# Patient Record
Sex: MALE | Race: WHITE | NOT HISPANIC OR LATINO | Employment: UNEMPLOYED | URBAN - METROPOLITAN AREA
[De-identification: names, ages, dates, MRNs, and addresses within clinical notes are randomized per-mention and may not be internally consistent; named-entity substitution may affect disease eponyms.]

---

## 2024-03-04 ENCOUNTER — APPOINTMENT (EMERGENCY)
Dept: RADIOLOGY | Facility: HOSPITAL | Age: 59
DRG: 136 | End: 2024-03-04
Payer: COMMERCIAL

## 2024-03-04 ENCOUNTER — APPOINTMENT (EMERGENCY)
Dept: CT IMAGING | Facility: HOSPITAL | Age: 59
DRG: 136 | End: 2024-03-04
Payer: COMMERCIAL

## 2024-03-04 ENCOUNTER — HOSPITAL ENCOUNTER (INPATIENT)
Facility: HOSPITAL | Age: 59
LOS: 2 days | Discharge: HOME | DRG: 136 | End: 2024-03-06
Attending: EMERGENCY MEDICINE | Admitting: INTERNAL MEDICINE
Payer: COMMERCIAL

## 2024-03-04 ENCOUNTER — APPOINTMENT (EMERGENCY)
Dept: MRI IMAGING | Facility: HOSPITAL | Age: 59
DRG: 136 | End: 2024-03-04
Payer: COMMERCIAL

## 2024-03-04 DIAGNOSIS — G93.89 BRAIN MASS: Primary | ICD-10-CM

## 2024-03-04 DIAGNOSIS — R91.8 LUNG MASS: ICD-10-CM

## 2024-03-04 PROBLEM — Z85.47 HX OF TESTICULAR CANCER: Status: ACTIVE | Noted: 2024-03-04

## 2024-03-04 PROBLEM — R79.89 ELEVATED TROPONIN: Status: ACTIVE | Noted: 2024-03-04

## 2024-03-04 PROBLEM — M54.9 BACK PAIN: Status: ACTIVE | Noted: 2024-03-04

## 2024-03-04 PROBLEM — J44.9 COPD (CHRONIC OBSTRUCTIVE PULMONARY DISEASE) (HCC): Status: ACTIVE | Noted: 2024-03-04

## 2024-03-04 LAB
2HR DELTA HS TROPONIN: -3 NG/L
4HR DELTA HS TROPONIN: -8 NG/L
ANION GAP SERPL CALCULATED.3IONS-SCNC: 10 MMOL/L
APAP SERPL-MCNC: <2 UG/ML (ref 10–20)
APTT PPP: 38 SECONDS (ref 23–37)
BASE EXCESS BLDA CALC-SCNC: -1 MMOL/L (ref -2–3)
BASOPHILS # BLD AUTO: 0.04 THOUSANDS/ÂΜL (ref 0–0.1)
BASOPHILS NFR BLD AUTO: 1 % (ref 0–1)
BUN SERPL-MCNC: 21 MG/DL (ref 5–25)
CA-I BLD-SCNC: 1.2 MMOL/L (ref 1.12–1.32)
CALCIUM SERPL-MCNC: 9.7 MG/DL (ref 8.4–10.2)
CARDIAC TROPONIN I PNL SERPL HS: 138 NG/L
CARDIAC TROPONIN I PNL SERPL HS: 143 NG/L
CARDIAC TROPONIN I PNL SERPL HS: 146 NG/L
CHLORIDE SERPL-SCNC: 105 MMOL/L (ref 96–108)
CK SERPL-CCNC: 114 U/L (ref 39–308)
CO2 SERPL-SCNC: 22 MMOL/L (ref 21–32)
CREAT SERPL-MCNC: 1.03 MG/DL (ref 0.6–1.3)
D DIMER PPP FEU-MCNC: 0.63 UG/ML FEU
EOSINOPHIL # BLD AUTO: 0.07 THOUSAND/ÂΜL (ref 0–0.61)
EOSINOPHIL NFR BLD AUTO: 1 % (ref 0–6)
ERYTHROCYTE [DISTWIDTH] IN BLOOD BY AUTOMATED COUNT: 12.5 % (ref 11.6–15.1)
ETHANOL SERPL-MCNC: <10 MG/DL
GFR SERPL CREATININE-BSD FRML MDRD: 79 ML/MIN/1.73SQ M
GLUCOSE SERPL-MCNC: 112 MG/DL (ref 65–140)
GLUCOSE SERPL-MCNC: 116 MG/DL (ref 65–140)
HCO3 BLDA-SCNC: 24 MMOL/L (ref 24–30)
HCT VFR BLD AUTO: 40.3 % (ref 36.5–49.3)
HCT VFR BLD CALC: 40 % (ref 36.5–49.3)
HGB BLD-MCNC: 13.2 G/DL (ref 12–17)
HGB BLDA-MCNC: 13.6 G/DL (ref 12–17)
IMM GRANULOCYTES # BLD AUTO: 0.03 THOUSAND/UL (ref 0–0.2)
IMM GRANULOCYTES NFR BLD AUTO: 0 % (ref 0–2)
INR PPP: 1.09 (ref 0.84–1.19)
LYMPHOCYTES # BLD AUTO: 1.54 THOUSANDS/ÂΜL (ref 0.6–4.47)
LYMPHOCYTES NFR BLD AUTO: 19 % (ref 14–44)
MCH RBC QN AUTO: 30.3 PG (ref 26.8–34.3)
MCHC RBC AUTO-ENTMCNC: 32.8 G/DL (ref 31.4–37.4)
MCV RBC AUTO: 92 FL (ref 82–98)
MONOCYTES # BLD AUTO: 0.6 THOUSAND/ÂΜL (ref 0.17–1.22)
MONOCYTES NFR BLD AUTO: 7 % (ref 4–12)
NEUTROPHILS # BLD AUTO: 6.04 THOUSANDS/ÂΜL (ref 1.85–7.62)
NEUTS SEG NFR BLD AUTO: 72 % (ref 43–75)
NRBC BLD AUTO-RTO: 0 /100 WBCS
PCO2 BLD: 25 MMOL/L (ref 21–32)
PCO2 BLD: 40.5 MM HG (ref 42–50)
PH BLD: 7.38 [PH] (ref 7.3–7.4)
PLATELET # BLD AUTO: 296 THOUSANDS/UL (ref 149–390)
PMV BLD AUTO: 10.2 FL (ref 8.9–12.7)
PO2 BLD: 32 MM HG (ref 35–45)
POTASSIUM BLD-SCNC: 3.8 MMOL/L (ref 3.5–5.3)
POTASSIUM SERPL-SCNC: 3.7 MMOL/L (ref 3.5–5.3)
PROTHROMBIN TIME: 14.6 SECONDS (ref 11.6–14.5)
RBC # BLD AUTO: 4.36 MILLION/UL (ref 3.88–5.62)
SALICYLATES SERPL-MCNC: <5 MG/DL (ref 3–20)
SAO2 % BLD FROM PO2: 60 % (ref 60–85)
SODIUM BLD-SCNC: 140 MMOL/L (ref 136–145)
SODIUM SERPL-SCNC: 137 MMOL/L (ref 135–147)
SPECIMEN SOURCE: ABNORMAL
WBC # BLD AUTO: 8.32 THOUSAND/UL (ref 4.31–10.16)

## 2024-03-04 PROCEDURE — 71275 CT ANGIOGRAPHY CHEST: CPT

## 2024-03-04 PROCEDURE — 36415 COLL VENOUS BLD VENIPUNCTURE: CPT | Performed by: EMERGENCY MEDICINE

## 2024-03-04 PROCEDURE — A9585 GADOBUTROL INJECTION: HCPCS | Performed by: EMERGENCY MEDICINE

## 2024-03-04 PROCEDURE — 84132 ASSAY OF SERUM POTASSIUM: CPT

## 2024-03-04 PROCEDURE — 74177 CT ABD & PELVIS W/CONTRAST: CPT

## 2024-03-04 PROCEDURE — 82077 ASSAY SPEC XCP UR&BREATH IA: CPT | Performed by: EMERGENCY MEDICINE

## 2024-03-04 PROCEDURE — 85014 HEMATOCRIT: CPT

## 2024-03-04 PROCEDURE — 80179 DRUG ASSAY SALICYLATE: CPT | Performed by: EMERGENCY MEDICINE

## 2024-03-04 PROCEDURE — 71045 X-RAY EXAM CHEST 1 VIEW: CPT

## 2024-03-04 PROCEDURE — 85610 PROTHROMBIN TIME: CPT | Performed by: PHYSICIAN ASSISTANT

## 2024-03-04 PROCEDURE — 82803 BLOOD GASES ANY COMBINATION: CPT

## 2024-03-04 PROCEDURE — 80143 DRUG ASSAY ACETAMINOPHEN: CPT | Performed by: EMERGENCY MEDICINE

## 2024-03-04 PROCEDURE — 99223 1ST HOSP IP/OBS HIGH 75: CPT | Performed by: INTERNAL MEDICINE

## 2024-03-04 PROCEDURE — 85379 FIBRIN DEGRADATION QUANT: CPT | Performed by: EMERGENCY MEDICINE

## 2024-03-04 PROCEDURE — 96376 TX/PRO/DX INJ SAME DRUG ADON: CPT

## 2024-03-04 PROCEDURE — 82550 ASSAY OF CK (CPK): CPT | Performed by: EMERGENCY MEDICINE

## 2024-03-04 PROCEDURE — 96375 TX/PRO/DX INJ NEW DRUG ADDON: CPT

## 2024-03-04 PROCEDURE — 84295 ASSAY OF SERUM SODIUM: CPT

## 2024-03-04 PROCEDURE — 96374 THER/PROPH/DIAG INJ IV PUSH: CPT

## 2024-03-04 PROCEDURE — 84484 ASSAY OF TROPONIN QUANT: CPT | Performed by: EMERGENCY MEDICINE

## 2024-03-04 PROCEDURE — 70450 CT HEAD/BRAIN W/O DYE: CPT

## 2024-03-04 PROCEDURE — 99291 CRITICAL CARE FIRST HOUR: CPT | Performed by: EMERGENCY MEDICINE

## 2024-03-04 PROCEDURE — 80048 BASIC METABOLIC PNL TOTAL CA: CPT | Performed by: EMERGENCY MEDICINE

## 2024-03-04 PROCEDURE — 85730 THROMBOPLASTIN TIME PARTIAL: CPT | Performed by: PHYSICIAN ASSISTANT

## 2024-03-04 PROCEDURE — 70553 MRI BRAIN STEM W/O & W/DYE: CPT

## 2024-03-04 PROCEDURE — 93005 ELECTROCARDIOGRAM TRACING: CPT

## 2024-03-04 PROCEDURE — 99285 EMERGENCY DEPT VISIT HI MDM: CPT

## 2024-03-04 PROCEDURE — 82947 ASSAY GLUCOSE BLOOD QUANT: CPT

## 2024-03-04 PROCEDURE — 82330 ASSAY OF CALCIUM: CPT

## 2024-03-04 PROCEDURE — 85025 COMPLETE CBC W/AUTO DIFF WBC: CPT | Performed by: EMERGENCY MEDICINE

## 2024-03-04 RX ORDER — MULTIVITAMIN
1 TABLET ORAL DAILY
COMMUNITY

## 2024-03-04 RX ORDER — CHLORHEXIDINE GLUCONATE ORAL RINSE 1.2 MG/ML
15 SOLUTION DENTAL EVERY 12 HOURS SCHEDULED
Status: DISCONTINUED | OUTPATIENT
Start: 2024-03-04 | End: 2024-03-06 | Stop reason: HOSPADM

## 2024-03-04 RX ORDER — GADOBUTROL 604.72 MG/ML
8.5 INJECTION INTRAVENOUS
Status: COMPLETED | OUTPATIENT
Start: 2024-03-04 | End: 2024-03-04

## 2024-03-04 RX ORDER — LEVETIRACETAM 500 MG/5ML
1000 INJECTION, SOLUTION, CONCENTRATE INTRAVENOUS ONCE
Status: COMPLETED | OUTPATIENT
Start: 2024-03-04 | End: 2024-03-04

## 2024-03-04 RX ORDER — LEVETIRACETAM 500 MG/5ML
500 INJECTION, SOLUTION, CONCENTRATE INTRAVENOUS EVERY 12 HOURS SCHEDULED
Status: DISCONTINUED | OUTPATIENT
Start: 2024-03-04 | End: 2024-03-06 | Stop reason: HOSPADM

## 2024-03-04 RX ORDER — DEXAMETHASONE SODIUM PHOSPHATE 4 MG/ML
4 INJECTION, SOLUTION INTRA-ARTICULAR; INTRALESIONAL; INTRAMUSCULAR; INTRAVENOUS; SOFT TISSUE EVERY 6 HOURS SCHEDULED
Status: DISCONTINUED | OUTPATIENT
Start: 2024-03-04 | End: 2024-03-06 | Stop reason: HOSPADM

## 2024-03-04 RX ORDER — DEXAMETHASONE SODIUM PHOSPHATE 10 MG/ML
10 INJECTION, SOLUTION INTRAMUSCULAR; INTRAVENOUS ONCE
Status: COMPLETED | OUTPATIENT
Start: 2024-03-04 | End: 2024-03-04

## 2024-03-04 RX ORDER — CHOLECALCIFEROL (VITAMIN D3) 50 MCG
2000 TABLET ORAL DAILY
COMMUNITY

## 2024-03-04 RX ADMIN — GADOBUTROL 8.5 ML: 604.72 INJECTION INTRAVENOUS at 14:32

## 2024-03-04 RX ADMIN — IOHEXOL 100 ML: 350 INJECTION, SOLUTION INTRAVENOUS at 11:36

## 2024-03-04 RX ADMIN — LEVETIRACETAM 500 MG: 500 INJECTION, SOLUTION INTRAVENOUS at 15:02

## 2024-03-04 RX ADMIN — DEXAMETHASONE SODIUM PHOSPHATE 10 MG: 10 INJECTION, SOLUTION INTRAMUSCULAR; INTRAVENOUS at 11:10

## 2024-03-04 RX ADMIN — LEVETIRACETAM 1000 MG: 500 INJECTION, SOLUTION INTRAVENOUS at 11:10

## 2024-03-04 RX ADMIN — CHLORHEXIDINE GLUCONATE 15 ML: 1.2 SOLUTION ORAL at 21:06

## 2024-03-04 RX ADMIN — DEXAMETHASONE SODIUM PHOSPHATE 4 MG: 4 INJECTION, SOLUTION INTRA-ARTICULAR; INTRALESIONAL; INTRAMUSCULAR; INTRAVENOUS; SOFT TISSUE at 17:07

## 2024-03-04 RX ADMIN — LEVETIRACETAM 500 MG: 500 INJECTION, SOLUTION INTRAVENOUS at 21:06

## 2024-03-04 NOTE — TREATMENT PLAN
Reviewed MRI brain with Dr. Goldberg.  This demonstrates at least 7 brain lesions with surrounding vasogenic edema.  Spoke with critical care team here at [place] , recommend transfer to [facility] for radiation.  Will make my team aware of transfer.  Call with any further questions or concerns.

## 2024-03-04 NOTE — CONSULTS
"e-Consult (IPC)     Consults     Contacted by Williams Oviedo MD.    Nino Pierresusie 58 y.o. male MRN: [id number]  Unit/Bed#: ED 07 Encounter: [id number]    Reason for Consult    Per provider report, patient presents with Confusion. Available past medical history,social history, surgical history, medication list, drug allergies and review of systems were reviewed.    /75 (BP Location: Right arm)   Pulse 65   Temp 97.5 °F (36.4 °C) (Temporal)   Resp 20   Ht 5' 10\" (1.778 m)   Wt 86.5 kg (190 lb 11.2 oz)   SpO2 92%   BMI 27.36 kg/m²      Clinical exam per provider report, confusion, intermittent re-directable.    Imaging personally reviewed. CT head, CTA Chest, MRI brain    Assessment and Recommendations    1. Imaging reviewed by Neurosurg, multiple brain lesion, with one right cerebellum lesion with small hemorrhage: recs [facility] for radiation   2. Consult Rad onc  3. dexamethasone  4. Keppra since one of the lesion and edema int e left temporal lobe  No need for SLB at this time. Transfer to [facility] for radiation evaluation.   Please call with any questions or concerns.     All questions answered. Provider is in agreement with the course of action. 31 + minutes, >50% of the total time devoted to medical consultative verbal/EMR discussion between providers. Written report will be generated in the EMR.   "
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
## 2024-03-04 NOTE — ASSESSMENT & PLAN NOTE
Intermittent confusion, headache, and aphasia worse with exertion since January  3/4/23 MRI brain: Multiple rim enhancing supra and infratentorial lesions some of which demonstrate internal hemorrhage, may represent cystic metastatic disease given findings within the chest. There is no associated increased ASL signal or definite increased cerebral blood flow and primary differential although less likely given nodular peripheral enhancement and lack of central restricted diffusion includes infection (including toxoplasmosis in the setting of immunocompromised state). 3 mm rightward midline shift, stable compared to most recent head CT.  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

## 2024-03-04 NOTE — H&P
[facility]  H&P  Name: Nino Silva 58 y.o. male I MRN: [id number]  Unit/Bed#: -01 SDU I Date of Admission: 3/4/2024   Date of Service: 3/4/2024 I Hospital Day: 0      Assessment/Plan   * Brain mass  Assessment & Plan  Intermittent confusion, headache, and aphasia worse with exertion since January  3/4/23 MRI brain: Multiple rim enhancing supra and infratentorial lesions some of which demonstrate internal hemorrhage, may represent cystic metastatic disease given findings within the chest. There is no associated increased ASL signal or definite increased cerebral blood flow and primary differential although less likely given nodular peripheral enhancement and lack of central restricted diffusion includes infection (including toxoplasmosis in the setting of immunocompromised state). 3 mm rightward midline shift, stable compared to most recent head CT.  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 Prominent right perihilar pulmonary mass resulting in cut off of a segmental right middle lobe pulmonary artery branch with remaining pulmonary arteries appearing unremarkable without filling defect to indicate PE. Additional separate right upper lobe mass versus bilobed component raising suspicion for pulmonary metastatic disease. Mediastinal and hilar adenopathy also suspicious for metastatic disease. Right greater than left iliac chain and periaortic beata disease also concerning for metastases. 2. Chronic pancreatitis. 3. COPD and associated pulmonary fibrotic changes noted as above.  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 male with past medical history significant for left-sided testicular cancer status post resection and chemotherapy in the early 90s and tobacco abuse who presented to the ED with complaints of lightheadedness and disorientation over the past 2 months.  Patient states that he developed what he thought was bronchitis in January with episodes of intermittent hemoptysis.  The symptoms resolved and he thought he was getting better so he did not seek treatment.  Those symptoms returned again in February and he was given a Z-Sav by his PCP for a presumed bronchitis.  He noted that he was having headaches and confusion when he would cough but again the symptoms resolved on their own.  Since then he has had intermittent episodes of \"disorientation\" worse with exertion.  His wife states that at times he has difficulty finding his words.  He denies any changes in vision, numbness, tingling, difficulties with ambulation.  He also admits to only being able to lay on his left side due to shortness of breath when laying on his right side.  In the ED, his CT head showed multiple cortical brain lesions with vasogenic edema concerning for metastatic disease.  This was discussed with neurocritical care at [place] and neurosurgery who recommended MRI brain and CTA chest abdomen pelvis which showed what is likely a primary lung malignancy with metastasis to the brain.  Due to vasogenic edema is recommended the patient be started on Decadron, Keppra for seizure prophylaxis, and have a consult " placed to radiation oncology for radiation to the brain.  He will be admitted to critical care service for further management.    History obtained from chart review and the patient.  Review of Systems   Constitutional:  Negative for chills, fatigue and fever.   Respiratory:  Positive for shortness of breath.    Cardiovascular:  Negative for chest pain.   Gastrointestinal:  Negative for abdominal distention, constipation, diarrhea and nausea.   Neurological:  Positive for speech difficulty and headaches. Negative for tremors, seizures, weakness, light-headedness and numbness.   Psychiatric/Behavioral:  Positive for confusion.    All other systems reviewed and are negative.    Disposition: Stepdown Level 1  Historical Information   No past medical history on file. Past Surgical History:  No date: HERNIA REPAIR   Current Outpatient Medications   Medication Instructions    Multiple Vitamin (Multi Vitamin Daily) TABS 1 tablet, Oral, Daily    Vitamin D 2,000 Units, Oral, Daily    No Known Allergies   Social History     Tobacco Use    Smoking status: Never    Smokeless tobacco: Never   Substance Use Topics    Alcohol use: Not Currently    Drug use: Not Currently    History reviewed. No pertinent family history.       Objective                            Vitals I/O      Most Recent Min/Max in 24hrs   Temp 98.8 °F (37.1 °C) Temp  Min: 97.5 °F (36.4 °C)  Max: 98.8 °F (37.1 °C)   Pulse 78 Pulse  Min: 62  Max: 80   Resp (!) 25 Resp  Min: 18  Max: 25   /82 BP  Min: 115/69  Max: 161/86   O2 Sat 95 % SpO2  Min: 92 %  Max: 95 %    No intake or output data in the 24 hours ending 03/04/24 1843    Diet NPO    Invasive Monitoring           Physical Exam   Physical Exam  Vitals reviewed.   Eyes:      Extraocular Movements: Extraocular movements intact.      Pupils: Pupils are equal, round, and reactive to light.   Skin:     General: Skin is warm and dry.   HENT:      Head: Normocephalic and atraumatic.      Mouth/Throat:       Mouth: Mucous membranes are moist.   Cardiovascular:      Rate and Rhythm: Normal rate and regular rhythm.      Heart sounds: No murmur heard.     No friction rub. No gallop.   Musculoskeletal:      Right lower leg: No edema.      Left lower leg: No edema.   Abdominal: General: There is no distension.      Palpations: Abdomen is soft.      Tenderness: There is no abdominal tenderness.   Constitutional:       General: He is not in acute distress.     Appearance: He is well-developed and well-nourished. He is not ill-appearing or toxic-appearing.   Pulmonary:      Effort: Pulmonary effort is normal.      Breath sounds: Wheezing present. No rhonchi or rales.   Neurological:      General: No focal deficit present.      Mental Status: He is alert and oriented to person, place and time. Mental status is at baseline.      Motor: Strength full and intact in all extremities.            Diagnostic Studies      EKG: NSR  Imaging:  I have personally reviewed pertinent reports.       Medications:  Scheduled PRN   chlorhexidine, 15 mL, Q12H JESUS  dexamethasone, 4 mg, Q6H JESUS  levETIRAcetam, 500 mg, Q12H JESUS          Continuous          Labs:    CBC    Recent Labs     03/04/24  1008 03/04/24  1014   WBC 8.32  --    HGB 13.2 13.6   HCT 40.3 40     --      BMP    Recent Labs     03/04/24  1008 03/04/24  1014   SODIUM 137  --    K 3.7  --      --    CO2 22 25   AGAP 10  --    BUN 21  --    CREATININE 1.03  --    CALCIUM 9.7  --        Coags    No recent results     Additional Electrolytes  Recent Labs     03/04/24  1014   CAIONIZED 1.20          Blood Gas    No recent results  No recent results LFTs  No recent results    Infectious  No recent results  Glucose  Recent Labs     03/04/24  1008   GLUC 112             Anticipated Length of Stay is > 2 midnights  Jamila Adames PA-C

## 2024-03-04 NOTE — PLAN OF CARE
Problem: PAIN - ADULT  Goal: Verbalizes/displays adequate comfort level or baseline comfort level  Description: Interventions:  - Encourage patient to monitor pain and request assistance  - Assess pain using appropriate pain scale  - Administer analgesics based on type and severity of pain and evaluate response  - Implement non-pharmacological measures as appropriate and evaluate response  - Consider cultural and social influences on pain and pain management  - Notify physician/advanced practitioner if interventions unsuccessful or patient reports new pain  Outcome: Progressing     Problem: INFECTION - ADULT  Goal: Absence or prevention of progression during hospitalization  Description: INTERVENTIONS:  - Assess and monitor for signs and symptoms of infection  - Monitor lab/diagnostic results  - Monitor all insertion sites, i.e. indwelling lines, tubes, and drains  - Monitor endotracheal if appropriate and nasal secretions for changes in amount and color  - [place] appropriate cooling/warming therapies per order  - Administer medications as ordered  - Instruct and encourage patient and family to use good hand hygiene technique  - Identify and instruct in appropriate isolation precautions for identified infection/condition  Outcome: Progressing  Goal: Absence of fever/infection during neutropenic period  Description: INTERVENTIONS:  - Monitor WBC    Outcome: Progressing     Problem: SAFETY ADULT  Goal: Patient will remain free of falls  Description: INTERVENTIONS:  - Educate patient/family on patient safety including physical limitations  - Instruct patient to call for assistance with activity   - Consult OT/PT to assist with strengthening/mobility   - Keep Call bell within reach  - Keep bed low and locked with side rails adjusted as appropriate  - Keep care items and personal belongings within reach  - Initiate and maintain comfort rounds  - Make Fall Risk Sign visible to staff  - Offer Toileting every 2 Hours,  in advance of need  - Initiate/Maintain bed alarm  - Obtain necessary fall risk management equipment:  Problem: Knowledge Deficit  Goal: Patient/family/caregiver demonstrates understanding of disease process, treatment plan, medications, and discharge instructions  Description: Complete learning assessment and assess knowledge base.  Interventions:  - Provide teaching at level of understanding  - Provide teaching via preferred learning methods  Outcome: Progressing   - Apply yellow socks and bracelet for high fall risk patients  - Consider moving patient to room near nurses station  Outcome: Progressing  Goal: Maintain or return to baseline ADL function  Description: INTERVENTIONS:  -  Assess patient's ability to carry out ADLs; assess patient's baseline for ADL function and identify physical deficits which impact ability to perform ADLs (bathing, care of mouth/teeth, toileting, grooming, dressing, etc.)  - Assess/evaluate cause of self-care deficits   - Assess range of motion  - Assess patient's mobility; develop plan if impaired  - Assess patient's need for assistive devices and provide as appropriate  - Encourage maximum independence but intervene and supervise when necessary  - Involve family in performance of ADLs  - Assess for home care needs following discharge   - Consider OT consult to assist with ADL evaluation and planning for discharge  - Provide patient education as appropriate  Outcome: Progressing  Goal: Maintains/Returns to pre admission functional level  Description: INTERVENTIONS:  - Perform AM-PAC 6 Click Basic Mobility/ Daily Activity assessment daily.  - Set and communicate daily mobility goal to care team and patient/family/caregiver.   - Collaborate with rehabilitation services on mobility goals if consulted    - Out of bed for toileting  - Record patient progress and toleration of activity level   Outcome: Progressing

## 2024-03-04 NOTE — ED NOTES
Pt's belongings at bedside.      Bonita Garcia RN  03/04/24 1222       Bonita Garcia RN  03/04/24 1237

## 2024-03-04 NOTE — TELEMEDICINE
"e-Consult (IPC)         Contacted by Williams O[place]o via TT at 1049    Nino Silva 58 y.o. male MRN: [id number]  Unit/Bed#: ED 07 Encounter: [id number]    Reason for Consult    Per provider report, patient presented with a few days/weeks of worsening confusion.  History of testicular cancer 30 years ago.    Available past medical history,social history, surgical history, medication list, drug allergies and review of systems were reviewed.    /86 (BP Location: Left arm)   Pulse 71   Temp 97.5 °F (36.4 °C) (Temporal)   Resp 18   Ht 5' 10\" (1.778 m)   Wt 86.5 kg (190 lb 11.2 oz)   SpO2 92%   BMI 27.36 kg/m²      Clinical exam per provider report, neuro intact    Imaging personally reviewed.  CT head demonstrates multiple cortical brain masses with associated vasogenic edema with midline shift.    Assessment and Recommendations    Continue frequent neurological checks.   STAT CT head with any neurological decline including drop GCS of 2pts within 1 hr.  Recommend MRI brain with and without contrast  Recommend CT chest abdomen and pelvis to rule out other possible underlying lesions  Recommend Decadron 10 mg one-time followed by 4 mg every 6 hours  Recommend Keppra  Hold all antiplatelet and anticoagulation medications.   Medical management and pain control per primary team  Recommend further discussion with neuro ICU in regards to transferring to [place]  Full consult to follow once transferred, contact neurosurgery with any further questions or concerns.    All questions answered. Provider is in agreement with the course of action. 11-20 minutes, >50% of the total time devoted to medical consultative verbal/EMR discussion between providers. Written report will be generated in the EMR.   "

## 2024-03-04 NOTE — ASSESSMENT & PLAN NOTE
3/4 CT CAP: 1. Prominent right perihilar pulmonary mass resulting in cut off of a segmental right middle lobe pulmonary artery branch with remaining pulmonary arteries appearing unremarkable without filling defect to indicate PE. Additional separate right upper lobe mass versus bilobed component raising suspicion for pulmonary metastatic disease. Mediastinal and hilar adenopathy also suspicious for metastatic disease. Right greater than left iliac chain and periaortic beata disease also concerning for metastases. 2. Chronic pancreatitis. 3. COPD and associated pulmonary fibrotic changes noted as above.  Hx of tobacco abuse, no longer smoking  Plan for IR biopsy  Currently on room air

## 2024-03-05 ENCOUNTER — APPOINTMENT (INPATIENT)
Dept: RADIOLOGY | Facility: HOSPITAL | Age: 59
DRG: 136 | End: 2024-03-05
Payer: COMMERCIAL

## 2024-03-05 ENCOUNTER — APPOINTMENT (INPATIENT)
Dept: CT IMAGING | Facility: HOSPITAL | Age: 59
DRG: 136 | End: 2024-03-05
Attending: RADIOLOGY
Payer: COMMERCIAL

## 2024-03-05 ENCOUNTER — TELEPHONE (OUTPATIENT)
Dept: HEMATOLOGY ONCOLOGY | Facility: CLINIC | Age: 59
End: 2024-03-05

## 2024-03-05 PROCEDURE — 77263 THER RADIOLOGY TX PLNG CPLX: CPT | Performed by: RADIOLOGY

## 2024-03-05 PROCEDURE — 71045 X-RAY EXAM CHEST 1 VIEW: CPT

## 2024-03-05 PROCEDURE — 77334 RADIATION TREATMENT AID(S): CPT | Performed by: RADIOLOGY

## 2024-03-05 PROCEDURE — 99232 SBSQ HOSP IP/OBS MODERATE 35: CPT | Performed by: INTERNAL MEDICINE

## 2024-03-05 PROCEDURE — 0BBC3ZX EXCISION OF RIGHT UPPER LUNG LOBE, PERCUTANEOUS APPROACH, DIAGNOSTIC: ICD-10-PCS | Performed by: RADIOLOGY

## 2024-03-05 PROCEDURE — 99255 IP/OBS CONSLTJ NEW/EST HI 80: CPT | Performed by: RADIOLOGY

## 2024-03-05 PROCEDURE — 77290 THER RAD SIMULAJ FIELD CPLX: CPT | Performed by: RADIOLOGY

## 2024-03-05 PROCEDURE — 88360 TUMOR IMMUNOHISTOCHEM/MANUAL: CPT | Performed by: PATHOLOGY

## 2024-03-05 PROCEDURE — 88305 TISSUE EXAM BY PATHOLOGIST: CPT | Performed by: PATHOLOGY

## 2024-03-05 PROCEDURE — 88333 PATH CONSLTJ SURG CYTO XM 1: CPT | Performed by: PATHOLOGY

## 2024-03-05 PROCEDURE — 88342 IMHCHEM/IMCYTCHM 1ST ANTB: CPT | Performed by: PATHOLOGY

## 2024-03-05 PROCEDURE — 88341 IMHCHEM/IMCYTCHM EA ADD ANTB: CPT | Performed by: PATHOLOGY

## 2024-03-05 RX ORDER — LIDOCAINE HYDROCHLORIDE 10 MG/ML
INJECTION, SOLUTION EPIDURAL; INFILTRATION; INTRACAUDAL; PERINEURAL AS NEEDED
Status: COMPLETED | OUTPATIENT
Start: 2024-03-05 | End: 2024-03-05

## 2024-03-05 RX ORDER — MIDAZOLAM HYDROCHLORIDE 2 MG/2ML
INJECTION, SOLUTION INTRAMUSCULAR; INTRAVENOUS AS NEEDED
Status: COMPLETED | OUTPATIENT
Start: 2024-03-05 | End: 2024-03-05

## 2024-03-05 RX ORDER — FENTANYL CITRATE 50 UG/ML
INJECTION, SOLUTION INTRAMUSCULAR; INTRAVENOUS AS NEEDED
Status: COMPLETED | OUTPATIENT
Start: 2024-03-05 | End: 2024-03-05

## 2024-03-05 RX ADMIN — CHLORHEXIDINE GLUCONATE 15 ML: 1.2 SOLUTION ORAL at 21:46

## 2024-03-05 RX ADMIN — DEXAMETHASONE SODIUM PHOSPHATE 4 MG: 4 INJECTION, SOLUTION INTRA-ARTICULAR; INTRALESIONAL; INTRAMUSCULAR; INTRAVENOUS; SOFT TISSUE at 12:54

## 2024-03-05 RX ADMIN — MIDAZOLAM 2 MG: 1 INJECTION INTRAMUSCULAR; INTRAVENOUS at 11:57

## 2024-03-05 RX ADMIN — FENTANYL CITRATE 100 MCG: 50 INJECTION, SOLUTION INTRAMUSCULAR; INTRAVENOUS at 11:57

## 2024-03-05 RX ADMIN — LIDOCAINE HYDROCHLORIDE 5 ML: 10 INJECTION, SOLUTION EPIDURAL; INFILTRATION; INTRACAUDAL; PERINEURAL at 11:57

## 2024-03-05 RX ADMIN — LEVETIRACETAM 500 MG: 500 INJECTION, SOLUTION INTRAVENOUS at 21:46

## 2024-03-05 RX ADMIN — DEXAMETHASONE SODIUM PHOSPHATE 4 MG: 4 INJECTION, SOLUTION INTRA-ARTICULAR; INTRALESIONAL; INTRAMUSCULAR; INTRAVENOUS; SOFT TISSUE at 00:00

## 2024-03-05 RX ADMIN — DEXAMETHASONE SODIUM PHOSPHATE 4 MG: 4 INJECTION, SOLUTION INTRA-ARTICULAR; INTRALESIONAL; INTRAMUSCULAR; INTRAVENOUS; SOFT TISSUE at 17:46

## 2024-03-05 RX ADMIN — LEVETIRACETAM 500 MG: 500 INJECTION, SOLUTION INTRAVENOUS at 08:55

## 2024-03-05 RX ADMIN — DEXAMETHASONE SODIUM PHOSPHATE 4 MG: 4 INJECTION, SOLUTION INTRA-ARTICULAR; INTRALESIONAL; INTRAMUSCULAR; INTRAVENOUS; SOFT TISSUE at 05:08

## 2024-03-05 RX ADMIN — CHLORHEXIDINE GLUCONATE 15 ML: 1.2 SOLUTION ORAL at 08:55

## 2024-03-05 NOTE — PROGRESS NOTES
Pastoral Care Progress Note    3/5/2024  Patient: Nino Silva : 1965  Admission Date & Time: 3/4/2024 0950  MRN: [id number] [facility]: [id number]       24 1200   Clinical Encounter Type   Visited With Patient;Family   Routine Visit Introduction   Referral From Verbal;Nurse   Referral To    Anglican Encounters   Anglican Needs Prayer        Intern visited with pt and family based on nurse referral.  Intern introduced themself to the pt and asked how he was doing. Pt appeared in positive spirits and stated that he was doing well. Pt identified his demetra and his family as sources of strength and support as he is awaiting his test results.  Intern also provided a supportive presence to the family. Prayer was provided before leaving and the pt was informed on how to contact chaplains if needed. Spiritual care remains available.             Chaplaincy Interventions Utilized:   Exploration: Explored spiritual needs & resources and Facilitated story telling    Collaboration: Facilitated respect for spiritual/cultural practice during hospitalization    Relationship Building: Cultivated a relationship of care and support, Listened empathically, Hospitality, and Provided silent and supportive presence    Ritual: Provided prayer    Chaplaincy Outcomes Achieved:  Expressed peace, Identified meaningful connections, Identified priorities, and Spiritual resources utilized    Spiritual Coping Strategies Utilized:   Spiritual practices and Spiritual comfort

## 2024-03-05 NOTE — TELEPHONE ENCOUNTER
"Soft Intake Form   Patient Details   Nino Silva     1965     Reason For Appointment   Who is Calling? Mary Malloyinley   If not patient, Name?  NA   DID YOU CONFIRM INSURANCE WITH PATIENT? MARLENE BENJAMIN Pending, Routed to finance   Who is the Referring Doctor? MARCOS Dong   What is the diagnosis? Lung mass, Brain mass   Has this diagnosis been confirmed by a biopsy/surgery?    If yes, what is the date it was done? Lung Bx taken on 3/5   Biopsy done at [facility]?  If not, where was it done? Yes   Was imaging done, and was it done at [facility]?  If not, where was it done? Yes-[facility]   Have you been seen by another Oncologist?  If so, who and where (name of facility, city and state) No   For 2nd Opinions Only: Are you currently undergoing treatment, or are you scheduled to start treatment?  If yes, name of facility, city and state  Patient will be starting WBRT on 3/6 per Rad Onc note.   For \"History Of\" only: Have you completed treatment?  No   Have you had Genetic Testing done in the past?  If so, advise to bring test results to their visit No   Record Gathering Information   Did you advise to have records faxed to [phone]?  NA   Did you advise to have disks sent to the proper address with imaging? (\"History of\" Patients)  5 years of imaging for breast patients-Mammos, US etc NA   Scheduling Information   Did you send new patient paperwork?  Email or mail? NA   What is the best call back number?   (If the [facility] is calling, please use their number) NA   Miscellaneous Information      The patient is scheduled for his HFU appointment with Dr. Morrell on 3/20 at 0900 in the [facility].      "

## 2024-03-05 NOTE — DISCHARGE INSTRUCTIONS
Needle Biopsy of the Lung    WHAT YOU NEED TO KNOW:  A needle biopsy of the lung is a procedure to remove cells or tissue from your lung. You may have a fine needle aspiration biopsy (FNAB), or a core needle biopsy (CNB). A FNAB is used to remove cells through a thin needle. CNB uses a thicker needle to remove lung tissue. The samples are collected and tested for inflammation, infection, or cancer.     DISCHARGE INSTRUCTIONS:   Resume your normal diet. Small sips of flat soda will help with nausea. Limit your activity for 24 hours.    Wound Care:      - Remove band aid in 24 hours.    Contact Interventional Radiology at [phone] ([place] PATIENTS: Contact Interventional Radiology at [phone]) ([place] PATIENTS: Contact Interventional Radiology at [phone]) if any of the following occur:    - You have a fever greater than 101*    - You cough up large amounts of bright red blood     - You have chest pain with breathing    - You have shortness of breath    -You have persistent nausea and vomiting    - You have pus, redness or swelling around your biopsy site    - You have questions or concerns about your condition or care            Needle Biopsy of the Lung    WHAT YOU NEED TO KNOW:  A needle biopsy of the lung is a procedure to remove cells or tissue from your lung. You may have a fine needle aspiration biopsy (FNAB), or a core needle biopsy (CNB). A FNAB is used to remove cells through a thin needle. CNB uses a thicker needle to remove lung tissue. The samples are collected and tested for inflammation, infection, or cancer.     DISCHARGE INSTRUCTIONS:   Resume your normal diet. Small sips of flat soda will help with nausea. Limit your activity for 24 hours.    Wound Care:      - Remove band aid in 24 hours.    Contact Interventional Radiology at [phone] ([place] PATIENTS: Contact Interventional Radiology at [phone]) ([place] PATIENTS: Contact Interventional Radiology at [phone]) if  any of the following occur:    - You have a fever greater than 101*    - You cough up large amounts of bright red blood     - You have chest pain with breathing    - You have shortness of breath    -You have persistent nausea and vomiting    - You have pus, redness or swelling around your biopsy site    - You have questions or concerns about your condition or care

## 2024-03-05 NOTE — CONSULTS
e-Consult (IPC)  - Interventional Radiology  Nino Silva 58 y.o. male MRN: [id number]  Unit/Bed#: -01 SDU Encounter: [id number]          Interventional Radiology has been consulted to evaluate Nino Silva    We were consulted by Dr Navarrete concerning this patient with metastatic cancer.    Inpatient Consult to IR  Consult performed by: Duke Flowers MD  Consult ordered by: Jamila Adames PA-C        03/05/24    Assessment/Recommendation:   58 yr old male with metastatic lesions in brain and right lung mass .Request for right lung mass biopsy. CT from 03/04/24 shows the right upper anterior lung mass accessible for CT guided biopsy.  This will be coordinated with CT.    5-10 minutes, >50% of the total time devoted to medical consultative verbal/EMR discussion between providers. Written report will be generated in the EMR.     Thank you for allowing Interventional Radiology to participate in the care of Nino Silva. Please don't hesitate to call or TigerText us with any questions.     Duke Flowers MD

## 2024-03-05 NOTE — QUICK NOTE
Radiation Oncology Simulation Note     A CT simulation was done today for treatment planning purposes for whole brain radiation. Patient will return for first radiation treatment 3/6 @ 12:30.    KG
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
## 2024-03-05 NOTE — PLAN OF CARE
Problem: PAIN - ADULT  Goal: Verbalizes/displays adequate comfort level or baseline comfort level  Description: Interventions:  - Encourage patient to monitor pain and request assistance  - Assess pain using appropriate pain scale  - Administer analgesics based on type and severity of pain and evaluate response  - Implement non-pharmacological measures as appropriate and evaluate response  - Consider cultural and social influences on pain and pain management  - Notify physician/advanced practitioner if interventions unsuccessful or patient reports new pain  Outcome: Progressing     Problem: INFECTION - ADULT  Goal: Absence or prevention of progression during hospitalization  Description: INTERVENTIONS:  - Assess and monitor for signs and symptoms of infection  - Monitor lab/diagnostic results  - Monitor all insertion sites, i.e. indwelling lines, tubes, and drains  - Monitor endotracheal if appropriate and nasal secretions for changes in amount and color  - [place] appropriate cooling/warming therapies per order  - Administer medications as ordered  - Instruct and encourage patient and family to use good hand hygiene technique  - Identify and instruct in appropriate isolation precautions for identified infection/condition  Outcome: Progressing  Goal: Absence of fever/infection during neutropenic period  Description: INTERVENTIONS:  - Monitor WBC    Outcome: Progressing     Problem: SAFETY ADULT  Goal: Patient will remain free of falls  Description: INTERVENTIONS:  - Educate patient/family on patient safety including physical limitations  - Instruct patient to call for assistance with activity   - Consult OT/PT to assist with strengthening/mobility   - Keep Call bell within reach  - Keep bed low and locked with side rails adjusted as appropriate  - Keep care items and personal belongings within reach  - Initiate and maintain comfort rounds  - Make Fall Risk Sign visible to staff  - Offer Toileting every 2 Hours,  in advance of need  - Obtain necessary fall risk management equipment  - Apply yellow socks and bracelet for high fall risk patients  - Consider moving patient to room near nurses station  Outcome: Progressing  Goal: Maintain or return to baseline ADL function  Description: INTERVENTIONS:  -  Assess patient's ability to carry out ADLs; assess patient's baseline for ADL function and identify physical deficits which impact ability to perform ADLs (bathing, care of mouth/teeth, toileting, grooming, dressing, etc.)  - Assess/evaluate cause of self-care deficits   - Assess range of motion  - Assess patient's mobility; develop plan if impaired  - Assess patient's need for assistive devices and provide as appropriate  - Encourage maximum independence but intervene and supervise when necessary  - Involve family in performance of ADLs  - Assess for home care needs following discharge   - Consider OT consult to assist with ADL evaluation and planning for discharge  - Provide patient education as appropriate  Outcome: Progressing  Goal: Maintains/Returns to pre admission functional level  Description: INTERVENTIONS:  - Perform AM-PAC 6 Click Basic Mobility/ Daily Activity assessment daily.  - Set and communicate daily mobility goal to care team and patient/family/caregiver.   - Collaborate with rehabilitation services on mobility goals if consulted  - Perform Range of Motion 4 times a day.  - Reposition patient every 2 hours.  - Dangle patient 3 times a day  - Stand patient 3 times a day  - Ambulate patient 3 times a day  - Out of bed to chair 3 times a day   - Out of bed for meals 3 times a day  - Out of bed for toileting  - Record patient progress and toleration of activity level   Outcome: Progressing     Problem: DISCHARGE PLANNING  Goal: Discharge to home or other facility with appropriate resources  Description: INTERVENTIONS:  - Identify barriers to discharge w/patient and caregiver  - Arrange for needed discharge  resources and transportation as appropriate  - Identify discharge learning needs (meds, wound care, etc.)  - Arrange for interpretive services to assist at discharge as needed  - Refer to Case Management Department for coordinating discharge planning if the patient needs post-hospital services based on physician/advanced practitioner order or complex needs related to functional status, cognitive ability, or social support system  Outcome: Progressing     Problem: Knowledge Deficit  Goal: Patient/family/caregiver demonstrates understanding of disease process, treatment plan, medications, and discharge instructions  Description: Complete learning assessment and assess knowledge base.  Interventions:  - Provide teaching at level of understanding  - Provide teaching via preferred learning methods  Outcome: Progressing     Problem: NEUROSENSORY - ADULT  Goal: Achieves stable or improved neurological status  Description: INTERVENTIONS  - Monitor and report changes in neurological status  - Monitor vital signs such as temperature, blood pressure, glucose, and any other labs ordered   - Initiate measures to prevent increased intracranial pressure  - Monitor for seizure activity and implement precautions if appropriate      Outcome: Progressing  Goal: Remains free of injury related to seizures activity  Description: INTERVENTIONS  - Maintain airway, patient safety  and administer oxygen as ordered  - Monitor patient for seizure activity, document and report duration and description of seizure to physician/advanced practitioner  - If seizure occurs,  ensure patient safety during seizure  - Reorient patient post seizure  - Seizure pads on all 4 side rails  - Instruct patient/family to notify RN of any seizure activity including if an aura is experienced  - Instruct patient/family to call for assistance with activity based on nursing assessment  - Administer anti-seizure medications if ordered    Outcome: Progressing  Goal:  Achieves maximal functionality and self care  Description: INTERVENTIONS  - Monitor swallowing and airway patency with patient fatigue and changes in neurological status  - Encourage and assist patient to increase activity and self care.   - Encourage visually impaired, hearing impaired and aphasic patients to use assistive/communication devices  Outcome: Progressing
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
## 2024-03-05 NOTE — PROGRESS NOTES
Patient post right apical lung mass biopsy with trace right apical pneumothorax slightly improved from time of biopsy. Patient stable and comfortable with 2 lts O2 via Nasal cannula.  Discussed with patient and family and Dr Navarrete.  Plan for f/u CXR in AM tomorrow.

## 2024-03-05 NOTE — BRIEF OP NOTE (RAD/CATH)
INTERVENTIONAL RADIOLOGY PROCEDURE NOTE    Date: 3/5/2024    Procedure: Right lung mass biopsy  Procedure Summary       Date:  Room / Location:     Anesthesia Start:  Anesthesia Stop:     Procedure:  Diagnosis:     Scheduled Providers:  Responsible Provider:     Anesthesia Type: Not recorded ASA Status: Not recorded            Preoperative diagnosis:   1. Brain mass    2. Lung mass         Postoperative diagnosis: Same.    Surgeon: Duke Flowers MD     Assistant: None. No qualified resident was available.    Blood loss: None    Specimens: Right lung mass      Findings: CT guided 18 G core biopsy of right upper anterior lung mass x 4.    Complications: None immediate.    Anesthesia: conscious sedation

## 2024-03-05 NOTE — CASE MANAGEMENT
Case Management Assessment & Discharge Planning Note    Patient name Nino Silva  Location  SDU/-01 S* MRN [id number]  : 1965 Date 3/5/2024       Current Admission Date: 3/4/2024  Current Admission Diagnosis:Brain mass   Patient Active Problem List    Diagnosis Date Noted    Back pain 2024    Brain mass 2024    Lung mass 2024    Elevated troponin 2024    COPD (chronic obstructive pulmonary disease) (HCC) 2024    Hx of testicular cancer 2024      LOS (days): 1  Geometric Mean LOS (GMLOS) (days):   Days to GMLOS:     OBJECTIVE:    Risk of Unplanned Readmission Score: 7.36         Current admission status: Inpatient       Preferred Pharmacy:   [id number] [place] - [address]  [address] [place] [address]  Phone: [phone] Fax: [phone]    Primary Care Provider: No primary care provider on file.    Primary Insurance: [place] [employer] PENDING  Secondary Insurance:     ASSESSMENT:  Active Health Care Proxies    There are no active Health Care Proxies on file.       Advance Directives  Does patient have a Health Care POA?: No  Was patient offered paperwork?: Yes (Declined)  Does patient currently have a Health Care decision maker?: No  Does patient have Advance Directives?: No  Was patient offered paperwork?: Yes (Declined)  Primary Contact: Charlotte Vides: wife: [phone]         Readmission Root Cause  30 Day Readmission: No    Patient Information  Admitted from:: Home  Mental Status: Alert  During Assessment patient was accompanied by: Not accompanied during assessment  Assessment information provided by:: Patient  Primary Caregiver: Self  Support Systems: Self, Spouse/significant other, Children  County of Residence: [place]  What city do you live in?: [place]  Type of Current Residence: Apartment  Floor Level: 1  Upon entering residence, is there a bedroom on the main floor (no further steps)?: Yes  Upon  entering residence, is there a bathroom on the main floor (no further steps)?: Yes  Living Arrangements: Lives w/ Spouse/significant other, Lives w/ Daughter  Is patient a ?: No    Activities of Daily Living Prior to Admission  Functional Status: Independent  Completes ADLs independently?: Yes  Ambulates independently?: Yes  Does patient use assisted devices?: No  Does patient currently own DME?: No  Does patient have a history of Outpatient Therapy (PT/OT)?: No  Does the patient have a history of Short-Term Rehab?: No  Does patient have a history of HHC?: No  Does patient currently have HHC?: No         Patient Information Continued  Income Source: Unknown  Does patient have prescription coverage?: No  Does patient receive dialysis treatments?: No  Does patient have a history of substance abuse?: No  Does patient have a history of Mental Health Diagnosis?: No         Means of Transportation  Means of Transport to Appts:: Drives Self      Social Determinants of Health (SDOH)      Flowsheet Row Most Recent Value   Housing Stability    In the last 12 months, was there a time when you were not able to pay the mortgage or rent on time? N   In the last 12 months, how many places have you lived? 1   In the last 12 months, was there a time when you did not have a steady place to sleep or slept in a shelter (including now)? N   Transportation Needs    In the past 12 months, has lack of transportation kept you from medical appointments or from getting medications? no   In the past 12 months, has lack of transportation kept you from meetings, work, or from getting things needed for daily living? No   Food Insecurity    Within the past 12 months, you worried that your food would run out before you got the money to buy more. Never true   Within the past 12 months, the food you bought just didn't last and you didn't have money to get more. Never true   Utilities    In the past 12 months has the electric, gas, oil, or  water company threatened to shut off services in your home? No            DISCHARGE DETAILS:    Additional Comments: Met with Pt. Pt presents AA&Ox3. Discussed role of case management. Pt lives with wife and dtr in 1st floor apartment. Independent PTA. NO DME. Denies hx of VNA/SNF/MH/D&A. Pt reports he uses [facility] in [place]. Pt reports he does not have health care insurance and met with PATHS rep to fill out paperwork. Pt reports he does not have PCP and agreeable for the [facility]'s general information for assistance in finding PCP-added to Pt's follow up providers. Pt reports plan to return home and his wife will transport home. CM to follow.

## 2024-03-05 NOTE — PROGRESS NOTES
[facility]  Progress Note  Name: Nino Silva I  MRN: [id number]  Unit/Bed#: -01 SDU I Date of Admission: 3/4/2024   Date of Service: 3/5/2024 I Hospital Day: 1    Assessment/Plan   * Brain mass  Assessment & Plan  Intermittent confusion, headache, and aphasia worse with exertion since January  3/4/23 MRI brain: Multiple rim enhancing supra and infratentorial lesions some of which demonstrate internal hemorrhage, may represent cystic metastatic disease given findings within the chest. There is no associated increased ASL signal or definite increased cerebral blood flow and primary differential although less likely given nodular peripheral enhancement and lack of central restricted diffusion includes infection (including toxoplasmosis in the setting of immunocompromised state). 3 mm rightward midline shift, stable compared to most recent head CT.  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 Prominent right perihilar pulmonary mass resulting in cut off of a segmental right middle lobe pulmonary artery branch with remaining pulmonary arteries appearing unremarkable without filling defect to indicate PE. Additional separate right upper lobe mass versus bilobed component raising suspicion for pulmonary metastatic disease. Mediastinal and hilar adenopathy also suspicious for metastatic disease. Right greater than left iliac chain and periaortic beata disease also concerning for metastases. 2. Chronic pancreatitis. 3. COPD and associated pulmonary fibrotic changes noted as above.  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 Yes  Need for changes to pain regimen? Yes   B: Both SAT/SAT  N/A   C: Choice of Sedation RASS Goal: 0 Alert and Calm  Need for changes to sedation or analgesia regimen? NA   D: Delirium CAM-ICU: Negative   E: Early Mobility  Plan for early mobility? Yes   F: Family Engagement Plan for family engagement today? Yes         Prophylaxis:  VTE Contraindicated secondary to: bleeding brain mets    Stress Ulcer  not ordered         Significant 24hr Events     No issues overnight     ( If decrease in neuro status ( GCS drop by 2 in 1 hour ) emergent CT HEAD)     Subjective   Review of Systems   Constitutional:  Positive for fatigue.   HENT: Negative.     Eyes: Negative.    Respiratory: Negative.     Cardiovascular: Negative.    Gastrointestinal: Negative.    Endocrine: Negative.    Musculoskeletal: Negative.    Allergic/Immunologic: Negative.    Neurological:  Positive for speech difficulty and light-headedness.   Hematological: Negative.    Psychiatric/Behavioral: Negative.     All other systems reviewed and are negative.     Objective            No complains                     Vitals I/O      Most Recent Min/Max in 24hrs   Temp 99 °F (37.2 °C) Temp  Min: 97.5 °F (36.4 °C)  Max: 99 °F (37.2 °C)   Pulse (!) 53 Pulse  Min: 53  Max: 80   Resp 20 Resp  Min: 18  Max: 25   /71 BP  Min: 105/70  Max: 161/86   O2 Sat 94 % SpO2  Min: 92 %  Max: 95 %      Intake/Output Summary (Last 24 hours) at 3/5/2024 4835  Last data filed at  3/5/2024 0301  Gross per 24 hour   Intake 240 ml   Output 400 ml   Net -160 ml       Diet NPO    Invasive Monitoring           Physical Exam   Physical Exam  Vitals and nursing note reviewed.   Eyes:      Extraocular Movements: Extraocular movements intact.      Pupils: Pupils are equal, round, and reactive to light.   Skin:     General: Skin is warm, dry and not mottled extremities.      Capillary Refill: Capillary refill takes less than 2 seconds.      Coloration: Skin is not pale.   HENT:      Head: Normocephalic and atraumatic.   Cardiovascular:      Rate and Rhythm: Normal rate and regular rhythm.      Pulses: Normal pulses.      Heart sounds: Normal heart sounds.   Musculoskeletal:         General: Normal range of motion.      Cervical back: Full passive range of motion without pain, normal range of motion and neck supple.   Abdominal: General: Bowel sounds are normal.      Palpations: Abdomen is soft.   Constitutional:       General: He is awake.      Appearance: He is well-developed and well-nourished.   Pulmonary:      Effort: Pulmonary effort is normal.      Breath sounds: Normal breath sounds. No wheezing or rhonchi.   Psychiatric:         Behavior: Behavior is cooperative.   Neurological:      General: No focal deficit present.      Mental Status: He is alert, easily aroused and oriented to person, place and time. Mental status is at baseline.      Sensory: Sensation is intact.      Motor: gross motor function is at baseline for patient. Strength full and intact in all extremities.            Diagnostic Studies      EKG: SB  Imaging:  I have personally reviewed pertinent reports.       Medications:  Scheduled PRN   chlorhexidine, 15 mL, Q12H JESUS  dexamethasone, 4 mg, Q6H JESUS  levETIRAcetam, 500 mg, Q12H JESUS          Continuous          Labs:    CBC    Recent Labs     03/04/24  1008 03/04/24  1014   WBC 8.32  --    HGB 13.2 13.6   HCT 40.3 40     --      BMP    Recent Labs     03/04/24  1008  03/04/24  1014   SODIUM 137  --    K 3.7  --      --    CO2 22 25   AGAP 10  --    BUN 21  --    CREATININE 1.03  --    CALCIUM 9.7  --        Coags    Recent Labs     03/04/24  1707   INR 1.09   PTT 38*        Additional Electrolytes  Recent Labs     03/04/24  1014   CAIONIZED 1.20          Blood Gas    No recent results  No recent results LFTs  No recent results    Infectious  No recent results  Glucose  Recent Labs     03/04/24  1008   GLUC 112               Reynaldo Paniagua PA-C

## 2024-03-05 NOTE — SEDATION DOCUMENTATION
Right lung bx completed. Band aid to site. Hemodynamically stable for transfer back to room. Report and care given to primary RN.

## 2024-03-06 ENCOUNTER — APPOINTMENT (INPATIENT)
Dept: RADIOLOGY | Facility: HOSPITAL | Age: 59
DRG: 136 | End: 2024-03-06
Payer: COMMERCIAL

## 2024-03-06 ENCOUNTER — PATIENT OUTREACH (OUTPATIENT)
Dept: HEMATOLOGY ONCOLOGY | Facility: CLINIC | Age: 59
End: 2024-03-06

## 2024-03-06 VITALS
WEIGHT: 186.07 LBS | RESPIRATION RATE: 20 BRPM | DIASTOLIC BLOOD PRESSURE: 79 MMHG | HEART RATE: 64 BPM | BODY MASS INDEX: 26.05 KG/M2 | SYSTOLIC BLOOD PRESSURE: 127 MMHG | HEIGHT: 71 IN | OXYGEN SATURATION: 95 % | TEMPERATURE: 97.7 F

## 2024-03-06 LAB — GLUCOSE SERPL-MCNC: 111 MG/DL (ref 65–140)

## 2024-03-06 PROCEDURE — 77280 THER RAD SIMULAJ FIELD SMPL: CPT | Performed by: RADIOLOGY

## 2024-03-06 PROCEDURE — 77307 TELETHX ISODOSE PLAN CPLX: CPT | Performed by: RADIOLOGY

## 2024-03-06 PROCEDURE — 71045 X-RAY EXAM CHEST 1 VIEW: CPT

## 2024-03-06 PROCEDURE — 77427 RADIATION TX MANAGEMENT X5: CPT | Performed by: RADIOLOGY

## 2024-03-06 PROCEDURE — 77331 SPECIAL RADIATION DOSIMETRY: CPT | Performed by: RADIOLOGY

## 2024-03-06 PROCEDURE — 99238 HOSP IP/OBS DSCHRG MGMT 30/<: CPT | Performed by: INTERNAL MEDICINE

## 2024-03-06 PROCEDURE — 77334 RADIATION TREATMENT AID(S): CPT | Performed by: RADIOLOGY

## 2024-03-06 PROCEDURE — 77412 RADIATION TX DELIVERY LVL 3: CPT | Performed by: RADIOLOGY

## 2024-03-06 PROCEDURE — 82948 REAGENT STRIP/BLOOD GLUCOSE: CPT

## 2024-03-06 RX ORDER — ONDANSETRON 4 MG/1
4 TABLET, FILM COATED ORAL EVERY 8 HOURS PRN
Qty: 20 TABLET | Refills: 0 | Status: SHIPPED | OUTPATIENT
Start: 2024-03-06 | End: 2024-03-20

## 2024-03-06 RX ORDER — DEXAMETHASONE 4 MG/1
4 TABLET ORAL EVERY 8 HOURS
Qty: 60 TABLET | Refills: 0 | OUTPATIENT
Start: 2024-03-06

## 2024-03-06 RX ORDER — DEXAMETHASONE 4 MG/1
4 TABLET ORAL EVERY 8 HOURS
Qty: 60 TABLET | Refills: 0 | Status: SHIPPED | OUTPATIENT
Start: 2024-03-06

## 2024-03-06 RX ORDER — LEVETIRACETAM 500 MG/1
500 TABLET ORAL EVERY 12 HOURS SCHEDULED
Qty: 10 TABLET | Refills: 0 | Status: SHIPPED | OUTPATIENT
Start: 2024-03-06 | End: 2024-03-11

## 2024-03-06 RX ORDER — OMEPRAZOLE 40 MG/1
40 CAPSULE, DELAYED RELEASE ORAL DAILY
Qty: 40 CAPSULE | Refills: 0 | Status: SHIPPED | OUTPATIENT
Start: 2024-03-06

## 2024-03-06 RX ADMIN — LEVETIRACETAM 500 MG: 500 INJECTION, SOLUTION INTRAVENOUS at 08:55

## 2024-03-06 RX ADMIN — DEXAMETHASONE SODIUM PHOSPHATE 4 MG: 4 INJECTION, SOLUTION INTRA-ARTICULAR; INTRALESIONAL; INTRAMUSCULAR; INTRAVENOUS; SOFT TISSUE at 00:22

## 2024-03-06 RX ADMIN — DEXAMETHASONE SODIUM PHOSPHATE 4 MG: 4 INJECTION, SOLUTION INTRA-ARTICULAR; INTRALESIONAL; INTRAMUSCULAR; INTRAVENOUS; SOFT TISSUE at 11:34

## 2024-03-06 RX ADMIN — DEXAMETHASONE SODIUM PHOSPHATE 4 MG: 4 INJECTION, SOLUTION INTRA-ARTICULAR; INTRALESIONAL; INTRAMUSCULAR; INTRAVENOUS; SOFT TISSUE at 05:55

## 2024-03-06 RX ADMIN — CHLORHEXIDINE GLUCONATE 15 ML: 1.2 SOLUTION ORAL at 08:50

## 2024-03-06 NOTE — PROGRESS NOTES
24 1400   Clinical Encounter Type   Visited With Patient and family together   Routine Visit Follow-up   Referral From Nurse   [facility] Encounters   [facility] Needs Prayer      Pastoral Care Progress Note    3/6/2024  Patient: Nino Silva : 1965  Admission Date & Time: 3/4/2024 0950  MRN: [id number] CSN: [id number]           met with family and patient together, offering prayers and support for treatments. Patient was grateful and appeared optimistic and shared his gratitude. Family supportive. Has home Qu[employer] support as well. Spiritual care remains available.

## 2024-03-06 NOTE — PROGRESS NOTES
Intake received.  Chart reviewed.  Patient is currently inpatient.  Patient has a history of left testicular cancer in the early 90s with resection and chemotherapy.  He initially presented to [facility] ED on 3//2024 with a chief complaint of lightheadedness and disorientation over the past 2 months.

## 2024-03-06 NOTE — ASSESSMENT & PLAN NOTE
3/4 CT CAP: 1. Prominent right perihilar pulmonary mass resulting in cut off of a segmental right middle lobe pulmonary artery branch with remaining pulmonary arteries appearing unremarkable without filling defect to indicate PE. Additional separate right upper lobe mass versus bilobed component raising suspicion for pulmonary metastatic disease. Mediastinal and hilar adenopathy also suspicious for metastatic disease. Right greater than left iliac chain and periaortic beata disease also concerning for metastases. 2. Chronic pancreatitis. 3. COPD and associated pulmonary fibrotic changes noted as above.  Hx of tobacco abuse, no longer smoking  Patient is status post IR biopsy, pathology pending  Status postbiopsy chest x-ray shows minimal pneumothorax repeat chest x-ray improving   Currently on room air  No pneumothorax seen on XR 3/6

## 2024-03-06 NOTE — DISCHARGE INSTR - AVS FIRST PAGE
You were admitted for brain lesions that are likely secondary to metastatic disease from primary lung cancer. Here in the hospital you underwent a lung biopsy by IR to evaluate the tumor in your lung and to identify the kind of cancer you have so that a treatment plan may be tailored to you. You were also started on your first brain radiation today in the hopes to shrink the tumors in your brain and lesson any further neurological symptoms you may experience.     After discharge you will need to continue to follow with radiation oncology for your radiation appointments. Your next radiation appointment is 3/7/2024 at 9:15 am. You will have a total of 10 days of treatment. You will also need to follow up with medical oncology once your tissue biopsy is resulted to start you on systemic chemotherapy scheduled for 3/20/2024.     I am continuing several medication you were started on in the hospital, now in oral form. First is dexamethasone, a steroid to help reduce the swelling in your brain caused by the tumors. This medication will be taken by mouth every 8 hours. This medication may cause weight gain, and you may feel hungry on this medication. Continued use of steroids can cause gastroesophageal reflux, because of this I am prescribing you a proton pump inhibitor call Omeprazole to limit build up of acid in the stomach and to prevent you from developing a gastric ulcer. The second medication is Keppra. This is an anti seizure medication. Sometimes patients with brain tumors experience seizures. You have not. We will have you finish a total of 7 days of Keppra. If you ever have a seizure in the future you will need to come back to the ER for evaluation. The last medication I prescribed for you is Zofran. This is a medication you can take as needed for nausea and vomiting. You may experience these symptoms while receiving radiation.

## 2024-03-06 NOTE — QUICK NOTE
Called [facility] [address] to confirm 4 prescriptions are ready for pickup per patient's wife request. [facility] confirmed [facility], patient's wife made aware.

## 2024-03-06 NOTE — ASSESSMENT & PLAN NOTE
3/4 CT CAP: 1. Prominent right perihilar pulmonary mass resulting in cut off of a segmental right middle lobe pulmonary artery branch with remaining pulmonary arteries appearing unremarkable without filling defect to indicate PE. Additional separate right upper lobe mass versus bilobed component raising suspicion for pulmonary metastatic disease. Mediastinal and hilar adenopathy also suspicious for metastatic disease. Right greater than left iliac chain and periaortic beata disease also concerning for metastases. 2. Chronic pancreatitis. 3. COPD and associated pulmonary fibrotic changes noted as above.  Hx of tobacco abuse, no longer smoking  Patient is status post IR biopsy, pathology pending  Status postbiopsy chest x-ray shows minimal pneumothorax repeat chest x-ray improving   Currently on room air

## 2024-03-06 NOTE — ASSESSMENT & PLAN NOTE
Intermittent confusion, headache, and aphasia worse with exertion since January  3/4/23 MRI brain: Multiple rim enhancing supra and infratentorial lesions some of which demonstrate internal hemorrhage, may represent cystic metastatic disease given findings within the chest. There is no associated increased ASL signal or definite increased cerebral blood flow and primary differential although less likely given nodular peripheral enhancement and lack of central restricted diffusion includes infection (including toxoplasmosis in the setting of immunocompromised state). 3 mm rightward midline shift, stable compared to most recent head CT.  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  Radiation oncology was consulted and recommended brain radiation, IR to obtain biopsy of lung tumor for cancer staging, consult medical oncology for treatment plan for systemic chemotherapy, all anticoagulation held  Patient had first total brain radiation on 3/6 with radiation oncology. Will need to continue to follow up with radiation oncology for continued radiation treatments  Patient discharged on Decadron 4 mg p.o. 3 times daily, Keppra 500 mg p.o. twice daily for a total of 5 more days, omeprazole 40 mg p.o. daily to prevent gastroesophageal reflux and PUD, Zofran 4 mg p.o. every 4 as needed for nausea vomiting  Patient to follow up with medical oncology 3/20 to discuss chemotherapy treatment plan

## 2024-03-06 NOTE — PLAN OF CARE
Problem: PAIN - ADULT  Goal: Verbalizes/displays adequate comfort level or baseline comfort level  Description: Interventions:  - Encourage patient to monitor pain and request assistance  - Assess pain using appropriate pain scale  - Administer analgesics based on type and severity of pain and evaluate response  - Implement non-pharmacological measures as appropriate and evaluate response  - Consider cultural and social influences on pain and pain management  - Notify physician/advanced practitioner if interventions unsuccessful or patient reports new pain  Outcome: Progressing     Problem: INFECTION - ADULT  Goal: Absence or prevention of progression during hospitalization  Description: INTERVENTIONS:  - Assess and monitor for signs and symptoms of infection  - Monitor lab/diagnostic results  - Monitor all insertion sites, i.e. indwelling lines, tubes, and drains  - Monitor endotracheal if appropriate and nasal secretions for changes in amount and color  - [place] appropriate cooling/warming therapies per order  - Administer medications as ordered  - Instruct and encourage patient and family to use good hand hygiene technique  - Identify and instruct in appropriate isolation precautions for identified infection/condition  Outcome: Progressing  Goal: Absence of fever/infection during neutropenic period  Description: INTERVENTIONS:  - Monitor WBC    Outcome: Progressing     Problem: SAFETY ADULT  Goal: Patient will remain free of falls  Description: INTERVENTIONS:  - Educate patient/family on patient safety including physical limitations  - Instruct patient to call for assistance with activity   - Consult OT/PT to assist with strengthening/mobility   - Keep Call bell within reach  - Keep bed low and locked with side rails adjusted as appropriate  - Keep care items and personal belongings within reach  - Initiate and maintain comfort rounds  - Make Fall Risk Sign visible to staff  - Offer Toileting every 2 Hours,  in advance of need  - Initiate/Maintain bed alarm  - Obtain necessary fall risk management equipment:   - Apply yellow socks and bracelet for high fall risk patients  - Consider moving patient to room near nurses station  Outcome: Progressing  Goal: Maintain or return to baseline ADL function  Description: INTERVENTIONS:  -  Assess patient's ability to carry out ADLs; assess patient's baseline for ADL function and identify physical deficits which impact ability to perform ADLs (bathing, care of mouth/teeth, toileting, grooming, dressing, etc.)  - Assess/evaluate cause of self-care deficits   - Assess range of motion  - Assess patient's mobility; develop plan if impaired  - Assess patient's need for assistive devices and provide as appropriate  - Encourage maximum independence but intervene and supervise when necessary  - Involve family in performance of ADLs  - Assess for home care needs following discharge   - Consider OT consult to assist with ADL evaluation and planning for discharge  - Provide patient education as appropriate  Outcome: Progressing  Goal: Maintains/Returns to pre admission functional level  Description: INTERVENTIONS:  - Perform AM-PAC 6 Click Basic Mobility/ Daily Activity assessment daily.  - Set and communicate daily mobility goal to care team and patient/family/caregiver.   - Collaborate with rehabilitation services on mobility goals if consulted  - Perform Range of Motion 3 times a day.  - Reposition patient every 2 hours.  - Dangle patient 3 times a day  - Stand patient 3 times a day  - Ambulate patient 3 times a day  - Out of bed to chair 3 times a day   - Out of bed for meals 3 times a day  - Out of bed for toileting  - Record patient progress and toleration of activity level   Outcome: Progressing     Problem: DISCHARGE PLANNING  Goal: Discharge to home or other facility with appropriate resources  Description: INTERVENTIONS:  - Identify barriers to discharge w/patient and caregiver  -  Arrange for needed discharge resources and transportation as appropriate  - Identify discharge learning needs (meds, wound care, etc.)  - Arrange for interpretive services to assist at discharge as needed  - Refer to Case Management Department for coordinating discharge planning if the patient needs post-hospital services based on physician/advanced practitioner order or complex needs related to functional status, cognitive ability, or social support system  Outcome: Progressing     Problem: Knowledge Deficit  Goal: Patient/family/caregiver demonstrates understanding of disease process, treatment plan, medications, and discharge instructions  Description: Complete learning assessment and assess knowledge base.  Interventions:  - Provide teaching at level of understanding  - Provide teaching via preferred learning methods  Outcome: Progressing     Problem: NEUROSENSORY - ADULT  Goal: Achieves stable or improved neurological status  Description: INTERVENTIONS  - Monitor and report changes in neurological status  - Monitor vital signs such as temperature, blood pressure, glucose, and any other labs ordered   - Initiate measures to prevent increased intracranial pressure  - Monitor for seizure activity and implement precautions if appropriate      Outcome: Progressing  Goal: Remains free of injury related to seizures activity  Description: INTERVENTIONS  - Maintain airway, patient safety  and administer oxygen as ordered  - Monitor patient for seizure activity, document and report duration and description of seizure to physician/advanced practitioner  - If seizure occurs,  ensure patient safety during seizure  - Reorient patient post seizure  - Seizure pads on all 4 side rails  - Instruct patient/family to notify RN of any seizure activity including if an aura is experienced  - Instruct patient/family to call for assistance with activity based on nursing assessment  - Administer anti-seizure medications if  ordered    Outcome: Progressing  Goal: Achieves maximal functionality and self care  Description: INTERVENTIONS  - Monitor swallowing and airway patency with patient fatigue and changes in neurological status  - Encourage and assist patient to increase activity and self care.   - Encourage visually impaired, hearing impaired and aphasic patients to use assistive/communication devices  Outcome: Progressing

## 2024-03-06 NOTE — ASSESSMENT & PLAN NOTE
Intermittent confusion, headache, and aphasia worse with exertion since January  3/4/23 MRI brain: Multiple rim enhancing supra and infratentorial lesions some of which demonstrate internal hemorrhage, may represent cystic metastatic disease given findings within the chest. There is no associated increased ASL signal or definite increased cerebral blood flow and primary differential although less likely given nodular peripheral enhancement and lack of central restricted diffusion includes infection (including toxoplasmosis in the setting of immunocompromised state). 3 mm rightward midline shift, stable compared to most recent head CT.  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

## 2024-03-06 NOTE — QUICK NOTE
Radiation Oncology Treatment Note     A treatment dose of 300 cGy was administered today to the involved tumor site(s) Whole brain using 1-10 MV photons.  Present total dose at this time is 300 cGy.  Approximately 9 more treatment before completion of treatments or critical review.    KG

## 2024-03-06 NOTE — DISCHARGE SUMMARY
Former[facility]  Discharge- Nino Silva 1965, 58 y.o. male MRN: [id number]  Unit/Bed#: -01 SDU Encounter: [id number]  Primary Care Provider: No primary care provider on file.   Date and time admitted to hospital: 3/4/2024  9:50 AM    * Brain mass  Assessment & Plan  Intermittent confusion, headache, and aphasia worse with exertion since January  3/4/23 MRI brain: Multiple rim enhancing supra and infratentorial lesions some of which demonstrate internal hemorrhage, may represent cystic metastatic disease given findings within the chest. There is no associated increased ASL signal or definite increased cerebral blood flow and primary differential although less likely given nodular peripheral enhancement and lack of central restricted diffusion includes infection (including toxoplasmosis in the setting of immunocompromised state). 3 mm rightward midline shift, stable compared to most recent head CT.  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  Radiation oncology was consulted and recommended brain radiation, IR to obtain biopsy of lung tumor for cancer staging, consult medical oncology for treatment plan for systemic chemotherapy, all anticoagulation held  Patient had first total brain radiation on 3/6 with radiation oncology. Will need to continue to follow up with radiation oncology for continued radiation treatments  Patient discharged on Decadron 4 mg p.o. 3 times daily, Keppra 500 mg p.o. twice daily for a total of 5 more days, omeprazole 40 mg p.o. daily to prevent gastroesophageal reflux and PUD, Zofran 4 mg p.o. every 4 as needed for nausea vomiting  Patient to follow up with medical oncology 3/20 to discuss chemotherapy treatment plan    Lung mass  Assessment & Plan  3/4 CT CAP: 1. Prominent right perihilar pulmonary mass  resulting in cut off of a segmental right middle lobe pulmonary artery branch with remaining pulmonary arteries appearing unremarkable without filling defect to indicate PE. Additional separate right upper lobe mass versus bilobed component raising suspicion for pulmonary metastatic disease. Mediastinal and hilar adenopathy also suspicious for metastatic disease. Right greater than left iliac chain and periaortic beata disease also concerning for metastases. 2. Chronic pancreatitis. 3. COPD and associated pulmonary fibrotic changes noted as above.  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    COPD (chronic obstructive pulmonary disease) (HCC)  Assessment & Plan  No formal diagnosis of COPD  Noted on CTCAP  Not in AECOPD      Back pain  Assessment & Plan  No longer complains of back pain               Medical Problems       Resolved Problems  Date Reviewed: 3/6/2024   None         Admission Date:   Admission Orders (From admission, onward)       Ordered        03/04/24 1715  INPATIENT ADMISSION  Once                            Admitting Diagnosis: Lung mass [R91.8]  Altered mental status [R41.82]  Brain mass [G93.89]    HPI: Nino Silva is a 58 year old male with a past medical history significant for left-sided testicular cancer status post resection and chemotherapy in the early 90s and tobacco abuse initially presented to [facility] ED on 3//2024 with a chief complaint of lightheadedness and disorientation over the past 2 months.  Patient stated that he developed  what he thought was bronchitis in January 2024 with episodes of intermittent hemoptysis.  The symptoms resolved and he thought he was getting better so he did not seek treatment.  Episodes of hemoptysis returned again in February and he was given a Z-Sav by his PCP for presumed bronchitis.  Patient noted that he was having frequent headaches and periods of confusion when he would cough but again the symptoms resolved on their own.  Since that time patient has had intermittent episodes of disorientation that was worse with exertion.  Patient's wife at also reported that the patient had had difficulty finding words recently.  Patient denied changes in vision, numbness, tingling, difficulties with ambulation.  Any ED his CT of his head showed multiple cortical brain lesions with vasogenic edema concerning for metastatic disease.  The patient's case was discussed with neurocritical care and neurosurgery at [facility] who recommended follow-up MRI of the brain as well as CTA chest abdomen pelvis which showed that he had a primary lung malignancy with metastasis to the brain.  Due to vasogenic edema seen on CT the patient was started on IV Decadron, and was started on Keppra for seizure prophylaxis.  Radiation oncology was consulted and recommended full brain radiation.  Medical oncology agreed with radiation and requested biopsy of the lung tumor for staging purposes and to formulate a chemotherapy plan for the patient.  Patient underwent IR biopsy of the right lung tumor on 3/5/2024.  Postbiopsy x-ray was noted to have a small pneumothorax that resolved on its own without symptoms.  Patient underwent first full brain radiation on 3/6/2024 without complication.  Patient to be discharged from home as he is now medically stable to follow-up with radiation oncology for continued radiation as well as medical oncology once his biopsy results are back to be started on chemotherapy regimen.    Procedures Performed:   Orders Placed  This Encounter   Procedures    Critical Care       Summary of Hospital Course: Admit 3/4/2024. CTH showed multiple cortical brain lesions with associated vasogenic edema worrisome for metastatic disease.  MRI confirmed multiple rim-enhancing supra and infratentorial lesions some of which demonstrate internal hemorrhage, may represent cystic metastatic disease given findings within the chest.  CT chest abdomen pelvis showed prominent right perihilar pulmonary mass resulting in cut off of segmental right middle lobe pulmonary artery branch with remaining pulmonary arteries.  Unremarkable without filling defect to indicate PE.  Additional separate right upper lobe mass versus bilobed component raising suspicion for pulmonary metastatic disease.  Mediastinal and hilar adenopathy also space for metastatic disease.  Consultation with neurocritical care and neurosurgery.  Patient to admitted to ICU for frequent neurochecks, started on Decadron, Keppra, consultation to radiation oncology/heme-onc.  3/5/2024 underwent right long biopsy status post procedure small pneumothorax was seen on chest x-ray. Patient did not require intervention, pneumothorax resolved on its own.  Patient underwent full brain radiation on 3/6/2024, without further complication.  Patient was discharged to home with follow-up with radiation oncology and hematology oncology    Significant Findings, Care, Treatment and Services Provided:   3/4/2024 CTH: ultiple cortical brain lesions with associated vasogenic edema most worrisome for metastatic disease, largest left parietal lobe measuring up to 2.7 x 4.3 cm. Slight rightward midline shift measuring up to 3 mm.   3/4/2024 CTA CAP: Prominent right perihilar pulmonary mass resulting in cut off of a segmental right middle lobe pulmonary artery branch with remaining pulmonary arteries appearing unremarkable without filling defect to indicate PE. Additional separate right upper lobe   mass versus bilobed  component raising suspicion for pulmonary metastatic disease. Mediastinal and hilar adenopathy also suspicious for metastatic disease. Right greater than left iliac chain and periaortic beata disease also concerning for metastases. Chronic pancreatitis. COPD and associated pulmonary fibrotic changes  3/4/2024 MRI Brain: Multiple rim enhancing supra and infratentorial lesions some of which demonstrate internal hemorrhage, may represent cystic metastatic disease given findings within the chest. There is no associated increased ASL signal or definite increased cerebral   blood flow and primary differential although less likely given nodular peripheral enhancement and lack of central restricted diffusion includes infection (including toxoplasmosis in the setting of immunocompromised state). 3 mm rightward midline shift, stable compared to most recent head CT  3/5/2024 IR right lung biopsy   3/6/2024 Full brain radiation     Complications: N/A    Condition at Discharge: good         Discharge instructions/Information to patient and family:   See after visit summary for information provided to patient and family.      Provisions for Follow-Up Care:  See after visit summary for information related to follow-up care and any pertinent home health orders.      PCP: No primary care provider on file.    Disposition: Home    Planned Readmission: No    Discharge Statement   I spent 20 minutes discharging the patient. This time was spent on the day of discharge. I had direct contact with the patient on the day of discharge. Additional documentation is required if more than 30 minutes were spent on discharge.     Discharge Medications:  See after visit summary for reconciled discharge medications provided to patient and family.

## 2024-03-06 NOTE — PROGRESS NOTES
[facility]  Progress Note  Name: Nino Silva I  MRN: [id number]  Unit/Bed#: -01 SDU I Date of Admission: 3/4/2024   Date of Service: 3/6/2024 I Hospital Day: 2    Assessment/Plan   * Brain mass  Assessment & Plan  Intermittent confusion, headache, and aphasia worse with exertion since January  3/4/23 MRI brain: Multiple rim enhancing supra and infratentorial lesions some of which demonstrate internal hemorrhage, may represent cystic metastatic disease given findings within the chest. There is no associated increased ASL signal or definite increased cerebral blood flow and primary differential although less likely given nodular peripheral enhancement and lack of central restricted diffusion includes infection (including toxoplasmosis in the setting of immunocompromised state). 3 mm rightward midline shift, stable compared to most recent head CT.  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 Prominent right perihilar pulmonary mass resulting in cut off of a segmental right middle lobe pulmonary artery branch with remaining pulmonary arteries appearing unremarkable without filling defect to indicate PE. Additional separate right upper lobe mass versus bilobed component raising suspicion for pulmonary metastatic disease. Mediastinal and hilar adenopathy also suspicious for metastatic disease. Right greater than left iliac chain and periaortic beata disease also concerning for metastases. 2. Chronic pancreatitis. 3. COPD and  associated pulmonary fibrotic changes noted as above.  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 NA  Need for changes to pain regimen? NA   B: Both SAT/SAT  N/A   C: Choice of Sedation RASS Goal: 0 Alert and Calm or N/A patient not on sedation  Need for changes to sedation or analgesia regimen? NA   D: Delirium CAM-ICU: Negative   E: Early Mobility  Plan for early mobility? Yes   F: Family Engagement Plan for family engagement today? Yes         Prophylaxis:  VTE Contraindicated secondary to: Bleeding in the brain   Stress Ulcer  not ordered         Significant 24hr Events     Had IR biopsy lung mass  We will get whole brain radiation therapy  During the night when sleeping goes as low as 35 with his heart rate it is sinus and as soon as he wakes up it is back above 40-55     Subjective   Review of Systems   Neurological:  Positive for speech difficulty and weakness.   All other systems reviewed and are negative.     Objective                No complains                 Vitals I/O      Most Recent Min/Max in 24hrs   Temp 97.9 °F (36.6 °C) Temp  Min: 97.5 °F (36.4 °C)  Max: 98 °F (36.7 °C)   Pulse 71 Pulse  Min: 52  Max: 71   Resp 22 Resp  Min: 13  Max: 26   /69 BP  Min: 97/65  Max: 121/76   O2 Sat 93 % SpO2  Min: 93 %  Max: 99 %      Intake/Output  Summary (Last 24 hours) at 3/6/2024 0545  Last data filed at 3/6/2024 0000  Gross per 24 hour   Intake 960 ml   Output 300 ml   Net 660 ml       Diet Regular; Regular House    Invasive Monitoring           Physical Exam   Physical Exam  Vitals and nursing note reviewed.   Eyes:      Extraocular Movements: Extraocular movements intact.      Pupils: Pupils are equal, round, and reactive to light.   Skin:     General: Skin is warm, dry and not mottled extremities.      Capillary Refill: Capillary refill takes less than 2 seconds.      Coloration: Skin is not pale.   HENT:      Head: Normocephalic and atraumatic.   Cardiovascular:      Rate and Rhythm: Normal rate and regular rhythm.      Pulses: Normal pulses.      Heart sounds: Normal heart sounds.   Musculoskeletal:         General: Normal range of motion.      Cervical back: Full passive range of motion without pain, normal range of motion and neck supple.   Abdominal: General: Bowel sounds are normal.      Palpations: Abdomen is soft.   Constitutional:       General: He is awake.      Appearance: He is well-developed and well-nourished.   Pulmonary:      Effort: Pulmonary effort is normal.      Breath sounds: Normal breath sounds. No wheezing or rhonchi.   Psychiatric:         Behavior: Behavior is cooperative.   Neurological:      General: No focal deficit present.      Mental Status: He is alert, easily aroused and oriented to person, place and time. Mental status is at baseline.      Sensory: Sensation is intact.      Motor: gross motor function is at baseline for patient. Strength full and intact in all extremities.            Diagnostic Studies      EKG: SB  Imaging:  I have personally reviewed pertinent reports.       Medications:  Scheduled PRN   chlorhexidine, 15 mL, Q12H JESUS  dexamethasone, 4 mg, Q6H JESUS  levETIRAcetam, 500 mg, Q12H JESUS          Continuous          Labs:    CBC    Recent Labs     03/04/24  1008 03/04/24  1014   WBC 8.32  --    HGB 13.2  13.6   HCT 40.3 40     --      BMP    Recent Labs     03/04/24  1008 03/04/24  1014   SODIUM 137  --    K 3.7  --      --    CO2 22 25   AGAP 10  --    BUN 21  --    CREATININE 1.03  --    CALCIUM 9.7  --        Coags    Recent Labs     03/04/24  1707   INR 1.09   PTT 38*        Additional Electrolytes  Recent Labs     03/04/24  1014   CAIONIZED 1.20          Blood Gas    No recent results  No recent results LFTs  No recent results    Infectious  No recent results  Glucose  Recent Labs     03/04/24  1008   GLUC 112               Reynaldo Paniagua PA-C

## 2024-03-07 ENCOUNTER — TELEPHONE (OUTPATIENT)
Dept: HEMATOLOGY ONCOLOGY | Facility: CLINIC | Age: 59
End: 2024-03-07

## 2024-03-07 ENCOUNTER — RADIATION ONCOLOGY ON TREATMENT (OUTPATIENT)
Facility: HOSPITAL | Age: 59
End: 2024-03-07

## 2024-03-07 ENCOUNTER — APPOINTMENT (OUTPATIENT)
Facility: HOSPITAL | Age: 59
End: 2024-03-07

## 2024-03-07 ENCOUNTER — TELEPHONE (OUTPATIENT)
Facility: HOSPITAL | Age: 59
End: 2024-03-07

## 2024-03-07 DIAGNOSIS — R91.8 LUNG MASS: Primary | ICD-10-CM

## 2024-03-07 PROCEDURE — 77412 RADIATION TX DELIVERY LVL 3: CPT | Performed by: RADIOLOGY

## 2024-03-07 NOTE — PROGRESS NOTES
Nino Silva    1. Lung mass  NM PET CT skull base to mid thigh    CANCELED: NM PET CT skull base to mid thigh        Cancer Staging   No matching staging information was found for the patient.    Oncology History    No history exists.       Interval History:***    GYN History:***    Patient Active Problem List   Diagnosis    Back pain    Brain mass    Lung mass    Elevated troponin    COPD (chronic obstructive pulmonary disease) (HCC)    Hx of testicular cancer     No past medical history on file.  Past Surgical History:   Procedure Laterality Date    HERNIA REPAIR      IR BIOPSY LUNG  3/5/2024     No family history on file.  Social History     Socioeconomic History    Marital status: /Civil Union     Spouse name: Not on file    Number of children: Not on file    Years of education: Not on file    Highest education level: Not on file   Occupational History    Not on file   Tobacco Use    Smoking status: Never    Smokeless tobacco: Never   Substance and Sexual Activity    Alcohol use: Not Currently    Drug use: Not Currently    Sexual activity: Not on file   Other Topics Concern    Not on file   Social History Narrative    Not on file     Social Determinants of Health     Financial Resource Strain: Not on file   Food Insecurity: No Food Insecurity (3/5/2024)    Hunger Vital Sign     Worried About Running Out of Food in the Last Year: Never true     Ran Out of Food in the Last Year: Never true   Transportation Needs: No Transportation Needs (3/5/2024)    PRAPARE - Transportation     Lack of Transportation (Medical): No     Lack of Transportation (Non-Medical): No   Physical Activity: Not on file   Stress: Not on file   Social Connections: Not on file   Intimate Partner Violence: Not on file   Housing Stability: Low Risk  (3/5/2024)    Housing Stability Vital Sign     Unable to Pay for Housing in the Last Year: No     Number of Places Lived in the Last Year: 1     Unstable Housing in the Last Year: No        Current Outpatient Medications:     Cholecalciferol (Vitamin D) 50 MCG (2000 UT) tablet, Take 2,000 Units by mouth daily, Disp: , Rfl:     dexamethasone (DECADRON) 4 mg tablet, Take 1 tablet (4 mg total) by mouth every 8 (eight) hours, Disp: 60 tablet, Rfl: 0    levETIRAcetam (Keppra) 500 mg tablet, Take 1 tablet (500 mg total) by mouth every 12 (twelve) hours for 10 doses, Disp: 10 tablet, Rfl: 0    Multiple Vitamin (Multi Vitamin Daily) TABS, Take 1 tablet by mouth daily, Disp: , Rfl:     omeprazole (PriLOSEC) 40 MG capsule, Take 1 capsule (40 mg total) by mouth daily, Disp: 40 capsule, Rfl: 0    ondansetron (ZOFRAN) 4 mg tablet, Take 1 tablet (4 mg total) by mouth every 8 (eight) hours as needed for nausea or vomiting, Disp: 20 tablet, Rfl: 0  No current facility-administered medications for this visit.  No Known Allergies    Review of Systems:  Review of Systems    There were no vitals filed for this visit.    Imaging:XR chest portable    Result Date: 3/6/2024  Narrative: XR CHEST PORTABLE INDICATION: follow up right PTX. COMPARISON: 3/5/2024 FINDINGS: Masslike density right midlung field similar to prior. Elsewhere, pulmonary opacities are also unchanged from recent prior. The right pneumothorax is no longer seen. No significant effusions are appreciated. Stable cardiomediastinal silhouette.     Impression: The right pneumothorax appears to have resolved. No significant change in the appearance of the lungs. Right lung mass redemonstrated Workstation performed: [id number]     IR biopsy lung    Result Date: 3/5/2024  Narrative: CT-scan guided core biopsy of a right apical lung lesion History: Right hilar and upper lung mass with metastatic lesions in the brain. Biopsy requested. Radiation dose: 1668.63 mGy Concious sedation time: 26 MINUTES Technique: This examination, like all CT scans performed in the [facility], was performed utilizing techniques to minimize radiation dose exposure,  including the use of iterative reconstruction and automated exposure control. The patient was brought to the CT scanner and placed supine on the table. After axial images were obtained through the region of interest an area of the skin was then marked, prepped, and draped in usual sterile fashion. Lidocaine was administered to the  skin and a small skin incision was made. A 17 gauge cannula was advanced up to the lesion. Through the cannula, an18-gauge core biopsy was performed. 3 additional passes were made. The specimen was found to be adequate for evaluation. The patient tolerated the procedure well . Small pneumothorax seen..     Impression: Impression: Successful percutaneous core biopsy of right anterior apical lung lesion. A full pathology report will follow. Small postprocedural pneumothorax. Patient stable. Workstation performed: [id number]     CXR 1 view PA portable in 2 hours    Result Date: 3/5/2024  Narrative: XR CHEST PORTABLE INDICATION: post right lung mass biopsy. COMPARISON: Compared to earlier study of 12:53 p.m. FINDINGS: Right hilar mass again seen. Bilateral peripheral interstitial prominence unchanged. Improving trace right apical pneumothorax. Normal cardiomediastinal silhouette. Bones are unremarkable for age. Normal upper abdomen.     Impression: Improved but residual trace right apical pneumothorax. Workstation performed: [id number]     CXR 1 view PA portable stat    Result Date: 3/5/2024  Narrative: XR CHEST PORTABLE INDICATION: Post rt lung mass biopsy. COMPARISON: Compared with 3/4/2024 FINDINGS: Mild prominent vascular interstitial markings. Right hilar mass. Small right apical pneumothorax. Normal cardiomediastinal silhouette. Bones are unremarkable for age. Normal upper abdomen.     Impression: Small right apical pneumothorax. Workstation performed: [id number]     MRI brain w wo contrast    Result Date: 3/4/2024  Narrative: MRI BRAIN WITH AND WITHOUT CONTRAST INDICATION: brain  fog. COMPARISON: Same day CT head TECHNIQUE: Multiplanar, multisequence imaging of the brain was performed before and after gadolinium administration., And CTA chest abdomen pelvis IV Contrast:  8.5 mL of Gadobutrol injection (SINGLE-DOSE) IMAGE QUALITY:   Diagnostic. FINDINGS: BRAIN PARENCHYMA: There are multiple centrally necrotic, rim-enhancing lesions demonstrating peripheral nodularity within the supra and infratentorial parenchyma. These lesions demonstrate peripheral restricted diffusion and surrounding vasogenic edema. There is no definite associated increased signal on ASL or cerebral blood flow. The lesions are listed below: - 1.3 x 1.5 x 1.4 cm right frontal lobe (12:102, 10:16) - 1.2 x 0.9 x 0.9 cm right thalamic lesion (12:86, 10:14). -4.6 x 3 x 4.6 cm left predominantly parietal lobe lesion (12:90, 10:9). This lesion is abutting exerts mass effect and partially effaces the posterior horn of the left lateral ventricle. -3.6 x 2.5 x 2.8 cm left temporal lobe lesion (12:62, 10:7), abutting and exerting mass effect on the temporal horn of the left lateral ventricle. -1.2 x 1.5 x 1 cm right cerebellar hemisphere lesion abutting the right tentorium (12:42, 10:17). -1 x 0.9 x 0.9 cm right paramedian occipital lobe lesion (12:70, 10:14). -Additional foci of enhancement within the right frontal lobe measuring 2 mm (12:119) and 0.7 cm within the right middle cerebellar peduncle (12:41), consistent with metastatic disease. As mentioned above there is vasogenic edema surrounding the metastatic lesions most prominently involving the left parietal and temporal lesions resulting in 3 mm rightward midline shift. Partial effacement of the right ambient cistern. There is hemorrhage associated with the right middle cerebellar peduncle and right cerebellar hemisphere lesions. No diffusion weighted signal normality to suggest acute infarct. Small scattered hyperintensities on T2/FLAIR imaging are noted in the  periventricular and subcortical white matter demonstrating an appearance that is statistically most likely to represent mild microangiopathic change. VENTRICLES: Partial effacement of the left lateral ventricle as detailed above. Otherwise the ventricles are normal for age. SELLA AND PITUITARY GLAND:  Normal. ORBITS:  Normal. PARANASAL SINUSES: Mild mucosal thickening of the paranasal sinuses. VASCULATURE:  Evaluation of the major intracranial vasculature demonstrates appropriate flow voids. CALVARIUM AND SKULL BASE:  Normal. EXTRACRANIAL SOFT TISSUES:  Normal.     Impression: -Multiple rim enhancing supra and infratentorial lesions some of which demonstrate internal hemorrhage, may represent cystic metastatic disease given findings within the chest. There is no associated increased ASL signal or definite increased cerebral blood flow and primary differential although less likely given nodular peripheral enhancement and lack of central restricted diffusion includes infection (including toxoplasmosis in the setting of immunocompromised state). Clinical correlation advised. -3 mm rightward midline shift, stable compared to most recent head CT. I personally communicated the findings via telephone with Williams Oviedo at 3:25 p.m. on 3/4/2024, Workstation performed: [id number]     XR follow up    Result Date: 3/4/2024  Narrative: XR FOLLOW UP (NO CHARGE) INDICATION: MRI screen. 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 COMPARISON: CT head without contrast earlier the same day. FINDINGS: No radiopaque foreign bodies. No evidence of a fracture or orbital emphysema. There is a lucent lesion on the right involving the temporal or parietal bone.. Clear paranasal sinuses.     Impression: No radiopaque foreign bodies. Lucency in the right temporoparietal region could represent a metastatic focus given the CT findings. Other benign lesion such as hemangioma are also possible. Further metastatic evaluation possibly including bone scan  can be considered. The study was marked in EPIC for immediate notification. I have personally reviewed this study including all images.  / JOANNE Resident: AYALA PARKINSON I, the attending radiologist, have reviewed the images and agree with the final report above. Workstation performed: [id number]     PE Study with CT abdomen & pelvis with contrast    Result Date: 3/4/2024  Narrative: CT PULMONARY ANGIOGRAM OF THE CHEST AND CT ABDOMEN AND PELVIS WITH INTRAVENOUS CONTRAST INDICATION: brain lesions, primary CA? elevated dimer.. COMPARISON: None. TECHNIQUE: CT examination of the chest, abdomen and pelvis was performed. Thin section CT angiographic technique was used in the chest in order to evaluate for pulmonary embolus and coronal 3D MIP postprocessing was performed on the acquisition scanner. Multiplanar 2D reformatted images were created from the source data. This examination, like all CT scans [facility], was performed utilizing techniques to minimize radiation dose exposure, including the use of iterative reconstruction and automated exposure control. Radiation dose length product (DLP) for this visit: 980.91 mGy-cm IV Contrast: 100 mL of iohexol (OMNIPAQUE) Enteric Contrast: Not administered. FINDINGS: CHEST PULMONARY ARTERIAL TREE: No pulmonary embolus is seen. LUNGS: COPD with scattered cystic changes. There is fibrosis at the lung bases. Prominent right upper lobe perihilar pulmonary mass identified measuring up to 4.3 x 5.8 cm concerning for primary carcinoma. There is considerable mass effect on adjacent pulmonary arteries and bronchi. There is partial cut off of the right middle lobe lobe pulmonary artery branch best seen on series 2 image 83. Additional right upper lobe nodular mass versus bilobed configuration measuring up to 3.8 x 3.0 cm. PLEURA: Unremarkable. HEART/AORTA: Heart is unremarkable for patient's age. No thoracic aortic aneurysm. MEDIASTINUM AND DAVID:  Prevascular node concerning for metastases measuring 1.5 x 2.0 cm on 2/84. Additional mediastinal, hilar nodes identified. CHEST WALL AND LOWER NECK: Unremarkable. ABDOMEN LIVER/BILIARY TREE: Enlarged fatty liver noted. GALLBLADDER: No calcified gallstones. No pericholecystic inflammatory change. SPLEEN: Unremarkable. PANCREAS: Prominent fatty atrophic change noted head and uncinate process region. Scattered calcifications suggestive of chronic pancreatitis. ADRENAL GLANDS: Unremarkable. KIDNEYS/URETERS: Left upper renal pole cortical cyst. Kidneys are otherwise unremarkable. STOMACH AND BOWEL: Unremarkable. APPENDIX: Normal appendix. ABDOMINOPELVIC CAVITY: Para-aortic retroperitoneal (largest on 3/84 measuring 3.0 x 2.2 cm) and iliac chain lymph nodes identified (largest on the right 3/135 measuring 3.0 x 2.6 cm). VESSELS: Unremarkable for patient's age. PELVIS REPRODUCTIVE ORGANS: Enlarged prostate.. URINARY BLADDER: Decompressed but otherwise unremarkable. ABDOMINAL WALL/INGUINAL REGIONS: Unremarkable. BONES: No acute fracture or suspicious osseous lesion.     Impression: 1. Prominent right perihilar pulmonary mass resulting in cut off of a segmental right middle lobe pulmonary artery branch with remaining pulmonary arteries appearing unremarkable without filling defect to indicate PE. Additional separate right upper lobe  mass versus bilobed component raising suspicion for pulmonary metastatic disease. Mediastinal and hilar adenopathy also suspicious for metastatic disease. Right greater than left iliac chain and periaortic beata disease also concerning for metastases. 2. Chronic pancreatitis. 3. COPD and associated pulmonary fibrotic changes noted as above. I personally discussed this study with ARIS FREY on 3/4/2024 12:27 PM. Workstation performed: [id number]     X-ray chest 1 view portable    Result Date: 3/4/2024  Narrative: XR CHEST PORTABLE INDICATION: chest pain. COMPARISON: None FINDINGS: Right  perihilar confluent mass and right basal infiltrate. CT correlation recommended . No pneumothorax or pleural effusion. Normal cardiomediastinal silhouette. Bones are unremarkable for age. Normal upper abdomen.     Impression: Right perihilar confluent mass and right basal infiltrate Workstation performed: [id number]     CT head wo contrast    Result Date: 3/4/2024  Narrative: CT BRAIN - WITHOUT CONTRAST INDICATION:   ams. COMPARISON:  None. TECHNIQUE:  CT examination of the brain was performed.  Multiplanar 2D reformatted images were created from the source data. Radiation dose length product (DLP) for this visit:  857 mGy-cm .  This examination, like all CT scans performed in the [facility], was performed utilizing techniques to minimize radiation dose exposure, including the use of iterative reconstruction and automated exposure control. IMAGE QUALITY:  Diagnostic. FINDINGS: PARENCHYMA: Multiple somewhat cystic-appearing cortical lesions with rim hyperattenuation and associated vasogenic edema, largest left parietal lobe measuring up to 2.7 x 4.3 cm. Right corona radiata measuring up to 1.3 cm. Left temporal lobe measuring up to 2.4 x 3.4 cm. Findings are worrisome for metastatic disease. There is mild rightward shift of approximately 3 mm. VENTRICLES AND EXTRA-AXIAL SPACES:  Normal for the patient's age. VISUALIZED ORBITS: Normal visualized orbits. PARANASAL SINUSES: Normal visualized paranasal sinuses. CALVARIUM AND EXTRACRANIAL SOFT TISSUES:  Normal.     Impression: Multiple cortical brain lesions with associated vasogenic edema most worrisome for metastatic disease, largest left parietal lobe measuring up to 2.7 x 4.3 cm. Slight rightward midline shift measuring up to 3 mm. Contrast-enhanced MR brain recommended promptly. I personally discussed this study with ARIS FREY on 3/4/2024 10:45 AM. Workstation performed: [id number]       Teaching:***

## 2024-03-07 NOTE — TELEPHONE ENCOUNTER
I phoned the patient and left a VM message indicating that I was calling from [facility] to review the details if his upcoming appointment. I provided the appointment information (3/20, Dr. Morrell, [facility] office, 0900), the office address and location, and the [facility] number as the office contact.

## 2024-03-08 ENCOUNTER — APPOINTMENT (OUTPATIENT)
Facility: HOSPITAL | Age: 59
End: 2024-03-08

## 2024-03-08 DIAGNOSIS — R91.8 LUNG MASS: Primary | ICD-10-CM

## 2024-03-08 PROCEDURE — 88305 TISSUE EXAM BY PATHOLOGIST: CPT | Performed by: PATHOLOGY

## 2024-03-08 PROCEDURE — 77412 RADIATION TX DELIVERY LVL 3: CPT | Performed by: RADIOLOGY

## 2024-03-08 PROCEDURE — 88341 IMHCHEM/IMCYTCHM EA ADD ANTB: CPT | Performed by: PATHOLOGY

## 2024-03-08 PROCEDURE — 88360 TUMOR IMMUNOHISTOCHEM/MANUAL: CPT | Performed by: PATHOLOGY

## 2024-03-08 PROCEDURE — 88333 PATH CONSLTJ SURG CYTO XM 1: CPT | Performed by: PATHOLOGY

## 2024-03-08 PROCEDURE — 88342 IMHCHEM/IMCYTCHM 1ST ANTB: CPT | Performed by: PATHOLOGY

## 2024-03-10 LAB
ATRIAL RATE: 73 BPM
P AXIS: 41 DEGREES
PR INTERVAL: 154 MS
QRS AXIS: 32 DEGREES
QRSD INTERVAL: 94 MS
QT INTERVAL: 412 MS
QTC INTERVAL: 453 MS
T WAVE AXIS: -12 DEGREES
VENTRICULAR RATE: 73 BPM

## 2024-03-10 PROCEDURE — 93010 ELECTROCARDIOGRAM REPORT: CPT | Performed by: INTERNAL MEDICINE

## 2024-03-11 ENCOUNTER — APPOINTMENT (OUTPATIENT)
Facility: HOSPITAL | Age: 59
End: 2024-03-11

## 2024-03-11 PROCEDURE — 77412 RADIATION TX DELIVERY LVL 3: CPT | Performed by: RADIOLOGY

## 2024-03-11 NOTE — QUICK NOTE
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

## 2024-03-12 ENCOUNTER — APPOINTMENT (OUTPATIENT)
Facility: HOSPITAL | Age: 59
End: 2024-03-12
Attending: RADIOLOGY

## 2024-03-12 ENCOUNTER — TELEPHONE (OUTPATIENT)
Dept: HEMATOLOGY ONCOLOGY | Facility: CLINIC | Age: 59
End: 2024-03-12

## 2024-03-12 PROCEDURE — 77412 RADIATION TX DELIVERY LVL 3: CPT | Performed by: RADIOLOGY

## 2024-03-12 PROCEDURE — 77336 RADIATION PHYSICS CONSULT: CPT | Performed by: RADIOLOGY

## 2024-03-12 NOTE — TELEPHONE ENCOUNTER
Received message from [employer] regarding patient's insurance, patient currently does not have active insurance, patient is waiting to see if he is able to receive medical assistance. Patient will inform the office once he is approved. Caris testing will be delayed until insurance is available. Patient verbalized understanding.

## 2024-03-13 ENCOUNTER — APPOINTMENT (OUTPATIENT)
Facility: HOSPITAL | Age: 59
End: 2024-03-13

## 2024-03-13 PROCEDURE — 77417 THER RADIOLOGY PORT IMAGE(S): CPT | Performed by: RADIOLOGY

## 2024-03-13 PROCEDURE — 77412 RADIATION TX DELIVERY LVL 3: CPT | Performed by: RADIOLOGY

## 2024-03-13 PROCEDURE — 77427 RADIATION TX MANAGEMENT X5: CPT | Performed by: RADIOLOGY

## 2024-03-14 ENCOUNTER — APPOINTMENT (OUTPATIENT)
Facility: HOSPITAL | Age: 59
End: 2024-03-14

## 2024-03-14 ENCOUNTER — APPOINTMENT (OUTPATIENT)
Facility: HOSPITAL | Age: 59
End: 2024-03-14
Attending: RADIOLOGY

## 2024-03-14 DIAGNOSIS — G93.89 BRAIN MASS: Primary | ICD-10-CM

## 2024-03-14 PROCEDURE — 77412 RADIATION TX DELIVERY LVL 3: CPT | Performed by: RADIOLOGY

## 2024-03-14 RX ORDER — DEXAMETHASONE 1 MG
1 TABLET ORAL SEE ADMIN INSTRUCTIONS
Qty: 10 TABLET | Refills: 0 | Status: SHIPPED | OUTPATIENT
Start: 2024-03-14

## 2024-03-14 RX ORDER — DEXAMETHASONE 2 MG/1
2 TABLET ORAL SEE ADMIN INSTRUCTIONS
Qty: 35 TABLET | Refills: 0 | Status: SHIPPED | OUTPATIENT
Start: 2024-03-14

## 2024-03-14 RX ORDER — DEXAMETHASONE 4 MG/1
4 TABLET ORAL SEE ADMIN INSTRUCTIONS
Qty: 26 TABLET | Refills: 0 | Status: SHIPPED | OUTPATIENT
Start: 2024-03-14

## 2024-03-15 ENCOUNTER — HOSPITAL ENCOUNTER (OUTPATIENT)
Dept: NUCLEAR MEDICINE | Facility: HOSPITAL | Age: 59
End: 2024-03-15
Payer: COMMERCIAL

## 2024-03-15 ENCOUNTER — APPOINTMENT (OUTPATIENT)
Facility: HOSPITAL | Age: 59
End: 2024-03-15

## 2024-03-15 DIAGNOSIS — R91.8 LUNG MASS: ICD-10-CM

## 2024-03-15 LAB — GLUCOSE SERPL-MCNC: 101 MG/DL (ref 65–140)

## 2024-03-15 PROCEDURE — A9552 F18 FDG: HCPCS

## 2024-03-15 PROCEDURE — 78815 PET IMAGE W/CT SKULL-THIGH: CPT

## 2024-03-15 PROCEDURE — 77412 RADIATION TX DELIVERY LVL 3: CPT | Performed by: RADIOLOGY

## 2024-03-15 PROCEDURE — 82948 REAGENT STRIP/BLOOD GLUCOSE: CPT

## 2024-03-18 ENCOUNTER — APPOINTMENT (OUTPATIENT)
Facility: HOSPITAL | Age: 59
End: 2024-03-18

## 2024-03-18 PROCEDURE — 77412 RADIATION TX DELIVERY LVL 3: CPT | Performed by: RADIOLOGY

## 2024-03-19 ENCOUNTER — APPOINTMENT (OUTPATIENT)
Facility: HOSPITAL | Age: 59
End: 2024-03-19
Attending: RADIOLOGY

## 2024-03-19 PROCEDURE — 77336 RADIATION PHYSICS CONSULT: CPT | Performed by: RADIOLOGY

## 2024-03-19 PROCEDURE — 77412 RADIATION TX DELIVERY LVL 3: CPT | Performed by: RADIOLOGY

## 2024-03-20 ENCOUNTER — TELEPHONE (OUTPATIENT)
Age: 59
End: 2024-03-20

## 2024-03-20 ENCOUNTER — OFFICE VISIT (OUTPATIENT)
Age: 59
End: 2024-03-20

## 2024-03-20 VITALS
DIASTOLIC BLOOD PRESSURE: 69 MMHG | SYSTOLIC BLOOD PRESSURE: 112 MMHG | WEIGHT: 186 LBS | HEIGHT: 71 IN | BODY MASS INDEX: 26.04 KG/M2 | OXYGEN SATURATION: 95 % | TEMPERATURE: 97.6 F | HEART RATE: 63 BPM | RESPIRATION RATE: 18 BRPM

## 2024-03-20 DIAGNOSIS — R11.2 CHEMOTHERAPY INDUCED NAUSEA AND VOMITING: ICD-10-CM

## 2024-03-20 DIAGNOSIS — C34.90 SMALL CELL LUNG CANCER (HCC): Primary | ICD-10-CM

## 2024-03-20 DIAGNOSIS — T45.1X5A CHEMOTHERAPY INDUCED NAUSEA AND VOMITING: ICD-10-CM

## 2024-03-20 PROCEDURE — 99205 OFFICE O/P NEW HI 60 MIN: CPT | Performed by: INTERNAL MEDICINE

## 2024-03-20 RX ORDER — ONDANSETRON HYDROCHLORIDE 8 MG/1
8 TABLET, FILM COATED ORAL EVERY 8 HOURS PRN
Qty: 30 TABLET | Refills: 1 | Status: SHIPPED | OUTPATIENT
Start: 2024-03-20

## 2024-03-20 RX ORDER — PROCHLORPERAZINE MALEATE 10 MG
10 TABLET ORAL EVERY 6 HOURS PRN
Qty: 30 TABLET | Refills: 1 | Status: SHIPPED | OUTPATIENT
Start: 2024-03-20

## 2024-03-20 RX ORDER — SODIUM CHLORIDE 9 MG/ML
20 INJECTION, SOLUTION INTRAVENOUS ONCE
OUTPATIENT
Start: 2024-04-08

## 2024-03-20 RX ORDER — SODIUM CHLORIDE 9 MG/ML
20 INJECTION, SOLUTION INTRAVENOUS ONCE
OUTPATIENT
Start: 2024-04-09

## 2024-03-20 RX ORDER — SODIUM CHLORIDE 9 MG/ML
20 INJECTION, SOLUTION INTRAVENOUS ONCE
OUTPATIENT
Start: 2024-04-10

## 2024-03-20 NOTE — TELEPHONE ENCOUNTER
What would be a preferred day of the week that would work best for your infusion appointment? M, T, W preferred but will make whatever work  Do you prefer mornings or afternoons for your appointments? 830am or later  Are there any days or dates that do not work for your schedule, including any upcoming vacations? N/A  We are going to try our best to schedule you at the infusion center closest to your home.  In the event that we are unable to what would be your next preferred infusion site or sites? [facility]  Do you have transportation to take you to all of your appointments? Yes  Would you like the infusion center to draw labs from your port? (disregard if patient doesn't have a port or need labs for infusion appointment) N/A

## 2024-03-20 NOTE — TELEPHONE ENCOUNTER
Dr Morrell requested a follow up appt between cycle 1 and cycle 2.  The follow up has been scheduled for Weds 4/10 @ 8:20 AM.  Please adjust this appt as needed per Dr Morrell schedule.

## 2024-03-20 NOTE — PROGRESS NOTES
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 to face counseling time, coordination of care and documentation.    Cortney Morrell MD PhD

## 2024-03-20 NOTE — PATIENT INSTRUCTIONS
Chemo/IO therapy to start 5 April 2024    Labs prior    Follow up in clinic one week after start chemotherapy

## 2024-03-21 ENCOUNTER — PATIENT OUTREACH (OUTPATIENT)
Dept: HEMATOLOGY ONCOLOGY | Facility: CLINIC | Age: 59
End: 2024-03-21

## 2024-03-21 DIAGNOSIS — C34.90 SMALL CELL LUNG CANCER (HCC): Primary | ICD-10-CM

## 2024-03-21 NOTE — PROGRESS NOTES
I reached out and spoke with Nino now that consults have been completed with the oncology teams to review for any barriers to care and offer supportive services as needed. I reviewed and updated the members assigned to the care team in [facility].   He knows the members of the care team as well as how and when to contact them with any needs.   He verbalizes managing the schedules well. He is thankful to have his wife is is very supportive in this area and accompanies him to his office visits.   We discussed the role Palliative Care could play in supporting him through treatment. Nino would to establish care with them. I placed referral and scheduled a consult for them and provided all appnt info.   Patients states that they are eating and drinking as per usual with no unintentional weight loss.   Nino states that his [employer] family have been overflowing his home with good nutritious foods as well.   Patient does not smoke.   Patient states He is well supported by family and friends.  Community support groups discussed including the Cancer Support Community of the [facility]. Patient declined information at this time.   Patient feels they have adequate insurance coverage and deny any financial concerns at this time.     I reviewed the following potential barriers to determine plan for on going outreach.  No-Multimodality treatment- WBRT is now complete    No-Presence of greater than 2 cancer related symptoms on presentation    No-Transportation barriers    No-Lack of social support    No-High anxiety    No-Difficultly comprehending treatment plan    No-History of non-compliance    No-Greater than 2 co-morbidities    No-Concurrent/additional care at Tertiary center ([facility], [facility])  No-Language barrier  No-Uninsured/ expressed financial concerns              No-Expresses concerns regarding memory/ forgetfulness   No-Active tobacco use  No-Primary caregiver to a family member/friend  Yes-Stage 4 Diagnosis  This  patient will require follow up outreach based on low complexity score of 0-1 qualifiers or barriers listed above. Plan to outreach in 4-6 weeks.   Discussed the plan for follow-up with the patient.   I have provided my direct contact information to the patient and welcome them to contact me if their needs as discussed above change. They were appreciative for the call.

## 2024-03-21 NOTE — TELEPHONE ENCOUNTER
Spoke with patient, confirmed dates, times, and location of first cycle and follow up. He will get the rest at infusion.

## 2024-03-25 ENCOUNTER — HOSPITAL ENCOUNTER (OUTPATIENT)
Dept: INFUSION CENTER | Facility: HOSPITAL | Age: 59
End: 2024-03-25
Attending: INTERNAL MEDICINE

## 2024-03-25 ENCOUNTER — PATIENT OUTREACH (OUTPATIENT)
Dept: HEMATOLOGY ONCOLOGY | Facility: CLINIC | Age: 59
End: 2024-03-25

## 2024-03-25 NOTE — PROGRESS NOTES
Oncology Finance Advocacy Intake and Intervention  Oncology Finance Counselor/Advocate placed call to patient. This writer informed patient that this writer is here to assist patient with billing questions, financial assistance, payment/payment plans, quotes, copayment assistance, insurance optimization, and insurance navigation.    This writer conducted a thorough benefit review of copayment, deductible, and out of pocket cost. This information is documented below and has been reviewed with patient.     Copayment: Self-Pay  Deductible: Self-Pay  Out of Pocket Cost: Self-Pay  Insurance optimization (Limited benefit vs self-pay): Self-Pay  Patient assistance status: MA Pending  Free Drug Applications: Imfinzi & Emend  Oral Chemo Application: NA  BIN#:  PCN#:  GRP#:  Copay:$  Interventions:    Outreached PT to discuss financial assistance opportunities. PT did not answer. Voicemail was left detailing the reason for the call, this writer's contact information, and a high-level overview of options.     If any question arise patient can contact this writer at below information. This information was given to patient at time of contact.      Elvi Romero MPH  Phone:[phone]  Email: [email or web address]

## 2024-03-27 ENCOUNTER — DOCUMENTATION (OUTPATIENT)
Dept: HEMATOLOGY ONCOLOGY | Facility: CLINIC | Age: 59
End: 2024-03-27

## 2024-03-27 NOTE — PROGRESS NOTES
Oncology Finance Advocacy Intake and Intervention    Oncology Finance Counselor/Advocate placed call to patient. This writer informed patient that this writer is here to assist patient with billing questions, financial assistance, payment/payment plans, quotes, copayment assistance, insurance optimization, and insurance navigation.      This writer conducted a thorough benefit review of copayment, deductible, and out of pocket cost. This information is documented below and has been reviewed with patient.     Copayment: $ SELF PAY, UNINSURED  Deductible: $ / $ Remaining   Out Of Pocket Cost: $ / $ Remaining  Insurance Optimization (Limited Benefit Vs Self-Pay): SELF PAY, UNINSURED  Patient Assistance Status: Naz Care, Emend, Imfinzi, Able Pay  Free Drug Applications: Naz Care, Emend, Imfinzi, Able Pay  Oral Chemo Application:   BIN Number:   PCN Number:   GRP Number:   Copay Amount: $  Interventions:   Spoke to PT and spouse regarding household finances, pending naz care application, emend, imfinzi, able pay, and the 2 remaining RX that do not have free drug programs and implications. All PT & spouse questions were answered about process & timeline. They were agreeable to an electronic process. Application sent to PT and HCP for signatures.   Information above was review thoroughly with patient and patient was advise of possible assistance programs/interventions. If any question arise patient can contact this writer at below information. This information was given to patient at time of contact.      Elvi Romero MPH  Phone: [phone]  Email: [email or web address]

## 2024-03-27 NOTE — PROGRESS NOTES
Applications were sent to HCP and PT.  The following email was sent to the PT accompanying the applications per his request after the phone call:     Sina Oneill and Leslie,     Thank you for giving me a call back today, it was great (virtually) meeting you both.     I apologize for the delayed email…. I had put out the inquiries to the [facility] financial counselor team regarding the status of your naz care application, as well as an inquiry to your clinical team about Toposar and Carboplatin (the 2 infusion medications that do not have funding) and I was waiting to e-mail you in hopes to have answers for you by the end of today but I do not. I will follow-up with you when I do though! However, in the meantime, I did not want to leave you without the information we spoke about.   Here is the link to Able Pay: [email or web address]. Friendly reminder that only services that are performed AFTER Able Pay approves you can be billed to Able Pay (EX: you have an appointment on 4/1 and 4/7 and are approved for Able Pay on 4/5, only the 4/7 appointment will be eligible for Able Pay). I just like to be transparent about that!   Once you get approved (if you choose to take this route- no pressure), you can either call me/ send me a picture of your card and I can do it OR you can call the billing office at: [phone] to have them add it as your payment method.   Your naz care application is still showing as pending. If you would like to call the department directly for an update, the [facility] financial counselor department's number is: [phone] OR you can ask me and I should be able to get you an answer as well.   Documents: I will need your 1040. At the end of the 1040, I will need Leslie to sign (I think the line is called head of household) the first line, then Nino, I will need you to sign the second line. You both will need to put the date and employment position Nino, you should put  unemployed).   Applications:   Emend [employer]:   Please complete the household salary and members section on page 2, sign and date on page 2, sign and date on page 3.  Imf[employer], [employer]:  Please sign and date on page 1 (2 qty times).  Please send both applications (Emend page 2 and 3, Imfinzi page 1) and your 1040 (all pages of 1040, no schedules needed; est. 2-3 pages) back to me at this e-mail address!   Again, I just gave soooo much information. Please feel free to call/ e-mail with questions or concerns. You will need this application to be processed before you can receive your treatment, FYI so please get this back to me at your earliest convenience. In the meantime, I will work on getting answers about the other 2 medications, the naz care application, and having your doctor sign the Emend and Imfinzi application, as well!   Have a good evening!    Warm Regards,     Elvi Romero, Mph   Oncology     [address]   [place] [address]  [phone]  [phone]

## 2024-04-04 ENCOUNTER — TELEPHONE (OUTPATIENT)
Dept: HEMATOLOGY ONCOLOGY | Facility: CLINIC | Age: 59
End: 2024-04-04

## 2024-04-04 NOTE — TELEPHONE ENCOUNTER
Patient had a question about what day would be the best to have the blood work done prior to his infusions. Patient ok with having blood draws from his arm and not the port. Patient made aware to have his blood work done tomorrow.     Patient also asking about his PRN nausea medications. Patient encouraged to pick this up from the pharmacy to have on hand. Clarified with patient that nausea/vomiting symptoms typically start the night of the infusion into the day after the infusion (assuming he even has it). Patient can take the PRN medications preemptively to help minimize symptoms if he finds he becomes nauseous after his first treatment. Patient verbally understood.

## 2024-04-04 NOTE — TELEPHONE ENCOUNTER
"Received from voice mail:    \"Good afternoon, Nurse Tabby, this is Le Hilton, I was in to see a couple of weeks ago and I'm supposed to start my chemo treatments on Monday. I just had a couple questions for you. If you give me a call back at your convenience, phone number is [phone]. Thank you so much.\"    "

## 2024-04-05 ENCOUNTER — APPOINTMENT (OUTPATIENT)
Dept: LAB | Facility: HOSPITAL | Age: 59
End: 2024-04-05

## 2024-04-05 DIAGNOSIS — C34.90 SMALL CELL LUNG CANCER (HCC): ICD-10-CM

## 2024-04-05 LAB
ALBUMIN SERPL BCP-MCNC: 3.8 G/DL (ref 3.5–5)
ALP SERPL-CCNC: 36 U/L (ref 34–104)
ALT SERPL W P-5'-P-CCNC: 26 U/L (ref 7–52)
ANION GAP SERPL CALCULATED.3IONS-SCNC: 6 MMOL/L (ref 4–13)
AST SERPL W P-5'-P-CCNC: 26 U/L (ref 13–39)
BASOPHILS # BLD AUTO: 0.03 THOUSANDS/ÂΜL (ref 0–0.1)
BASOPHILS NFR BLD AUTO: 0 % (ref 0–1)
BILIRUB SERPL-MCNC: 0.47 MG/DL (ref 0.2–1)
BUN SERPL-MCNC: 23 MG/DL (ref 5–25)
CALCIUM SERPL-MCNC: 9.2 MG/DL (ref 8.4–10.2)
CHLORIDE SERPL-SCNC: 103 MMOL/L (ref 96–108)
CO2 SERPL-SCNC: 29 MMOL/L (ref 21–32)
CREAT SERPL-MCNC: 0.7 MG/DL (ref 0.6–1.3)
CRP SERPL QL: <1 MG/L
EOSINOPHIL # BLD AUTO: 0.15 THOUSAND/ÂΜL (ref 0–0.61)
EOSINOPHIL NFR BLD AUTO: 2 % (ref 0–6)
ERYTHROCYTE [DISTWIDTH] IN BLOOD BY AUTOMATED COUNT: 13.9 % (ref 11.6–15.1)
ERYTHROCYTE [SEDIMENTATION RATE] IN BLOOD: 14 MM/HOUR (ref 0–19)
GFR SERPL CREATININE-BSD FRML MDRD: 103 ML/MIN/1.73SQ M
GLUCOSE SERPL-MCNC: 74 MG/DL (ref 65–140)
HCT VFR BLD AUTO: 42.5 % (ref 36.5–49.3)
HGB BLD-MCNC: 13.8 G/DL (ref 12–17)
IMM GRANULOCYTES # BLD AUTO: 0.07 THOUSAND/UL (ref 0–0.2)
IMM GRANULOCYTES NFR BLD AUTO: 1 % (ref 0–2)
LDH SERPL-CCNC: 348 U/L (ref 140–271)
LIPASE SERPL-CCNC: 37 U/L (ref 11–82)
LYMPHOCYTES # BLD AUTO: 1.72 THOUSANDS/ÂΜL (ref 0.6–4.47)
LYMPHOCYTES NFR BLD AUTO: 21 % (ref 14–44)
MAGNESIUM SERPL-MCNC: 2.2 MG/DL (ref 1.9–2.7)
MCH RBC QN AUTO: 31.2 PG (ref 26.8–34.3)
MCHC RBC AUTO-ENTMCNC: 32.5 G/DL (ref 31.4–37.4)
MCV RBC AUTO: 96 FL (ref 82–98)
MONOCYTES # BLD AUTO: 0.73 THOUSAND/ÂΜL (ref 0.17–1.22)
MONOCYTES NFR BLD AUTO: 9 % (ref 4–12)
NEUTROPHILS # BLD AUTO: 5.57 THOUSANDS/ÂΜL (ref 1.85–7.62)
NEUTS SEG NFR BLD AUTO: 67 % (ref 43–75)
NRBC BLD AUTO-RTO: 0 /100 WBCS
PLATELET # BLD AUTO: 277 THOUSANDS/UL (ref 149–390)
PMV BLD AUTO: 9.7 FL (ref 8.9–12.7)
POTASSIUM SERPL-SCNC: 5.3 MMOL/L (ref 3.5–5.3)
PROT SERPL-MCNC: 6.8 G/DL (ref 6.4–8.4)
RBC # BLD AUTO: 4.42 MILLION/UL (ref 3.88–5.62)
SODIUM SERPL-SCNC: 138 MMOL/L (ref 135–147)
T3FREE SERPL-MCNC: 4.23 PG/ML (ref 2.5–3.9)
TSH SERPL DL<=0.05 MIU/L-ACNC: 0.85 UIU/ML (ref 0.45–4.5)
WBC # BLD AUTO: 8.27 THOUSAND/UL (ref 4.31–10.16)

## 2024-04-05 PROCEDURE — 85652 RBC SED RATE AUTOMATED: CPT

## 2024-04-05 PROCEDURE — 86140 C-REACTIVE PROTEIN: CPT

## 2024-04-05 PROCEDURE — 84481 FREE ASSAY (FT-3): CPT

## 2024-04-05 PROCEDURE — 85025 COMPLETE CBC W/AUTO DIFF WBC: CPT

## 2024-04-05 PROCEDURE — 83690 ASSAY OF LIPASE: CPT

## 2024-04-05 PROCEDURE — 82024 ASSAY OF ACTH: CPT

## 2024-04-05 PROCEDURE — 84443 ASSAY THYROID STIM HORMONE: CPT

## 2024-04-05 PROCEDURE — 83735 ASSAY OF MAGNESIUM: CPT

## 2024-04-05 PROCEDURE — 83615 LACTATE (LD) (LDH) ENZYME: CPT

## 2024-04-05 PROCEDURE — 80053 COMPREHEN METABOLIC PANEL: CPT

## 2024-04-05 PROCEDURE — 36415 COLL VENOUS BLD VENIPUNCTURE: CPT

## 2024-04-06 LAB — ACTH PLAS-MCNC: 13.1 PG/ML (ref 7.2–63.3)

## 2024-04-08 ENCOUNTER — HOSPITAL ENCOUNTER (OUTPATIENT)
Dept: INFUSION CENTER | Facility: HOSPITAL | Age: 59
Discharge: HOME | End: 2024-04-08
Attending: INTERNAL MEDICINE
Payer: COMMERCIAL

## 2024-04-08 VITALS
WEIGHT: 191.14 LBS | DIASTOLIC BLOOD PRESSURE: 81 MMHG | RESPIRATION RATE: 18 BRPM | HEIGHT: 71 IN | TEMPERATURE: 96.6 F | OXYGEN SATURATION: 97 % | BODY MASS INDEX: 26.76 KG/M2 | HEART RATE: 77 BPM | SYSTOLIC BLOOD PRESSURE: 137 MMHG

## 2024-04-08 DIAGNOSIS — C34.90 SMALL CELL LUNG CANCER (HCC): Primary | ICD-10-CM

## 2024-04-08 RX ORDER — SODIUM CHLORIDE 9 MG/ML
20 INJECTION, SOLUTION INTRAVENOUS ONCE
Status: COMPLETED | OUTPATIENT
Start: 2024-04-08 | End: 2024-04-08

## 2024-04-08 RX ADMIN — SODIUM CHLORIDE 20 ML/HR: 0.9 INJECTION, SOLUTION INTRAVENOUS at 10:36

## 2024-04-08 RX ADMIN — ETOPOSIDE 164 MG: 20 INJECTION INTRAVENOUS at 13:24

## 2024-04-08 RX ADMIN — DEXAMETHASONE SODIUM PHOSPHATE: 100 INJECTION INTRAMUSCULAR; INTRAVENOUS at 10:39

## 2024-04-08 RX ADMIN — FOSAPREPITANT DIMEGLUMINE 150 MG: 150 INJECTION, POWDER, LYOPHILIZED, FOR SOLUTION INTRAVENOUS at 11:03

## 2024-04-08 RX ADMIN — CARBOPLATIN 750 MG: 10 INJECTION, SOLUTION INTRAVENOUS at 12:47

## 2024-04-08 RX ADMIN — DURVALUMAB 1500 MG: 500 INJECTION, SOLUTION INTRAVENOUS at 11:40

## 2024-04-08 NOTE — PROGRESS NOTES
Pt tolerated chemotherapy infusion without any adverse reactions.  Iv removed. Pt ambulated out of unit with a steady gait. Declined AVS     Aware of next appt 04/09/2024 @ 0100 pm

## 2024-04-09 ENCOUNTER — HOSPITAL ENCOUNTER (OUTPATIENT)
Dept: INFUSION CENTER | Facility: HOSPITAL | Age: 59
Discharge: HOME | End: 2024-04-09
Attending: INTERNAL MEDICINE
Payer: COMMERCIAL

## 2024-04-09 ENCOUNTER — DOCUMENTATION (OUTPATIENT)
Dept: HEMATOLOGY ONCOLOGY | Facility: CLINIC | Age: 59
End: 2024-04-09

## 2024-04-09 VITALS
TEMPERATURE: 98.7 F | DIASTOLIC BLOOD PRESSURE: 76 MMHG | RESPIRATION RATE: 16 BRPM | BODY MASS INDEX: 26.76 KG/M2 | HEIGHT: 71 IN | WEIGHT: 191.14 LBS | SYSTOLIC BLOOD PRESSURE: 129 MMHG | HEART RATE: 53 BPM | OXYGEN SATURATION: 100 %

## 2024-04-09 DIAGNOSIS — C34.90 SMALL CELL LUNG CANCER (HCC): Primary | ICD-10-CM

## 2024-04-09 PROCEDURE — 96367 TX/PROPH/DG ADDL SEQ IV INF: CPT

## 2024-04-09 PROCEDURE — 96413 CHEMO IV INFUSION 1 HR: CPT

## 2024-04-09 RX ORDER — SODIUM CHLORIDE 9 MG/ML
20 INJECTION, SOLUTION INTRAVENOUS ONCE
Status: COMPLETED | OUTPATIENT
Start: 2024-04-09 | End: 2024-04-09

## 2024-04-09 RX ADMIN — ETOPOSIDE 164 MG: 20 INJECTION INTRAVENOUS at 14:13

## 2024-04-09 RX ADMIN — DEXAMETHASONE SODIUM PHOSPHATE: 100 INJECTION INTRAMUSCULAR; INTRAVENOUS at 13:41

## 2024-04-09 RX ADMIN — SODIUM CHLORIDE 20 ML/HR: 9 INJECTION, SOLUTION INTRAVENOUS at 13:41

## 2024-04-09 NOTE — PROGRESS NOTES
Nino Silva  tolerated treatment well with no complications.      Nino Silva is aware of future appt on 4/10 at 1200.     AVS printed and given to Nino Silva:  No (Declined by Nino Silva)     Iv site maintained and with alcohol capo and nina wrap per order ok to leave IV in.

## 2024-04-09 NOTE — PROGRESS NOTES
Emend shipment to deliver on 4/23/24 to [address] [place] [address] [place] [address]  Infusion ATTN Pharmacy.   Call [phone] to refill.   PT eligible until 4/9/25.   Refill Emend every 21 days.    Elvi Romero, MPH  Phone:[phone]  Email: [email or web address]
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
## 2024-04-09 NOTE — PROGRESS NOTES
Emend approved 4/9/24-/9/25. Faxed enrollment to pharmacy at [id number].        Elvi Romero MPH  Phone:[phone]  Email: [email or web address]

## 2024-04-10 ENCOUNTER — HOSPITAL ENCOUNTER (OUTPATIENT)
Dept: INFUSION CENTER | Facility: HOSPITAL | Age: 59
Discharge: HOME | End: 2024-04-10
Attending: INTERNAL MEDICINE
Payer: COMMERCIAL

## 2024-04-10 ENCOUNTER — TELEPHONE (OUTPATIENT)
Dept: INTERNAL MEDICINE CLINIC | Facility: CLINIC | Age: 59
End: 2024-04-10

## 2024-04-10 ENCOUNTER — TELEPHONE (OUTPATIENT)
Age: 59
End: 2024-04-10

## 2024-04-10 ENCOUNTER — DOCUMENTATION (OUTPATIENT)
Dept: HEMATOLOGY ONCOLOGY | Facility: CLINIC | Age: 59
End: 2024-04-10

## 2024-04-10 ENCOUNTER — OFFICE VISIT (OUTPATIENT)
Age: 59
End: 2024-04-10

## 2024-04-10 VITALS
BODY MASS INDEX: 26.76 KG/M2 | RESPIRATION RATE: 16 BRPM | OXYGEN SATURATION: 97 % | TEMPERATURE: 97 F | WEIGHT: 191.14 LBS | SYSTOLIC BLOOD PRESSURE: 117 MMHG | DIASTOLIC BLOOD PRESSURE: 70 MMHG | HEART RATE: 58 BPM | HEIGHT: 71 IN

## 2024-04-10 VITALS
HEIGHT: 71 IN | OXYGEN SATURATION: 90 % | SYSTOLIC BLOOD PRESSURE: 120 MMHG | DIASTOLIC BLOOD PRESSURE: 76 MMHG | RESPIRATION RATE: 17 BRPM | HEART RATE: 78 BPM | BODY MASS INDEX: 27.16 KG/M2 | WEIGHT: 194 LBS | TEMPERATURE: 97.6 F

## 2024-04-10 DIAGNOSIS — T45.1X5A CHEMOTHERAPY INDUCED NAUSEA AND VOMITING: ICD-10-CM

## 2024-04-10 DIAGNOSIS — C34.90 SMALL CELL LUNG CANCER (HCC): ICD-10-CM

## 2024-04-10 DIAGNOSIS — C34.90 SMALL CELL LUNG CANCER (HCC): Primary | ICD-10-CM

## 2024-04-10 DIAGNOSIS — R11.2 CHEMOTHERAPY INDUCED NAUSEA AND VOMITING: ICD-10-CM

## 2024-04-10 PROCEDURE — 96367 TX/PROPH/DG ADDL SEQ IV INF: CPT

## 2024-04-10 PROCEDURE — 96413 CHEMO IV INFUSION 1 HR: CPT

## 2024-04-10 PROCEDURE — 99215 OFFICE O/P EST HI 40 MIN: CPT | Performed by: INTERNAL MEDICINE

## 2024-04-10 RX ORDER — SODIUM CHLORIDE 9 MG/ML
20 INJECTION, SOLUTION INTRAVENOUS ONCE
Status: COMPLETED | OUTPATIENT
Start: 2024-04-10 | End: 2024-04-10

## 2024-04-10 RX ORDER — PROCHLORPERAZINE MALEATE 10 MG
10 TABLET ORAL EVERY 6 HOURS PRN
Qty: 30 TABLET | Refills: 1 | Status: SHIPPED | OUTPATIENT
Start: 2024-04-10

## 2024-04-10 RX ADMIN — ETOPOSIDE 164 MG: 20 INJECTION INTRAVENOUS at 12:30

## 2024-04-10 RX ADMIN — SODIUM CHLORIDE 20 ML/HR: 9 INJECTION, SOLUTION INTRAVENOUS at 11:59

## 2024-04-10 RX ADMIN — DEXAMETHASONE SODIUM PHOSPHATE: 100 INJECTION INTRAMUSCULAR; INTRAVENOUS at 11:59

## 2024-04-10 NOTE — PROGRESS NOTES
Patient chemo infusion completed without complication. Patient given AVS at this time and was discharged in stable condition to home via ambulation.  Patient verified next appointment.

## 2024-04-10 NOTE — TELEPHONE ENCOUNTER
Patient has a STAT referral, patient's oncologist called and lvm asking to schedule an appt for him. Called patient and lvm explaining someone will be calling him to schedule an appt

## 2024-04-10 NOTE — PROGRESS NOTES
Called [employer] at [phone] and spoke with representative Adelaida.    [id number] Approved 4/10/24-4/10/25    E-Scribe: BUSHRA Ceja     [employer] Information #[id number]    Elvi Romero MPH  Phone:[phone]  Email: [email or web address]

## 2024-04-11 ENCOUNTER — TELEPHONE (OUTPATIENT)
Age: 59
End: 2024-04-11

## 2024-04-11 DIAGNOSIS — C34.90 SMALL CELL LUNG CANCER (HCC): Primary | ICD-10-CM

## 2024-04-11 RX ORDER — DURVALUMAB 500 MG/10ML
1500 INJECTION, SOLUTION INTRAVENOUS
Qty: 30 ML | Refills: 3 | Status: SHIPPED | OUTPATIENT
Start: 2024-04-11

## 2024-04-11 NOTE — TELEPHONE ENCOUNTER
Patient's wife stopped in for FMLA paperwork left for her by Tabby and brought in first page Tabby was missing.  Copied page, gave wife her completed form and than faxed after confirming with wife and Tabby to do so.

## 2024-04-15 ENCOUNTER — DOCUMENTATION (OUTPATIENT)
Dept: HEMATOLOGY ONCOLOGY | Facility: CLINIC | Age: 59
End: 2024-04-15

## 2024-04-15 ENCOUNTER — TELEPHONE (OUTPATIENT)
Dept: HEMATOLOGY ONCOLOGY | Facility: CLINIC | Age: 59
End: 2024-04-15

## 2024-04-15 NOTE — PROGRESS NOTES
PT spoke to Steph NATHAN [facility] regarding naz care application. When this writer began working the case, naz care was pending. This writer forwarded PT 1040 to [facility] team, as 1040 were needed for free drug applications. This writer secured free drug applications. This writer reached out to Steph to inform her that the PT is seeking clarity.   The original email sent to the PT can be found below:    Sina Oneill and Leslie,      Thank you for giving me a call back today, it was great (virtually) meeting you both.      I apologize for the delayed email…. I had put out the inquiries to the hospital financial counselor team regarding the status of your naz care application, as well as an inquiry to your clinical team about Toposar and Carboplatin (the 2 infusion medications that do not have funding) and I was waiting to e-mail you in hopes to have answers for you by the end of today but I do not. I will follow-up with you when I do though! However, in the meantime, I did not want to leave you without the information we spoke about.   Here is the link to [employer]: [email or web address]. Friendly reminder that only services that are performed AFTER Able Pay approves you can be billed to [employer] (EX: you have an appointment on 4/1 and 4/7 and are approved for [employer]ble Pay on 4/5, only the 4/7 appointment will be eligible for [employer]). I just like to be transparent about that!   Once you get approved (if you choose to take this route- no pressure), you can either call me/ send me a picture of your card and I can do it OR you can call the billing office at: [phone] to have them add it as your payment method.   Your naz care application is still showing as pending. If you would like to call the department directly for an update, the [facility] financial counselor department's number is: [phone] OR you can ask me and I should be able to get you an answer as well.   Documents: I will need your 1040.  At the end of the 1040, I will need Leslie to sign (I think the line is called head of household) the first line, then Nino, I will need you to sign the second line. You both will need to put the date and employment position Nino, you should put unemployed).   Applications:   [employer]:   Please complete the household salary and members section on page 2, sign and date on page 2, sign and date on page 3.  Imfinzi, [employer]:  Please sign and date on page 1 (2 qty times).  Please send both applications (Emend page 2 and 3, Imfinzi page 1) and your 1040 (all pages of 1040, no schedules needed; est. 2-3 pages) back to me at this e-mail address!   Again, I just gave soooo much information. Please feel free to call/ e-mail with questions or concerns. You will need this application to be processed before you can receive your treatment, FYI so please get this back to me at your earliest convenience. In the meantime, I will work on getting answers about the other 2 medications, the naz care application, and having your doctor sign the Emend and Imfinzi application, as well!   Have a good evening!    PT sent back applications and 1040.     PT, on 4/15/24, left  for this writer saying the following:  Good morning, Ananya. This is Nino. Samson, you've been talking to my wife and I, Brittany, and about our financial assistance and stuff. I got a call from Steph, from [facility], also from the financial assistance, what is it? Coordinator [facility]. And she said she has none of my paperwork. The wife said you have all the paperwork, so I was wondering if you could for forward it over to them or e-mail them or else call me and let me know what you may need. My phone number is [phone]. Steph was aware who you are, so I'm not sure if you're aware of who she is. But again, she's a  for [facility]. She said she needs some of the information I guess that you have. If you have any  questions, give me a call back. If not, just let me know what else I could do. OK. Thank you so much and have a nice day. Bye now.    PT also has MARLENE BENJAMIN now.... [facility] team should support further requests related to naz care.

## 2024-04-15 NOTE — TELEPHONE ENCOUNTER
"Received from voice mail:    \"Good morning, Nurse Magda, she's really Tabby. This is Yobani Oneill calling Raul [birth year]. Just had a couple questions, no emergency. I was just wondering if you could give me a call back at your convenience. Thank you so much and I look forward to talking to you.\"    "

## 2024-04-15 NOTE — TELEPHONE ENCOUNTER
Patient calling to report that wheezing symptoms he was experiencing at his last office visit have since resolved. Patient inquiring if Dr. Morrell still would like him to see ENT for it. Dr. Morrell cancelled ENT referral. Patient made aware.     Patient also with question regarding how long chemotherapy is present in body fluids like urine, following infusions. Patient made aware that chemotherapy is secreted out of the body for the first 48 hours after infusion. Patient's last infusion was on 4/10/24. Reassurance given.

## 2024-04-16 ENCOUNTER — DOCUMENTATION (OUTPATIENT)
Dept: HEMATOLOGY ONCOLOGY | Facility: CLINIC | Age: 59
End: 2024-04-16

## 2024-04-16 NOTE — PROGRESS NOTES
Imfinzi: [id number]   [place] [employer] [place] # [phone]  Approved 4/10/24-4/10/25   4 Refills   Pharm[facility]: [facility] [place]    6-8 Business Day to Ship Out  Scheduled to deliver on 4/26/24    Emend:  [facility]  [phone]  Refill Emend every 21 days.  4/9/24-4/9/25   3 Refills after this fill  Call 7 days before DOS   Scheduled to deliver on 4/23/24    Elvi Romero MPH  Phone:[phone]  Email: [email or web address]

## 2024-04-17 ENCOUNTER — APPOINTMENT (OUTPATIENT)
Dept: LAB | Facility: HOSPITAL | Age: 59
End: 2024-04-17

## 2024-04-17 DIAGNOSIS — C34.90 SMALL CELL LUNG CANCER (HCC): ICD-10-CM

## 2024-04-17 LAB
ALBUMIN SERPL BCP-MCNC: 3.9 G/DL (ref 3.5–5)
ALP SERPL-CCNC: 42 U/L (ref 34–104)
ALT SERPL W P-5'-P-CCNC: 26 U/L (ref 7–52)
ANION GAP SERPL CALCULATED.3IONS-SCNC: 7 MMOL/L (ref 4–13)
AST SERPL W P-5'-P-CCNC: 22 U/L (ref 13–39)
BASOPHILS # BLD AUTO: 0.04 THOUSANDS/ÂΜL (ref 0–0.1)
BASOPHILS NFR BLD AUTO: 1 % (ref 0–1)
BILIRUB SERPL-MCNC: 0.59 MG/DL (ref 0.2–1)
BUN SERPL-MCNC: 18 MG/DL (ref 5–25)
CALCIUM SERPL-MCNC: 9 MG/DL (ref 8.4–10.2)
CHLORIDE SERPL-SCNC: 104 MMOL/L (ref 96–108)
CO2 SERPL-SCNC: 27 MMOL/L (ref 21–32)
CREAT SERPL-MCNC: 0.82 MG/DL (ref 0.6–1.3)
EOSINOPHIL # BLD AUTO: 0.05 THOUSAND/ÂΜL (ref 0–0.61)
EOSINOPHIL NFR BLD AUTO: 1 % (ref 0–6)
ERYTHROCYTE [DISTWIDTH] IN BLOOD BY AUTOMATED COUNT: 13.2 % (ref 11.6–15.1)
GFR SERPL CREATININE-BSD FRML MDRD: 96 ML/MIN/1.73SQ M
GLUCOSE SERPL-MCNC: 97 MG/DL (ref 65–140)
HCT VFR BLD AUTO: 39 % (ref 36.5–49.3)
HGB BLD-MCNC: 12.9 G/DL (ref 12–17)
IMM GRANULOCYTES # BLD AUTO: 0.04 THOUSAND/UL (ref 0–0.2)
IMM GRANULOCYTES NFR BLD AUTO: 1 % (ref 0–2)
LYMPHOCYTES # BLD AUTO: 0.9 THOUSANDS/ÂΜL (ref 0.6–4.47)
LYMPHOCYTES NFR BLD AUTO: 23 % (ref 14–44)
MAGNESIUM SERPL-MCNC: 2 MG/DL (ref 1.9–2.7)
MCH RBC QN AUTO: 31 PG (ref 26.8–34.3)
MCHC RBC AUTO-ENTMCNC: 33.1 G/DL (ref 31.4–37.4)
MCV RBC AUTO: 94 FL (ref 82–98)
MONOCYTES # BLD AUTO: 0.15 THOUSAND/ÂΜL (ref 0.17–1.22)
MONOCYTES NFR BLD AUTO: 4 % (ref 4–12)
NEUTROPHILS # BLD AUTO: 2.76 THOUSANDS/ÂΜL (ref 1.85–7.62)
NEUTS SEG NFR BLD AUTO: 70 % (ref 43–75)
NRBC BLD AUTO-RTO: 0 /100 WBCS
PLATELET # BLD AUTO: 204 THOUSANDS/UL (ref 149–390)
PMV BLD AUTO: 9.9 FL (ref 8.9–12.7)
POTASSIUM SERPL-SCNC: 4.3 MMOL/L (ref 3.5–5.3)
PROT SERPL-MCNC: 7.1 G/DL (ref 6.4–8.4)
RBC # BLD AUTO: 4.16 MILLION/UL (ref 3.88–5.62)
RETICS # AUTO: ABNORMAL 10*3/UL (ref 14356–105094)
RETICS # CALC: 0.22 % (ref 0.37–1.87)
SODIUM SERPL-SCNC: 138 MMOL/L (ref 135–147)
WBC # BLD AUTO: 3.94 THOUSAND/UL (ref 4.31–10.16)

## 2024-04-17 PROCEDURE — 36415 COLL VENOUS BLD VENIPUNCTURE: CPT

## 2024-04-17 PROCEDURE — 85025 COMPLETE CBC W/AUTO DIFF WBC: CPT

## 2024-04-17 PROCEDURE — 85045 AUTOMATED RETICULOCYTE COUNT: CPT

## 2024-04-17 PROCEDURE — 83735 ASSAY OF MAGNESIUM: CPT

## 2024-04-17 PROCEDURE — 80053 COMPREHEN METABOLIC PANEL: CPT

## 2024-04-18 ENCOUNTER — RADIATION ONCOLOGY FOLLOW-UP (OUTPATIENT)
Facility: HOSPITAL | Age: 59
End: 2024-04-18
Attending: RADIOLOGY

## 2024-04-18 VITALS
OXYGEN SATURATION: 96 % | WEIGHT: 191.8 LBS | RESPIRATION RATE: 16 BRPM | HEART RATE: 71 BPM | SYSTOLIC BLOOD PRESSURE: 113 MMHG | TEMPERATURE: 97.6 F | BODY MASS INDEX: 26.75 KG/M2 | DIASTOLIC BLOOD PRESSURE: 72 MMHG

## 2024-04-18 DIAGNOSIS — C34.90 SMALL CELL LUNG CANCER (HCC): Primary | ICD-10-CM

## 2024-04-18 PROCEDURE — 99024 POSTOP FOLLOW-UP VISIT: CPT | Performed by: RADIOLOGY

## 2024-04-18 PROCEDURE — 99211 OFF/OP EST MAY X REQ PHY/QHP: CPT | Performed by: RADIOLOGY
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
## 2024-04-18 NOTE — PROGRESS NOTES
Nino Silva 1965 is a 58 y.o. male With metastatic lung cancer. He completed whole brain radiation 3/19/24. He returns today for his first follow-up.    3/15/24 PET scan-   1. 3.6 x 5.4 cm hypermetabolic right upper lobe mass and adjacent 2.6 cm hypermetabolic satellite nodule, compatible with malignant involvement.  2. Hypermetabolic bilateral hilar, mediastinal, right lower cervical, retroperitoneal, and bilateral iliac chain lymphadenopathy, compatible with malignant beata involvement.  3. Multiple brain metastases better appreciated on recent MRI.    3/20/24 Dr Morrell- start Carboplatin/Etoposide AUC 5 and 100mg/m2 as well as and Durvalumab 1500 mg flat/fixed dose every 3 weeks x 4 with maintenance after every 4 weeks depending on response. Wait until at least April 5 due to steroid taper. Treat with 4 cycles then repeat PET and MRI brain    4/10/24 Dr. Morrell  Completed C1D3 today with Carbo/Etoposide/Durvalumab  Tolerated chemotherapy well without issues with N/V; is constipated   Has noted some  wheezing, no overt stridor left neck and is audible on exam today; has right sided cervical nodes per PET but on left  ENT referral  Follow up 3 weeks    Upcoming appointments  ENT referral now cancelled per MD and patient. No longer needed  5/3/24 Dr. Morrell      Follow up visit     Oncology History   Small cell lung cancer (HCC)   3/5/2024 Initial Diagnosis    Small cell lung cancer (HCC)     3/5/2024 Biopsy    Final Diagnosis  A. Lung, Right Upper Lobe, biopsy:  - Poorly differentiated carcinoma with neuroendocrine features, favor large cell neuroendocrine carcinoma.  - See note.     3/6/2024 - 3/19/2024 Radiation      Plan ID Energy Fractions Dose per Fraction (cGy) Dose Correction (cGy) Total Dose Delivered (cGy) Elapsed Days   Whole Brain 6X 10 / 10 300 0 3,000 13        4/8/2024 -  Chemotherapy    alteplase (CATHFLO), 2 mg, Intracatheter, Every 1 Minute as needed, 1 of 10 cycles  fosaprepitant  "(EMEND) IVPB, 150 mg, Intravenous, Once, 1 of 4 cycles  Administration: 150 mg (4/8/2024)  etoposide (TOPOSAR), 80 mg/m2 = 164 mg, Intravenous, Once, 1 of 4 cycles  Dose modification: 100 mg/m2 (original dose 80 mg/m2, Cycle 1, Reason: Anticipated Tolerance), 80 mg/m2 (original dose 80 mg/m2, Cycle 1, Reason: Anticipated Tolerance), 100 mg/m2 (original dose 80 mg/m2, Cycle 2, Reason: Anticipated Tolerance)  Administration: 164 mg (4/8/2024), 164 mg (4/9/2024), 164 mg (4/10/2024)  CARBOplatin (PARAPLATIN) IVPB (GOG AUC DOSING), 750 mg (574.7 % of original dose 130.5 mg), Intravenous, Once, 1 of 4 cycles  Dose modification: 130.5 mg (original dose 130.5 mg, Cycle 1, Reason: Anticipated Tolerance),   (original dose 130.5 mg, Cycle 1, Reason: Other (Must fill in a comment))  Administration: 750 mg (4/8/2024)  durvalumab (IMFINZI) IVPB, 1,500 mg, Intravenous, Once, 1 of 10 cycles  Administration: 1,500 mg (4/8/2024)         Review of Systems:  Review of Systems   Constitutional:  Positive for fatigue (comes and goes).        Food not a flavorful as it was   HENT: Negative.     Eyes: Negative.    Respiratory:  Positive for cough (thick yellowish).    Cardiovascular:  Positive for chest pain (reports on april 5th on right chest and went away).   Gastrointestinal: Negative.    Endocrine: Positive for cold intolerance and heat intolerance.        Currently on chemo. Feet feel cold   Genitourinary: Negative.    Musculoskeletal: Negative.    Skin: Negative.    Allergic/Immunologic: Negative.    Neurological:  Positive for numbness (hands and feet) and headaches (off and on short duration. 1/10). Negative for dizziness, seizures and speech difficulty.   Hematological: Negative.    Psychiatric/Behavioral:  Positive for confusion (mild and feels fuzzy sometimes. sometimes difficulty word finding).         Mental \"clarity\" goes up and down per patient       Clinical Trial: no    Health Maintenance   Topic Date Due    Hepatitis C " Screening  Never done    COVID-19 Vaccine (1) Never done    Pneumococcal Vaccine: Pediatrics (0 to 5 Years) and At-Risk Patients (6 to 64 Years) (1 of 2 - PCV) Never done    HIV Screening  Never done    BMI: Followup Plan  Never done    Annual Physical  Never done    Zoster Vaccine (1 of 2) Never done    DTaP,Tdap,and Td Vaccines (1 - Tdap) Never done    Colorectal Cancer Screening  Never done    Influenza Vaccine (1) Never done    BMI: Adult  04/10/2025    Depression Screening  04/18/2025    HIB Vaccine  Aged Out    IPV Vaccine  Aged Out    Hepatitis A Vaccine  Aged Out    Meningococcal ACWY Vaccine  Aged Out    HPV Vaccine  Aged Out     Patient Active Problem List   Diagnosis    Back pain    Brain mass    Lung mass    Elevated troponin    COPD (chronic obstructive pulmonary disease) (HCC)    Hx of testicular cancer    Small cell lung cancer (HCC)     No past medical history on file.  Past Surgical History:   Procedure Laterality Date    HERNIA REPAIR      IR BIOPSY LUNG  3/5/2024     No family history on file.  Social History     Socioeconomic History    Marital status: /Civil Union     Spouse name: Not on file    Number of children: Not on file    Years of education: Not on file    Highest education level: Not on file   Occupational History    Not on file   Tobacco Use    Smoking status: Never    Smokeless tobacco: Never   Substance and Sexual Activity    Alcohol use: Not Currently    Drug use: Not Currently    Sexual activity: Not on file   Other Topics Concern    Not on file   Social History Narrative    Not on file     Social Determinants of Health     Financial Resource Strain: Not on file   Food Insecurity: No Food Insecurity (3/5/2024)    Hunger Vital Sign     Worried About Running Out of Food in the Last Year: Never true     Ran Out of Food in the Last Year: Never true   Transportation Needs: No Transportation Needs (3/5/2024)    PRAPARE - Transportation     Lack of Transportation (Medical): No      Lack of Transportation (Non-Medical): No   Physical Activity: Not on file   Stress: Not on file   Social Connections: Not on file   Intimate Partner Violence: Not on file   Housing Stability: Low Risk  (3/5/2024)    Housing Stability Vital Sign     Unable to Pay for Housing in the Last Year: No     Number of Places Lived in the Last Year: 1     Unstable Housing in the Last Year: No       Current Outpatient Medications:     Cholecalciferol (Vitamin D) 50 MCG (2000 UT) tablet, Take 2,000 Units by mouth daily, Disp: , Rfl:     Multiple Vitamin (Multi Vitamin Daily) TABS, Take 1 tablet by mouth daily, Disp: , Rfl:     dexamethasone (DECADRON) 1 mg tablet, Take 1 tablet (1 mg total) by mouth see administration instructions Follow taper calendar (Patient not taking: Reported on 4/18/2024), Disp: 10 tablet, Rfl: 0    dexamethasone (DECADRON) 2 mg tablet, Take 1 tablet (2 mg total) by mouth see administration instructions Follow taper calendar (Patient not taking: Reported on 4/18/2024), Disp: 35 tablet, Rfl: 0    dexamethasone (DECADRON) 4 mg tablet, Take 1 tablet (4 mg total) by mouth every 8 (eight) hours (Patient not taking: Reported on 4/18/2024), Disp: 60 tablet, Rfl: 0    dexamethasone (DECADRON) 4 mg tablet, Take 1 tablet (4 mg total) by mouth see administration instructions Follow taper calendar (Patient not taking: Reported on 4/18/2024), Disp: 26 tablet, Rfl: 0    Durvalumab (Imfinzi) 500 MG/10ML SOLN, Inject 30 mL (1,500 mg total) into a catheter in a vein every 21 days To be given at infusion center, Disp: 30 mL, Rfl: 3    levETIRAcetam (Keppra) 500 mg tablet, Take 1 tablet (500 mg total) by mouth every 12 (twelve) hours for 10 doses, Disp: 10 tablet, Rfl: 0    omeprazole (PriLOSEC) 40 MG capsule, Take 1 capsule (40 mg total) by mouth daily (Patient not taking: Reported on 3/20/2024), Disp: 40 capsule, Rfl: 0    ondansetron (ZOFRAN) 8 mg tablet, Take 1 tablet (8 mg total) by mouth every 8 (eight) hours as  needed for nausea or vomiting (Patient not taking: Reported on 4/18/2024), Disp: 30 tablet, Rfl: 1    prochlorperazine (COMPAZINE) 10 mg tablet, Take 1 tablet (10 mg total) by mouth every 6 (six) hours as needed for nausea or vomiting (Patient not taking: Reported on 4/18/2024), Disp: 30 tablet, Rfl: 1  No Known Allergies  Vitals:    04/18/24 0828   BP: 113/72   BP Location: Left arm   Patient Position: Sitting   Cuff Size: Standard   Pulse: 71   Resp: 16   Temp: 97.6 °F (36.4 °C)   TempSrc: Temporal   SpO2: 96%   Weight: 87 kg (191 lb 12.8 oz)      Pain Score: 0-No pain

## 2024-04-22 ENCOUNTER — TELEPHONE (OUTPATIENT)
Dept: INTERNAL MEDICINE CLINIC | Facility: CLINIC | Age: 59
End: 2024-04-22

## 2024-04-22 NOTE — TELEPHONE ENCOUNTER
I received a [facility] referral for patient with DX code of small cell lung cancer. I called and spoke with patient to offer appt. He said he was hoping to get in closer to [facility] as it is hard for him to get to [place]. He said some of the symptoms are gone so he would rather wait and see if he can get in closer to that area. He stated he will reach out to his PCP to see if they can help coordinate that appt

## 2024-04-24 RX ORDER — PALONOSETRON 0.05 MG/ML
0.25 INJECTION, SOLUTION INTRAVENOUS ONCE
Status: CANCELLED | OUTPATIENT
Start: 2024-04-30

## 2024-04-24 RX ORDER — PALONOSETRON 0.05 MG/ML
0.25 INJECTION, SOLUTION INTRAVENOUS ONCE
Status: CANCELLED | OUTPATIENT
Start: 2024-05-01

## 2024-04-24 RX ORDER — PALONOSETRON 0.05 MG/ML
0.25 INJECTION, SOLUTION INTRAVENOUS ONCE
Status: CANCELLED | OUTPATIENT
Start: 2024-04-29

## 2024-04-24 RX ORDER — SODIUM CHLORIDE 9 MG/ML
20 INJECTION, SOLUTION INTRAVENOUS ONCE
Status: CANCELLED | OUTPATIENT
Start: 2024-04-29

## 2024-04-24 RX ORDER — SODIUM CHLORIDE 9 MG/ML
20 INJECTION, SOLUTION INTRAVENOUS ONCE
Status: CANCELLED | OUTPATIENT
Start: 2024-05-01

## 2024-04-24 RX ORDER — SODIUM CHLORIDE 9 MG/ML
20 INJECTION, SOLUTION INTRAVENOUS ONCE
Status: CANCELLED | OUTPATIENT
Start: 2024-04-30

## 2024-04-26 ENCOUNTER — APPOINTMENT (OUTPATIENT)
Dept: LAB | Facility: HOSPITAL | Age: 59
End: 2024-04-26
Payer: COMMERCIAL

## 2024-04-26 ENCOUNTER — TELEPHONE (OUTPATIENT)
Dept: HEMATOLOGY ONCOLOGY | Facility: CLINIC | Age: 59
End: 2024-04-26

## 2024-04-26 DIAGNOSIS — C34.90 SMALL CELL LUNG CANCER (HCC): ICD-10-CM

## 2024-04-26 LAB
ALBUMIN SERPL BCP-MCNC: 4.1 G/DL (ref 3.5–5)
ALP SERPL-CCNC: 46 U/L (ref 34–104)
ALT SERPL W P-5'-P-CCNC: 18 U/L (ref 7–52)
ANION GAP SERPL CALCULATED.3IONS-SCNC: 7 MMOL/L (ref 4–13)
AST SERPL W P-5'-P-CCNC: 21 U/L (ref 13–39)
BASOPHILS # BLD AUTO: 0.05 THOUSANDS/ÂΜL (ref 0–0.1)
BASOPHILS NFR BLD AUTO: 2 % (ref 0–1)
BILIRUB SERPL-MCNC: 0.55 MG/DL (ref 0.2–1)
BUN SERPL-MCNC: 12 MG/DL (ref 5–25)
CALCIUM SERPL-MCNC: 9 MG/DL (ref 8.4–10.2)
CHLORIDE SERPL-SCNC: 100 MMOL/L (ref 96–108)
CO2 SERPL-SCNC: 28 MMOL/L (ref 21–32)
CREAT SERPL-MCNC: 0.77 MG/DL (ref 0.6–1.3)
CRP SERPL QL: 1.4 MG/L
EOSINOPHIL # BLD AUTO: 0.04 THOUSAND/ÂΜL (ref 0–0.61)
EOSINOPHIL NFR BLD AUTO: 1 % (ref 0–6)
ERYTHROCYTE [DISTWIDTH] IN BLOOD BY AUTOMATED COUNT: 13.6 % (ref 11.6–15.1)
ERYTHROCYTE [SEDIMENTATION RATE] IN BLOOD: 24 MM/HOUR (ref 0–19)
GFR SERPL CREATININE-BSD FRML MDRD: 99 ML/MIN/1.73SQ M
GLUCOSE P FAST SERPL-MCNC: 101 MG/DL (ref 65–99)
HCT VFR BLD AUTO: 38.5 % (ref 36.5–49.3)
HGB BLD-MCNC: 12.9 G/DL (ref 12–17)
IMM GRANULOCYTES # BLD AUTO: 0.03 THOUSAND/UL (ref 0–0.2)
IMM GRANULOCYTES NFR BLD AUTO: 1 % (ref 0–2)
LDH SERPL-CCNC: 242 U/L (ref 140–271)
LIPASE SERPL-CCNC: 19 U/L (ref 11–82)
LYMPHOCYTES # BLD AUTO: 0.98 THOUSANDS/ÂΜL (ref 0.6–4.47)
LYMPHOCYTES NFR BLD AUTO: 32 % (ref 14–44)
MAGNESIUM SERPL-MCNC: 2.1 MG/DL (ref 1.9–2.7)
MCH RBC QN AUTO: 31.3 PG (ref 26.8–34.3)
MCHC RBC AUTO-ENTMCNC: 33.5 G/DL (ref 31.4–37.4)
MCV RBC AUTO: 93 FL (ref 82–98)
MONOCYTES # BLD AUTO: 0.72 THOUSAND/ÂΜL (ref 0.17–1.22)
MONOCYTES NFR BLD AUTO: 24 % (ref 4–12)
NEUTROPHILS # BLD AUTO: 1.23 THOUSANDS/ÂΜL (ref 1.85–7.62)
NEUTS SEG NFR BLD AUTO: 40 % (ref 43–75)
NRBC BLD AUTO-RTO: 0 /100 WBCS
PLATELET # BLD AUTO: 285 THOUSANDS/UL (ref 149–390)
PMV BLD AUTO: 9.3 FL (ref 8.9–12.7)
POTASSIUM SERPL-SCNC: 4.4 MMOL/L (ref 3.5–5.3)
PROT SERPL-MCNC: 7.4 G/DL (ref 6.4–8.4)
RBC # BLD AUTO: 4.12 MILLION/UL (ref 3.88–5.62)
SODIUM SERPL-SCNC: 135 MMOL/L (ref 135–147)
T3FREE SERPL-MCNC: 3.4 PG/ML (ref 2.5–3.9)
TSH SERPL DL<=0.05 MIU/L-ACNC: 0.56 UIU/ML (ref 0.45–4.5)
WBC # BLD AUTO: 3.05 THOUSAND/UL (ref 4.31–10.16)

## 2024-04-26 PROCEDURE — 83615 LACTATE (LD) (LDH) ENZYME: CPT

## 2024-04-26 PROCEDURE — 83690 ASSAY OF LIPASE: CPT

## 2024-04-26 PROCEDURE — 85652 RBC SED RATE AUTOMATED: CPT

## 2024-04-26 PROCEDURE — 84481 FREE ASSAY (FT-3): CPT

## 2024-04-26 PROCEDURE — 36415 COLL VENOUS BLD VENIPUNCTURE: CPT

## 2024-04-26 PROCEDURE — 83735 ASSAY OF MAGNESIUM: CPT

## 2024-04-26 PROCEDURE — 80053 COMPREHEN METABOLIC PANEL: CPT

## 2024-04-26 PROCEDURE — 84443 ASSAY THYROID STIM HORMONE: CPT

## 2024-04-26 PROCEDURE — 86140 C-REACTIVE PROTEIN: CPT

## 2024-04-26 PROCEDURE — 85025 COMPLETE CBC W/AUTO DIFF WBC: CPT

## 2024-04-26 NOTE — TELEPHONE ENCOUNTER
Patient Call    Who are you speaking with? Patient    If it is not the patient, are they listed on an active communication consent form? N/A   What is the reason for this call? Patient is requesting a call back to discuss questions   Does this require a call back? Yes   If a call back is required, please list best call back number [phone]   If a call back is required, advise that a message will be forwarded to their care team and someone will return their call as soon as possible.   Did you relay this information to the patient? Yes

## 2024-04-26 NOTE — TELEPHONE ENCOUNTER
Returned call to patient, he wanted to review all of the labs he had drawn today.  ACTH was missed, he will have drawn Monday prior to chemo.

## 2024-04-27 ENCOUNTER — APPOINTMENT (OUTPATIENT)
Dept: LAB | Facility: HOSPITAL | Age: 59
End: 2024-04-27
Payer: COMMERCIAL

## 2024-04-27 DIAGNOSIS — C34.90 SMALL CELL LUNG CANCER (HCC): ICD-10-CM

## 2024-04-27 DIAGNOSIS — C34.90 MALIGNANT NEOPLASM OF BRONCHUS AND LUNG (HCC): ICD-10-CM

## 2024-04-27 PROCEDURE — 82024 ASSAY OF ACTH: CPT

## 2024-04-27 PROCEDURE — 36415 COLL VENOUS BLD VENIPUNCTURE: CPT
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
## 2024-04-29 ENCOUNTER — HOSPITAL ENCOUNTER (OUTPATIENT)
Dept: INFUSION CENTER | Facility: HOSPITAL | Age: 59
Discharge: HOME | End: 2024-04-29
Attending: INTERNAL MEDICINE
Payer: COMMERCIAL

## 2024-04-29 VITALS
HEART RATE: 81 BPM | OXYGEN SATURATION: 94 % | WEIGHT: 192.9 LBS | SYSTOLIC BLOOD PRESSURE: 124 MMHG | DIASTOLIC BLOOD PRESSURE: 74 MMHG | TEMPERATURE: 97.4 F | BODY MASS INDEX: 27.01 KG/M2 | RESPIRATION RATE: 16 BRPM | HEIGHT: 71 IN

## 2024-04-29 DIAGNOSIS — C34.90 SMALL CELL LUNG CANCER (HCC): Primary | ICD-10-CM

## 2024-04-29 PROCEDURE — 96367 TX/PROPH/DG ADDL SEQ IV INF: CPT

## 2024-04-29 PROCEDURE — 96375 TX/PRO/DX INJ NEW DRUG ADDON: CPT

## 2024-04-29 PROCEDURE — 96417 CHEMO IV INFUS EACH ADDL SEQ: CPT

## 2024-04-29 PROCEDURE — 96413 CHEMO IV INFUSION 1 HR: CPT

## 2024-04-29 RX ORDER — SODIUM CHLORIDE 9 MG/ML
20 INJECTION, SOLUTION INTRAVENOUS ONCE
Status: COMPLETED | OUTPATIENT
Start: 2024-04-29 | End: 2024-04-29

## 2024-04-29 RX ORDER — PALONOSETRON 0.05 MG/ML
0.25 INJECTION, SOLUTION INTRAVENOUS ONCE
Status: COMPLETED | OUTPATIENT
Start: 2024-04-29 | End: 2024-04-29

## 2024-04-29 RX ADMIN — DURVALUMAB 1500 MG: 500 INJECTION, SOLUTION INTRAVENOUS at 10:22

## 2024-04-29 RX ADMIN — CARBOPLATIN 701.5 MG: 10 INJECTION, SOLUTION INTRAVENOUS at 11:24

## 2024-04-29 RX ADMIN — ETOPOSIDE 205 MG: 20 INJECTION, SOLUTION INTRAVENOUS at 12:02

## 2024-04-29 RX ADMIN — SODIUM CHLORIDE 150 MG: 0.9 INJECTION, SOLUTION INTRAVENOUS at 09:38

## 2024-04-29 RX ADMIN — SODIUM CHLORIDE 20 ML/HR: 0.9 INJECTION, SOLUTION INTRAVENOUS at 09:00

## 2024-04-29 RX ADMIN — DEXAMETHASONE SODIUM PHOSPHATE 10 MG: 100 INJECTION INTRAMUSCULAR; INTRAVENOUS at 09:00

## 2024-04-29 RX ADMIN — PALONOSETRON HYDROCHLORIDE 0.25 MG: 0.25 INJECTION INTRAVENOUS at 09:00

## 2024-04-29 NOTE — PROGRESS NOTES
Nino Silva  tolerated treatment well with no complications.      Nino Silva is aware of future appt on 04/30/2024 at 08:30 AM.     AVS printed and given to Nino Silva:  Yes

## 2024-04-30 ENCOUNTER — HOSPITAL ENCOUNTER (OUTPATIENT)
Dept: INFUSION CENTER | Facility: HOSPITAL | Age: 59
Discharge: HOME | End: 2024-04-30
Attending: INTERNAL MEDICINE
Payer: COMMERCIAL

## 2024-04-30 ENCOUNTER — HOSPITAL ENCOUNTER (OUTPATIENT)
Dept: INFUSION CENTER | Facility: HOSPITAL | Age: 59
Discharge: HOME | End: 2024-04-30
Attending: INTERNAL MEDICINE

## 2024-04-30 VITALS
SYSTOLIC BLOOD PRESSURE: 120 MMHG | DIASTOLIC BLOOD PRESSURE: 80 MMHG | RESPIRATION RATE: 16 BRPM | HEART RATE: 53 BPM | TEMPERATURE: 97.7 F | WEIGHT: 192.9 LBS | HEIGHT: 71 IN | BODY MASS INDEX: 27.01 KG/M2 | OXYGEN SATURATION: 96 %

## 2024-04-30 DIAGNOSIS — C34.90 SMALL CELL LUNG CANCER (HCC): Primary | ICD-10-CM

## 2024-04-30 LAB — ACTH PLAS-MCNC: 61.2 PG/ML (ref 7.2–63.3)

## 2024-04-30 PROCEDURE — 96413 CHEMO IV INFUSION 1 HR: CPT

## 2024-04-30 PROCEDURE — 96367 TX/PROPH/DG ADDL SEQ IV INF: CPT

## 2024-04-30 RX ORDER — SODIUM CHLORIDE 9 MG/ML
20 INJECTION, SOLUTION INTRAVENOUS ONCE
Status: COMPLETED | OUTPATIENT
Start: 2024-04-30 | End: 2024-04-30

## 2024-04-30 RX ADMIN — DEXAMETHASONE SODIUM PHOSPHATE 10 MG: 100 INJECTION INTRAMUSCULAR; INTRAVENOUS at 08:51

## 2024-04-30 RX ADMIN — SODIUM CHLORIDE 20 ML/HR: 0.9 INJECTION, SOLUTION INTRAVENOUS at 08:51

## 2024-04-30 RX ADMIN — ETOPOSIDE 205 MG: 20 INJECTION, SOLUTION INTRAVENOUS at 09:31

## 2024-04-30 NOTE — PROGRESS NOTES
Nino Ricardo  tolerated chemotherapy well with no complications. Aware of future appt on 5/1/24 at 830. AVS declined. Patient left clinic ambulatory.

## 2024-04-30 NOTE — PLAN OF CARE
Problem: Knowledge Deficit  Goal: Patient/family/caregiver demonstrates understanding of disease process, treatment plan, medications, and discharge instructions  Description: Complete learning assessment and assess knowledge base.  Interventions:  - Provide teaching at level of understanding  - Provide teaching via preferred learning methods  4/30/2024 0834 by Toshia Noguera RN  Outcome: Progressing  4/30/2024 0834 by Toshia Noguera RN  Outcome: Progressing

## 2024-05-01 ENCOUNTER — HOSPITAL ENCOUNTER (OUTPATIENT)
Dept: INFUSION CENTER | Facility: HOSPITAL | Age: 59
Discharge: HOME | End: 2024-05-01
Attending: INTERNAL MEDICINE
Payer: COMMERCIAL

## 2024-05-01 ENCOUNTER — HOSPITAL ENCOUNTER (OUTPATIENT)
Dept: INFUSION CENTER | Facility: HOSPITAL | Age: 59
End: 2024-05-01
Attending: INTERNAL MEDICINE

## 2024-05-01 VITALS
HEART RATE: 54 BPM | HEIGHT: 71 IN | TEMPERATURE: 96.6 F | DIASTOLIC BLOOD PRESSURE: 74 MMHG | OXYGEN SATURATION: 95 % | SYSTOLIC BLOOD PRESSURE: 126 MMHG | BODY MASS INDEX: 27.01 KG/M2 | WEIGHT: 192.9 LBS | RESPIRATION RATE: 16 BRPM

## 2024-05-01 DIAGNOSIS — C34.90 SMALL CELL LUNG CANCER (HCC): Primary | ICD-10-CM

## 2024-05-01 PROCEDURE — 96367 TX/PROPH/DG ADDL SEQ IV INF: CPT

## 2024-05-01 PROCEDURE — 96413 CHEMO IV INFUSION 1 HR: CPT

## 2024-05-01 RX ADMIN — ETOPOSIDE 205 MG: 20 INJECTION, SOLUTION INTRAVENOUS at 09:27

## 2024-05-01 RX ADMIN — DEXAMETHASONE SODIUM PHOSPHATE 10 MG: 100 INJECTION INTRAMUSCULAR; INTRAVENOUS at 08:48

## 2024-05-01 NOTE — PROGRESS NOTES
Nino Silva  tolerated treatment well with no complications.      Nino Silva is aware of future appt on 05/20/2024 at 08:30 AM.     AVS printed and given to Nino Silva:  Yes

## 2024-05-03 ENCOUNTER — OFFICE VISIT (OUTPATIENT)
Age: 59
End: 2024-05-03

## 2024-05-03 VITALS
SYSTOLIC BLOOD PRESSURE: 125 MMHG | OXYGEN SATURATION: 91 % | HEIGHT: 70 IN | RESPIRATION RATE: 18 BRPM | WEIGHT: 191.8 LBS | BODY MASS INDEX: 27.46 KG/M2 | TEMPERATURE: 98 F | DIASTOLIC BLOOD PRESSURE: 74 MMHG | HEART RATE: 71 BPM

## 2024-05-03 DIAGNOSIS — C34.90 SMALL CELL LUNG CANCER (HCC): Primary | ICD-10-CM

## 2024-05-03 PROCEDURE — 99214 OFFICE O/P EST MOD 30 MIN: CPT | Performed by: INTERNAL MEDICINE

## 2024-05-06 ENCOUNTER — PATIENT OUTREACH (OUTPATIENT)
Dept: HEMATOLOGY ONCOLOGY | Facility: CLINIC | Age: 59
End: 2024-05-06

## 2024-05-06 DIAGNOSIS — C34.90 SMALL CELL LUNG CANCER (HCC): ICD-10-CM

## 2024-05-06 RX ORDER — DURVALUMAB 500 MG/10ML
1500 INJECTION, SOLUTION INTRAVENOUS
Qty: 30 ML | Refills: 0 | Status: SHIPPED | OUTPATIENT
Start: 2024-05-06

## 2024-05-06 NOTE — PROGRESS NOTES
I reached out and spoke with Nino to follow up with them and to review for any changes in barriers to care and offer supportive services as needed.    Barriers noted previously; none.     Current barriers and interventions provided: none    Nino shared that he is well supported by his family and friends. His sister and wife have been taking him to his appnts and they are managing his schedules well. He has been working with a  and has their contact info to follow up as needed. We previously discussed and scheduled a consult with Palliative Care but he explained that they cancelled that appnt as he did not want to accrue another bill if not needed. He is currently managing symptoms and side effects of treatment though Hem/Onc to his satisfaction. I encouraged him to maintain open discussions with his providers to make sure that he is as comfortable as possible through treatment. He confirmed that he has the contact information for his providers and knows how and when to call them.    Discussed the plan for follow-up with the patient. I will follow up with them in 4-6 weeks.   I have provided my direct contact information to the patient and welcome them to contact me if their needs as discussed above change. They were appreciative for the call.

## 2024-05-09 ENCOUNTER — TELEPHONE (OUTPATIENT)
Dept: HEMATOLOGY ONCOLOGY | Facility: CLINIC | Age: 59
End: 2024-05-09

## 2024-05-09 ENCOUNTER — TELEPHONE (OUTPATIENT)
Dept: SURGICAL ONCOLOGY | Facility: CLINIC | Age: 59
End: 2024-05-09

## 2024-05-09 NOTE — TELEPHONE ENCOUNTER
Patient Call    Who are you speaking with? Patient    If it is not the patient, are they listed on an active communication consent form? N/A   What is the reason for this call? The patient is requesting to speak to Tabby regarding his anti-nausea medication and some other questions as well.    Does this require a call back? Yes   If a call back is required, please list best call back number [phone]   If a call back is required, advise that a message will be forwarded to their care team and someone will return their call as soon as possible.   Did you relay this information to the patient? Yes

## 2024-05-09 NOTE — TELEPHONE ENCOUNTER
E mail sent regarding Emend Upfront Delivery:    Good afternoon,    I placed a call to [facility] today for the Upfront Shipment of the patient's Emend for the next planned treatment 05/20/2024.     I spoke with Vanessa ARMSTRONG [phone] and she confirmed that the Upfront Shipment from [facility] would go to:     [facility]    [address], [place], [address]    [facility]        Ship date: 05/09/24 for Delivery: 05/10/24      Future Upfront Shipment calls to the pharmacy should take place a week prior to treatment.    E mail sent regarding Imfinzi Delivery:    Good afternoon,    I placed a call to [facility] today for the Upfront Shipment of the patient's Imfinzi for the next planned treatment 05/20/2024.     I spoke with Joanne MARQUEZ [phone] and she confirmed that the Upfront Shipment from [facility] would go to:     [facility]    [address] [place], [address]    [facility]     Will be processed 05/11/242 for expected     Ship date: 05/13/24 for Delivery: 05/14/24.    Regards

## 2024-05-09 NOTE — TELEPHONE ENCOUNTER
Patient has been experiencing nausea and loss of appetite since cycle 2 of chemotherapy. Patient reports that he is able to hydrate well. Patient has been utilizing PRN Zofran and Compazine for the nausea and now has constipation as a result. Patient not getting much relief with the Senna alone and has had to use OTC laxatives. Recommended patient take Colace and Senna BID, along with Miralex daily. If he needs immediate relief, suggested patient try a half bottle of Magnesium Citrate- but advised to be near a bathroom if taking. Encouraged the patient to call the office if he finds his fluid intake becomes poorer, so we can get him scheduled for IV hydration and anti-emetics. Patient verbally understood.

## 2024-05-15 RX ORDER — PALONOSETRON 0.05 MG/ML
0.25 INJECTION, SOLUTION INTRAVENOUS ONCE
Status: CANCELLED | OUTPATIENT
Start: 2024-05-20

## 2024-05-15 RX ORDER — SODIUM CHLORIDE 9 MG/ML
20 INJECTION, SOLUTION INTRAVENOUS ONCE
Status: CANCELLED | OUTPATIENT
Start: 2024-05-20

## 2024-05-15 RX ORDER — SODIUM CHLORIDE 9 MG/ML
20 INJECTION, SOLUTION INTRAVENOUS ONCE
Status: CANCELLED | OUTPATIENT
Start: 2024-05-21

## 2024-05-15 RX ORDER — SODIUM CHLORIDE 9 MG/ML
20 INJECTION, SOLUTION INTRAVENOUS ONCE
Status: CANCELLED | OUTPATIENT
Start: 2024-05-22

## 2024-05-17 ENCOUNTER — APPOINTMENT (OUTPATIENT)
Dept: LAB | Facility: HOSPITAL | Age: 59
End: 2024-05-17
Payer: COMMERCIAL

## 2024-05-17 DIAGNOSIS — C34.90 SMALL CELL LUNG CANCER (HCC): ICD-10-CM

## 2024-05-17 LAB
ALBUMIN SERPL BCP-MCNC: 4.1 G/DL (ref 3.5–5)
ALP SERPL-CCNC: 39 U/L (ref 34–104)
ALT SERPL W P-5'-P-CCNC: 18 U/L (ref 7–52)
ANION GAP SERPL CALCULATED.3IONS-SCNC: 10 MMOL/L (ref 4–13)
AST SERPL W P-5'-P-CCNC: 19 U/L (ref 13–39)
BASOPHILS # BLD AUTO: 0.02 THOUSANDS/ÂΜL (ref 0–0.1)
BASOPHILS NFR BLD AUTO: 1 % (ref 0–1)
BILIRUB SERPL-MCNC: 0.56 MG/DL (ref 0.2–1)
BUN SERPL-MCNC: 9 MG/DL (ref 5–25)
CALCIUM SERPL-MCNC: 9.1 MG/DL (ref 8.4–10.2)
CHLORIDE SERPL-SCNC: 101 MMOL/L (ref 96–108)
CO2 SERPL-SCNC: 26 MMOL/L (ref 21–32)
CREAT SERPL-MCNC: 0.82 MG/DL (ref 0.6–1.3)
CRP SERPL QL: <1 MG/L
EOSINOPHIL # BLD AUTO: 0.06 THOUSAND/ÂΜL (ref 0–0.61)
EOSINOPHIL NFR BLD AUTO: 2 % (ref 0–6)
ERYTHROCYTE [DISTWIDTH] IN BLOOD BY AUTOMATED COUNT: 14.9 % (ref 11.6–15.1)
ERYTHROCYTE [SEDIMENTATION RATE] IN BLOOD: 38 MM/HOUR (ref 0–19)
GFR SERPL CREATININE-BSD FRML MDRD: 96 ML/MIN/1.73SQ M
GLUCOSE SERPL-MCNC: 105 MG/DL (ref 65–140)
HCT VFR BLD AUTO: 32.3 % (ref 36.5–49.3)
HGB BLD-MCNC: 11 G/DL (ref 12–17)
IMM GRANULOCYTES # BLD AUTO: 0.01 THOUSAND/UL (ref 0–0.2)
IMM GRANULOCYTES NFR BLD AUTO: 0 % (ref 0–2)
LDH SERPL-CCNC: 182 U/L (ref 140–271)
LIPASE SERPL-CCNC: 12 U/L (ref 11–82)
LYMPHOCYTES # BLD AUTO: 0.91 THOUSANDS/ÂΜL (ref 0.6–4.47)
LYMPHOCYTES NFR BLD AUTO: 34 % (ref 14–44)
MAGNESIUM SERPL-MCNC: 2.1 MG/DL (ref 1.9–2.7)
MCH RBC QN AUTO: 31.9 PG (ref 26.8–34.3)
MCHC RBC AUTO-ENTMCNC: 34.1 G/DL (ref 31.4–37.4)
MCV RBC AUTO: 94 FL (ref 82–98)
MONOCYTES # BLD AUTO: 0.69 THOUSAND/ÂΜL (ref 0.17–1.22)
MONOCYTES NFR BLD AUTO: 26 % (ref 4–12)
NEUTROPHILS # BLD AUTO: 1 THOUSANDS/ÂΜL (ref 1.85–7.62)
NEUTS SEG NFR BLD AUTO: 37 % (ref 43–75)
NRBC BLD AUTO-RTO: 0 /100 WBCS
PLATELET # BLD AUTO: 259 THOUSANDS/UL (ref 149–390)
PMV BLD AUTO: 9.1 FL (ref 8.9–12.7)
POTASSIUM SERPL-SCNC: 4.3 MMOL/L (ref 3.5–5.3)
PROT SERPL-MCNC: 7.2 G/DL (ref 6.4–8.4)
RBC # BLD AUTO: 3.45 MILLION/UL (ref 3.88–5.62)
SODIUM SERPL-SCNC: 137 MMOL/L (ref 135–147)
TSH SERPL DL<=0.05 MIU/L-ACNC: 0.53 UIU/ML (ref 0.45–4.5)
WBC # BLD AUTO: 2.69 THOUSAND/UL (ref 4.31–10.16)

## 2024-05-17 PROCEDURE — 82024 ASSAY OF ACTH: CPT

## 2024-05-17 PROCEDURE — 36415 COLL VENOUS BLD VENIPUNCTURE: CPT

## 2024-05-17 PROCEDURE — 84443 ASSAY THYROID STIM HORMONE: CPT

## 2024-05-17 PROCEDURE — 85652 RBC SED RATE AUTOMATED: CPT

## 2024-05-17 PROCEDURE — 85025 COMPLETE CBC W/AUTO DIFF WBC: CPT

## 2024-05-17 PROCEDURE — 83690 ASSAY OF LIPASE: CPT

## 2024-05-17 PROCEDURE — 86140 C-REACTIVE PROTEIN: CPT

## 2024-05-17 PROCEDURE — 80053 COMPREHEN METABOLIC PANEL: CPT

## 2024-05-17 PROCEDURE — 83735 ASSAY OF MAGNESIUM: CPT

## 2024-05-17 PROCEDURE — 83615 LACTATE (LD) (LDH) ENZYME: CPT

## 2024-05-18 LAB — ACTH PLAS-MCNC: 36.6 PG/ML (ref 7.2–63.3)

## 2024-05-20 ENCOUNTER — TELEPHONE (OUTPATIENT)
Dept: HEMATOLOGY ONCOLOGY | Facility: CLINIC | Age: 59
End: 2024-05-20

## 2024-05-20 ENCOUNTER — HOSPITAL ENCOUNTER (OUTPATIENT)
Dept: INFUSION CENTER | Facility: HOSPITAL | Age: 59
Discharge: HOME | End: 2024-05-20
Attending: INTERNAL MEDICINE
Payer: COMMERCIAL

## 2024-05-20 VITALS
BODY MASS INDEX: 26.76 KG/M2 | SYSTOLIC BLOOD PRESSURE: 104 MMHG | HEART RATE: 75 BPM | WEIGHT: 186.95 LBS | OXYGEN SATURATION: 96 % | RESPIRATION RATE: 16 BRPM | TEMPERATURE: 97.8 F | HEIGHT: 70 IN | DIASTOLIC BLOOD PRESSURE: 67 MMHG

## 2024-05-20 DIAGNOSIS — C34.90 SMALL CELL LUNG CANCER (HCC): Primary | ICD-10-CM

## 2024-05-20 LAB
BASOPHILS # BLD AUTO: 0.04 THOUSANDS/ÂΜL (ref 0–0.1)
BASOPHILS NFR BLD AUTO: 1 % (ref 0–1)
EOSINOPHIL # BLD AUTO: 0.03 THOUSAND/ÂΜL (ref 0–0.61)
EOSINOPHIL NFR BLD AUTO: 1 % (ref 0–6)
ERYTHROCYTE [DISTWIDTH] IN BLOOD BY AUTOMATED COUNT: 15 % (ref 11.6–15.1)
HCT VFR BLD AUTO: 31.9 % (ref 36.5–49.3)
HGB BLD-MCNC: 11 G/DL (ref 12–17)
IMM GRANULOCYTES # BLD AUTO: 0.04 THOUSAND/UL (ref 0–0.2)
IMM GRANULOCYTES NFR BLD AUTO: 1 % (ref 0–2)
LYMPHOCYTES # BLD AUTO: 1.23 THOUSANDS/ÂΜL (ref 0.6–4.47)
LYMPHOCYTES NFR BLD AUTO: 29 % (ref 14–44)
MCH RBC QN AUTO: 32 PG (ref 26.8–34.3)
MCHC RBC AUTO-ENTMCNC: 34.5 G/DL (ref 31.4–37.4)
MCV RBC AUTO: 93 FL (ref 82–98)
MONOCYTES # BLD AUTO: 0.97 THOUSAND/ÂΜL (ref 0.17–1.22)
MONOCYTES NFR BLD AUTO: 23 % (ref 4–12)
NEUTROPHILS # BLD AUTO: 1.95 THOUSANDS/ÂΜL (ref 1.85–7.62)
NEUTS SEG NFR BLD AUTO: 45 % (ref 43–75)
NRBC BLD AUTO-RTO: 0 /100 WBCS
PLATELET # BLD AUTO: 325 THOUSANDS/UL (ref 149–390)
PMV BLD AUTO: 8.7 FL (ref 8.9–12.7)
RBC # BLD AUTO: 3.44 MILLION/UL (ref 3.88–5.62)
WBC # BLD AUTO: 4.26 THOUSAND/UL (ref 4.31–10.16)

## 2024-05-20 PROCEDURE — 85025 COMPLETE CBC W/AUTO DIFF WBC: CPT | Performed by: INTERNAL MEDICINE

## 2024-05-20 RX ORDER — SODIUM CHLORIDE 9 MG/ML
20 INJECTION, SOLUTION INTRAVENOUS ONCE
Status: COMPLETED | OUTPATIENT
Start: 2024-05-20 | End: 2024-05-20

## 2024-05-20 RX ORDER — PALONOSETRON 0.05 MG/ML
0.25 INJECTION, SOLUTION INTRAVENOUS ONCE
Status: COMPLETED | OUTPATIENT
Start: 2024-05-20 | End: 2024-05-20

## 2024-05-20 RX ADMIN — DEXAMETHASONE SODIUM PHOSPHATE 10 MG: 10 INJECTION, SOLUTION INTRAMUSCULAR; INTRAVENOUS at 09:09

## 2024-05-20 RX ADMIN — DURVALUMAB 1500 MG: 500 INJECTION, SOLUTION INTRAVENOUS at 10:27

## 2024-05-20 RX ADMIN — SODIUM CHLORIDE 20 ML/HR: 0.9 INJECTION, SOLUTION INTRAVENOUS at 09:09

## 2024-05-20 RX ADMIN — PALONOSETRON HYDROCHLORIDE 0.25 MG: 0.25 INJECTION INTRAVENOUS at 09:11

## 2024-05-20 RX ADMIN — ETOPOSIDE 200 MG: 20 INJECTION, SOLUTION INTRAVENOUS at 12:06

## 2024-05-20 RX ADMIN — SODIUM CHLORIDE 150 MG: 0.9 INJECTION, SOLUTION INTRAVENOUS at 09:48

## 2024-05-20 RX ADMIN — CARBOPLATIN 664 MG: 10 INJECTION, SOLUTION INTRAVENOUS at 11:30

## 2024-05-20 NOTE — PROGRESS NOTES
Patient chemo infusion completed without complication. Patient declined AVS at this time and was discharged in stable condition to home via ambulation.

## 2024-05-21 ENCOUNTER — HOSPITAL ENCOUNTER (OUTPATIENT)
Dept: INFUSION CENTER | Facility: HOSPITAL | Age: 59
Discharge: HOME | End: 2024-05-21
Attending: INTERNAL MEDICINE
Payer: COMMERCIAL

## 2024-05-21 VITALS
DIASTOLIC BLOOD PRESSURE: 58 MMHG | OXYGEN SATURATION: 97 % | BODY MASS INDEX: 26.76 KG/M2 | HEIGHT: 70 IN | TEMPERATURE: 97 F | RESPIRATION RATE: 16 BRPM | SYSTOLIC BLOOD PRESSURE: 95 MMHG | HEART RATE: 61 BPM | WEIGHT: 186.95 LBS

## 2024-05-21 DIAGNOSIS — C34.90 SMALL CELL LUNG CANCER (HCC): Primary | ICD-10-CM

## 2024-05-21 PROCEDURE — 96413 CHEMO IV INFUSION 1 HR: CPT

## 2024-05-21 PROCEDURE — 96367 TX/PROPH/DG ADDL SEQ IV INF: CPT

## 2024-05-21 RX ORDER — SODIUM CHLORIDE 9 MG/ML
20 INJECTION, SOLUTION INTRAVENOUS ONCE
Status: COMPLETED | OUTPATIENT
Start: 2024-05-21 | End: 2024-05-21

## 2024-05-21 RX ADMIN — ETOPOSIDE 200 MG: 20 INJECTION, SOLUTION INTRAVENOUS at 09:55

## 2024-05-21 RX ADMIN — DEXAMETHASONE SODIUM PHOSPHATE 10 MG: 10 INJECTION, SOLUTION INTRAMUSCULAR; INTRAVENOUS at 09:02

## 2024-05-21 RX ADMIN — SODIUM CHLORIDE 20 ML/HR: 0.9 INJECTION, SOLUTION INTRAVENOUS at 09:00

## 2024-05-21 NOTE — PROGRESS NOTES
Nino Silva  tolerated treatment well with no complications.      Nino Silva is aware of future appt on 5/22/24 at 0830.     AVS printed and given to Nino Silva:  No (Declined by Nino Silva)

## 2024-05-22 ENCOUNTER — TELEPHONE (OUTPATIENT)
Age: 59
End: 2024-05-22

## 2024-05-22 ENCOUNTER — TELEPHONE (OUTPATIENT)
Dept: HEMATOLOGY ONCOLOGY | Facility: CLINIC | Age: 59
End: 2024-05-22

## 2024-05-22 ENCOUNTER — HOSPITAL ENCOUNTER (OUTPATIENT)
Dept: INFUSION CENTER | Facility: HOSPITAL | Age: 59
Discharge: HOME | End: 2024-05-22
Attending: INTERNAL MEDICINE
Payer: COMMERCIAL

## 2024-05-22 VITALS
HEIGHT: 70 IN | BODY MASS INDEX: 26.76 KG/M2 | HEART RATE: 52 BPM | RESPIRATION RATE: 16 BRPM | SYSTOLIC BLOOD PRESSURE: 113 MMHG | WEIGHT: 186.95 LBS | TEMPERATURE: 98.2 F | DIASTOLIC BLOOD PRESSURE: 76 MMHG | OXYGEN SATURATION: 97 %

## 2024-05-22 DIAGNOSIS — C34.90 SMALL CELL LUNG CANCER (HCC): Primary | ICD-10-CM

## 2024-05-22 PROCEDURE — 96413 CHEMO IV INFUSION 1 HR: CPT

## 2024-05-22 PROCEDURE — 96367 TX/PROPH/DG ADDL SEQ IV INF: CPT

## 2024-05-22 RX ORDER — SODIUM CHLORIDE 9 MG/ML
20 INJECTION, SOLUTION INTRAVENOUS ONCE
Status: COMPLETED | OUTPATIENT
Start: 2024-05-22 | End: 2024-05-22

## 2024-05-22 RX ADMIN — ETOPOSIDE 200 MG: 20 INJECTION, SOLUTION INTRAVENOUS at 09:35

## 2024-05-22 RX ADMIN — DEXAMETHASONE SODIUM PHOSPHATE 10 MG: 10 INJECTION, SOLUTION INTRAMUSCULAR; INTRAVENOUS at 08:34

## 2024-05-22 RX ADMIN — SODIUM CHLORIDE 20 ML/HR: 0.9 INJECTION, SOLUTION INTRAVENOUS at 08:34

## 2024-05-22 NOTE — TELEPHONE ENCOUNTER
Patient Call    Who are you speaking with? Patient    If it is not the patient, are they listed on an active communication consent form? N/A   What is the reason for this call? He wanted to schedule last infusion   Does this require a call back? Yes   If a call back is required, please list best call back number [phone]    If a call back is required, advise that a message will be forwarded to their care team and someone will return their call as soon as possible.   Did you relay this information to the patient? Yes

## 2024-05-22 NOTE — TELEPHONE ENCOUNTER
Patient's spouse stopped in office to change 6/5 appt which patient can not make.  Rescheduled to 6/7.

## 2024-05-28 DIAGNOSIS — C34.90 SMALL CELL LUNG CANCER (HCC): ICD-10-CM

## 2024-05-28 RX ORDER — DURVALUMAB 500 MG/10ML
1500 INJECTION, SOLUTION INTRAVENOUS
Qty: 30 ML | Refills: 0 | Status: SHIPPED | OUTPATIENT
Start: 2024-05-28

## 2024-06-04 ENCOUNTER — TELEPHONE (OUTPATIENT)
Dept: SURGICAL ONCOLOGY | Facility: CLINIC | Age: 59
End: 2024-06-04

## 2024-06-04 RX ORDER — PALONOSETRON 0.05 MG/ML
0.25 INJECTION, SOLUTION INTRAVENOUS ONCE
OUTPATIENT
Start: 2024-06-10

## 2024-06-04 RX ORDER — SODIUM CHLORIDE 9 MG/ML
20 INJECTION, SOLUTION INTRAVENOUS ONCE
OUTPATIENT
Start: 2024-06-10

## 2024-06-04 RX ORDER — SODIUM CHLORIDE 9 MG/ML
20 INJECTION, SOLUTION INTRAVENOUS ONCE
OUTPATIENT
Start: 2024-06-12

## 2024-06-04 RX ORDER — SODIUM CHLORIDE 9 MG/ML
20 INJECTION, SOLUTION INTRAVENOUS ONCE
OUTPATIENT
Start: 2024-06-11

## 2024-06-04 NOTE — TELEPHONE ENCOUNTER
E mail sent regarding Emend Upfront Delivery:     Good morning,     I placed a call to [employer]  today for the Upfront Shipment of the patient's Emend for the next planned treatment 06/10/2024.     I spoke with Mandeep TORRES [phone] and she confirmed that the Upfront Shipment from [employer] would go to:     [facility]     [address]  [place] [address]     Attention Infusion Pharmacy        Ship date: 06/04/24 for Delivery: 06/05/24      Future Upfront Shipment calls to the pharmacy should take place a week prior to treatment.      Nick Sanchez  Oncology

## 2024-06-06 ENCOUNTER — DOCUMENTATION (OUTPATIENT)
Dept: HEMATOLOGY ONCOLOGY | Facility: CLINIC | Age: 59
End: 2024-06-06
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
## 2024-06-06 NOTE — PROGRESS NOTES
Imfinzi: [id number]   [place] [employer] [place] # [phone]  Approved 4/10/24-4/10/25   3 Refills   Pharmacy: [facility] [place], [place]    6-8 Business Day to Ship Out  Scheduled to deliver on 6/14/24.  Spoke with Frances, she called me to schedule Nino's RX.   This writer Nick know, as this is not the correct contact.     Elvi Romero, MPH  Phone: [phone]  Email: [email or web address]

## 2024-06-07 ENCOUNTER — APPOINTMENT (OUTPATIENT)
Dept: LAB | Facility: HOSPITAL | Age: 59
End: 2024-06-07
Payer: COMMERCIAL

## 2024-06-07 ENCOUNTER — OFFICE VISIT (OUTPATIENT)
Age: 59
End: 2024-06-07
Payer: COMMERCIAL

## 2024-06-07 VITALS
OXYGEN SATURATION: 95 % | BODY MASS INDEX: 26.17 KG/M2 | WEIGHT: 182.8 LBS | TEMPERATURE: 97.9 F | HEART RATE: 72 BPM | SYSTOLIC BLOOD PRESSURE: 102 MMHG | HEIGHT: 70 IN | RESPIRATION RATE: 16 BRPM | DIASTOLIC BLOOD PRESSURE: 69 MMHG

## 2024-06-07 DIAGNOSIS — C34.90 SMALL CELL LUNG CANCER (HCC): ICD-10-CM

## 2024-06-07 DIAGNOSIS — C34.90 SMALL CELL LUNG CANCER (HCC): Primary | ICD-10-CM

## 2024-06-07 LAB
ALBUMIN SERPL BCP-MCNC: 4.4 G/DL (ref 3.5–5)
ALP SERPL-CCNC: 37 U/L (ref 34–104)
ALT SERPL W P-5'-P-CCNC: 14 U/L (ref 7–52)
ANION GAP SERPL CALCULATED.3IONS-SCNC: 8 MMOL/L (ref 4–13)
AST SERPL W P-5'-P-CCNC: 17 U/L (ref 13–39)
BASOPHILS # BLD AUTO: 0.01 THOUSANDS/ÂΜL (ref 0–0.1)
BASOPHILS NFR BLD AUTO: 0 % (ref 0–1)
BILIRUB SERPL-MCNC: 0.62 MG/DL (ref 0.2–1)
BUN SERPL-MCNC: 9 MG/DL (ref 5–25)
CALCIUM SERPL-MCNC: 9.2 MG/DL (ref 8.4–10.2)
CHLORIDE SERPL-SCNC: 105 MMOL/L (ref 96–108)
CO2 SERPL-SCNC: 27 MMOL/L (ref 21–32)
CREAT SERPL-MCNC: 0.8 MG/DL (ref 0.6–1.3)
CRP SERPL QL: 1 MG/L
EOSINOPHIL # BLD AUTO: 0.06 THOUSAND/ÂΜL (ref 0–0.61)
EOSINOPHIL NFR BLD AUTO: 3 % (ref 0–6)
ERYTHROCYTE [DISTWIDTH] IN BLOOD BY AUTOMATED COUNT: 16.3 % (ref 11.6–15.1)
ERYTHROCYTE [SEDIMENTATION RATE] IN BLOOD: 37 MM/HOUR (ref 0–19)
GFR SERPL CREATININE-BSD FRML MDRD: 97 ML/MIN/1.73SQ M
GLUCOSE SERPL-MCNC: 128 MG/DL (ref 65–140)
HCT VFR BLD AUTO: 30.5 % (ref 36.5–49.3)
HGB BLD-MCNC: 10.2 G/DL (ref 12–17)
IMM GRANULOCYTES # BLD AUTO: 0.01 THOUSAND/UL (ref 0–0.2)
IMM GRANULOCYTES NFR BLD AUTO: 0 % (ref 0–2)
LDH SERPL-CCNC: 172 U/L (ref 140–271)
LIPASE SERPL-CCNC: 14 U/L (ref 11–82)
LYMPHOCYTES # BLD AUTO: 0.91 THOUSANDS/ÂΜL (ref 0.6–4.47)
LYMPHOCYTES NFR BLD AUTO: 40 % (ref 14–44)
MAGNESIUM SERPL-MCNC: 2.1 MG/DL (ref 1.9–2.7)
MCH RBC QN AUTO: 32.8 PG (ref 26.8–34.3)
MCHC RBC AUTO-ENTMCNC: 33.4 G/DL (ref 31.4–37.4)
MCV RBC AUTO: 98 FL (ref 82–98)
MONOCYTES # BLD AUTO: 0.61 THOUSAND/ÂΜL (ref 0.17–1.22)
MONOCYTES NFR BLD AUTO: 27 % (ref 4–12)
NEUTROPHILS # BLD AUTO: 0.67 THOUSANDS/ÂΜL (ref 1.85–7.62)
NEUTS SEG NFR BLD AUTO: 30 % (ref 43–75)
NRBC BLD AUTO-RTO: 0 /100 WBCS
PLATELET # BLD AUTO: 224 THOUSANDS/UL (ref 149–390)
PMV BLD AUTO: 9.5 FL (ref 8.9–12.7)
POTASSIUM SERPL-SCNC: 4 MMOL/L (ref 3.5–5.3)
PROT SERPL-MCNC: 7.4 G/DL (ref 6.4–8.4)
RBC # BLD AUTO: 3.11 MILLION/UL (ref 3.88–5.62)
SODIUM SERPL-SCNC: 140 MMOL/L (ref 135–147)
T3FREE SERPL-MCNC: 3.32 PG/ML (ref 2.5–3.9)
T4 FREE SERPL-MCNC: 0.86 NG/DL (ref 0.61–1.12)
TSH SERPL DL<=0.05 MIU/L-ACNC: 0.42 UIU/ML (ref 0.45–4.5)
WBC # BLD AUTO: 2.27 THOUSAND/UL (ref 4.31–10.16)

## 2024-06-07 PROCEDURE — 84481 FREE ASSAY (FT-3): CPT

## 2024-06-07 PROCEDURE — 84439 ASSAY OF FREE THYROXINE: CPT

## 2024-06-07 PROCEDURE — 86140 C-REACTIVE PROTEIN: CPT | Performed by: INTERNAL MEDICINE

## 2024-06-07 PROCEDURE — 99215 OFFICE O/P EST HI 40 MIN: CPT | Performed by: INTERNAL MEDICINE

## 2024-06-07 PROCEDURE — 83615 LACTATE (LD) (LDH) ENZYME: CPT | Performed by: INTERNAL MEDICINE

## 2024-06-07 PROCEDURE — 83735 ASSAY OF MAGNESIUM: CPT | Performed by: INTERNAL MEDICINE

## 2024-06-07 PROCEDURE — 84443 ASSAY THYROID STIM HORMONE: CPT

## 2024-06-07 PROCEDURE — 85652 RBC SED RATE AUTOMATED: CPT | Performed by: INTERNAL MEDICINE

## 2024-06-07 PROCEDURE — 85025 COMPLETE CBC W/AUTO DIFF WBC: CPT

## 2024-06-07 PROCEDURE — 80053 COMPREHEN METABOLIC PANEL: CPT

## 2024-06-07 PROCEDURE — 83690 ASSAY OF LIPASE: CPT | Performed by: INTERNAL MEDICINE

## 2024-06-07 PROCEDURE — 36415 COLL VENOUS BLD VENIPUNCTURE: CPT

## 2024-06-07 NOTE — PATIENT INSTRUCTIONS
PET scan and MRI brain around 28th June    Call with any issues    Labs prior to next visit in about 3-4 weeks    Follow up one month

## 2024-06-10 ENCOUNTER — HOSPITAL ENCOUNTER (OUTPATIENT)
Dept: INFUSION CENTER | Facility: HOSPITAL | Age: 59
Discharge: HOME | End: 2024-06-10
Attending: INTERNAL MEDICINE
Payer: COMMERCIAL

## 2024-06-10 ENCOUNTER — TELEPHONE (OUTPATIENT)
Dept: HEMATOLOGY ONCOLOGY | Facility: CLINIC | Age: 59
End: 2024-06-10

## 2024-06-10 DIAGNOSIS — C34.90 SMALL CELL LUNG CANCER (HCC): ICD-10-CM

## 2024-06-10 NOTE — TELEPHONE ENCOUNTER
Title: Patient with a 6/7 ANC of 0.67. Patient's treatment for 6/ is to be held for 1 week. Patient notified of hold and change to treatment schedule.     Date patient scheduled: 6/10-6/12    Original medication ordered:      Carboplatin/Etoposide/Imfinzi    Timeout done with FRAN Hernandez at [facility] on 6/10/24 at 0800.

## 2024-06-11 ENCOUNTER — HOSPITAL ENCOUNTER (OUTPATIENT)
Dept: INFUSION CENTER | Facility: HOSPITAL | Age: 59
End: 2024-06-11
Attending: INTERNAL MEDICINE

## 2024-06-12 ENCOUNTER — HOSPITAL ENCOUNTER (OUTPATIENT)
Dept: INFUSION CENTER | Facility: HOSPITAL | Age: 59
End: 2024-06-12
Attending: INTERNAL MEDICINE

## 2024-06-13 ENCOUNTER — PATIENT OUTREACH (OUTPATIENT)
Dept: HEMATOLOGY ONCOLOGY | Facility: CLINIC | Age: 59
End: 2024-06-13

## 2024-06-13 ENCOUNTER — APPOINTMENT (OUTPATIENT)
Dept: LAB | Facility: HOSPITAL | Age: 59
End: 2024-06-13
Payer: COMMERCIAL

## 2024-06-13 DIAGNOSIS — C34.90 SMALL CELL LUNG CANCER (HCC): ICD-10-CM

## 2024-06-13 DIAGNOSIS — Z29.89 IMMUNOTHERAPY: Primary | ICD-10-CM

## 2024-06-13 DIAGNOSIS — Z29.89 IMMUNOTHERAPY: ICD-10-CM

## 2024-06-13 LAB
ALBUMIN SERPL BCP-MCNC: 4.4 G/DL (ref 3.5–5)
ALP SERPL-CCNC: 43 U/L (ref 34–104)
ALT SERPL W P-5'-P-CCNC: 13 U/L (ref 7–52)
ANION GAP SERPL CALCULATED.3IONS-SCNC: 8 MMOL/L (ref 4–13)
AST SERPL W P-5'-P-CCNC: 18 U/L (ref 13–39)
BASOPHILS # BLD AUTO: 0.03 THOUSANDS/ÂΜL (ref 0–0.1)
BASOPHILS NFR BLD AUTO: 1 % (ref 0–1)
BILIRUB SERPL-MCNC: 0.6 MG/DL (ref 0.2–1)
BUN SERPL-MCNC: 13 MG/DL (ref 5–25)
CALCIUM SERPL-MCNC: 9.5 MG/DL (ref 8.4–10.2)
CHLORIDE SERPL-SCNC: 104 MMOL/L (ref 96–108)
CO2 SERPL-SCNC: 27 MMOL/L (ref 21–32)
CREAT SERPL-MCNC: 0.87 MG/DL (ref 0.6–1.3)
CRP SERPL QL: 4 MG/L
EOSINOPHIL # BLD AUTO: 0.07 THOUSAND/ÂΜL (ref 0–0.61)
EOSINOPHIL NFR BLD AUTO: 1 % (ref 0–6)
ERYTHROCYTE [DISTWIDTH] IN BLOOD BY AUTOMATED COUNT: 16.2 % (ref 11.6–15.1)
ERYTHROCYTE [SEDIMENTATION RATE] IN BLOOD: 34 MM/HOUR (ref 0–19)
GFR SERPL CREATININE-BSD FRML MDRD: 94 ML/MIN/1.73SQ M
GLUCOSE SERPL-MCNC: 107 MG/DL (ref 65–140)
HCT VFR BLD AUTO: 31.3 % (ref 36.5–49.3)
HGB BLD-MCNC: 10.2 G/DL (ref 12–17)
IMM GRANULOCYTES # BLD AUTO: 0.05 THOUSAND/UL (ref 0–0.2)
IMM GRANULOCYTES NFR BLD AUTO: 1 % (ref 0–2)
LDH SERPL-CCNC: 174 U/L (ref 140–271)
LIPASE SERPL-CCNC: 12 U/L (ref 11–82)
LYMPHOCYTES # BLD AUTO: 1.35 THOUSANDS/ÂΜL (ref 0.6–4.47)
LYMPHOCYTES NFR BLD AUTO: 27 % (ref 14–44)
MAGNESIUM SERPL-MCNC: 2.2 MG/DL (ref 1.9–2.7)
MCH RBC QN AUTO: 32.2 PG (ref 26.8–34.3)
MCHC RBC AUTO-ENTMCNC: 32.6 G/DL (ref 31.4–37.4)
MCV RBC AUTO: 99 FL (ref 82–98)
MONOCYTES # BLD AUTO: 0.93 THOUSAND/ÂΜL (ref 0.17–1.22)
MONOCYTES NFR BLD AUTO: 19 % (ref 4–12)
NEUTROPHILS # BLD AUTO: 2.52 THOUSANDS/ÂΜL (ref 1.85–7.62)
NEUTS SEG NFR BLD AUTO: 51 % (ref 43–75)
NRBC BLD AUTO-RTO: 0 /100 WBCS
PLATELET # BLD AUTO: 346 THOUSANDS/UL (ref 149–390)
PMV BLD AUTO: 9.2 FL (ref 8.9–12.7)
POTASSIUM SERPL-SCNC: 4.5 MMOL/L (ref 3.5–5.3)
PROT SERPL-MCNC: 7.6 G/DL (ref 6.4–8.4)
RBC # BLD AUTO: 3.17 MILLION/UL (ref 3.88–5.62)
SODIUM SERPL-SCNC: 139 MMOL/L (ref 135–147)
T3FREE SERPL-MCNC: 3.68 PG/ML (ref 2.5–3.9)
TSH SERPL DL<=0.05 MIU/L-ACNC: 0.74 UIU/ML (ref 0.45–4.5)
WBC # BLD AUTO: 4.95 THOUSAND/UL (ref 4.31–10.16)

## 2024-06-13 PROCEDURE — 83735 ASSAY OF MAGNESIUM: CPT

## 2024-06-13 PROCEDURE — 85025 COMPLETE CBC W/AUTO DIFF WBC: CPT

## 2024-06-13 PROCEDURE — 84481 FREE ASSAY (FT-3): CPT

## 2024-06-13 PROCEDURE — 80053 COMPREHEN METABOLIC PANEL: CPT

## 2024-06-13 PROCEDURE — 84443 ASSAY THYROID STIM HORMONE: CPT

## 2024-06-13 PROCEDURE — 83615 LACTATE (LD) (LDH) ENZYME: CPT

## 2024-06-13 PROCEDURE — 82024 ASSAY OF ACTH: CPT

## 2024-06-13 PROCEDURE — 86140 C-REACTIVE PROTEIN: CPT

## 2024-06-13 PROCEDURE — 85652 RBC SED RATE AUTOMATED: CPT

## 2024-06-13 PROCEDURE — 36415 COLL VENOUS BLD VENIPUNCTURE: CPT

## 2024-06-13 PROCEDURE — 83690 ASSAY OF LIPASE: CPT

## 2024-06-13 NOTE — PROGRESS NOTES
I reached out to Nino now that he has been on Tx to review for any barriers to care and offer any supportive services that may be needed. Previous outreach there were no barriers identified. I left  with reason for my call including my direct phone number [phone].

## 2024-06-14 LAB — ACTH PLAS-MCNC: 28.4 PG/ML (ref 7.2–63.3)

## 2024-06-17 ENCOUNTER — HOSPITAL ENCOUNTER (OUTPATIENT)
Dept: INFUSION CENTER | Facility: HOSPITAL | Age: 59
Discharge: HOME | End: 2024-06-17
Attending: INTERNAL MEDICINE
Payer: COMMERCIAL

## 2024-06-17 VITALS
OXYGEN SATURATION: 95 % | BODY MASS INDEX: 25.91 KG/M2 | DIASTOLIC BLOOD PRESSURE: 56 MMHG | HEART RATE: 80 BPM | SYSTOLIC BLOOD PRESSURE: 101 MMHG | HEIGHT: 70 IN | TEMPERATURE: 97.8 F | WEIGHT: 181 LBS | RESPIRATION RATE: 16 BRPM

## 2024-06-17 DIAGNOSIS — C34.90 SMALL CELL LUNG CANCER (HCC): Primary | ICD-10-CM

## 2024-06-17 RX ORDER — SODIUM CHLORIDE 9 MG/ML
20 INJECTION, SOLUTION INTRAVENOUS ONCE
Status: COMPLETED | OUTPATIENT
Start: 2024-06-17 | End: 2024-06-17

## 2024-06-17 RX ORDER — PALONOSETRON 0.05 MG/ML
0.25 INJECTION, SOLUTION INTRAVENOUS ONCE
Status: COMPLETED | OUTPATIENT
Start: 2024-06-17 | End: 2024-06-17

## 2024-06-17 RX ADMIN — DURVALUMAB 1500 MG: 500 INJECTION, SOLUTION INTRAVENOUS at 11:45

## 2024-06-17 RX ADMIN — ETOPOSIDE 200 MG: 20 INJECTION, SOLUTION INTRAVENOUS at 13:26

## 2024-06-17 RX ADMIN — SODIUM CHLORIDE 20 ML/HR: 0.9 INJECTION, SOLUTION INTRAVENOUS at 10:09

## 2024-06-17 RX ADMIN — DEXAMETHASONE SODIUM PHOSPHATE 10 MG: 10 INJECTION, SOLUTION INTRAMUSCULAR; INTRAVENOUS at 10:09

## 2024-06-17 RX ADMIN — PALONOSETRON HYDROCHLORIDE 0.25 MG: 0.25 INJECTION INTRAVENOUS at 10:32

## 2024-06-17 RX ADMIN — SODIUM CHLORIDE 150 MG: 0.9 INJECTION, SOLUTION INTRAVENOUS at 10:54

## 2024-06-17 RX ADMIN — CARBOPLATIN 633 MG: 10 INJECTION, SOLUTION INTRAVENOUS at 12:52

## 2024-06-17 NOTE — PROGRESS NOTES
Nino Silva  tolerated treatment well with no complications.      Nino Silva is aware of future appt on 6/18 at 1300.     AVS printed and given to Nino Silva:    No (Declined by Nino Silva)

## 2024-06-18 ENCOUNTER — HOSPITAL ENCOUNTER (OUTPATIENT)
Dept: INFUSION CENTER | Facility: HOSPITAL | Age: 59
Discharge: HOME | End: 2024-06-18
Attending: INTERNAL MEDICINE
Payer: COMMERCIAL

## 2024-06-18 VITALS
DIASTOLIC BLOOD PRESSURE: 72 MMHG | HEIGHT: 70 IN | TEMPERATURE: 97.4 F | HEART RATE: 72 BPM | BODY MASS INDEX: 25.91 KG/M2 | WEIGHT: 181 LBS | RESPIRATION RATE: 16 BRPM | SYSTOLIC BLOOD PRESSURE: 108 MMHG | OXYGEN SATURATION: 96 %

## 2024-06-18 DIAGNOSIS — C34.90 SMALL CELL LUNG CANCER (HCC): Primary | ICD-10-CM

## 2024-06-18 RX ORDER — SODIUM CHLORIDE 9 MG/ML
20 INJECTION, SOLUTION INTRAVENOUS ONCE
Status: COMPLETED | OUTPATIENT
Start: 2024-06-18 | End: 2024-06-18

## 2024-06-18 RX ADMIN — ETOPOSIDE 200 MG: 20 INJECTION, SOLUTION INTRAVENOUS at 14:22

## 2024-06-18 RX ADMIN — SODIUM CHLORIDE 20 ML/HR: 0.9 INJECTION, SOLUTION INTRAVENOUS at 13:22

## 2024-06-18 RX ADMIN — DEXAMETHASONE SODIUM PHOSPHATE 10 MG: 10 INJECTION, SOLUTION INTRAMUSCULAR; INTRAVENOUS at 13:25

## 2024-06-18 NOTE — PROGRESS NOTES
..Nino Silva  tolerated treatment well with no complications.      Nino Silva is aware of future appt on 6/19 at 1:00.     AVS printed and given to Nino Silva:  No (Declined by Nino Silva)

## 2024-06-19 ENCOUNTER — HOSPITAL ENCOUNTER (OUTPATIENT)
Dept: INFUSION CENTER | Facility: HOSPITAL | Age: 59
Discharge: HOME | End: 2024-06-19
Attending: INTERNAL MEDICINE
Payer: COMMERCIAL

## 2024-06-19 VITALS
HEART RATE: 62 BPM | BODY MASS INDEX: 25.91 KG/M2 | TEMPERATURE: 96.8 F | WEIGHT: 181 LBS | SYSTOLIC BLOOD PRESSURE: 96 MMHG | OXYGEN SATURATION: 96 % | RESPIRATION RATE: 18 BRPM | HEIGHT: 70 IN | DIASTOLIC BLOOD PRESSURE: 57 MMHG

## 2024-06-19 DIAGNOSIS — C34.90 SMALL CELL LUNG CANCER (HCC): Primary | ICD-10-CM

## 2024-06-19 RX ORDER — SODIUM CHLORIDE 9 MG/ML
20 INJECTION, SOLUTION INTRAVENOUS ONCE
Status: COMPLETED | OUTPATIENT
Start: 2024-06-19 | End: 2024-06-19

## 2024-06-19 RX ADMIN — DEXAMETHASONE SODIUM PHOSPHATE 10 MG: 10 INJECTION, SOLUTION INTRAMUSCULAR; INTRAVENOUS at 13:01

## 2024-06-19 RX ADMIN — SODIUM CHLORIDE 20 ML/HR: 0.9 INJECTION, SOLUTION INTRAVENOUS at 13:01

## 2024-06-19 RX ADMIN — ETOPOSIDE 200 MG: 20 INJECTION, SOLUTION INTRAVENOUS at 13:44

## 2024-06-19 NOTE — PROGRESS NOTES
Pt tolerated treatment with no adv reactions; pt aware of next appt 7/8 at 1400; left unit ambulatory with steady gait.

## 2024-06-27 DIAGNOSIS — C34.90 SMALL CELL LUNG CANCER (HCC): Primary | ICD-10-CM

## 2024-06-29 ENCOUNTER — HOSPITAL ENCOUNTER (OUTPATIENT)
Dept: MRI IMAGING | Facility: HOSPITAL | Age: 59
Discharge: HOME | End: 2024-06-29
Attending: INTERNAL MEDICINE
Payer: COMMERCIAL

## 2024-06-29 DIAGNOSIS — C34.90 SMALL CELL LUNG CANCER (HCC): ICD-10-CM

## 2024-06-29 PROCEDURE — 70553 MRI BRAIN STEM W/O & W/DYE: CPT

## 2024-06-29 PROCEDURE — A9585 GADOBUTROL INJECTION: HCPCS | Performed by: INTERNAL MEDICINE

## 2024-06-29 RX ORDER — GADOBUTROL 604.72 MG/ML
8 INJECTION INTRAVENOUS
Status: COMPLETED | OUTPATIENT
Start: 2024-06-29 | End: 2024-06-29

## 2024-06-29 RX ADMIN — GADOBUTROL 8 ML: 604.72 INJECTION INTRAVENOUS at 14:13

## 2024-07-01 ENCOUNTER — OFFICE VISIT (OUTPATIENT)
Dept: FAMILY MEDICINE CLINIC | Facility: HOSPITAL | Age: 59
End: 2024-07-01
Payer: COMMERCIAL

## 2024-07-01 VITALS
DIASTOLIC BLOOD PRESSURE: 60 MMHG | HEIGHT: 70 IN | BODY MASS INDEX: 25.91 KG/M2 | OXYGEN SATURATION: 96 % | HEART RATE: 69 BPM | WEIGHT: 181 LBS | SYSTOLIC BLOOD PRESSURE: 102 MMHG

## 2024-07-01 DIAGNOSIS — J44.9 CHRONIC OBSTRUCTIVE PULMONARY DISEASE, UNSPECIFIED COPD TYPE (HCC): ICD-10-CM

## 2024-07-01 DIAGNOSIS — C34.90 SMALL CELL LUNG CANCER (HCC): Primary | ICD-10-CM

## 2024-07-01 PROBLEM — R79.89 ELEVATED TROPONIN: Status: RESOLVED | Noted: 2024-03-04 | Resolved: 2024-07-01

## 2024-07-01 PROBLEM — M54.9 BACK PAIN: Status: RESOLVED | Noted: 2024-03-04 | Resolved: 2024-07-01

## 2024-07-01 PROBLEM — R91.8 LUNG MASS: Status: RESOLVED | Noted: 2024-03-04 | Resolved: 2024-07-01

## 2024-07-01 PROCEDURE — 99204 OFFICE O/P NEW MOD 45 MIN: CPT | Performed by: INTERNAL MEDICINE

## 2024-07-01 NOTE — PROGRESS NOTES
Assessment/Plan:    Small cell lung cancer (HCC)  Patient presents with his wife to establish care at this practice.    He has small cell lung cancer with brain metastases.  He is undergoing chemotherapy.    He complains of nausea after chemotherapy and when he takes Zofran he develops constipation.    He also complains of shortness of breath with exertion: Walking 100 yards or walking up a flight of stairs causes dyspnea which is relieved in 1 to 2 minutes after resting.  He has a pulse oximeter at home and his oxygen saturation dropped to as low as 72% according to him    His CBC showed a hemoglobin of 10.2 on June 13, thyroid function, liver function test were normal    His lungs are clear to auscultation today    I ordered a 6-minute walk test to see if patient qualifies for home oxygen    He will follow-up with hematology    COPD (chronic obstructive pulmonary disease) (HCC)  Patient has a remote history of smoking as a child.  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  CT of the chest was done in March when he was diagnosed with lung cancer.  The chest CAT scan also revealed COPD.  He does have shortness of breath with exertion but denies cough, wheezing, hemoptysis  His lungs are completely clear to auscultation today    I will check patient's PFTs to assess the degree of COPD seen on CT of the chest and to see if he needs inhalers     Diagnoses and all orders for this visit:    Small cell lung cancer (HCC)  -     Pulse Oximetry with exercise; Future    Chronic obstructive pulmonary disease, unspecified COPD type (HCC)  -     Complete PFT with post Bronchodilator and Six Minute walk; Future  -     Pulse Oximetry with exercise; Future          Subjective:      Patient ID: Nino Silva is a 58 y.o. male.      HPI    Patient presents for follow-up of chronic conditions as detailed in the assessment and plan.      The following portions of the patient's history were reviewed and updated as  "appropriate: current medications, past family history, past medical history, past social history, past surgical history and problem list.    Review of Systems      Objective:    /60   Pulse 69   Ht 5' 10\" (1.778 m)   Wt 82.1 kg (181 lb)   SpO2 96%   BMI 25.97 kg/m²      Physical Exam  Constitutional:       General: He is not in acute distress.     Appearance: He is not toxic-appearing.   HENT:      Head: Normocephalic.   Cardiovascular:      Rate and Rhythm: Normal rate and regular rhythm.      Heart sounds: No murmur heard.     No gallop.   Pulmonary:      Effort: No respiratory distress.      Breath sounds: No wheezing or rales.   Abdominal:      General: Bowel sounds are normal. There is no distension.      Palpations: Abdomen is soft.      Tenderness: There is no abdominal tenderness.   Musculoskeletal:      Cervical back: Neck supple.   Lymphadenopathy:      Cervical: No cervical adenopathy.   Skin:     General: Skin is warm and dry.      Comments: He had no lower extremity edema   Neurological:      Mental Status: He is alert and oriented to person, place, and time.      Cranial Nerves: No cranial nerve deficit.      Motor: No weakness.      Gait: Gait normal.   Psychiatric:         Mood and Affect: Mood normal.         Thought Content: Thought content normal.           Depression Screening and Follow-up Plan: Patient was screened for depression during today's encounter. They screened negative with a PHQ-2 score of 0.        Obed Marinelli MD  "

## 2024-07-01 NOTE — ASSESSMENT & PLAN NOTE
Patient presents with his wife to establish care at this practice.    He has small cell lung cancer with brain metastases.  He is undergoing chemotherapy.    He complains of nausea after chemotherapy and when he takes Zofran he develops constipation.    He also complains of shortness of breath with exertion: Walking 100 yards or walking up a flight of stairs causes dyspnea which is relieved in 1 to 2 minutes after resting.  He has a pulse oximeter at home and his oxygen saturation dropped to as low as 72% according to him    His CBC showed a hemoglobin of 10.2 on June 13, thyroid function, liver function test were normal    His lungs are clear to auscultation today    I ordered a 6-minute walk test to see if patient qualifies for home oxygen    He will follow-up with hematology

## 2024-07-01 NOTE — ASSESSMENT & PLAN NOTE
Patient has a remote history of smoking as a child.  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  CT of the chest was done in March when he was diagnosed with lung cancer.  The chest CAT scan also revealed COPD.  He does have shortness of breath with exertion but denies cough, wheezing, hemoptysis  His lungs are completely clear to auscultation today    I will check patient's PFTs to assess the degree of COPD seen on CT of the chest and to see if he needs inhalers

## 2024-07-02 ENCOUNTER — HOSPITAL ENCOUNTER (OUTPATIENT)
Dept: NUCLEAR MEDICINE | Facility: HOSPITAL | Age: 59
Discharge: HOME | End: 2024-07-02
Attending: INTERNAL MEDICINE
Payer: COMMERCIAL

## 2024-07-02 DIAGNOSIS — C34.90 SMALL CELL LUNG CANCER (HCC): ICD-10-CM

## 2024-07-02 LAB — GLUCOSE SERPL-MCNC: 102 MG/DL (ref 65–140)

## 2024-07-02 PROCEDURE — 78815 PET IMAGE W/CT SKULL-THIGH: CPT

## 2024-07-02 PROCEDURE — A9552 F18 FDG: HCPCS

## 2024-07-02 PROCEDURE — 82948 REAGENT STRIP/BLOOD GLUCOSE: CPT

## 2024-07-03 DIAGNOSIS — C34.90 SMALL CELL LUNG CANCER (HCC): ICD-10-CM

## 2024-07-03 RX ORDER — DURVALUMAB 500 MG/10ML
1500 INJECTION, SOLUTION INTRAVENOUS
Qty: 30 ML | Refills: 0 | Status: SHIPPED | OUTPATIENT
Start: 2024-07-03

## 2024-07-04 RX ORDER — SODIUM CHLORIDE 9 MG/ML
20 INJECTION, SOLUTION INTRAVENOUS ONCE
OUTPATIENT
Start: 2024-07-10

## 2024-07-05 ENCOUNTER — APPOINTMENT (OUTPATIENT)
Dept: LAB | Facility: HOSPITAL | Age: 59
End: 2024-07-05
Payer: COMMERCIAL

## 2024-07-05 ENCOUNTER — TELEPHONE (OUTPATIENT)
Age: 59
End: 2024-07-05

## 2024-07-05 DIAGNOSIS — C34.90 SMALL CELL LUNG CANCER (HCC): ICD-10-CM

## 2024-07-05 LAB
ALBUMIN SERPL BCG-MCNC: 4.2 G/DL (ref 3.5–5)
ALP SERPL-CCNC: 38 U/L (ref 34–104)
ALT SERPL W P-5'-P-CCNC: 22 U/L (ref 7–52)
ANION GAP SERPL CALCULATED.3IONS-SCNC: 9 MMOL/L (ref 4–13)
AST SERPL W P-5'-P-CCNC: 23 U/L (ref 13–39)
BASOPHILS # BLD MANUAL: 0 THOUSAND/UL (ref 0–0.1)
BASOPHILS NFR MAR MANUAL: 0 % (ref 0–1)
BILIRUB SERPL-MCNC: 0.57 MG/DL (ref 0.2–1)
BUN SERPL-MCNC: 12 MG/DL (ref 5–25)
CALCIUM SERPL-MCNC: 9.3 MG/DL (ref 8.4–10.2)
CEA SERPL-MCNC: 14.2 NG/ML (ref 0–3)
CHLORIDE SERPL-SCNC: 105 MMOL/L (ref 96–108)
CO2 SERPL-SCNC: 26 MMOL/L (ref 21–32)
CREAT SERPL-MCNC: 0.86 MG/DL (ref 0.6–1.3)
CRP SERPL QL: <1 MG/L
EOSINOPHIL # BLD MANUAL: 0.09 THOUSAND/UL (ref 0–0.4)
EOSINOPHIL NFR BLD MANUAL: 4 % (ref 0–6)
ERYTHROCYTE [DISTWIDTH] IN BLOOD BY AUTOMATED COUNT: 15.8 % (ref 11.6–15.1)
ERYTHROCYTE [SEDIMENTATION RATE] IN BLOOD: 30 MM/HOUR (ref 0–19)
GFR SERPL CREATININE-BSD FRML MDRD: 94 ML/MIN/1.73SQ M
GLUCOSE P FAST SERPL-MCNC: 102 MG/DL (ref 65–99)
HCT VFR BLD AUTO: 28.7 % (ref 36.5–49.3)
HGB BLD-MCNC: 9.6 G/DL (ref 12–17)
LDH SERPL-CCNC: 168 U/L (ref 140–271)
LIPASE SERPL-CCNC: 15 U/L (ref 11–82)
LYMPHOCYTES # BLD AUTO: 1.44 THOUSAND/UL (ref 0.6–4.47)
LYMPHOCYTES # BLD AUTO: 65 % (ref 14–44)
MAGNESIUM SERPL-MCNC: 2 MG/DL (ref 1.9–2.7)
MCH RBC QN AUTO: 33.8 PG (ref 26.8–34.3)
MCHC RBC AUTO-ENTMCNC: 33.4 G/DL (ref 31.4–37.4)
MCV RBC AUTO: 101 FL (ref 82–98)
MONOCYTES # BLD AUTO: 0.27 THOUSAND/UL (ref 0–1.22)
MONOCYTES NFR BLD: 12 % (ref 4–12)
NEUTROPHILS # BLD MANUAL: 0.42 THOUSAND/UL (ref 1.85–7.62)
NEUTS BAND NFR BLD MANUAL: 1 % (ref 0–8)
NEUTS SEG NFR BLD AUTO: 18 % (ref 43–75)
PLATELET # BLD AUTO: 194 THOUSANDS/UL (ref 149–390)
PLATELET BLD QL SMEAR: ADEQUATE
PMV BLD AUTO: 9 FL (ref 8.9–12.7)
POTASSIUM SERPL-SCNC: 4 MMOL/L (ref 3.5–5.3)
PROT SERPL-MCNC: 7.2 G/DL (ref 6.4–8.4)
RBC # BLD AUTO: 2.84 MILLION/UL (ref 3.88–5.62)
RBC MORPH BLD: NORMAL
SODIUM SERPL-SCNC: 140 MMOL/L (ref 135–147)
T3FREE SERPL-MCNC: 3.22 PG/ML (ref 2.5–3.9)
TSH SERPL DL<=0.05 MIU/L-ACNC: 0.88 UIU/ML (ref 0.45–4.5)
WBC # BLD AUTO: 2.21 THOUSAND/UL (ref 4.31–10.16)

## 2024-07-05 PROCEDURE — 85027 COMPLETE CBC AUTOMATED: CPT

## 2024-07-05 PROCEDURE — 85652 RBC SED RATE AUTOMATED: CPT

## 2024-07-05 PROCEDURE — 85007 BL SMEAR W/DIFF WBC COUNT: CPT

## 2024-07-05 PROCEDURE — 84443 ASSAY THYROID STIM HORMONE: CPT

## 2024-07-05 PROCEDURE — 82378 CARCINOEMBRYONIC ANTIGEN: CPT

## 2024-07-05 PROCEDURE — 84481 FREE ASSAY (FT-3): CPT

## 2024-07-05 PROCEDURE — 83615 LACTATE (LD) (LDH) ENZYME: CPT

## 2024-07-05 PROCEDURE — 80053 COMPREHEN METABOLIC PANEL: CPT

## 2024-07-05 PROCEDURE — 83735 ASSAY OF MAGNESIUM: CPT

## 2024-07-05 PROCEDURE — 82024 ASSAY OF ACTH: CPT

## 2024-07-05 PROCEDURE — 83690 ASSAY OF LIPASE: CPT

## 2024-07-05 PROCEDURE — 86140 C-REACTIVE PROTEIN: CPT

## 2024-07-05 PROCEDURE — 36415 COLL VENOUS BLD VENIPUNCTURE: CPT

## 2024-07-05 NOTE — TELEPHONE ENCOUNTER
Infusion center unable to accept deliveries over the weekend and the earliest the Imfinzi can be shipped is Monday. [employer] representative unable to give time that Imfinzi will be arriving at the infusion center on Tuesday, 7/9. Patient's infusion was rescheduled for Wednesday, 7/10 at 9:30 am after his appt with Dr. Morrell on 7/10 at 8:20 am. Left message for patient on home phone with date and time on this infusion change.

## 2024-07-05 NOTE — TELEPHONE ENCOUNTER
Called [employer] to discuss status of Imfinzi shipment. Was told that if case was escalated before 4 pm today, they should be able to have the Imfinzi shipped for Monday. Per respreasentive, case was being expedited.

## 2024-07-06 LAB — ACTH PLAS-MCNC: 64.6 PG/ML (ref 7.2–63.3)

## 2024-07-08 ENCOUNTER — HOSPITAL ENCOUNTER (OUTPATIENT)
Dept: INFUSION CENTER | Facility: HOSPITAL | Age: 59
Discharge: HOME | End: 2024-07-08
Attending: INTERNAL MEDICINE

## 2024-07-10 ENCOUNTER — HOSPITAL ENCOUNTER (OUTPATIENT)
Dept: INFUSION CENTER | Facility: HOSPITAL | Age: 59
Discharge: HOME | End: 2024-07-10
Attending: INTERNAL MEDICINE
Payer: COMMERCIAL

## 2024-07-10 ENCOUNTER — OFFICE VISIT (OUTPATIENT)
Age: 59
End: 2024-07-10
Payer: COMMERCIAL

## 2024-07-10 VITALS
BODY MASS INDEX: 25.34 KG/M2 | OXYGEN SATURATION: 94 % | WEIGHT: 177 LBS | HEIGHT: 70 IN | SYSTOLIC BLOOD PRESSURE: 92 MMHG | RESPIRATION RATE: 18 BRPM | HEART RATE: 74 BPM | TEMPERATURE: 97.6 F | DIASTOLIC BLOOD PRESSURE: 64 MMHG

## 2024-07-10 VITALS
OXYGEN SATURATION: 96 % | HEART RATE: 61 BPM | HEIGHT: 70 IN | BODY MASS INDEX: 25.34 KG/M2 | DIASTOLIC BLOOD PRESSURE: 79 MMHG | SYSTOLIC BLOOD PRESSURE: 113 MMHG | TEMPERATURE: 97.1 F | WEIGHT: 177 LBS

## 2024-07-10 DIAGNOSIS — Z29.89 IMMUNOTHERAPY: Primary | ICD-10-CM

## 2024-07-10 DIAGNOSIS — C34.90 SMALL CELL LUNG CANCER (HCC): ICD-10-CM

## 2024-07-10 DIAGNOSIS — C34.90 SMALL CELL LUNG CANCER (HCC): Primary | ICD-10-CM

## 2024-07-10 PROCEDURE — 99215 OFFICE O/P EST HI 40 MIN: CPT | Performed by: INTERNAL MEDICINE

## 2024-07-10 RX ORDER — SODIUM CHLORIDE 9 MG/ML
20 INJECTION, SOLUTION INTRAVENOUS ONCE
Status: COMPLETED | OUTPATIENT
Start: 2024-07-10 | End: 2024-07-10

## 2024-07-10 RX ADMIN — DURVALUMAB 1500 MG: 500 INJECTION, SOLUTION INTRAVENOUS at 09:50

## 2024-07-10 RX ADMIN — SODIUM CHLORIDE 20 ML/HR: 9 INJECTION, SOLUTION INTRAVENOUS at 09:50

## 2024-07-10 NOTE — PROGRESS NOTES
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 lesion.     No mass effect or midline shift. No acute intracranial hemorrhage. No acute infarction. No abnormal hyperperfusion.     Small scattered hyperintensities on T2/FLAIR imaging are noted in the periventricular and subcortical white matter demonstrating an appearance that is statistically most likely to represent mild microangiopathic change.     VENTRICLES:  Normal for the patient's age.     SELLA AND PITUITARY GLAND:  Normal.     ORBITS:  Normal.     PARANASAL SINUSES:  Normal.     VASCULATURE:  Evaluation of the major intracranial vasculature demonstrates appropriate flow voids.     CALVARIUM AND SKULL BASE: Small bilateral mastoid effusions.     EXTRACRANIAL SOFT TISSUES:  Normal.     IMPRESSION:     Overall positive treatment response since 3/4/2024.  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  - Resolved enhancing metastatic lesions in right thalamus, right medial occipital lobe, and right middle cerebellar peduncle.  - No acute intracranial abnormality.    I spent 40 minutes on chart review, face to face  counseling time, coordination of care and documentation.    Cortney Morrell MD PhD

## 2024-07-10 NOTE — PROGRESS NOTES
Nino Silva  tolerated treatment well with no complications.      Nino Silva is aware of future appt on 8/5 at 1030.     AVS printed and given to Nino Silva:  Yes

## 2024-07-19 ENCOUNTER — PATIENT OUTREACH (OUTPATIENT)
Dept: HEMATOLOGY ONCOLOGY | Facility: CLINIC | Age: 59
End: 2024-07-19

## 2024-07-19 NOTE — PROGRESS NOTES
I reached out to Nino to review for any barriers to care and offer any supportive services that may be needed. I left  with reason for my call including my direct phone number [phone].

## 2024-07-22 ENCOUNTER — TELEPHONE (OUTPATIENT)
Age: 59
End: 2024-07-22

## 2024-07-22 NOTE — TELEPHONE ENCOUNTER
Pt called in regards to the six minute walk test Dr. Marinelli ordered; he stated when he went to actually schedule the test, the nurse didn't understand what Dr. Marinelli was ordering and changed the order. Pt would like Dr. Marinelli to look at pts chart and make sure he was scheduled for the right test. Checked pt's upcoming appts and he's scheduled for 8/12 at [facility] and was told the test would take over two hours; didn't see any other orders in his chart. Please advise pt with this information.

## 2024-07-23 ENCOUNTER — OFFICE VISIT (OUTPATIENT)
Facility: HOSPITAL | Age: 59
End: 2024-07-23
Attending: RADIOLOGY
Payer: COMMERCIAL

## 2024-07-23 VITALS
HEIGHT: 70 IN | WEIGHT: 179.2 LBS | SYSTOLIC BLOOD PRESSURE: 108 MMHG | RESPIRATION RATE: 18 BRPM | HEART RATE: 74 BPM | OXYGEN SATURATION: 97 % | BODY MASS INDEX: 25.65 KG/M2 | DIASTOLIC BLOOD PRESSURE: 71 MMHG | TEMPERATURE: 96.8 F

## 2024-07-23 DIAGNOSIS — C79.31 LUNG CANCER METASTATIC TO BRAIN (HCC): ICD-10-CM

## 2024-07-23 DIAGNOSIS — C34.90 SMALL CELL LUNG CANCER (HCC): Primary | ICD-10-CM

## 2024-07-23 DIAGNOSIS — C34.90 LUNG CANCER METASTATIC TO BRAIN (HCC): ICD-10-CM

## 2024-07-23 PROCEDURE — 99213 OFFICE O/P EST LOW 20 MIN: CPT | Performed by: RADIOLOGY

## 2024-07-23 PROCEDURE — 99211 OFF/OP EST MAY X REQ PHY/QHP: CPT | Performed by: RADIOLOGY

## 2024-07-23 RX ORDER — MULTIVIT WITH MINERALS/LUTEIN
500 TABLET ORAL DAILY
COMMUNITY
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
## 2024-07-23 NOTE — PROGRESS NOTES
"Nino Silva 1965 is a 58 y.o. male with metastatic lung cancer. He completed whole brain radiation 3/19/24. He was last seen 24. He returns today for follow-up.    5/3/24 Dr. Morrell- Doing well. Wheezing/stridor resolved. Increased nausea and constipation, discussed laxatives, antiemetics. Continue with treatment. F/u 3 weeks    24 Dr. Morrell- Doing well. Increased toxicities. PET MRI brain later in  after cycle 4. , hold treatment    6/10/24 cycle 4 of chemo      24 MRI brain-  Overall positive treatment response since 3/4/2024.  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  - Resolved enhancing metastatic lesions in right thalamus, right medial occipital lobe, and right middle cerebellar peduncle.  - No acute intracranial abnormality.      24 PET scan-  1. Significant improvement of previously seen adenopathy in the right neck, abdomen and pelvis, as above further delineated  2. There is also significantly improved disease in the left infrahilar region and AP window  3. Within the right chest, persistent viable tumor as above described. Some regions demonstrate a greater SUV value, while others are unchanged in size. Some regions have shown partial improvement  4. No development of osseous metastasis      7/10/24 Dr. Morrell- Doing well post chemotherapy but remains very fatigued with chemotherapy \"fog\". Maintenance Durvalumab every 4 weeks to start today      Upcomin24 infusion  24 pulmonary f/u  24 Dr. Morrell      Follow up visit     Oncology History   Small cell lung cancer (HCC)   3/5/2024 Initial Diagnosis    Small cell lung cancer (HCC)     3/5/2024 Biopsy    Final Diagnosis  A. Lung, Right Upper Lobe, biopsy:  - Poorly " differentiated carcinoma with neuroendocrine features, favor large cell neuroendocrine carcinoma.  - See note.     3/5/2024 -  Cancer Staged    Staging form: Lung, AJCC 8th Edition  - Clinical stage from 3/5/2024: Stage IV (cT2, cN3, cM1) - Signed by Lizbeth López MD on 4/18/2024  Histopathologic type: Small cell carcinoma, NOS  Stage prefix: Initial diagnosis  Histologic grade (G): G3  Histologic grading system: 4 grade system       3/6/2024 - 3/19/2024 Radiation      Plan ID Energy Fractions Dose per Fraction (cGy) Dose Correction (cGy) Total Dose Delivered (cGy) Elapsed Days   Whole Brain 6X 10 / 10 300 0 3,000 13        4/8/2024 -  Chemotherapy    alteplase (CATHFLO), 2 mg, Intracatheter, Every 1 Minute as needed, 5 of 10 cycles  palonosetron (ALOXI), 0.25 mg, Intravenous, Once, 3 of 3 cycles  Administration: 0.25 mg (4/29/2024), 0.25 mg (5/20/2024), 0.25 mg (6/17/2024)  fosaprepitant (EMEND) IVPB, 150 mg, Intravenous, Once, 4 of 4 cycles  Administration: 150 mg (4/8/2024), 150 mg (4/29/2024), 150 mg (5/20/2024), 150 mg (6/17/2024)  etoposide (TOPOSAR), 80 mg/m2 = 164 mg, Intravenous, Once, 4 of 4 cycles  Dose modification: 100 mg/m2 (original dose 80 mg/m2, Cycle 1, Reason: Anticipated Tolerance), 80 mg/m2 (original dose 80 mg/m2, Cycle 1, Reason: Anticipated Tolerance), 100 mg/m2 (original dose 80 mg/m2, Cycle 2, Reason: Anticipated Tolerance)  Administration: 164 mg (4/8/2024), 164 mg (4/9/2024), 164 mg (4/10/2024), 205 mg (4/29/2024), 205 mg (4/30/2024), 205 mg (5/1/2024), 200 mg (5/20/2024), 200 mg (5/21/2024), 200 mg (5/22/2024), 200 mg (6/17/2024), 200 mg (6/18/2024), 200 mg (6/19/2024)  CARBOplatin (PARAPLATIN) IVPB ([facility] AUC DOSING), 750 mg (574.7 % of original dose 130.5 mg), Intravenous, Once, 4 of 4 cycles  Dose modification: 130.5 mg (original dose 130.5 mg, Cycle 1, Reason: Anticipated Tolerance),   (original dose 130.5 mg, Cycle 1, Reason: Other (Must fill in a comment))  Administration: 750 mg  (4/8/2024), 701.5 mg (4/29/2024), 664 mg (5/20/2024), 633 mg (6/17/2024)  durvalumab (IMFINZI) IVPB, 1,500 mg, Intravenous, Once, 5 of 10 cycles  Administration: 1,500 mg (4/8/2024), 1,500 mg (4/29/2024), 1,500 mg (5/20/2024), 1,500 mg (6/17/2024), 1,500 mg (7/10/2024)         Review of Systems:  Review of Systems   Constitutional:  Positive for fatigue (varies).        Reports one brief episode of low grade fever last week   HENT: Negative.          Reports throat feels dry at night   Eyes: Negative.    Respiratory:  Positive for cough (mild, occasional) and shortness of breath (improving).    Cardiovascular: Negative.    Gastrointestinal:  Positive for constipation.   Endocrine: Negative.    Genitourinary: Negative.    Musculoskeletal: Negative.    Skin: Negative.    Allergic/Immunologic: Negative.    Neurological:  Positive for weakness (hands, unchanged) and numbness (neuropathy feet). Negative for dizziness and headaches.   Hematological: Negative.    Psychiatric/Behavioral: Negative.          Reports some memory issues. Reports he felt sharp when he was taking the steroids after radiation. States it waxes and wanes       Clinical Trial: no      Health Maintenance   Topic Date Due    Hepatitis C Screening  Never done    COVID-19 Vaccine (1) Never done    Pneumococcal Vaccine: Pediatrics (0 to 5 Years) and At-Risk Patients (6 to 64 Years) (1 of 2 - PCV) Never done    HIV Screening  Never done    BMI: Followup Plan  Never done    Annual Physical  Never done    Zoster Vaccine (1 of 2) Never done    DTaP,Tdap,and Td Vaccines (1 - Tdap) Never done    Colorectal Cancer Screening  Never done    Influenza Vaccine (1) 09/01/2024    RSV Vaccine Age 60+ Years (1 - 1-dose 60+ series) 10/02/2024    BMI: Adult  07/10/2025    Depression Screening  07/23/2025    RSV Vaccine age 0-20 Months  Aged Out    HIB Vaccine  Aged Out    IPV Vaccine  Aged Out    Hepatitis A Vaccine  Aged Out    Meningococcal ACWY Vaccine  Aged Out    HPV  Vaccine  Aged Out     Patient Active Problem List   Diagnosis    Brain mass    COPD (chronic obstructive pulmonary disease) (HCC)    Hx of testicular cancer    Small cell lung cancer (HCC)     No past medical history on file.  Past Surgical History:   Procedure Laterality Date    HERNIA REPAIR      IR BIOPSY LUNG  03/05/2024    ORCHIECTOMY Left      Family History   Problem Relation Age of Onset    Breast cancer additional onset Mother     No Known Problems Father     Stroke Brother      Social History     Socioeconomic History    Marital status: /Civil Union     Spouse name: Not on file    Number of children: Not on file    Years of education: Not on file    Highest education level: Not on file   Occupational History    Not on file   Tobacco Use    Smoking status: Never    Smokeless tobacco: Never   Vaping Use    Vaping status: Never Used   Substance and Sexual Activity    Alcohol use: Not Currently    Drug use: Not Currently    Sexual activity: Not on file   Other Topics Concern    Not on file   Social History Narrative    Not on file     Social Determinants of Health     Financial Resource Strain: Not on file   Food Insecurity: No Food Insecurity (3/5/2024)    Hunger Vital Sign     Worried About Running Out of Food in the Last Year: Never true     Ran Out of Food in the Last Year: Never true   Transportation Needs: No Transportation Needs (3/5/2024)    PRAPARE - Transportation     Lack of Transportation (Medical): No     Lack of Transportation (Non-Medical): No   Physical Activity: Not on file   Stress: Not on file   Social Connections: Not on file   Intimate Partner Violence: Not on file   Housing Stability: Low Risk  (3/5/2024)    Housing Stability Vital Sign     Unable to Pay for Housing in the Last Year: No     Number of Times Moved in the Last Year: 1     Homeless in the Last Year: No       Current Outpatient Medications:     ascorbic acid (VITAMIN C) 250 mg tablet, Take 500 mg by mouth daily, Disp: ,  "Rfl:     Durvalumab (Imfinzi) 500 MG/10ML SOLN, Inject 30 mL (1,500 mg total) into a catheter in a vein every 21 days To be given at infusion center, Disp: 30 mL, Rfl: 0    Multiple Vitamin (Multi Vitamin Daily) TABS, Take 1 tablet by mouth daily, Disp: , Rfl:     Cholecalciferol (Vitamin D) 50 MCG (2000 UT) tablet, Take 2,000 Units by mouth daily (Patient not taking: Reported on 7/23/2024), Disp: , Rfl:     dexamethasone (DECADRON) 1 mg tablet, Take 1 tablet (1 mg total) by mouth see administration instructions Follow taper calendar (Patient not taking: Reported on 4/18/2024), Disp: 10 tablet, Rfl: 0    dexamethasone (DECADRON) 2 mg tablet, Take 1 tablet (2 mg total) by mouth see administration instructions Follow taper calendar (Patient not taking: Reported on 4/18/2024), Disp: 35 tablet, Rfl: 0    dexamethasone (DECADRON) 4 mg tablet, Take 1 tablet (4 mg total) by mouth every 8 (eight) hours (Patient not taking: Reported on 4/18/2024), Disp: 60 tablet, Rfl: 0    dexamethasone (DECADRON) 4 mg tablet, Take 1 tablet (4 mg total) by mouth see administration instructions Follow taper calendar (Patient not taking: Reported on 4/18/2024), Disp: 26 tablet, Rfl: 0    omeprazole (PriLOSEC) 40 MG capsule, Take 1 capsule (40 mg total) by mouth daily (Patient not taking: Reported on 3/20/2024), Disp: 40 capsule, Rfl: 0    ondansetron (ZOFRAN) 8 mg tablet, Take 1 tablet (8 mg total) by mouth every 8 (eight) hours as needed for nausea or vomiting (Patient not taking: Reported on 6/7/2024), Disp: 30 tablet, Rfl: 1    prochlorperazine (COMPAZINE) 10 mg tablet, Take 1 tablet (10 mg total) by mouth every 6 (six) hours as needed for nausea or vomiting (Patient not taking: Reported on 4/18/2024), Disp: 30 tablet, Rfl: 1  No Known Allergies  Vitals:    07/23/24 0932   BP: 108/71   Pulse: 74   Resp: 18   Temp: (!) 96.8 °F (36 °C)   SpO2: 97%   Weight: 81.3 kg (179 lb 3.2 oz)   Height: 5' 10\" (1.778 m)      Pain Score: 0-No pain  "

## 2024-07-29 RX ORDER — SODIUM CHLORIDE 9 MG/ML
20 INJECTION, SOLUTION INTRAVENOUS ONCE
OUTPATIENT
Start: 2024-08-13

## 2024-07-31 ENCOUNTER — OFFICE VISIT (OUTPATIENT)
Facility: HOSPITAL | Age: 59
End: 2024-07-31
Attending: RADIOLOGY
Payer: COMMERCIAL

## 2024-07-31 VITALS
SYSTOLIC BLOOD PRESSURE: 118 MMHG | BODY MASS INDEX: 25.71 KG/M2 | RESPIRATION RATE: 16 BRPM | WEIGHT: 179.2 LBS | DIASTOLIC BLOOD PRESSURE: 72 MMHG | OXYGEN SATURATION: 95 % | HEART RATE: 77 BPM | TEMPERATURE: 96.6 F

## 2024-07-31 DIAGNOSIS — C34.90 SMALL CELL LUNG CANCER (HCC): Primary | ICD-10-CM

## 2024-07-31 PROCEDURE — 99214 OFFICE O/P EST MOD 30 MIN: CPT | Performed by: RADIOLOGY

## 2024-07-31 NOTE — PROGRESS NOTES
Nino Silva 1965 is a 58 y.o. male With metastatic lung cancer. He completed whole brain radiation 3/19/24. He completed systemic chemotherapy on 24. Remains on immunotherapy with Durvalumab every 4 weeks. He was last seen in Radiation Oncology on 24.He returns today for complex follow up.      24 MRI brain-  Overall positive treatment response since 3/4/2024.  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  - Resolved enhancing metastatic lesions in right thalamus, right medial occipital lobe, and right middle cerebellar peduncle.  - No acute intracranial abnormality.       24 PET scan-  1. Significant improvement of previously seen adenopathy in the right neck, abdomen and pelvis, as above further delineated  2. There is also significantly improved disease in the left infrahilar region and AP window  3. Within the right chest, persistent viable tumor as above described. Some regions demonstrate a greater SUV value, while others are unchanged in size. Some regions have shown partial improvement  4. No development of osseous metastasis          Upcomin24 Infusion  24 Pulmonary  10/21/24 MRI brain w wo contrast    Follow up visit     Oncology History   Small cell lung cancer (HCC)   3/5/2024 Initial Diagnosis    Small cell lung cancer (HCC)     3/5/2024 Biopsy    Final Diagnosis  A. Lung, Right Upper Lobe, biopsy:  - Poorly differentiated carcinoma with neuroendocrine features, favor large cell neuroendocrine carcinoma.  - See note.     3/5/2024 -  Cancer Staged    Staging form: Lung, AJCC 8th Edition  - Clinical stage from 3/5/2024: Stage IV (cT2, cN3, cM1) - Signed by Lizbeth López MD on 2024  Histopathologic type: Small cell carcinoma,  NOS  Stage prefix: Initial diagnosis  Histologic grade (G): G3  Histologic grading system: 4 grade system       3/6/2024 - 3/19/2024 Radiation      Plan ID Energy Fractions Dose per Fraction (cGy) Dose Correction (cGy) Total Dose Delivered (cGy) Elapsed Days   Whole Brain 6X 10 / 10 300 0 3,000 13        4/8/2024 -  Chemotherapy    alteplase (CATHFLO), 2 mg, Intracatheter, Every 1 Minute as needed, 5 of 10 cycles  palonosetron (ALOXI), 0.25 mg, Intravenous, Once, 3 of 3 cycles  Administration: 0.25 mg (4/29/2024), 0.25 mg (5/20/2024), 0.25 mg (6/17/2024)  fosaprepitant (EMEND) IVPB, 150 mg, Intravenous, Once, 4 of 4 cycles  Administration: 150 mg (4/8/2024), 150 mg (4/29/2024), 150 mg (5/20/2024), 150 mg (6/17/2024)  etoposide (TOPOSAR), 80 mg/m2 = 164 mg, Intravenous, Once, 4 of 4 cycles  Dose modification: 100 mg/m2 (original dose 80 mg/m2, Cycle 1, Reason: Anticipated Tolerance), 80 mg/m2 (original dose 80 mg/m2, Cycle 1, Reason: Anticipated Tolerance), 100 mg/m2 (original dose 80 mg/m2, Cycle 2, Reason: Anticipated Tolerance)  Administration: 164 mg (4/8/2024), 164 mg (4/9/2024), 164 mg (4/10/2024), 205 mg (4/29/2024), 205 mg (4/30/2024), 205 mg (5/1/2024), 200 mg (5/20/2024), 200 mg (5/21/2024), 200 mg (5/22/2024), 200 mg (6/17/2024), 200 mg (6/18/2024), 200 mg (6/19/2024)  CARBOplatin (PARAPLATIN) IVPB ([facility] AUC DOSING), 750 mg (574.7 % of original dose 130.5 mg), Intravenous, Once, 4 of 4 cycles  Dose modification: 130.5 mg (original dose 130.5 mg, Cycle 1, Reason: Anticipated Tolerance),   (original dose 130.5 mg, Cycle 1, Reason: Other (Must fill in a comment))  Administration: 750 mg (4/8/2024), 701.5 mg (4/29/2024), 664 mg (5/20/2024), 633 mg (6/17/2024)  durvalumab (IMFINZI) IVPB, 1,500 mg, Intravenous, Once, 5 of 10 cycles  Administration: 1,500 mg (4/8/2024), 1,500 mg (4/29/2024), 1,500 mg (5/20/2024), 1,500 mg (6/17/2024), 1,500 mg (7/10/2024)         Review of Systems:  Review of Systems    Constitutional:  Positive for fatigue (varies).   HENT: Negative.          Reports throat feels dry at night   Eyes: Negative.    Respiratory:  Positive for cough (mostly dry, intermittently productive in am-thick/yellowish) and shortness of breath (with exertion. Reports improving).    Cardiovascular: Negative.    Gastrointestinal:  Positive for constipation and nausea. Negative for vomiting.   Endocrine: Positive for heat intolerance (hands sensitive to heat).   Genitourinary: Negative.    Musculoskeletal: Negative.    Skin: Negative.    Allergic/Immunologic: Negative.    Neurological:  Positive for weakness (hands, unchanged) and numbness (neuropathy feet). Negative for dizziness, seizures and headaches.   Hematological: Negative.    Psychiatric/Behavioral:  Positive for confusion (mild- reports short term memory issues).         Reports some memory issues. Reports he felt sharp when he was taking the steroids after radiation. States it waxes and wanes         Health Maintenance   Topic Date Due    Hepatitis C Screening  Never done    COVID-19 Vaccine (1) Never done    Pneumococcal Vaccine: Pediatrics (0 to 5 Years) and At-Risk Patients (6 to 64 Years) (1 of 2 - PCV) Never done    HIV Screening  Never done    BMI: Followup Plan  Never done    Annual Physical  Never done    Zoster Vaccine (1 of 2) Never done    DTaP,Tdap,and Td Vaccines (1 - Tdap) Never done    Colorectal Cancer Screening  Never done    Influenza Vaccine (1) 09/01/2024    RSV Vaccine Age 60+ Years (1 - 1-dose 60+ series) 10/02/2024    Depression Screening  07/23/2025    BMI: Adult  07/23/2025    RSV Vaccine age 0-20 Months  Aged Out    HIB Vaccine  Aged Out    IPV Vaccine  Aged Out    Hepatitis A Vaccine  Aged Out    Meningococcal ACWY Vaccine  Aged Out    HPV Vaccine  Aged Out     Patient Active Problem List   Diagnosis    Brain mass    COPD (chronic obstructive pulmonary disease) (HCC)    Hx of testicular cancer    Small cell lung cancer (HCC)     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 No pertinent past medical history.  Past Surgical History:   Procedure Laterality Date    HERNIA REPAIR      IR BIOPSY LUNG  03/05/2024    ORCHIECTOMY Left      Family History   Problem Relation Age of Onset    Breast cancer additional onset Mother     No Known Problems Father     Stroke Brother      Social History     Socioeconomic History    Marital status: /Civil Union     Spouse name: Not on file    Number of children: Not on file    Years of education: Not on file    Highest education level: Not on file   Occupational History    Not on file   Tobacco Use    Smoking status: Never    Smokeless tobacco: Never   Vaping Use    Vaping status: Never Used   Substance and Sexual Activity    Alcohol use: Not Currently    Drug use: Not Currently    Sexual activity: Not on file   Other Topics Concern    Not on file   Social History Narrative    Not on file     Social Determinants of Health     Financial Resource Strain: Not on file   Food Insecurity: No Food Insecurity (3/5/2024)    Hunger Vital Sign     Worried About Running Out of Food in the Last Year: Never true     Ran Out of Food in the Last Year: Never true   Transportation Needs: No Transportation Needs (3/5/2024)    PRAPARE - Transportation     Lack of Transportation (Medical): No     Lack of Transportation (Non-Medical): No   Physical Activity: Not on file   Stress: Not on file   Social Connections: Not on file   Intimate Partner Violence: Not on file   Housing Stability: Low Risk  (3/5/2024)    Housing Stability Vital Sign     Unable to Pay for Housing in the Last Year: No     Number of Times Moved in the Last Year: 1     Homeless in the Last Year: No       Current Outpatient Medications:     ascorbic acid (VITAMIN C) 250 mg tablet, Take 500 mg by mouth daily, Disp: , Rfl:     Durvalumab (Imfinzi) 500 MG/10ML SOLN, Inject 30 mL (1,500 mg total) into a catheter in a vein every 21 days To be  given at infusion center, Disp: 30 mL, Rfl: 0    Multiple Vitamin (Multi Vitamin Daily) TABS, Take 1 tablet by mouth daily, Disp: , Rfl:     Cholecalciferol (Vitamin D) 50 MCG (2000 UT) tablet, Take 2,000 Units by mouth daily (Patient not taking: Reported on 7/23/2024), Disp: , Rfl:     dexamethasone (DECADRON) 1 mg tablet, Take 1 tablet (1 mg total) by mouth see administration instructions Follow taper calendar (Patient not taking: Reported on 4/18/2024), Disp: 10 tablet, Rfl: 0    dexamethasone (DECADRON) 2 mg tablet, Take 1 tablet (2 mg total) by mouth see administration instructions Follow taper calendar (Patient not taking: Reported on 4/18/2024), Disp: 35 tablet, Rfl: 0    dexamethasone (DECADRON) 4 mg tablet, Take 1 tablet (4 mg total) by mouth every 8 (eight) hours (Patient not taking: Reported on 4/18/2024), Disp: 60 tablet, Rfl: 0    dexamethasone (DECADRON) 4 mg tablet, Take 1 tablet (4 mg total) by mouth see administration instructions Follow taper calendar (Patient not taking: Reported on 4/18/2024), Disp: 26 tablet, Rfl: 0    omeprazole (PriLOSEC) 40 MG capsule, Take 1 capsule (40 mg total) by mouth daily (Patient not taking: Reported on 3/20/2024), Disp: 40 capsule, Rfl: 0    ondansetron (ZOFRAN) 8 mg tablet, Take 1 tablet (8 mg total) by mouth every 8 (eight) hours as needed for nausea or vomiting (Patient not taking: Reported on 6/7/2024), Disp: 30 tablet, Rfl: 1    prochlorperazine (COMPAZINE) 10 mg tablet, Take 1 tablet (10 mg total) by mouth every 6 (six) hours as needed for nausea or vomiting (Patient not taking: Reported on 4/18/2024), Disp: 30 tablet, Rfl: 1  No Known Allergies  Vitals:    07/31/24 0758   BP: 118/72   BP Location: Right arm   Patient Position: Sitting   Pulse: 77   Resp: 16   Temp: (!) 96.6 °F (35.9 °C)   SpO2: 95%   Weight: 81.3 kg (179 lb 3.2 oz)      Pain Score: 0-No pain

## 2024-07-31 NOTE — PROGRESS NOTES
Consultation - Radiation Oncology      MRN:[id number] : 1965  Encounter: [id number]  Patient Information: Nino Silva      CHIEF COMPLAINT  Chief Complaint   Patient presents with    Follow-up     Cancer Staging   Small cell lung cancer (HCC)  Staging form: Lung, AJCC 8th Edition  - Clinical stage from 3/5/2024: Stage IV (cT2, cN3, cM1) - Signed by Lizbeth López MD on 2024  Histopathologic type: Small cell carcinoma, NOS  Stage prefix: Initial diagnosis  Histologic grade (G): G3  Histologic grading system: 4 grade system           History of Present Illness   Nino Silva is a 58 y.o. 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  He received whole brain radiation, treatment completion on 3/19/2024.   He then received systemic therapy and presents for consideration of additional radiotherapy and ongoing disease surveillance.     To briefly review, he presented in 3/2024 with altered mental status.    CT head without contrast on 3/4/2024 demonstrated multiple cortical brain lesions with associated vasogenic edema most worrisome for metastatic disease.  The largest was a left parietal lobe lesion measuring 2.7 x 4.3 cm.    CTA chest/abdomen and pelvis on 3/4/2024 demonstrated a prominent right upper lobe perihilar pulmonary mass identified measuring up to 4.3 x 5.8 cm concerning for primary carcinoma.  There was considerable mass effect on adjacent pulmonary arteries and bronchi.  There was partial cut off of the right middle lobe pulmonary artery branch and additional right upper lobe nodular mass versus bilobed configuration measuring 3.8 x 3 cm.  There was a prevascular node measuring 1.5 x 2 cm and additional mediastinal and hilar adenopathy.  There was para-aortic retroperitoneal and iliac chain lymphadneopathy.     MRI brain on 3/4/2024 demonstrated multiple centrally  necrotic, rim-enhancing  lesions demonstrating peripheral nodularity within the supra and infratentorial parenchyma.      IR biopsy of the lung was performed on 3/5/2024 and poorly differentiated carcinoma with neuroendocrine features, favoring large cell neuroendocrine carcinoma.      PET/CT on 3/15/2024 demonstrated a 3.6 x 5.4 cm hypermetabolic right upper lobe mass and adjacent 2.6 cm hypermetabolic satellite nodule, compatible with malignant involvement.  There was hypermetabolic bilateral hilar, mediastinal, right lower cervical, retroperitoneal and bilateral iliac chain lymphadenopathy, compatible with multiple malignant involvement.      He completed whole brain radiation on 3/19/2024.    He began carboplatin, etoposide and durvalumab on 4/8/2024.    MRI brain on 6/29/24 demonstrated decreased size of enhancing hemorrhagic metastatic lesions in the left parietal, right frontal periventricular, left temporal and right cerebellar lobes.  The largest in the left parietal lobe had mild perilesional vasogenic edema, improved.  There was resolution of enhancing metastatic lesions in the right thalamus, right medial occipital lobe and right middle cerebellar peduncle.      PET/CT on 7/2/2024 demonstrated no residual FDG avidity in right cervical lymph nodes.  The previously 3.6 x 5.4 cm right upper lobe perihilar mass now measured 2.5 x 2.7 cm with SUV 12.  The anterior right upper lobe mass previously measuring 2.6 x 2 cm now was unchanged in size with SUV 19, previously 11.  A previously seen right hilar lymph node measuring 2.4 x 1.9 cm with SUV 9.8 now measuring 1.6 x 1.6 cm with SUV 7.  There was no residual activity in a previously seen left infrahilar lymph node.  There was previously a conglomerate of right lower paratracheal lymph nodes measuring 1.6 x 1.9 cm with SUV 8.7, now 1.5 x 1.2 cm with SUV 5.  There was a previous right upper paratracheal lymph node measuring 1.9 cm now 1.9 x 1.5 cm with SUV 14.   An AP window lymph node measured 2.2 x 1.8 cm previously, now measured 1.4 x 0.8 cm.  Of note, there was marked interval improvement in previously demonstrated retroperitoneal lymphadenopathy and the majority of the lymph nodes were no longer FDG avid.  An example is a left para-aortic lymph node which was previously 2.7 x 4.1 cm, now 0.9 x 0.7 cm and prior right external iliac lymph node measuring 2.6 x 3.1 cm, now 1.2 x 0.7 cm.      He continues durvalumab.      Upon interview, he endorses the above history.  He has improving nausea and fatigue while continuing maintenance durvalumab alone.  He denies worsening shortness of cough, chest pain or fevers/chills.  He had initial improvement in mental clarity after administration of steroids and WBRT.  However, now he has slowly noticed some decline in short term memory and processing speed (calculating tip at restaurants, etc.).  He is without additional acute concerns.    Historical Information   Oncology History   Small cell lung cancer (HCC)   3/5/2024 Initial Diagnosis    Small cell lung cancer (HCC)     3/5/2024 Biopsy    Final Diagnosis  A. Lung, Right Upper Lobe, biopsy:  - Poorly differentiated carcinoma with neuroendocrine features, favor large cell neuroendocrine carcinoma.  - See note.     3/5/2024 -  Cancer Staged    Staging form: Lung, AJCC 8th Edition  - Clinical stage from 3/5/2024: Stage IV (cT2, cN3, cM1) - Signed by Lizbeth López MD on 4/18/2024  Histopathologic type: Small cell carcinoma, NOS  Stage prefix: Initial diagnosis  Histologic grade (G): G3  Histologic grading system: 4 grade system       3/6/2024 - 3/19/2024 Radiation      Plan ID Energy Fractions Dose per Fraction (cGy) Dose Correction (cGy) Total Dose Delivered (cGy) Elapsed Days   Whole Brain 6X 10 / 10 300 0 3,000 13        4/8/2024 -  Chemotherapy    alteplase (CATHFLO), 2 mg, Intracatheter, Every 1 Minute as needed, 5 of 10 cycles  palonosetron (ALOXI), 0.25 mg, Intravenous,  Once, 3 of 3 cycles  Administration: 0.25 mg (4/29/2024), 0.25 mg (5/20/2024), 0.25 mg (6/17/2024)  fosaprepitant (EMEND) IVPB, 150 mg, Intravenous, Once, 4 of 4 cycles  Administration: 150 mg (4/8/2024), 150 mg (4/29/2024), 150 mg (5/20/2024), 150 mg (6/17/2024)  etoposide (TOPOSAR), 80 mg/m2 = 164 mg, Intravenous, Once, 4 of 4 cycles  Dose modification: 100 mg/m2 (original dose 80 mg/m2, Cycle 1, Reason: Anticipated Tolerance), 80 mg/m2 (original dose 80 mg/m2, Cycle 1, Reason: Anticipated Tolerance), 100 mg/m2 (original dose 80 mg/m2, Cycle 2, Reason: Anticipated Tolerance)  Administration: 164 mg (4/8/2024), 164 mg (4/9/2024), 164 mg (4/10/2024), 205 mg (4/29/2024), 205 mg (4/30/2024), 205 mg (5/1/2024), 200 mg (5/20/2024), 200 mg (5/21/2024), 200 mg (5/22/2024), 200 mg (6/17/2024), 200 mg (6/18/2024), 200 mg (6/19/2024)  CARBOplatin (PARAPLATIN) IVPB (GOG AUC DOSING), 750 mg (574.7 % of original dose 130.5 mg), Intravenous, Once, 4 of 4 cycles  Dose modification: 130.5 mg (original dose 130.5 mg, Cycle 1, Reason: Anticipated Tolerance),   (original dose 130.5 mg, Cycle 1, Reason: Other (Must fill in a comment))  Administration: 750 mg (4/8/2024), 701.5 mg (4/29/2024), 664 mg (5/20/2024), 633 mg (6/17/2024)  durvalumab (IMFINZI) IVPB, 1,500 mg, Intravenous, Once, 5 of 10 cycles  Administration: 1,500 mg (4/8/2024), 1,500 mg (4/29/2024), 1,500 mg (5/20/2024), 1,500 mg (6/17/2024), 1,500 mg (7/10/2024)           History reviewed. No pertinent past medical history.  Past Surgical History:   Procedure Laterality Date    HERNIA REPAIR      IR BIOPSY LUNG  03/05/2024    ORCHIECTOMY Left        Family History   Problem Relation Age of Onset    Breast cancer additional onset Mother     No Known Problems Father     Stroke Brother        Social History   Social History     Substance and Sexual Activity   Alcohol Use Not Currently     Social History     Substance and Sexual Activity   Drug Use Not Currently     Social  History     Tobacco Use   Smoking Status Never   Smokeless Tobacco Never         Meds/Allergies     Current Outpatient Medications:     ascorbic acid (VITAMIN C) 250 mg tablet, Take 500 mg by mouth daily, Disp: , Rfl:     Durvalumab (Imfinzi) 500 MG/10ML SOLN, Inject 30 mL (1,500 mg total) into a catheter in a vein every 21 days To be given at infusion center, Disp: 30 mL, Rfl: 0    Multiple Vitamin (Multi Vitamin Daily) TABS, Take 1 tablet by mouth daily, Disp: , Rfl:     Cholecalciferol (Vitamin D) 50 MCG (2000 UT) tablet, Take 2,000 Units by mouth daily (Patient not taking: Reported on 7/23/2024), Disp: , Rfl:     dexamethasone (DECADRON) 1 mg tablet, Take 1 tablet (1 mg total) by mouth see administration instructions Follow taper calendar (Patient not taking: Reported on 4/18/2024), Disp: 10 tablet, Rfl: 0    dexamethasone (DECADRON) 2 mg tablet, Take 1 tablet (2 mg total) by mouth see administration instructions Follow taper calendar (Patient not taking: Reported on 4/18/2024), Disp: 35 tablet, Rfl: 0    dexamethasone (DECADRON) 4 mg tablet, Take 1 tablet (4 mg total) by mouth every 8 (eight) hours (Patient not taking: Reported on 4/18/2024), Disp: 60 tablet, Rfl: 0    dexamethasone (DECADRON) 4 mg tablet, Take 1 tablet (4 mg total) by mouth see administration instructions Follow taper calendar (Patient not taking: Reported on 4/18/2024), Disp: 26 tablet, Rfl: 0    omeprazole (PriLOSEC) 40 MG capsule, Take 1 capsule (40 mg total) by mouth daily (Patient not taking: Reported on 3/20/2024), Disp: 40 capsule, Rfl: 0    ondansetron (ZOFRAN) 8 mg tablet, Take 1 tablet (8 mg total) by mouth every 8 (eight) hours as needed for nausea or vomiting (Patient not taking: Reported on 6/7/2024), Disp: 30 tablet, Rfl: 1    prochlorperazine (COMPAZINE) 10 mg tablet, Take 1 tablet (10 mg total) by mouth every 6 (six) hours as needed for nausea or vomiting (Patient not taking: Reported on 4/18/2024), Disp: 30 tablet, Rfl: 1  No  Known Allergies      Review of Systems  Constitutional:  Positive for fatigue (varies).   HENT: Negative.          Reports throat feels dry at night   Eyes: Negative.    Respiratory:  Positive for cough (mostly dry, intermittently productive in am-thick/yellowish) and shortness of breath (with exertion. Reports improving).    Cardiovascular: Negative.    Gastrointestinal:  Positive for constipation and nausea. Negative for vomiting.   Endocrine: Positive for heat intolerance (hands sensitive to heat).   Genitourinary: Negative.    Musculoskeletal: Negative.    Skin: Negative.    Allergic/Immunologic: Negative.    Neurological:  Positive for weakness (hands, unchanged) and numbness (neuropathy feet). Negative for dizziness, seizures and headaches.   Hematological: Negative.    Psychiatric/Behavioral:  Positive for confusion (mild- reports short term memory issues).         Reports some memory issues. Reports he felt sharp when he was taking the steroids after radiation. States it waxes and wanes     OBJECTIVE:   /72 (BP Location: Right arm, Patient Position: Sitting)   Pulse 77   Temp (!) 96.6 °F (35.9 °C)   Resp 16   Wt 81.3 kg (179 lb 3.2 oz)   SpO2 95%   BMI 25.71 kg/m²   Pain Assessment:  0  Performance Status: Karnofsky: 90 - Able to carry on normal activity; minor signs or symptoms of disease     Physical Exam  HENT:      Head: Normocephalic and atraumatic.   Eyes:      General: No scleral icterus.     Extraocular Movements: Extraocular movements intact.   Cardiovascular:      Rate and Rhythm: Normal rate.   Pulmonary:      Effort: Pulmonary effort is normal.   Abdominal:      General: Abdomen is flat. There is no distension.   Musculoskeletal:         General: Normal range of motion.      Cervical back: Normal range of motion.   Skin:     General: Skin is warm and dry.   Neurological:      General: No focal deficit present.      Mental Status: He is alert.   Psychiatric:         Mood and Affect:  Mood normal.          RESULTS  Lab Results    Chemistry        Component Value Date/Time    K 4.0 07/05/2024 0723     07/05/2024 0723    CO2 26 07/05/2024 0723    CO2 25 03/04/2024 1014    BUN 12 07/05/2024 0723    CREATININE 0.86 07/05/2024 0723        Component Value Date/Time    CALCIUM 9.3 07/05/2024 0723    ALKPHOS 38 07/05/2024 0723    AST 23 07/05/2024 0723    ALT 22 07/05/2024 0723            Lab Results   Component Value Date    WBC 2.21 (L) 07/05/2024    HGB 9.6 (L) 07/05/2024    HCT 28.7 (L) 07/05/2024     (H) 07/05/2024     07/05/2024         Imaging Studies  NM PET CT skull base to mid thigh    Result Date: 7/2/2024  Narrative: PET/CT SCAN INDICATION: C34.90: Malignant neoplasm of unspecified part of unspecified bronchus or lung History of small cell lung carcinoma with known metastatic disease involving the brain. Restaging study following therapy. MODIFIER: PS COMPARISON: Prior PET/CT 3/15/2024 CELL TYPE: Poorly differentiated carcinoma with neuroendocrine features favoring large cell neuroendocrine carcinoma, right upper lobe TECHNIQUE:   8.6 mCi F-18-FDG administered IV. Multiplanar attenuation corrected and non attenuation corrected PET images are available for interpretation, and contiguous, low dose, axial CT sections were obtained from the skull base through the femurs. Intravenous contrast material was not utilized. This examination, like all CT scans performed in the [facility], was performed utilizing techniques to minimize radiation dose exposure, including the use of iterative reconstruction and automated exposure control. Fasting serum glucose: 102 mg/dl FINDINGS: VISUALIZED BRAIN: Known history of brain metastases as per prior MRI studies. Limited CT demonstrates no hydrocephalus, midline shift or acute hemorrhage HEAD/NECK: No residual FDG avidity within previously seen right cervical lymph nodes. CT images: No other significant change CHEST: -The  right upper lobe perihilar mass earlier measured 3.6 x 5.4 cm with SUV max 13, currently approximately 2.5 x 2.7 cm, SUV max 12.0 - Anterior right upper lobe mass on image 4/101 earlier measured 2.6 x 2.0 cm with SUV max 11, currently unchanged in size, SUV max now 19 - Previously seen right hilar lymph node measuring 2.4 x 1.9 cm with SUV max 9.8, now approximately 1.6 x 1.6 cm based on size of activity and with SUV max 7.0 - No residual activity within the previously seen left infrahilar lymph node - Prior study demonstrated a conglomerate of right lower paratracheal lymph nodes measuring 1.6 x 1.9 cm with SUV max of 8.7 now approximately 1.5 x 1.2 cm, SUV max 5.0. There is dystrophic calcification within the treated node. - Prior study demonstrated right upper paratracheal adenopathy measuring 1.6 x 1.9 cm with SUV max 9.0, currently approximately 1.9 x 1.5 cm, SUV max 14. - Prior study demonstrated a left-sided AP window lymph node earlier 2.2 x 1.8 cm with SUV max of 10.0. Now measuring 1.4 x 0.8 cm, SUV max 2.3. CT images: Coronary artery calcification. No pericardial effusion. Subpleural interstitial fibrotic change and bullous change at the right lung base as on the previous study. ABDOMEN: Prior examination demonstrated multiple FDG avid retroperitoneal lymph nodes. Current study demonstrates a marked interval improvement. Majority of the lymph nodes are no longer FDG avid and have markedly diminished in size. An example is the left para-aortic lymph node which earlier measured 2.7 x 4.1 cm with SUV max 13, now 0.9 x 0.7 cm, SUV max 1.8. Previous right external iliac lymph node had measured 2.6 x 3.1 cm with SUV max of 9.1 now 1.2 x 0.7 cm, SUV max 3.2. CT images: Several punctate pancreatic calcifications as on prior study. Probable left upper pole renal cyst, unchanged. PELVIS: Please see above for description of improved adenopathy CT images: Prostatomegaly OSSEOUS STRUCTURES: No FDG avid lesions are  seen. CT images: No interval change     Impression: 1. Significant improvement of previously seen adenopathy in the right neck, abdomen and pelvis, as above further delineated 2. There is also significantly improved disease in the left infrahilar region and AP window 3. Within the right chest, persistent viable tumor as above described. Some regions demonstrate a greater SUV value, while others are unchanged in size. Some regions have shown partial improvement 4. No development of osseous metastasis Workstation performed: [id number]         Pathology: See above.  Confirmed with pathologist today that this is large cell neuroendocrine carcinoma         ASSESSMENT  No diagnosis found.  Cancer Staging   Small cell lung cancer (HCC)  Staging form: Lung, AJCC 8th Edition  - Clinical stage from 3/5/2024: Stage IV (cT2, cN3, cM1) - Signed by Lizbeth López MD on 4/18/2024  Histopathologic type: Small cell carcinoma, NOS  Stage prefix: Initial diagnosis  Histologic grade (G): G3  Histologic grading system: 4 grade system        PLAN/DISCUSSION  No orders of the defined types were placed in this encounter.         Nino Silva is a 58 y.o. 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  He received whole brain radiation, treatment completion on 3/19/2024.   He then received systemic therapy and presents for consideration of additional radiotherapy and ongoing disease surveillance.     I reviewed the diagnosis of metastatic non-small cell lung cancer presenting with multiple metastatic sites including intracranial metastatic disease now status post-WBRT.  I reviewed possible side effects of WBRT including neurocognitive changes.  He has surveillance brain MRI arranged in the near future.  At this time, he has exhibited a treatment response and there is no clear role for additional radiotherapy for  "management of brain metastases at this time.    I reviewed his post-chemotherapy PET/CT demonstrating a favorable disease response in extrathoracic sites.  He has small volume residual disease within the chest.  I discussed the evolving role of immunotherapy and agree with continued maintenance durvalumab. Given the non-small cell histology, there is no clear role for thoracic consolidation at this time.  Palliative treatment of symptomatic or painful sites of disease, both within the chest or at extrathoracic locations, can be considered at any time in the future.     All questions were answered to his satisfaction.  Going forward, he will continue surveillance imaging and maintenance durvalumab under the supervision of medical oncology.  He has repeat brain MRI scheduled for 8/14/24 and will follow up with radiation oncology after this is completed.    He was encouraged to call with any future questions / concerns.    Prince Funes MD  7/31/2024,8:56 AM      Portions of the record may have been created with voice recognition software.  Occasional wrong word or \"sound a like\" substitutions may have occurred due to the inherent limitations of voice recognition software.  Read the chart carefully and recognize, using context, where substitutions have occurred.          "

## 2024-08-02 ENCOUNTER — TELEPHONE (OUTPATIENT)
Age: 59
End: 2024-08-02

## 2024-08-02 ENCOUNTER — APPOINTMENT (OUTPATIENT)
Dept: LAB | Facility: HOSPITAL | Age: 59
End: 2024-08-02
Payer: COMMERCIAL

## 2024-08-02 DIAGNOSIS — C34.90 SMALL CELL LUNG CANCER (HCC): Primary | ICD-10-CM

## 2024-08-02 DIAGNOSIS — C34.90 SMALL CELL LUNG CANCER (HCC): ICD-10-CM

## 2024-08-02 LAB
ALBUMIN SERPL BCG-MCNC: 4.3 G/DL (ref 3.5–5)
ALP SERPL-CCNC: 52 U/L (ref 34–104)
ALT SERPL W P-5'-P-CCNC: 10 U/L (ref 7–52)
ANION GAP SERPL CALCULATED.3IONS-SCNC: 8 MMOL/L (ref 4–13)
AST SERPL W P-5'-P-CCNC: 16 U/L (ref 13–39)
BASOPHILS # BLD AUTO: 0.05 THOUSANDS/ÂΜL (ref 0–0.1)
BASOPHILS NFR BLD AUTO: 1 % (ref 0–1)
BILIRUB SERPL-MCNC: 0.53 MG/DL (ref 0.2–1)
BUN SERPL-MCNC: 13 MG/DL (ref 5–25)
CALCIUM SERPL-MCNC: 9.6 MG/DL (ref 8.4–10.2)
CHLORIDE SERPL-SCNC: 102 MMOL/L (ref 96–108)
CO2 SERPL-SCNC: 29 MMOL/L (ref 21–32)
CREAT SERPL-MCNC: 0.8 MG/DL (ref 0.6–1.3)
CRP SERPL QL: 5.5 MG/L
EOSINOPHIL # BLD AUTO: 0.25 THOUSAND/ÂΜL (ref 0–0.61)
EOSINOPHIL NFR BLD AUTO: 4 % (ref 0–6)
ERYTHROCYTE [DISTWIDTH] IN BLOOD BY AUTOMATED COUNT: 13.2 % (ref 11.6–15.1)
ERYTHROCYTE [SEDIMENTATION RATE] IN BLOOD: 27 MM/HOUR (ref 0–19)
GFR SERPL CREATININE-BSD FRML MDRD: 97 ML/MIN/1.73SQ M
GLUCOSE P FAST SERPL-MCNC: 100 MG/DL (ref 65–99)
HCT VFR BLD AUTO: 34.3 % (ref 36.5–49.3)
HGB BLD-MCNC: 11.4 G/DL (ref 12–17)
IMM GRANULOCYTES # BLD AUTO: 0.03 THOUSAND/UL (ref 0–0.2)
IMM GRANULOCYTES NFR BLD AUTO: 1 % (ref 0–2)
LDH SERPL-CCNC: 185 U/L (ref 140–271)
LIPASE SERPL-CCNC: 11 U/L (ref 11–82)
LYMPHOCYTES # BLD AUTO: 1.08 THOUSANDS/ÂΜL (ref 0.6–4.47)
LYMPHOCYTES NFR BLD AUTO: 17 % (ref 14–44)
MAGNESIUM SERPL-MCNC: 2 MG/DL (ref 1.9–2.7)
MCH RBC QN AUTO: 33.4 PG (ref 26.8–34.3)
MCHC RBC AUTO-ENTMCNC: 33.2 G/DL (ref 31.4–37.4)
MCV RBC AUTO: 101 FL (ref 82–98)
MONOCYTES # BLD AUTO: 0.76 THOUSAND/ÂΜL (ref 0.17–1.22)
MONOCYTES NFR BLD AUTO: 12 % (ref 4–12)
NEUTROPHILS # BLD AUTO: 4.21 THOUSANDS/ÂΜL (ref 1.85–7.62)
NEUTS SEG NFR BLD AUTO: 65 % (ref 43–75)
NRBC BLD AUTO-RTO: 0 /100 WBCS
PLATELET # BLD AUTO: 302 THOUSANDS/UL (ref 149–390)
PMV BLD AUTO: 9.9 FL (ref 8.9–12.7)
POTASSIUM SERPL-SCNC: 4.3 MMOL/L (ref 3.5–5.3)
PROT SERPL-MCNC: 7.8 G/DL (ref 6.4–8.4)
RBC # BLD AUTO: 3.41 MILLION/UL (ref 3.88–5.62)
SODIUM SERPL-SCNC: 139 MMOL/L (ref 135–147)
T3FREE SERPL-MCNC: 3.12 PG/ML (ref 2.5–3.9)
TSH SERPL DL<=0.05 MIU/L-ACNC: 0.57 UIU/ML (ref 0.45–4.5)
WBC # BLD AUTO: 6.38 THOUSAND/UL (ref 4.31–10.16)

## 2024-08-02 PROCEDURE — 36415 COLL VENOUS BLD VENIPUNCTURE: CPT

## 2024-08-02 PROCEDURE — 84481 FREE ASSAY (FT-3): CPT

## 2024-08-02 PROCEDURE — 86140 C-REACTIVE PROTEIN: CPT

## 2024-08-02 PROCEDURE — 84443 ASSAY THYROID STIM HORMONE: CPT

## 2024-08-02 PROCEDURE — 82024 ASSAY OF ACTH: CPT

## 2024-08-02 PROCEDURE — 85652 RBC SED RATE AUTOMATED: CPT

## 2024-08-02 PROCEDURE — 85025 COMPLETE CBC W/AUTO DIFF WBC: CPT

## 2024-08-02 PROCEDURE — 80053 COMPREHEN METABOLIC PANEL: CPT

## 2024-08-02 PROCEDURE — 83735 ASSAY OF MAGNESIUM: CPT

## 2024-08-02 PROCEDURE — 83615 LACTATE (LD) (LDH) ENZYME: CPT

## 2024-08-02 PROCEDURE — 83690 ASSAY OF LIPASE: CPT

## 2024-08-02 NOTE — TELEPHONE ENCOUNTER
Called [employer] to set up shipment of Imfinzi. They were able to set up shipment but this is going to take 6-8 days. Can we push his treatment out 1 week? Thanks!

## 2024-08-03 LAB — ACTH PLAS-MCNC: 38.3 PG/ML (ref 7.2–63.3)

## 2024-08-05 ENCOUNTER — HOSPITAL ENCOUNTER (OUTPATIENT)
Dept: INFUSION CENTER | Facility: HOSPITAL | Age: 59
Discharge: HOME | End: 2024-08-05
Attending: INTERNAL MEDICINE

## 2024-08-12 ENCOUNTER — HOSPITAL ENCOUNTER (OUTPATIENT)
Dept: PULMONOLOGY | Facility: HOSPITAL | Age: 59
Discharge: HOME | End: 2024-08-12
Attending: INTERNAL MEDICINE
Payer: COMMERCIAL

## 2024-08-12 DIAGNOSIS — J44.9 CHRONIC OBSTRUCTIVE PULMONARY DISEASE, UNSPECIFIED COPD TYPE (HCC): ICD-10-CM

## 2024-08-12 PROCEDURE — 94060 EVALUATION OF WHEEZING: CPT | Performed by: INTERNAL MEDICINE

## 2024-08-12 PROCEDURE — 94618 PULMONARY STRESS TESTING: CPT

## 2024-08-12 PROCEDURE — 94729 DIFFUSING CAPACITY: CPT

## 2024-08-12 PROCEDURE — 94060 EVALUATION OF WHEEZING: CPT

## 2024-08-12 PROCEDURE — 94726 PLETHYSMOGRAPHY LUNG VOLUMES: CPT

## 2024-08-12 PROCEDURE — 94726 PLETHYSMOGRAPHY LUNG VOLUMES: CPT | Performed by: INTERNAL MEDICINE

## 2024-08-12 PROCEDURE — 94729 DIFFUSING CAPACITY: CPT | Performed by: INTERNAL MEDICINE

## 2024-08-12 PROCEDURE — 94618 PULMONARY STRESS TESTING: CPT | Performed by: INTERNAL MEDICINE

## 2024-08-12 RX ORDER — ALBUTEROL SULFATE 0.83 MG/ML
2.5 SOLUTION RESPIRATORY (INHALATION) ONCE
Status: COMPLETED | OUTPATIENT
Start: 2024-08-12 | End: 2024-08-12

## 2024-08-12 RX ADMIN — ALBUTEROL SULFATE 2.5 MG: 2.5 SOLUTION RESPIRATORY (INHALATION) at 12:47

## 2024-08-13 ENCOUNTER — HOSPITAL ENCOUNTER (OUTPATIENT)
Dept: INFUSION CENTER | Facility: HOSPITAL | Age: 59
Discharge: HOME | End: 2024-08-13
Attending: INTERNAL MEDICINE
Payer: COMMERCIAL

## 2024-08-13 VITALS
BODY MASS INDEX: 25.28 KG/M2 | SYSTOLIC BLOOD PRESSURE: 113 MMHG | HEIGHT: 70 IN | HEART RATE: 67 BPM | RESPIRATION RATE: 16 BRPM | DIASTOLIC BLOOD PRESSURE: 70 MMHG | OXYGEN SATURATION: 94 % | TEMPERATURE: 96.5 F | WEIGHT: 176.59 LBS

## 2024-08-13 DIAGNOSIS — C34.90 SMALL CELL LUNG CANCER (HCC): Primary | ICD-10-CM

## 2024-08-13 PROCEDURE — 96413 CHEMO IV INFUSION 1 HR: CPT

## 2024-08-13 RX ORDER — SODIUM CHLORIDE 9 MG/ML
20 INJECTION, SOLUTION INTRAVENOUS ONCE
Status: COMPLETED | OUTPATIENT
Start: 2024-08-13 | End: 2024-08-13

## 2024-08-13 RX ADMIN — SODIUM CHLORIDE 20 ML/HR: 0.9 INJECTION, SOLUTION INTRAVENOUS at 08:52

## 2024-08-13 RX ADMIN — DURVALUMAB 1500 MG: 500 INJECTION, SOLUTION INTRAVENOUS at 08:55

## 2024-08-13 NOTE — PLAN OF CARE
Problem: Knowledge Deficit  Goal: Patient/family/caregiver demonstrates understanding of disease process, treatment plan, medications, and discharge instructions  Description: Complete learning assessment and assess knowledge base.  Interventions:  - Provide teaching at level of understanding  - Provide teaching via preferred learning methods  Outcome: Progressing     
Initial (On Arrival)

## 2024-08-13 NOTE — PROGRESS NOTES
Nino Silva  tolerated treatment well with no complications.      Nino Silva is aware of future appt on 9/9/24 at 0830.     AVS printed and given to Nino Silva:  No (Declined by Nino Silva)

## 2024-08-15 DIAGNOSIS — R06.09 DYSPNEA ON EXERTION: Primary | ICD-10-CM
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
## 2024-08-16 ENCOUNTER — OFFICE VISIT (OUTPATIENT)
Age: 59
End: 2024-08-16
Payer: COMMERCIAL

## 2024-08-16 VITALS
SYSTOLIC BLOOD PRESSURE: 103 MMHG | HEIGHT: 70 IN | BODY MASS INDEX: 25.62 KG/M2 | WEIGHT: 179 LBS | OXYGEN SATURATION: 95 % | DIASTOLIC BLOOD PRESSURE: 65 MMHG | HEART RATE: 71 BPM | RESPIRATION RATE: 16 BRPM | TEMPERATURE: 97.9 F

## 2024-08-16 DIAGNOSIS — C34.90 SMALL CELL LUNG CANCER (HCC): Primary | ICD-10-CM

## 2024-08-16 PROCEDURE — 99215 OFFICE O/P EST HI 40 MIN: CPT | Performed by: INTERNAL MEDICINE

## 2024-08-19 ENCOUNTER — PATIENT OUTREACH (OUTPATIENT)
Dept: HEMATOLOGY ONCOLOGY | Facility: CLINIC | Age: 59
End: 2024-08-19

## 2024-08-19 LAB

## 2024-08-19 NOTE — PROGRESS NOTES
I reached out and spoke with Nino to follow up and to review for any changes in barriers to care and offer supportive services as needed.    Barriers noted previously; none.     Current barriers and interventions provided: none    Nino shared that he is managing well. He still feels well supported by his Buddhism, family and friends. He would like the info for the Cancer Support Community so that he may view and possibly get involved. He is regaining his sense of taste but admits that he is still experiencing early satiety. Overall he feels that his nutritional intact is adequate. Nino states that he has started to feel better since completing chemo but still has cycles of the expected fatigue that does resolve. I reviewed palliative care with him again. He still feels that he he managing and declines to establish at this time. In regards to financial concerns Nino shared that his naz care will be expiring this October. He believes he has the number of the  he worked with but is unsure. I will send her info to him in [employer] message.     Bases on individual needs I will follow up with them in 4-6 weeks.   I have provided my direct contact information and welcome them to contact me if their needs as discussed above change. They were appreciative for the call.

## 2024-08-20 ENCOUNTER — DOCUMENTATION (OUTPATIENT)
Dept: HEMATOLOGY ONCOLOGY | Facility: CLINIC | Age: 59
End: 2024-08-20

## 2024-08-20 NOTE — PROGRESS NOTES
Spoke to PT regarding [facility] care re-application. PT was emailed application. PT has this writer's contact information for questions.      Elvi Romero MPH  Phone: [phone]  Email: [email or web address]

## 2024-08-23 DIAGNOSIS — J44.9 CHRONIC OBSTRUCTIVE PULMONARY DISEASE, UNSPECIFIED COPD TYPE (HCC): Primary | ICD-10-CM

## 2024-08-23 DIAGNOSIS — C79.31 LUNG CANCER METASTATIC TO BRAIN (HCC): ICD-10-CM

## 2024-08-23 DIAGNOSIS — C34.90 LUNG CANCER METASTATIC TO BRAIN (HCC): ICD-10-CM

## 2024-08-23 DIAGNOSIS — C34.90 SMALL CELL LUNG CANCER (HCC): ICD-10-CM

## 2024-08-27 DIAGNOSIS — C34.90 SMALL CELL LUNG CANCER (HCC): ICD-10-CM

## 2024-08-27 RX ORDER — DURVALUMAB 500 MG/10ML
1500 INJECTION, SOLUTION INTRAVENOUS
Qty: 30 ML | Refills: 11 | Status: SHIPPED | OUTPATIENT
Start: 2024-08-27 | End: 2024-08-29 | Stop reason: SDUPTHER

## 2024-08-27 NOTE — TELEPHONE ENCOUNTER
Called [facility] to schedule shipment of landon to [facility] center for his 9/9 treatment. They need a new script before proceeding. Script pended to be printed and faxed to [facility]. Once this is sent, will need to call to set up shipment.

## 2024-08-29 DIAGNOSIS — C34.90 SMALL CELL LUNG CANCER (HCC): ICD-10-CM

## 2024-08-29 RX ORDER — DURVALUMAB 500 MG/10ML
1500 INJECTION, SOLUTION INTRAVENOUS
Qty: 30 ML | Refills: 11 | Status: SHIPPED | OUTPATIENT
Start: 2024-08-29

## 2024-08-29 NOTE — TELEPHONE ENCOUNTER
Spoke to [facility] to confirm they received the faxed script for patient's imfinzi. They have not received it. They requested we send it electronically to [facility]. Script pended. Will need call tomorrow or Monday to set up shipment.

## 2024-09-03 ENCOUNTER — TELEPHONE (OUTPATIENT)
Age: 59
End: 2024-09-03

## 2024-09-03 NOTE — TELEPHONE ENCOUNTER
Called [facility] and scheduled shipment of Infinzi to  infusion center. Treatment scheduled for 9/9. Tracking information not available at this time.

## 2024-09-04 RX ORDER — SODIUM CHLORIDE 9 MG/ML
20 INJECTION, SOLUTION INTRAVENOUS ONCE
Status: CANCELLED | OUTPATIENT
Start: 2024-09-09

## 2024-09-06 ENCOUNTER — APPOINTMENT (OUTPATIENT)
Dept: LAB | Facility: HOSPITAL | Age: 59
End: 2024-09-06
Payer: COMMERCIAL

## 2024-09-06 DIAGNOSIS — C34.90 SMALL CELL LUNG CANCER (HCC): ICD-10-CM

## 2024-09-06 DIAGNOSIS — C34.90 MALIGNANT NEOPLASM OF BRONCHUS AND LUNG (HCC): ICD-10-CM

## 2024-09-06 LAB
ALBUMIN SERPL BCG-MCNC: 4.2 G/DL (ref 3.5–5)
ALP SERPL-CCNC: 49 U/L (ref 34–104)
ALT SERPL W P-5'-P-CCNC: 12 U/L (ref 7–52)
ANION GAP SERPL CALCULATED.3IONS-SCNC: 10 MMOL/L (ref 4–13)
AST SERPL W P-5'-P-CCNC: 18 U/L (ref 5–45)
BASOPHILS # BLD AUTO: 0.03 THOUSANDS/ÂΜL (ref 0–0.1)
BASOPHILS NFR BLD AUTO: 1 % (ref 0–1)
BILIRUB SERPL-MCNC: 0.55 MG/DL (ref 0.2–1)
BUN SERPL-MCNC: 15 MG/DL (ref 5–25)
CALCIUM SERPL-MCNC: 9.2 MG/DL (ref 8.4–10.2)
CHLORIDE SERPL-SCNC: 103 MMOL/L (ref 96–108)
CO2 SERPL-SCNC: 26 MMOL/L (ref 21–32)
CREAT SERPL-MCNC: 0.85 MG/DL (ref 0.6–1.3)
CRP SERPL QL: 1.1 MG/L
EOSINOPHIL # BLD AUTO: 0.17 THOUSAND/ÂΜL (ref 0–0.61)
EOSINOPHIL NFR BLD AUTO: 3 % (ref 0–6)
ERYTHROCYTE [DISTWIDTH] IN BLOOD BY AUTOMATED COUNT: 12.3 % (ref 11.6–15.1)
ERYTHROCYTE [SEDIMENTATION RATE] IN BLOOD: 29 MM/HOUR (ref 0–19)
GLUCOSE P FAST SERPL-MCNC: 96 MG/DL (ref 65–99)
HCT VFR BLD AUTO: 37 % (ref 36.5–46.1)
HGB BLD-MCNC: 12.5 G/DL (ref 12–15.4)
IMM GRANULOCYTES # BLD AUTO: 0.02 THOUSAND/UL (ref 0–0.2)
IMM GRANULOCYTES NFR BLD AUTO: 0 % (ref 0–2)
LDH SERPL-CCNC: 191 U/L (ref 140–271)
LIPASE SERPL-CCNC: 20 U/L (ref 11–82)
LYMPHOCYTES # BLD AUTO: 1.21 THOUSANDS/ÂΜL (ref 0.6–4.47)
LYMPHOCYTES NFR BLD AUTO: 22 % (ref 14–44)
MAGNESIUM SERPL-MCNC: 2.1 MG/DL (ref 1.9–2.7)
MCH RBC QN AUTO: 33.2 PG (ref 26.8–34.3)
MCHC RBC AUTO-ENTMCNC: 33.8 G/DL (ref 31.4–37.4)
MCV RBC AUTO: 98 FL (ref 82–98)
MONOCYTES # BLD AUTO: 0.57 THOUSAND/ÂΜL (ref 0.17–1.22)
MONOCYTES NFR BLD AUTO: 10 % (ref 4–12)
NEUTROPHILS # BLD AUTO: 3.59 THOUSANDS/ÂΜL (ref 1.85–7.62)
NEUTS SEG NFR BLD AUTO: 64 % (ref 43–75)
NRBC BLD AUTO-RTO: 0 /100 WBCS
PLATELET # BLD AUTO: 271 THOUSANDS/UL (ref 149–390)
PMV BLD AUTO: 10.2 FL (ref 8.9–12.7)
POTASSIUM SERPL-SCNC: 3.8 MMOL/L (ref 3.5–5.3)
PROT SERPL-MCNC: 7.5 G/DL (ref 6.4–8.4)
RBC # BLD AUTO: 3.77 MILLION/UL (ref 3.88–5.12)
SODIUM SERPL-SCNC: 139 MMOL/L (ref 135–147)
T3 SERPL-MCNC: 1.2 NG/ML
TSH SERPL DL<=0.05 MIU/L-ACNC: 0.49 UIU/ML (ref 0.45–4.5)
WBC # BLD AUTO: 5.59 THOUSAND/UL (ref 4.31–10.16)

## 2024-09-06 PROCEDURE — 80053 COMPREHEN METABOLIC PANEL: CPT

## 2024-09-06 PROCEDURE — 36415 COLL VENOUS BLD VENIPUNCTURE: CPT

## 2024-09-06 PROCEDURE — 83690 ASSAY OF LIPASE: CPT

## 2024-09-06 PROCEDURE — 83615 LACTATE (LD) (LDH) ENZYME: CPT

## 2024-09-06 PROCEDURE — 85025 COMPLETE CBC W/AUTO DIFF WBC: CPT

## 2024-09-06 PROCEDURE — 84443 ASSAY THYROID STIM HORMONE: CPT

## 2024-09-06 PROCEDURE — 82024 ASSAY OF ACTH: CPT

## 2024-09-06 PROCEDURE — 83735 ASSAY OF MAGNESIUM: CPT

## 2024-09-06 PROCEDURE — 84480 ASSAY TRIIODOTHYRONINE (T3): CPT

## 2024-09-06 PROCEDURE — 86140 C-REACTIVE PROTEIN: CPT

## 2024-09-06 PROCEDURE — 85652 RBC SED RATE AUTOMATED: CPT

## 2024-09-07 LAB — ACTH PLAS-MCNC: 43.5 PG/ML (ref 7.2–63.3)

## 2024-09-09 ENCOUNTER — HOSPITAL ENCOUNTER (OUTPATIENT)
Dept: INFUSION CENTER | Facility: HOSPITAL | Age: 59
Discharge: HOME | End: 2024-09-09
Attending: INTERNAL MEDICINE
Payer: COMMERCIAL

## 2024-09-09 VITALS
HEART RATE: 73 BPM | DIASTOLIC BLOOD PRESSURE: 59 MMHG | WEIGHT: 178.35 LBS | SYSTOLIC BLOOD PRESSURE: 105 MMHG | TEMPERATURE: 97.6 F | BODY MASS INDEX: 25.53 KG/M2 | HEIGHT: 70 IN | OXYGEN SATURATION: 95 %

## 2024-09-09 DIAGNOSIS — C34.90 SMALL CELL LUNG CANCER (HCC): Primary | ICD-10-CM

## 2024-09-09 PROCEDURE — 96413 CHEMO IV INFUSION 1 HR: CPT

## 2024-09-09 RX ORDER — SODIUM CHLORIDE 9 MG/ML
20 INJECTION, SOLUTION INTRAVENOUS ONCE
Status: COMPLETED | OUTPATIENT
Start: 2024-09-09 | End: 2024-09-09

## 2024-09-09 RX ADMIN — DURVALUMAB 1500 MG: 500 INJECTION, SOLUTION INTRAVENOUS at 09:16

## 2024-09-09 RX ADMIN — SODIUM CHLORIDE 20 ML/HR: 9 INJECTION, SOLUTION INTRAVENOUS at 09:15

## 2024-09-09 NOTE — PROGRESS NOTES
Pt tolerated chemotherapy infusion without any adverse reactions.  Iv removed. Pt ambulated out of unit with a steady gait. AVS printed and given to pt.     Aware of next appt 10/07/24 @ 0800 am
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
## 2024-09-18 ENCOUNTER — OFFICE VISIT (OUTPATIENT)
Age: 59
End: 2024-09-18
Payer: COMMERCIAL

## 2024-09-18 VITALS
TEMPERATURE: 98 F | HEIGHT: 70 IN | DIASTOLIC BLOOD PRESSURE: 64 MMHG | SYSTOLIC BLOOD PRESSURE: 118 MMHG | BODY MASS INDEX: 25.34 KG/M2 | HEART RATE: 74 BPM | OXYGEN SATURATION: 92 % | WEIGHT: 177 LBS | RESPIRATION RATE: 18 BRPM

## 2024-09-18 DIAGNOSIS — Z29.89 IMMUNOTHERAPY: Primary | ICD-10-CM

## 2024-09-18 PROCEDURE — 99215 OFFICE O/P EST HI 40 MIN: CPT | Performed by: INTERNAL MEDICINE

## 2024-09-18 RX ORDER — PREDNISONE 5 MG/1
5 TABLET ORAL DAILY
Qty: 20 TABLET | Refills: 1 | Status: SHIPPED | OUTPATIENT
Start: 2024-09-18

## 2024-09-27 ENCOUNTER — PATIENT OUTREACH (OUTPATIENT)
Dept: HEMATOLOGY ONCOLOGY | Facility: CLINIC | Age: 59
End: 2024-09-27

## 2024-09-27 ENCOUNTER — HOSPITAL ENCOUNTER (OUTPATIENT)
Dept: NON INVASIVE DIAGNOSTICS | Age: 59
Discharge: HOME | End: 2024-09-27
Attending: INTERNAL MEDICINE
Payer: COMMERCIAL

## 2024-09-27 ENCOUNTER — TELEPHONE (OUTPATIENT)
Age: 59
End: 2024-09-27

## 2024-09-27 VITALS
DIASTOLIC BLOOD PRESSURE: 64 MMHG | HEIGHT: 70 IN | BODY MASS INDEX: 25.34 KG/M2 | WEIGHT: 177 LBS | SYSTOLIC BLOOD PRESSURE: 118 MMHG | HEART RATE: 64 BPM

## 2024-09-27 DIAGNOSIS — R06.09 DYSPNEA ON EXERTION: ICD-10-CM

## 2024-09-27 PROCEDURE — 93306 TTE W/DOPPLER COMPLETE: CPT | Performed by: INTERNAL MEDICINE

## 2024-09-27 PROCEDURE — 93356 MYOCRD STRAIN IMG SPCKL TRCK: CPT | Performed by: INTERNAL MEDICINE

## 2024-09-27 PROCEDURE — 93356 MYOCRD STRAIN IMG SPCKL TRCK: CPT

## 2024-09-27 PROCEDURE — 93306 TTE W/DOPPLER COMPLETE: CPT

## 2024-09-27 NOTE — PROGRESS NOTES
I reached out and spoke with Nino to follow up and to review for any changes in barriers to care and offer supportive services as needed.    Barriers noted previously; none.     Current barriers and interventions provided: none    Nino shared that he is doing well. He is driving himself to all of his appnts. He is still managing the schedules well. He was actually in the car at the time of my call so we kept the conversation short and simple. He stated that he has not yet re-applied for his naz care. I sent information for his  via [facility] should he need any additional guidance he may follow up with her. I also included information on the Cancer Support Community. He is aware that I remain available should he need any additional support connections. No barriers identified today.     Bases on individual needs I will follow up with them in 6-8 weeks.   I have provided my direct contact information and welcome them to contact me if their needs as discussed above change. They were appreciative for the call.

## 2024-09-28 LAB
AORTIC ROOT: 3.4 CM
APICAL FOUR CHAMBER EJECTION FRACTION: 65 %
ASCENDING AORTA: 3.1 CM
BSA FOR ECHO PROCEDURE: 1.98 M2
E WAVE DECELERATION TIME: 403 MS
E/A RATIO: 0.68
FRACTIONAL SHORTENING: 31 (ref 28–44)
GLOBAL LONGITUIDAL STRAIN: -20 %
INTERVENTRICULAR SEPTUM IN DIASTOLE (PARASTERNAL SHORT AXIS VIEW): 1.2 CM
INTERVENTRICULAR SEPTUM: 1.2 CM (ref 0.6–1.1)
LAAS-AP2: 12.2 CM2
LAAS-AP4: 19.2 CM2
LEFT ATRIUM SIZE: 3.6 CM
LEFT ATRIUM VOLUME (MOD BIPLANE): 42 ML
LEFT ATRIUM VOLUME INDEX (MOD BIPLANE): 21.2 ML/M2
LEFT INTERNAL DIMENSION IN SYSTOLE: 3.4 CM (ref 2.1–4)
LEFT VENTRICLE DIASTOLIC VOLUME (MOD BIPLANE): 147 ML
LEFT VENTRICLE DIASTOLIC VOLUME INDEX (MOD BIPLANE): 74.2 ML/M2
LEFT VENTRICLE SYSTOLIC VOLUME (MOD BIPLANE): 49 ML
LEFT VENTRICLE SYSTOLIC VOLUME INDEX (MOD BIPLANE): 24.7 ML/M2
LEFT VENTRICULAR INTERNAL DIMENSION IN DIASTOLE: 4.9 CM (ref 3.5–6)
LEFT VENTRICULAR POSTERIOR WALL IN END DIASTOLE: 1.4 CM
LEFT VENTRICULAR STROKE VOLUME: 65 ML
LV EF: 67 %
LVSV (TEICH): 65 ML
MV E'TISSUE VEL-LAT: 12 CM/S
MV E'TISSUE VEL-SEP: 9 CM/S
MV PEAK A VEL: 0.71 M/S
MV PEAK E VEL: 48 CM/S
MV STENOSIS PRESSURE HALF TIME: 117 MS
MV VALVE AREA P 1/2 METHOD: 1.88
RIGHT ATRIUM AREA SYSTOLE A4C: 15 CM2
RIGHT VENTRICLE ID DIMENSION: 4.1 CM
SL CV LEFT ATRIUM LENGTH A2C: 4.3 CM
SL CV LV EF: 67
SL CV PED ECHO LEFT VENTRICLE DIASTOLIC VOLUME (MOD BIPLANE) 2D: 114 ML
SL CV PED ECHO LEFT VENTRICLE SYSTOLIC VOLUME (MOD BIPLANE) 2D: 49 ML
TR MAX PG: 20 MMHG
TR PEAK VELOCITY: 2.2 M/S
TRICUSPID ANNULAR PLANE SYSTOLIC EXCURSION: 2.3 CM
TRICUSPID VALVE PEAK REGURGITATION VELOCITY: 2.24 M/S

## 2024-09-30 RX ORDER — SODIUM CHLORIDE 9 MG/ML
20 INJECTION, SOLUTION INTRAVENOUS ONCE
Status: CANCELLED | OUTPATIENT
Start: 2024-10-07

## 2024-10-04 ENCOUNTER — APPOINTMENT (OUTPATIENT)
Dept: LAB | Facility: HOSPITAL | Age: 59
End: 2024-10-04
Payer: COMMERCIAL

## 2024-10-04 DIAGNOSIS — C34.90 SMALL CELL LUNG CANCER (HCC): ICD-10-CM

## 2024-10-04 LAB
ALBUMIN SERPL BCG-MCNC: 4.2 G/DL (ref 3.5–5)
ALP SERPL-CCNC: 52 U/L (ref 34–104)
ALT SERPL W P-5'-P-CCNC: 15 U/L (ref 7–52)
ANION GAP SERPL CALCULATED.3IONS-SCNC: 11 MMOL/L (ref 4–13)
AST SERPL W P-5'-P-CCNC: 18 U/L (ref 5–45)
BASOPHILS # BLD AUTO: 0.05 THOUSANDS/ΜL (ref 0–0.1)
BASOPHILS NFR BLD AUTO: 1 % (ref 0–1)
BILIRUB SERPL-MCNC: 0.52 MG/DL (ref 0.2–1)
BUN SERPL-MCNC: 17 MG/DL (ref 5–25)
CALCIUM SERPL-MCNC: 9.3 MG/DL (ref 8.4–10.2)
CHLORIDE SERPL-SCNC: 103 MMOL/L (ref 96–108)
CO2 SERPL-SCNC: 26 MMOL/L (ref 21–32)
CREAT SERPL-MCNC: 0.81 MG/DL (ref 0.6–1.3)
CRP SERPL QL: <1 MG/L
EOSINOPHIL # BLD AUTO: 0.2 THOUSAND/ΜL (ref 0–0.61)
EOSINOPHIL NFR BLD AUTO: 3 % (ref 0–6)
ERYTHROCYTE [DISTWIDTH] IN BLOOD BY AUTOMATED COUNT: 12.4 % (ref 11.6–15.1)
ERYTHROCYTE [SEDIMENTATION RATE] IN BLOOD: 37 MM/HOUR (ref 0–19)
GLUCOSE SERPL-MCNC: 101 MG/DL (ref 65–140)
HCT VFR BLD AUTO: 40.4 % (ref 36.5–46.1)
HGB BLD-MCNC: 13.5 G/DL (ref 12–15.4)
IMM GRANULOCYTES # BLD AUTO: 0.04 THOUSAND/UL (ref 0–0.2)
IMM GRANULOCYTES NFR BLD AUTO: 1 % (ref 0–2)
LDH SERPL-CCNC: 208 U/L (ref 140–271)
LIPASE SERPL-CCNC: 28 U/L (ref 11–82)
LYMPHOCYTES # BLD AUTO: 1.22 THOUSANDS/ΜL (ref 0.6–4.47)
LYMPHOCYTES NFR BLD AUTO: 18 % (ref 14–44)
MAGNESIUM SERPL-MCNC: 2 MG/DL (ref 1.9–2.7)
MCH RBC QN AUTO: 32.5 PG (ref 26.8–34.3)
MCHC RBC AUTO-ENTMCNC: 33.4 G/DL (ref 31.4–37.4)
MCV RBC AUTO: 97 FL (ref 82–98)
MONOCYTES # BLD AUTO: 0.7 THOUSAND/ΜL (ref 0.17–1.22)
MONOCYTES NFR BLD AUTO: 10 % (ref 4–12)
NEUTROPHILS # BLD AUTO: 4.49 THOUSANDS/ΜL (ref 1.85–7.62)
NEUTS SEG NFR BLD AUTO: 67 % (ref 43–75)
NRBC BLD AUTO-RTO: 0 /100 WBCS
PLATELET # BLD AUTO: 266 THOUSANDS/UL (ref 149–390)
PMV BLD AUTO: 10.2 FL (ref 8.9–12.7)
POTASSIUM SERPL-SCNC: 4.5 MMOL/L (ref 3.5–5.3)
PROT SERPL-MCNC: 7.3 G/DL (ref 6.4–8.4)
RBC # BLD AUTO: 4.16 MILLION/UL (ref 3.88–5.12)
SODIUM SERPL-SCNC: 140 MMOL/L (ref 135–147)
T3FREE SERPL-MCNC: 3.65 PG/ML (ref 2.5–3.9)
TSH SERPL DL<=0.05 MIU/L-ACNC: 1.04 UIU/ML (ref 0.45–4.5)
WBC # BLD AUTO: 6.7 THOUSAND/UL (ref 4.31–10.16)

## 2024-10-04 PROCEDURE — 84481 FREE ASSAY (FT-3): CPT

## 2024-10-04 PROCEDURE — 83690 ASSAY OF LIPASE: CPT

## 2024-10-04 PROCEDURE — 80053 COMPREHEN METABOLIC PANEL: CPT | Performed by: INTERNAL MEDICINE

## 2024-10-04 PROCEDURE — 83615 LACTATE (LD) (LDH) ENZYME: CPT

## 2024-10-04 PROCEDURE — 85025 COMPLETE CBC W/AUTO DIFF WBC: CPT | Performed by: INTERNAL MEDICINE

## 2024-10-04 PROCEDURE — 83735 ASSAY OF MAGNESIUM: CPT

## 2024-10-04 PROCEDURE — 36415 COLL VENOUS BLD VENIPUNCTURE: CPT

## 2024-10-04 PROCEDURE — 85652 RBC SED RATE AUTOMATED: CPT

## 2024-10-04 PROCEDURE — 84443 ASSAY THYROID STIM HORMONE: CPT | Performed by: INTERNAL MEDICINE

## 2024-10-04 PROCEDURE — 86140 C-REACTIVE PROTEIN: CPT

## 2024-10-07 ENCOUNTER — HOSPITAL ENCOUNTER (OUTPATIENT)
Dept: INFUSION CENTER | Facility: HOSPITAL | Age: 59
Discharge: HOME | End: 2024-10-07
Attending: INTERNAL MEDICINE
Payer: COMMERCIAL

## 2024-10-07 VITALS
TEMPERATURE: 97.3 F | OXYGEN SATURATION: 96 % | DIASTOLIC BLOOD PRESSURE: 69 MMHG | SYSTOLIC BLOOD PRESSURE: 114 MMHG | HEART RATE: 67 BPM

## 2024-10-07 DIAGNOSIS — C34.90 SMALL CELL LUNG CANCER (HCC): ICD-10-CM

## 2024-10-07 DIAGNOSIS — C34.00 SMALL CELL CARCINOMA OF HILUM OF LUNG, UNSPECIFIED LATERALITY (HCC): Primary | ICD-10-CM

## 2024-10-07 PROCEDURE — 96413 CHEMO IV INFUSION 1 HR: CPT

## 2024-10-07 RX ORDER — SODIUM CHLORIDE 9 MG/ML
20 INJECTION, SOLUTION INTRAVENOUS ONCE
Status: COMPLETED | OUTPATIENT
Start: 2024-10-07 | End: 2024-10-07

## 2024-10-07 RX ADMIN — DURVALUMAB 1500 MG: 500 INJECTION, SOLUTION INTRAVENOUS at 08:51

## 2024-10-07 RX ADMIN — SODIUM CHLORIDE 20 ML/HR: 0.9 INJECTION, SOLUTION INTRAVENOUS at 08:49

## 2024-10-07 NOTE — PROGRESS NOTES
Nino Silva  tolerated treatment well with no complications.      Nino Silva is aware of future appt on 11/04/2024 at 08:00 AM.     AVS printed and given to Nino Silva:  Yes (Calendar given to Pt. For next 2 appts.)

## 2024-10-09 ENCOUNTER — OFFICE VISIT (OUTPATIENT)
Dept: FAMILY MEDICINE CLINIC | Facility: HOSPITAL | Age: 59
End: 2024-10-09
Payer: COMMERCIAL

## 2024-10-09 VITALS
SYSTOLIC BLOOD PRESSURE: 106 MMHG | HEIGHT: 70 IN | HEART RATE: 62 BPM | RESPIRATION RATE: 16 BRPM | TEMPERATURE: 97.4 F | DIASTOLIC BLOOD PRESSURE: 72 MMHG | WEIGHT: 175 LBS | BODY MASS INDEX: 25.05 KG/M2 | OXYGEN SATURATION: 95 %

## 2024-10-09 DIAGNOSIS — Z00.00 ANNUAL PHYSICAL EXAM: ICD-10-CM

## 2024-10-09 DIAGNOSIS — Z13.6 SCREENING FOR CARDIOVASCULAR CONDITION: ICD-10-CM

## 2024-10-09 DIAGNOSIS — C34.90 SMALL CELL CARCINOMA OF LUNG, UNSPECIFIED LATERALITY, UNSPECIFIED PART OF LUNG (HCC): Primary | ICD-10-CM

## 2024-10-09 DIAGNOSIS — Z12.12 SCREENING FOR COLORECTAL CANCER: ICD-10-CM

## 2024-10-09 DIAGNOSIS — Z12.11 SCREENING FOR COLORECTAL CANCER: ICD-10-CM

## 2024-10-09 PROCEDURE — 99213 OFFICE O/P EST LOW 20 MIN: CPT | Performed by: INTERNAL MEDICINE

## 2024-10-09 PROCEDURE — 99396 PREV VISIT EST AGE 40-64: CPT | Performed by: INTERNAL MEDICINE

## 2024-10-09 RX ORDER — VITAMIN E 268 MG
400 CAPSULE ORAL DAILY
COMMUNITY

## 2024-10-09 NOTE — PROGRESS NOTES
Adult Annual Physical  Name: Nino Silva      : 1965      MRN: [id number]  Encounter Provider: Obed Marinelli MD  Encounter Date: 10/9/2024   Encounter department: [facility]IMARY [facility] [address]    Assessment & Plan  Small cell carcinoma of lung, unspecified laterality, unspecified part of lung (HCC)    Patient has shortness of breath with hypoxia intermittently.  Sometimes his oxygen saturation dropped to 82% on room air.  When he feels out of breath or hypoxic he uses oxygen for 1-2 hours and then he shortness of breath improves and hypoxia resolves.  He has a PET scan coming up.  His echo showed normal LV systolic function, unremarkable valvular function, no pericardial effusion.  PFTs showed no significant obstructive lung disease on   He is not hypoxic today on room air, he has intermittent crackles posteriorly.  CBC showed no leukocytosis or anemia on   Await PET scan results  Continue oxygen prn  Hypoxia       Annual physical exam         Screening for colorectal cancer    Orders:    Cologuard    Screening for cardiovascular condition    Orders:    Lipid panel; Future    Immunizations and preventive care screenings were discussed with patient today. Appropriate education was printed on patient's after visit summary.        Counseling:  Exercise: the importance of regular exercise/physical activity was discussed. Recommend exercise 3-5 times per week for at least 30 minutes.   I ordered Cologuard for colon cancer screening  Patient has no urinary symptoms, we decided against prostate cancer screening  Advised him to get the influenza vaccine, RSV vaccine, COVID booster this fall as well as the Shingrix vaccines  I will check his cholesterol         History of Present Illness     Adult Annual Physical:  Patient presents for annual physical.     Diet and Physical Activity:  - Diet/Nutrition: well balanced diet.  - Exercise: walking. Yoga, some strength training    General  "Health:  - Sleep: sleeps well.  - Hearing:. Intermittent clicking sounds in the right ear since starting prednisone  - Vision: no vision problems.  - Dental: regular dental visits.    Review of Systems      Objective     /72   Pulse 62   Temp (!) 97.4 °F (36.3 °C) (Tympanic Core)   Resp 16   Ht 5' 10\" (1.778 m)   Wt 79.4 kg (175 lb)   SpO2 95%   BMI 25.11 kg/m²     Physical Exam  Constitutional:       General: He is not in acute distress.     Appearance: He is well-developed. He is not toxic-appearing.   HENT:      Head: Normocephalic.      Right Ear: Tympanic membrane normal. There is no impacted cerumen.      Left Ear: Tympanic membrane normal. There is no impacted cerumen.      Mouth/Throat:      Mouth: Mucous membranes are moist.      Pharynx: No oropharyngeal exudate.   Eyes:      Conjunctiva/sclera: Conjunctivae normal.   Cardiovascular:      Rate and Rhythm: Normal rate and regular rhythm.      Heart sounds: No murmur heard.     No gallop.   Pulmonary:      Effort: No respiratory distress.      Breath sounds: Rales (Intermittent crackles posteriorly present) present. No wheezing.   Abdominal:      General: Bowel sounds are normal.      Palpations: Abdomen is soft.      Tenderness: There is no abdominal tenderness.   Musculoskeletal:      Cervical back: Neck supple.   Skin:     General: Skin is warm and dry.   Neurological:      Mental Status: He is alert and oriented to person, place, and time.      Cranial Nerves: No cranial nerve deficit.      Motor: No weakness.      Gait: Gait normal.   Psychiatric:         Mood and Affect: Mood normal.         Thought Content: Thought content normal.         "

## 2024-10-09 NOTE — ASSESSMENT & PLAN NOTE
Patient has shortness of breath with hypoxia intermittently.  Sometimes his oxygen saturation dropped to 82% on room air.  When he feels out of breath or hypoxic he uses oxygen for 1-2 hours and then he shortness of breath improves and hypoxia resolves.  He has a PET scan coming up.  His echo showed normal LV systolic function, unremarkable valvular function, no pericardial effusion.  PFTs showed no significant obstructive lung disease on July 1  He is not hypoxic today on room air, he has intermittent crackles posteriorly.  CBC showed no leukocytosis or anemia on October 4  Await PET scan results  Continue oxygen prn  Hypoxia

## 2024-10-09 NOTE — PATIENT INSTRUCTIONS
"Consider getting the following vaccines this fall: influenza, RSV, covid booster and shingrix.      Patient Education     Routine physical for adults   The Basics   Written by the doctors and editors at [facility]   What is a physical? -- A physical is a routine visit, or \"check-up,\" with your doctor. You might also hear it called a \"wellness visit\" or \"preventive visit.\"  During each visit, the doctor will:   Ask about your physical and mental health   Ask about your habits, behaviors, and lifestyle   Do an exam   Give you vaccines if needed   Talk to you about any medicines you take   Give advice about your health   Answer your questions  Getting regular check-ups is an important part of taking care of your health. It can help your doctor find and treat any problems you have. But it's also important for preventing health problems.  A routine physical is different from a \"sick visit.\" A sick visit is when you see a doctor because of a health concern or problem. Since physicals are scheduled ahead of time, you can think about what you want to ask the doctor.  How often should I get a physical? -- It depends on your age and health. In general, for people age 21 years and older:   If you are younger than 50 years, you might be able to get a physical every 3 years.   If you are 50 years or older, your doctor might recommend a physical every year.  If you have an ongoing health condition, like diabetes or high blood pressure, your doctor will probably want to see you more often.  What happens during a physical? -- In general, each visit will include:   Physical exam - The doctor or nurse will check your height, weight, heart rate, and blood pressure. They will also look at your eyes and ears. They will ask about how you are feeling and whether you have any symptoms that bother you.   Medicines - It's a good idea to bring a list of all the medicines you take to each doctor visit. Your doctor will talk to you about your " "medicines and answer any questions. Tell them if you are having any side effects that bother you. You should also tell them if you are having trouble paying for any of your medicines.   Habits and behaviors - This includes:   Your diet   Your exercise habits   Whether you smoke, drink alcohol, or use drugs   Whether you are sexually active   Whether you feel safe at home  Your doctor will talk to you about things you can do to improve your health and lower your risk of health problems. They will also offer help and support. For example, if you want to quit smoking, they can give you advice and might prescribe medicines. If you want to improve your diet or get more physical activity, they can help you with this, too.   Lab tests, if needed - The tests you get will depend on your age and situation. For example, your doctor might want to check your:   Cholesterol   Blood sugar   Iron level   Vaccines - The recommended vaccines will depend on your age, health, and what vaccines you already had. Vaccines are very important because they can prevent certain serious or deadly infections.   Discussion of screening - \"Screening\" means checking for diseases or other health problems before they cause symptoms. Your doctor can recommend screening based on your age, risk, and preferences. This might include tests to check for:   Cancer, such as breast, prostate, cervical, ovarian, colorectal, prostate, lung, or skin cancer   Sexually transmitted infections, such as chlamydia and gonorrhea   Mental health conditions like depression and anxiety  Your doctor will talk to you about the different types of screening tests. They can help you decide which screenings to have. They can also explain what the results might mean.   Answering questions - The physical is a good time to ask the doctor or nurse questions about your health. If needed, they can refer you to other doctors or specialists, too.  Adults older than 65 years often need " other care, too. As you get older, your doctor will talk to you about:   How to prevent falling at home   Hearing or vision tests   Memory testing   How to take your medicines safely   Making sure that you have the help and support you need at home  All topics are updated as new evidence becomes available and our peer review process is complete.  This topic retrieved from [employer] on: May 02, 2024.  Topic 657855 Version 1.0  Release: 32.4.3 - C32.122  © 2024 [employer] and/or its affiliates. All rights reserved.  Consumer Information Use and Disclaimer   Disclaimer: This generalized information is a limited summary of diagnosis, treatment, and/or medication information. It is not meant to be comprehensive and should be used as a tool to help the user understand and/or assess potential diagnostic and treatment options. It does NOT include all information about conditions, treatments, medications, side effects, or risks that may apply to a specific patient. It is not intended to be medical advice or a substitute for the medical advice, diagnosis, or treatment of a health care provider based on the health care provider's examination and assessment of a patient's specific and unique circumstances. Patients must speak with a health care provider for complete information about their health, medical questions, and treatment options, including any risks or benefits regarding use of medications. This information does not endorse any treatments or medications as safe, effective, or approved for treating a specific patient. Up[employer], Inc[employer] and its affiliates disclaim any warranty or liability relating to this information or the use thereof.The use of this information is governed by the Terms of Use, available at https://www.wolterskluwer.com/en/know/clinical-effectiveness-terms. 2024© [employer]Date, [employer]. and its affiliates and/or licensors. All rights reserved.  Copyright   © 2024 [employer] and/or its affiliates. All rights  reserved.

## 2024-10-11 ENCOUNTER — TELEPHONE (OUTPATIENT)
Age: 59
End: 2024-10-11

## 2024-10-11 NOTE — TELEPHONE ENCOUNTER
Called and left a message that due to a change in the provider's schedule appt on 10/30 at 820 am time has been moved to 320 pm on the same day, 10/30.

## 2024-10-18 ENCOUNTER — HOSPITAL ENCOUNTER (OUTPATIENT)
Dept: NUCLEAR MEDICINE | Facility: HOSPITAL | Age: 59
End: 2024-10-18
Attending: INTERNAL MEDICINE
Payer: COMMERCIAL

## 2024-10-18 DIAGNOSIS — C34.11 SMALL CELL LUNG CANCER, RIGHT UPPER LOBE (HCC): ICD-10-CM

## 2024-10-18 DIAGNOSIS — C34.90 SMALL CELL LUNG CANCER (HCC): ICD-10-CM

## 2024-10-18 LAB — GLUCOSE SERPL-MCNC: 96 MG/DL (ref 65–140)

## 2024-10-18 PROCEDURE — 82948 REAGENT STRIP/BLOOD GLUCOSE: CPT

## 2024-10-18 PROCEDURE — 78815 PET IMAGE W/CT SKULL-THIGH: CPT

## 2024-10-18 PROCEDURE — A9552 F18 FDG: HCPCS

## 2024-10-21 ENCOUNTER — HOSPITAL ENCOUNTER (OUTPATIENT)
Dept: MRI IMAGING | Facility: HOSPITAL | Age: 59
Discharge: HOME | End: 2024-10-21
Attending: RADIOLOGY
Payer: COMMERCIAL

## 2024-10-21 DIAGNOSIS — C34.90 LUNG CANCER METASTATIC TO BRAIN (HCC): ICD-10-CM

## 2024-10-21 DIAGNOSIS — C79.31 LUNG CANCER METASTATIC TO BRAIN (HCC): ICD-10-CM

## 2024-10-21 PROCEDURE — 70553 MRI BRAIN STEM W/O & W/DYE: CPT

## 2024-10-21 PROCEDURE — A9585 GADOBUTROL INJECTION: HCPCS | Performed by: RADIOLOGY

## 2024-10-21 RX ORDER — GADOBUTROL 604.72 MG/ML
8 INJECTION INTRAVENOUS
Status: COMPLETED | OUTPATIENT
Start: 2024-10-21 | End: 2024-10-21

## 2024-10-21 RX ADMIN — GADOBUTROL 8 ML: 604.72 INJECTION INTRAVENOUS at 08:49

## 2024-10-26 NOTE — PROGRESS NOTES
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 the left infrahilar region and AP window  3. Within the right chest, persistent viable tumor as above described. Some regions demonstrate a greater SUV value, while others are unchanged in size. Some regions have shown partial improvement  4. No development of osseous metastasis        MRI brain 6/29/2024     IMPRESSION:     Overall positive treatment response since 3/4/2024.  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  - Resolved enhancing metastatic lesions in right thalamus, right medial occipital lobe, and right middle cerebellar peduncle.  - No acute intracranial abnormality.     Interval History:  8/16/2024        Seen by Rad Onc; restaging MRI of the brain to be performed in 3 months.  He may be a candidate for consolidation radiation to the disease in the chest and will be further evaluated for that.  We will see him for routine follow-up in 4 months.     Doing well in terms of IO therapy-had second cycle of Durvalumab without issues.  Has continued constipation still using sennekot.  No rash, SOB not worse but will be getting home oxygen.  Still feels " some LANZA when exercising.  Overall though looks well, eating, weight maintained 179 pounds      Having issues getting Durvalumab-has to self  due to insurance and will look into this as should be delivered to pharmacy here at [facility]            Interval History:  9/18/2024     Doing well.  Has few issues as above with LANZA/SOB using O2.  Pulse ox can decrease to 72% and then up to 90s with oxygen.  Is 92% today.  Had some pain inner tragus left ear for several days, now resolved; then noted posterior right ear and again resolved.  Continues with IO induced constipation.  Will continue treatment with IO.            Interval History: 10/30/2024    Doing well; was surprised at scan results with progression mostly in right lung     RUL mass SUV 17 (was 19) but increase size 2.9x2.5 and was 1.6x1.3 cm; right perihilar mass SUV 18 (12) also increased to 3.4x3.2 was 2.1x1.8 cm.  Right mediastinal disease also increased as below. Has no symptoms.  Will discuss with Rad Onc whether could do RT at this point as consolidative RT was suggested but not done when seen in July 2024.  Has been getting Durvalumab which is tolerating.  No other disease; brain mets stable    Did discuss Lurbinectedin as wellas tarlatamab but will do lurbinectedin as second line if RT is not feasible.            MRI brain 10/21/2024         BRAIN PARENCHYMA: Previously seen hemorrhagic metastatic lesions are described below on series 10:     - Interval decrease in size of the previously seen treated left parietal lobe cystic and hemorrhagic metastatic lesion currently measuring 1.0 x 0.4 cm on image 209 and previously measured 2.2 x 0.9 cm. Mild marginal enhancement noted.  - Interval decrease in size of the treated cystic lesion within the right frontal periventricular region on image 210 measuring 3 mm.  - Interval resolution of enhancement and decrease in size of the left temporal lobe periventricular lesion with minimal residual cystic change  remaining and measuring 6 mm on image 134.  - Stable right cerebellar peripheral enhancing lesion on image 107.  - Lesion within the right middle cerebellar peduncle has improved since prior examinations with residual cystic change remaining up to 3 mm.     There are no new areas of enhancing intracranial metastatic disease identified.     No evidence for acute infarction. No midline shift. Confluent white matter FLAIR signal hyperintensity is most consistent with postradiation treatment change and new since the prior exam.     VENTRICLES:  Normal for the patient's age.     SELLA AND PITUITARY GLAND:  Normal.     ORBITS:  Normal.     PARANASAL SINUSES: There are bilateral mastoid air cell effusions.     VASCULATURE:  Evaluation of the major intracranial vasculature demonstrates appropriate flow voids.     CALVARIUM AND SKULL BASE:  Normal.     EXTRACRANIAL SOFT TISSUES:  Normal.     IMPRESSION:     No new intracranial metastatic disease identified.     Multiple additional treated metastatic lesions as described above are overall stable to improved since the prior examination.    PET 10/18/2024      HEAD/NECK:  Increased symmetric FDG activity in the region of the posterior cricoarytenoid region, likely related to phonation. There is a physiologic distribution of FDG. No FDG avid cervical adenopathy is seen.  CT images: Unremarkable.     CHEST:  - Anterior right upper lobe mass demonstrates SUV max of 17, similar to prior (previously 19). There has been interval increase in the size of the hypermetabolic component of the mass currently measuring 2.9 x 2.5 cm on PET image 98 (previously 1.6 x 1.3   cm when measured similarly).  - Right perihilar mass demonstrates SUV max of 17.5 (previously 12). This mass also demonstrates interval increase in size of the hypermetabolic component currently measuring 3.4 x 3.2 cm on PET image 101 (previously 2.1 x 1.8 cm when measured   similarly).  - Right superior mediastinal lymph  node demonstrates increased FDG activity with SUV max of 5.1 and measures 0.9 x 0.8 cm (series 4 image 87). This previously measured 0.7 x 0.5 cm on prior PET/CT study with SUV max of 2.2.  - Enlarging FDG-avid right upper paratracheal lymph node with SUV max of 12 and measures 2.2 x 2.2 cm (series 4 image 85). This previously measured 0.7 x 0.6 cm on prior PET/CT study without significant FDG activity.  - Additional FDG-avid enlarging right lower paratracheal lymph node with partial stable calcifications with SUV max of 15 and measures 2.7 x 1.5 cm (series 4 image 91). This previously measured 2.0 x 1.1 cm with SUV max of 14.     CT images: Unchanged cardiomegaly. Coronary artery calcifications. Reticular opacifications with honeycombing, suggestive of UIP pattern of pulmonary fibrosis. Stable 3.5 cm right middle lobe subpleural bulla.     ABDOMEN:  New FDG avid left periaortic lymph node, SUV max of 4.8. This measures 6 mm short axis image 166 series 4.     CT images: No additional significant findings.     PELVIS:  No FDG avid soft tissue lesions are seen. Physiologic activity in the right distal ureter.  CT images: Prostatomegaly.     OSSEOUS STRUCTURES:  No FDG avid lesions are seen.  CT images: No significant findings.     IMPRESSION:     1. Overall findings concerning for disease progression.     2. More prominent FDG avid right upper lobe mass compatible with the primary malignancy.     3. Progression of FDG-avid disease in the mediastinum and the right perihilar region concerning for progression of metastasis.     4. New FDG avid left periaortic lymph node in the retroperitoneum suspicious for metastasis.       REVIEW OF SYSTEMS: as above   Please note that a 14-point review of systems was performed to include Constitutional, HEENT, Respiratory, CVS, GI, , Musculoskeletal, Integumentary, Neurologic, Rheumatologic, Endocrinologic, Psychiatric, Lymphatic, and Hematologic/Oncologic systems were reviewed and  are negative unless otherwise stated in HPI. Positive and negative findings pertinent to this evaluation are incorporated into the history of present illness.      ECOG PS: 1    PROBLEM LIST:  Patient Active Problem List   Diagnosis    Brain mass    COPD (chronic obstructive pulmonary disease) (HCC)    Hx of testicular cancer    Small cell lung cancer (HCC)    Lung cancer metastatic to brain (HCC)       Past Medical History:   has no past medical history on file.    PAST SURGICAL HISTORY:   has a past surgical history that includes Hernia repair; IR biopsy lung (03/05/2024); and Orchiectomy (Left).    CURRENT MEDICATIONS  Current Outpatient Medications   Medication Sig Dispense Refill    ascorbic acid (VITAMIN C) 250 mg tablet Take 500 mg by mouth daily      Cholecalciferol (Vitamin D) 50 MCG (2000 UT) tablet Take 2,000 Units by mouth daily      Durvalumab (Imfinzi) 500 MG/10ML SOLN Inject 30 mL (1,500 mg total) into a catheter in a vein every 21 days To be given at infusion center 30 mL 11    Multiple Vitamin (Multi Vitamin Daily) TABS Take 1 tablet by mouth daily (Patient not taking: Reported on 10/9/2024)      predniSONE 5 mg tablet Take 1 tablet (5 mg total) by mouth daily 20 tablet 1    vitamin E, tocopherol, 400 units capsule Take 400 Units by mouth daily       No current facility-administered medications for this visit.     [unfilled]    SOCIAL HISTORY:   reports that he has never smoked. He has never used smokeless tobacco. He reports that he does not currently use alcohol. He reports that he does not currently use drugs.     FAMILY HISTORY:  family history includes Breast cancer additional onset in his mother; No Known Problems in his father; Stroke in his brother.     ALLERGIES:  has No Known Allergies.      Physical Exam:  Vital Signs:   Visit Vitals  Smoking Status Never     There is no height or weight on file to calculate BMI.  There is no height or weight on file to calculate BSA.    GEN: Alert, awake  oriented x3, in no acute distress  HEENT- No pallor, icterus, cyanosis, no oral mucosal lesions,   LAD - no palpable cervical, clavicle, axillary, inguinal LAD  Heart- normal S1 S2, regular rate and rhythm, No murmur, rubs.   Lungs- clear breathing sound bilateral.   Abdomen- soft, Non tender, bowel sounds present  Extremities- No cyanosis, clubbing, edema  Neuro- No focal neurological deficit    Labs:  Lab Results   Component Value Date    WBC 6.70 10/04/2024    HGB 13.5 10/04/2024    HCT 40.4 10/04/2024    MCV 97 10/04/2024     10/04/2024     Lab Results   Component Value Date    SODIUM 140 10/04/2024    K 4.5 10/04/2024     10/04/2024    CO2 26 10/04/2024    AGAP 11 10/04/2024    BUN 17 10/04/2024    CREATININE 0.81 10/04/2024    GLUC 101 10/04/2024    GLUF 96 09/06/2024    CALCIUM 9.3 10/04/2024    AST 18 10/04/2024    ALT 15 10/04/2024    ALKPHOS 52 10/04/2024    TP 7.3 10/04/2024    TBILI 0.52 10/04/2024    EGFR 97 08/02/2024           I spent 40 minutes on chart review, face to face counseling time, coordination of care and documentation.    Cortney Morrell MD PhD

## 2024-10-29 ENCOUNTER — TELEPHONE (OUTPATIENT)
Age: 59
End: 2024-10-29

## 2024-10-30 ENCOUNTER — OFFICE VISIT (OUTPATIENT)
Age: 59
End: 2024-10-30
Payer: COMMERCIAL

## 2024-10-30 ENCOUNTER — TELEPHONE (OUTPATIENT)
Dept: PULMONOLOGY | Facility: CLINIC | Age: 59
End: 2024-10-30

## 2024-10-30 VITALS
BODY MASS INDEX: 25.77 KG/M2 | SYSTOLIC BLOOD PRESSURE: 111 MMHG | RESPIRATION RATE: 16 BRPM | WEIGHT: 180 LBS | HEART RATE: 70 BPM | HEIGHT: 70 IN | DIASTOLIC BLOOD PRESSURE: 76 MMHG | OXYGEN SATURATION: 95 % | TEMPERATURE: 98 F

## 2024-10-30 DIAGNOSIS — C34.01 SMALL CELL CARCINOMA OF HILUM OF RIGHT LUNG (HCC): Primary | ICD-10-CM

## 2024-10-30 PROCEDURE — 99215 OFFICE O/P EST HI 40 MIN: CPT | Performed by: INTERNAL MEDICINE

## 2024-11-01 ENCOUNTER — APPOINTMENT (OUTPATIENT)
Dept: LAB | Facility: HOSPITAL | Age: 59
End: 2024-11-01
Payer: COMMERCIAL

## 2024-11-01 ENCOUNTER — TELEPHONE (OUTPATIENT)
Facility: HOSPITAL | Age: 59
End: 2024-11-01

## 2024-11-01 DIAGNOSIS — C34.00 SMALL CELL CARCINOMA OF HILUM OF LUNG, UNSPECIFIED LATERALITY (HCC): ICD-10-CM

## 2024-11-01 DIAGNOSIS — C34.01 SMALL CELL CARCINOMA OF HILUM OF RIGHT LUNG (HCC): ICD-10-CM

## 2024-11-01 LAB
ALBUMIN SERPL BCG-MCNC: 4.3 G/DL (ref 3.5–5)
ALP SERPL-CCNC: 51 U/L (ref 34–104)
ALT SERPL W P-5'-P-CCNC: 17 U/L (ref 7–52)
ANION GAP SERPL CALCULATED.3IONS-SCNC: 8 MMOL/L (ref 4–13)
AST SERPL W P-5'-P-CCNC: 19 U/L (ref 5–45)
BASOPHILS # BLD AUTO: 0.04 THOUSANDS/ΜL (ref 0–0.1)
BASOPHILS NFR BLD AUTO: 1 % (ref 0–1)
BILIRUB SERPL-MCNC: 0.49 MG/DL (ref 0.2–1)
BUN SERPL-MCNC: 15 MG/DL (ref 5–25)
CALCIUM SERPL-MCNC: 9.2 MG/DL (ref 8.4–10.2)
CHLORIDE SERPL-SCNC: 103 MMOL/L (ref 96–108)
CO2 SERPL-SCNC: 28 MMOL/L (ref 21–32)
CREAT SERPL-MCNC: 0.84 MG/DL (ref 0.6–1.3)
CRP SERPL QL: 1.5 MG/L
EOSINOPHIL # BLD AUTO: 0.18 THOUSAND/ΜL (ref 0–0.61)
EOSINOPHIL NFR BLD AUTO: 3 % (ref 0–6)
ERYTHROCYTE [DISTWIDTH] IN BLOOD BY AUTOMATED COUNT: 12.7 % (ref 11.6–15.1)
ERYTHROCYTE [SEDIMENTATION RATE] IN BLOOD: 52 MM/HOUR (ref 0–19)
GLUCOSE SERPL-MCNC: 89 MG/DL (ref 65–140)
HCT VFR BLD AUTO: 38.1 % (ref 36.5–46.1)
HGB BLD-MCNC: 12.6 G/DL (ref 12–15.4)
IMM GRANULOCYTES # BLD AUTO: 0.04 THOUSAND/UL (ref 0–0.2)
IMM GRANULOCYTES NFR BLD AUTO: 1 % (ref 0–2)
LDH SERPL-CCNC: 222 U/L (ref 140–271)
LIPASE SERPL-CCNC: 29 U/L (ref 11–82)
LYMPHOCYTES # BLD AUTO: 1.13 THOUSANDS/ΜL (ref 0.6–4.47)
LYMPHOCYTES NFR BLD AUTO: 17 % (ref 14–44)
MAGNESIUM SERPL-MCNC: 2 MG/DL (ref 1.9–2.7)
MCH RBC QN AUTO: 31.5 PG (ref 26.8–34.3)
MCHC RBC AUTO-ENTMCNC: 33.1 G/DL (ref 31.4–37.4)
MCV RBC AUTO: 95 FL (ref 82–98)
MONOCYTES # BLD AUTO: 0.64 THOUSAND/ΜL (ref 0.17–1.22)
MONOCYTES NFR BLD AUTO: 10 % (ref 4–12)
NEUTROPHILS # BLD AUTO: 4.67 THOUSANDS/ΜL (ref 1.85–7.62)
NEUTS SEG NFR BLD AUTO: 68 % (ref 43–75)
NRBC BLD AUTO-RTO: 0 /100 WBCS
PLATELET # BLD AUTO: 274 THOUSANDS/UL (ref 149–390)
PMV BLD AUTO: 10.1 FL (ref 8.9–12.7)
POTASSIUM SERPL-SCNC: 4.3 MMOL/L (ref 3.5–5.3)
PROT SERPL-MCNC: 7.7 G/DL (ref 6.4–8.4)
RBC # BLD AUTO: 4 MILLION/UL (ref 3.88–5.12)
SODIUM SERPL-SCNC: 139 MMOL/L (ref 135–147)
TSH SERPL DL<=0.05 MIU/L-ACNC: 0.85 UIU/ML (ref 0.45–4.5)
WBC # BLD AUTO: 6.7 THOUSAND/UL (ref 4.31–10.16)

## 2024-11-01 PROCEDURE — 83735 ASSAY OF MAGNESIUM: CPT

## 2024-11-01 PROCEDURE — 80053 COMPREHEN METABOLIC PANEL: CPT

## 2024-11-01 PROCEDURE — 83615 LACTATE (LD) (LDH) ENZYME: CPT

## 2024-11-01 PROCEDURE — 86140 C-REACTIVE PROTEIN: CPT

## 2024-11-01 PROCEDURE — 83690 ASSAY OF LIPASE: CPT

## 2024-11-01 PROCEDURE — 36415 COLL VENOUS BLD VENIPUNCTURE: CPT

## 2024-11-01 PROCEDURE — 84443 ASSAY THYROID STIM HORMONE: CPT

## 2024-11-01 PROCEDURE — 85652 RBC SED RATE AUTOMATED: CPT

## 2024-11-01 PROCEDURE — 85025 COMPLETE CBC W/AUTO DIFF WBC: CPT

## 2024-11-01 NOTE — TELEPHONE ENCOUNTER
Offered patient follow up appointment with Dr. López. Follow up scheduled for Tuesday November 5th at 3pm. He was appreciative of call.

## 2024-11-04 ENCOUNTER — HOSPITAL ENCOUNTER (OUTPATIENT)
Dept: INFUSION CENTER | Facility: HOSPITAL | Age: 59
Discharge: HOME | End: 2024-11-04
Attending: INTERNAL MEDICINE

## 2024-11-05 ENCOUNTER — OFFICE VISIT (OUTPATIENT)
Facility: HOSPITAL | Age: 59
End: 2024-11-05
Attending: RADIOLOGY
Payer: COMMERCIAL

## 2024-11-05 ENCOUNTER — CONSULT (OUTPATIENT)
Age: 59
End: 2024-11-05
Payer: COMMERCIAL

## 2024-11-05 VITALS
DIASTOLIC BLOOD PRESSURE: 68 MMHG | RESPIRATION RATE: 18 BRPM | SYSTOLIC BLOOD PRESSURE: 109 MMHG | OXYGEN SATURATION: 93 % | TEMPERATURE: 96.6 F | HEART RATE: 82 BPM

## 2024-11-05 VITALS
BODY MASS INDEX: 25.8 KG/M2 | OXYGEN SATURATION: 96 % | HEIGHT: 70 IN | SYSTOLIC BLOOD PRESSURE: 126 MMHG | HEART RATE: 64 BPM | WEIGHT: 180.2 LBS | TEMPERATURE: 96.4 F | DIASTOLIC BLOOD PRESSURE: 80 MMHG

## 2024-11-05 DIAGNOSIS — C34.90 LUNG CANCER METASTATIC TO BRAIN (HCC): ICD-10-CM

## 2024-11-05 DIAGNOSIS — C79.31 LUNG CANCER METASTATIC TO BRAIN (HCC): ICD-10-CM

## 2024-11-05 DIAGNOSIS — C34.90 SMALL CELL LUNG CANCER (HCC): ICD-10-CM

## 2024-11-05 DIAGNOSIS — J44.9 CHRONIC OBSTRUCTIVE PULMONARY DISEASE, UNSPECIFIED COPD TYPE (HCC): ICD-10-CM

## 2024-11-05 DIAGNOSIS — C34.11 SMALL CELL CARCINOMA OF UPPER LOBE OF RIGHT LUNG (HCC): Primary | ICD-10-CM

## 2024-11-05 PROCEDURE — 99213 OFFICE O/P EST LOW 20 MIN: CPT | Performed by: RADIOLOGY

## 2024-11-05 PROCEDURE — 99211 OFF/OP EST MAY X REQ PHY/QHP: CPT | Performed by: RADIOLOGY

## 2024-11-05 PROCEDURE — 99244 OFF/OP CNSLTJ NEW/EST MOD 40: CPT | Performed by: INTERNAL MEDICINE

## 2024-11-05 RX ORDER — UMECLIDINIUM BROMIDE AND VILANTEROL TRIFENATATE 62.5; 25 UG/1; UG/1
1 POWDER RESPIRATORY (INHALATION) DAILY
Qty: 60 BLISTER | Refills: 2 | Status: SHIPPED | OUTPATIENT
Start: 2024-11-05 | End: 2025-02-03

## 2024-11-05 NOTE — PROGRESS NOTES
"Pulmonary Consultation   Nino Silva 59 y.o. adult MRN: [id number]  11/5/2024    Referring Physician or Provider:  Obed Marinelli MD  [facility]  [address] [place] [address]       Chief Complaint:  CT chest abnormality, cough, shortness of breath, small cell lung cancer    HPI:    59-year-old male with past medical history of prior tobacco abuse, prior marijuana abuse, non-small cell lung cancer presents for evaluation of CT chest abnormality.  Patient states that he feels short of breath with ambulation.  He also has productive cough in the morning occasionally with bloody colored sputum.  No fevers or chills.  He recently completed chemotherapy for small cell lung cancer.  Of note, he states that PET scan showed progression of disease and will have reevaluation for small cell lung cancer treatment.  He no longer smokes tobacco or marijuana.  He states that he previously smoked tobacco for \"a few years\" but not since his 20s.  Previously worked as a greer.      Past Medical Hx  Small cell lung cancer  COPD, Gold stage 0      Past Surgical Hx  Past Surgical History:   Procedure Laterality Date    HERNIA REPAIR      IR BIOPSY LUNG  03/05/2024    ORCHIECTOMY Left            Family Hx  Family History   Problem Relation Age of Onset    Breast cancer additional onset Mother     No Known Problems Father     Stroke Brother            Occupational History:   Previously worked as a greer.      Social History:   Social History     Socioeconomic History    Marital status: /Civil Union     Spouse name: Not on file    Number of children: Not on file    Years of education: Not on file    Highest education level: Not on file   Occupational History    Not on file   Tobacco Use    Smoking status: Never    Smokeless tobacco: Never   Vaping Use    Vaping status: Never Used   Substance and Sexual Activity    Alcohol use: Not Currently    Drug use: Not Currently    Sexual activity: Not on file   Other Topics " "Concern    Not on file   Social History Narrative    Not on file     Social Determinants of Health     Financial Resource Strain: Not on file   Food Insecurity: No Food Insecurity (3/5/2024)    Nursing - Inadequate Food Risk Classification     Worried About Running Out of Food in the Last Year: Never true     Ran Out of Food in the Last Year: Never true     Ran Out of Food in the Last Year: Not on file   Transportation Needs: No Transportation Needs (3/5/2024)    PRAPARE - Transportation     Lack of Transportation (Medical): No     Lack of Transportation (Non-Medical): No   Physical Activity: Not on file   Stress: Not on file   Social Connections: Not on file   Intimate Partner Violence: Not on file   Housing Stability: Low Risk  (3/5/2024)    Housing Stability Vital Sign     Unable to Pay for Housing in the Last Year: No     Number of Times Moved in the Last Year: 1     Homeless in the Last Year: No        Pertinent Meds  No inhaled medications      ROS:  Constitutional: - Fatigue, - chills, - fever, - weight change.   HEENT: - rhinorrhea, - sneezing, - sore throat.    Respiratory: + cough, + sputum production, - shortness of breath, - wheezing.    Cardiovascular: - chest pain,  -palpitations, - leg swelling.   Gastrointestinal: - abdominal pain, - constipation, - diarrhea, - nausea, - vomiting.   Endocrine: - cold intolerance, - heat intolerance.   Genitourinary: - dysuria.   Musculoskeletal: - arthralgias.   Skin:- rash, - wound.   Allergic/Immunologic: - allergies  Neurological: - dizziness, - numbness      Vitals: Blood pressure 126/80, pulse 64, temperature (!) 96.4 °F (35.8 °C), temperature source Tympanic, height 5' 10\" (1.778 m), weight 81.7 kg (180 lb 3.2 oz), SpO2 96%., Body mass index is 25.86 kg/m².    Physical Exam  GEN  NAD  HEENT  ncat, non icteric, MM moist  NECK  supple, no JVD, no LAD  CV  +s1s2, no mrg, RRR  PULM bilateral basilar predominant rales, no wheeze, no rhonchi  ABD  soft, ntnd, + " "BS  EXT trace lower extremity pitting edema, no cyanosis, no clubbing  NEURO  Aox3, no focal weakness    Imaging and other studies     I have personally viewed and interpreted the following studies:  PET/CT 10/18/2024 shows enlargement of right upper lobe mass and right paratracheal lymph node.  Previous PET scan.  There is basilar and peripheral predominant reticulation and honeycombing.      Pulmonary function testing:   PFT 8/12/2024 shows no obstruction, no restriction, + moderate diffusing impairment      Assessment:  COPD-Gold stage 0  Small cell lung cancer  Fibrotic lung disease-likely IPF    Plan:  Trial Anoro daily  Continue use 2 L of supplemental oxygen via nasal cannula with exertion  Small cell lung cancer treatment per oncology.  Although patient has significant CT changes consistent with IPF with typical UIP pattern, I do not recommend any treatment for this at this time.  Patient will call pulmonary office back with any worsening or development of new pulmonary symptoms prior to next visit.    Return visit in 2 months  On next visit we will evaluate symptoms, success with Anoro, oncology follow-ups    Note: Portions of the record may have been created with voice recognition software. Occasional wrong word or \"sound a like\" substitutions may have occurred due to the inherent limitations of voice recognition software. Read the chart carefully and recognize, using context, where substitutions have occurred.     Harmeet Webster M.D.  [facility]    "

## 2024-11-05 NOTE — PROGRESS NOTES
Nino Silva 1965 is a 59 y.o. 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 He completed whole brain radiation 3/19/24. He completed systemic chemotherapy on 6/19/24. Remains on immunotherapy with Durvalumab every 4 weeks. He was last seen in Radiation Oncology on 7/31/24.He returns today for follow up.     8/16/24 Dr. Morrell  Repeat imaging in Oct with PET and MRI brain and then assess for role regarding RT  Follow in one month  Durvalumab only maintenance  C1 7/10/2024  C2 8/10/2024  C3 9/10/2024      9/18/24 Dr. Morrell  Durvalumab only maintenance  C1 7/10/2024  C2 8/10/2024  C3 9/10/2024  C4  10/7/2024  Continues with chronic constipation worse since on IO-using senna miralax  Has had issues with LANZA-concern with IO induced pneumotitis-using home O2 intermittently. Consider starting Prednisone 5mg to see if improvement  Follow up late October 2024      10/18/24 PET Scan  IMPRESSION:  1. Overall findings concerning for disease progression.  2. More prominent FDG avid right upper lobe mass compatible with the primary malignancy.  3. Progression of FDG-avid disease in the mediastinum and the right perihilar region concerning for progression of metastasis.  4. New FDG avid left periaortic lymph node in the retroperitoneum suspicious for metastasis.  The study was marked in EPIC for significant notification.      10/21/24 MRI Brain  IMPRESSION:  No new intracranial metastatic disease identified.  Multiple additional treated metastatic lesions as described above are overall stable to improved since the prior examination.  Confluent white matter FLAIR signal hyperintensity is consistent with post radiation treatment change.       10/30/24 Dr. Morrell  MRI brain  No new intracranial metastatic disease identified.  Multiple additional treated metastatic lesions as described above are overall stable to improved since the prior examination.  Confluent  white matter FLAIR signal hyperintensity is consistent with post radiation treatment change.    PET 10/18/24  Disease appears to be within right lung-consolidative RT was previously considered as this is SCLC. Would re consider at this point before giving second line therapy  He does not want any more chemo and would consider RT  Would agree to Lurbinectedin every 3 weeks. Rad onc to re evaluate  Follow up in 2 weeks    24 Dr. Webster- Pulmonology  Plan:  Trial Anoro daily  Continue use 2 L of supplemental oxygen via nasal cannula with exertion  Small cell lung cancer treatment per oncology.  Although patient has significant CT changes consistent with IPF with typical UIP pattern, I do not recommend any treatment for this at this time.  Patient will call pulmonary office back with any worsening or development of new pulmonary symptoms prior to next visit.     Return visit in 2 months  On next visit we will evaluate symptoms, success with Anoro, oncology follow-ups      Upcomin24 Dr. Morrell    Follow up visit     Oncology History   Small cell lung cancer (HCC)   3/5/2024 Initial Diagnosis    Small cell lung cancer (HCC)     3/5/2024 Biopsy    Final Diagnosis  A. Lung, Right Upper Lobe, biopsy:  - Poorly differentiated carcinoma with neuroendocrine features, favor large cell neuroendocrine carcinoma.  - See note.     3/5/2024 -  Cancer Staged    Staging form: Lung, AJCC 8th Edition  - Clinical stage from 3/5/2024: Stage IV (cT2, cN3, cM1) - Signed by Libzeth López MD on 2024  Histopathologic type: Small cell carcinoma, NOS  Stage prefix: Initial diagnosis  Histologic grade (G): G3  Histologic grading system: 4 grade system       3/6/2024 - 3/19/2024 Radiation      Plan ID Energy Fractions Dose per Fraction (cGy) Dose Correction (cGy) Total Dose Delivered (cGy) Elapsed Days   Whole Brain 6X 10 / 10 300 0 3,000 13        2024 -  Chemotherapy    alteplase (CATHFLO), 2 mg, Intracatheter, Every  1 Minute as needed, 8 of 10 cycles  palonosetron (ALOXI), 0.25 mg, Intravenous, Once, 3 of 3 cycles  Administration: 0.25 mg (4/29/2024), 0.25 mg (5/20/2024), 0.25 mg (6/17/2024)  fosaprepitant (EMEND) IVPB, 150 mg, Intravenous, Once, 4 of 4 cycles  Administration: 150 mg (4/8/2024), 150 mg (4/29/2024), 150 mg (5/20/2024), 150 mg (6/17/2024)  etoposide (TOPOSAR), 80 mg/m2 = 164 mg, Intravenous, Once, 4 of 4 cycles  Dose modification: 100 mg/m2 (original dose 80 mg/m2, Cycle 1, Reason: Anticipated Tolerance), 80 mg/m2 (original dose 80 mg/m2, Cycle 1, Reason: Anticipated Tolerance), 100 mg/m2 (original dose 80 mg/m2, Cycle 2, Reason: Anticipated Tolerance)  Administration: 164 mg (4/8/2024), 164 mg (4/9/2024), 164 mg (4/10/2024), 205 mg (4/29/2024), 205 mg (4/30/2024), 205 mg (5/1/2024), 200 mg (5/20/2024), 200 mg (5/21/2024), 200 mg (5/22/2024), 200 mg (6/17/2024), 200 mg (6/18/2024), 200 mg (6/19/2024)  CARBOplatin (PARAPLATIN) IVPB (GO AUC DOSING), 750 mg (574.7 % of original dose 130.5 mg), Intravenous, Once, 4 of 4 cycles  Dose modification: 130.5 mg (original dose 130.5 mg, Cycle 1, Reason: Anticipated Tolerance),   (original dose 130.5 mg, Cycle 1, Reason: Other (Must fill in a comment))  Administration: 750 mg (4/8/2024), 701.5 mg (4/29/2024), 664 mg (5/20/2024), 633 mg (6/17/2024)  durvalumab (IMFINZI) IVPB, 1,500 mg, Intravenous, Once, 8 of 10 cycles  Administration: 1,500 mg (4/8/2024), 1,500 mg (4/29/2024), 1,500 mg (5/20/2024), 1,500 mg (6/17/2024), 1,500 mg (7/10/2024), 1,500 mg (8/13/2024), 1,500 mg (9/9/2024), 1,500 mg (10/7/2024)         Review of Systems:  Review of Systems   Constitutional:  Positive for fatigue (reports somewhat better).   HENT:  Positive for ear pain (left side by jaw. Tender to touch). Negative for trouble swallowing.         Reports throat feels dry at night but improving   Eyes: Negative.    Respiratory:  Positive for cough (mostly dry, intermittently productive in  am-thick/yellowish. Reports was blood tinged last week) and shortness of breath (with exertion. Reports improving).         Wears 2L oxygen via NC as needed. Reports does check his pulse ox at home.    Cardiovascular: Negative.    Gastrointestinal:  Positive for constipation. Negative for nausea and vomiting.   Endocrine: Positive for cold intolerance (feet). Negative for heat intolerance.   Genitourinary: Negative.    Musculoskeletal:  Positive for arthralgias (knees and hips).   Skin: Negative.    Allergic/Immunologic: Negative.    Neurological:  Positive for weakness (hands, unchanged), numbness (neuropathy feet) and headaches (every now and again reports mild headache). Negative for dizziness and seizures.   Hematological: Negative.    Psychiatric/Behavioral:  Positive for confusion (mild- reports short term memory issues).         Reports some memory issues. States it waxes and wanes         Health Maintenance   Topic Date Due    Hepatitis C Screening  Never done    COVID-19 Vaccine (1) Never done    Pneumococcal Vaccine: Pediatrics (0 to 5 Years) and At-Risk Patients (6 to 64 Years) (1 of 2 - PCV) Never done    HIV Screening  Never done    BMI: Followup Plan  Never done    Zoster Vaccine (1 of 2) Never done    DTaP,Tdap,and Td Vaccines (1 - Tdap) Never done    Colorectal Cancer Screening  Never done    RSV Vaccine Age 60+ Years (1 - 1-dose 60+ series) Never done    Influenza Vaccine (1) 06/30/2025 (Originally 9/1/2024)    Depression Screening  07/23/2025    Annual Physical  10/09/2025    BMI: Adult  11/05/2025    RSV Vaccine age 0-20 Months  Aged Out    HIB Vaccine  Aged Out    IPV Vaccine  Aged Out    Hepatitis A Vaccine  Aged Out    Meningococcal ACWY Vaccine  Aged Out    HPV Vaccine  Aged Out     Patient Active Problem List   Diagnosis    Brain mass    COPD (chronic obstructive pulmonary disease) (HCC)    Hx of testicular cancer    Small cell lung cancer (HCC)    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 No pertinent past medical history.  Past Surgical History:   Procedure Laterality Date    HERNIA REPAIR      IR BIOPSY LUNG  03/05/2024    ORCHIECTOMY Left      Family History   Problem Relation Age of Onset    Breast cancer additional onset Mother     No Known Problems Father     Stroke Brother      Social History     Socioeconomic History    Marital status: /Civil Union     Spouse name: Not on file    Number of children: Not on file    Years of education: Not on file    Highest education level: Not on file   Occupational History    Not on file   Tobacco Use    Smoking status: Never    Smokeless tobacco: Never   Vaping Use    Vaping status: Never Used   Substance and Sexual Activity    Alcohol use: Not Currently    Drug use: Not Currently    Sexual activity: Not on file   Other Topics Concern    Not on file   Social History Narrative    Not on file     Social Determinants of Health     Financial Resource Strain: Not on file   Food Insecurity: No Food Insecurity (3/5/2024)    Nursing - Inadequate Food Risk Classification     Worried About Running Out of Food in the Last Year: Never true     Ran Out of Food in the Last Year: Never true     Ran Out of Food in the Last Year: Not on file   Transportation Needs: No Transportation Needs (3/5/2024)    PRAPARE - Transportation     Lack of Transportation (Medical): No     Lack of Transportation (Non-Medical): No   Physical Activity: Not on file   Stress: Not on file   Social Connections: Not on file   Intimate Partner Violence: Not on file   Housing Stability: Low Risk  (3/5/2024)    Housing Stability Vital Sign     Unable to Pay for Housing in the Last Year: No     Number of Times Moved in the Last Year: 1     Homeless in the Last Year: No       Current Outpatient Medications:     ascorbic acid (VITAMIN C) 250 mg tablet, Take 500 mg by mouth daily, Disp: , Rfl:     Cholecalciferol (Vitamin D) 50 MCG (2000 UT) tablet, Take 2,000 Units by mouth  daily, Disp: , Rfl:     vitamin E, tocopherol, 400 units capsule, Take 400 Units by mouth daily, Disp: , Rfl:     Durvalumab (Imfinzi) 500 MG/10ML SOLN, Inject 30 mL (1,500 mg total) into a catheter in a vein every 21 days To be given at infusion center (Patient not taking: Reported on 11/5/2024), Disp: 30 mL, Rfl: 11    Multiple Vitamin (Multi Vitamin Daily) TABS, Take 1 tablet by mouth daily (Patient not taking: Reported on 10/9/2024), Disp: , Rfl:     predniSONE 5 mg tablet, Take 1 tablet (5 mg total) by mouth daily (Patient not taking: Reported on 11/5/2024), Disp: 20 tablet, Rfl: 1    umeclidinium-vilanterol (Anoro Ellipta) 62.5-25 mcg/actuation inhaler, Inhale 1 puff daily (Patient not taking: Reported on 11/5/2024), Disp: 60 blister, Rfl: 2  No Known Allergies  Vitals:    11/05/24 1457   BP: 109/68   BP Location: Left arm   Pulse: 82   Resp: 18   Temp: (!) 96.6 °F (35.9 °C)   SpO2: 93%      Pain Score: 0-No pain

## 2024-11-06 NOTE — PROGRESS NOTES
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 Silva is a 59-year-old gentleman who presented with extensive stage large cell lung cancer including brain metastases.  He received whole brain radiation completed 9 months ago which she tolerated very well.   He has been improving from baseline neurologic symptoms at presentation with some mild residual word finding and memory issues.  He has increased symptomatology related to that intrathoracic disease including cough, dyspnea on exertion requiring supplemental oxygen, occasional small-volume hemoptysis and occasional chest discomfort.  Restaging PET/CT while on Durvalumab alone shows progression of disease in the chest and increased uptake in a single periaortic node.  MRI of the brain shows stable to improved intracranial disease.    Dr. Morrell discussed with the patient second line systemic therapy potentially including  Lurbinectedin and tarlatamab.  I will discuss with her whether palliative thoracic radiation would be indicated at this time or at the time of further progression of symptoms related to the intrathoracic disease.  Patient is seeing Dr. Morrell for follow-up next week.      Interval history:    Nino Silva is a 59 y.o. year old male who presented with symptoms of what he believed to be bronchitis since December 2023 when he noted some productive cough and dyspnea on exertion.  He did feel that the symptoms improved but relapsed in February 2024.   CT of the chest, abdomen and pelvis with IV contrast on March 3, 2024 showed a prominent right upper lobe perihilar pulmonary mass measuring 5.8 cm concerning for primary lung carcinoma with mass effect on the adjacent pulmonary arteries and bronchi, partial obstruction of the right middle lobe pulmonary artery.  There was an additional right upper lobe nodular mass measuring  3.8 cm, a prevascular node measuring 2 cm and additional mediastinal and hilar nodes identified.  Periaortic retroperitoneal adenopathy measuring up to 3 cm on the right side.  MRI of the brain with contrast showed multiple centrally necrotic rim-enhancing lesions in the supra and infratentorial brain with associated vasogenic edema.   Biopsy of the right upper lobe lung mass on March 5, 2024 showed poorly differentiated carcinoma with neuroendocrine features, favor large cell neuroendocrine.  Patient received urgent whole brain irradiation to a dose of 3000 cGy in 10 fractions between March 6 and March 19, 2024.  He was seen by Dr. Morrell in medical oncology who prescribed 4 cycles of chemotherapy with etoposide, carboplatin and durvalumab from April 8 and completed on June 19.  A follow-up PET/CT on July 2, 2024 showed significant improvement in previously described adenopathy in the chest and abdomen, slight improvement in the left infrahilar disease, persistent viable tumor in the right upper lobe.  He continued on Durvalumab alone until October 7, 2024.     He is seen for routine 9 month posttreatment follow-up visit today.  Most recent PET/CT on October 18, 2024 showed probable progression of disease within interval increase of the right upper lobe mass, right perihilar mass, right superior mediastinal lymph node and paratracheal lymph node all with SUVs ranging 14-17, new hypermetabolic left periaortic node with SUV of 4.8.  MRI of the brain on October 21, 2024 showed interval decrease of the previously seen metastatic lesions, overall stable to improved with no new metastatic intracranial disease.    He is describing a productive cough mostly in the mornings with occasional small-volume hemoptysis, mostly clearing up later in the day.  He has developed dyspnea on exertion requiring supplemental oxygen when he is active.  He reports that his O2 sats can drop into the 70s and 80s with activity.  He denies  shortness of breath at rest.  Supplemental oxygen significantly alleviates his dyspnea.  He has occasional chest discomfort.  He describes no new neurologic symptoms.      Oncology History   Small cell lung cancer (HCC)   3/5/2024 Initial Diagnosis    Small cell lung cancer (HCC)     3/5/2024 Biopsy    Final Diagnosis  A. Lung, Right Upper Lobe, biopsy:  - Poorly differentiated carcinoma with neuroendocrine features, favor large cell neuroendocrine carcinoma.  - See note.     3/5/2024 -  Cancer Staged    Staging form: Lung, AJCC 8th Edition  - Clinical stage from 3/5/2024: Stage IV (cT2, cN3, cM1) - Signed by Lizbeth López MD on 4/18/2024  Histopathologic type: Small cell carcinoma, NOS  Stage prefix: Initial diagnosis  Histologic grade (G): G3  Histologic grading system: 4 grade system       3/6/2024 - 3/19/2024 Radiation      Plan ID Energy Fractions Dose per Fraction (cGy) Dose Correction (cGy) Total Dose Delivered (cGy) Elapsed Days   Whole Brain 6X 10 / 10 300 0 3,000 13        4/8/2024 -  Chemotherapy    alteplase (CATHFLO), 2 mg, Intracatheter, Every 1 Minute as needed, 8 of 10 cycles  palonosetron (ALOXI), 0.25 mg, Intravenous, Once, 3 of 3 cycles  Administration: 0.25 mg (4/29/2024), 0.25 mg (5/20/2024), 0.25 mg (6/17/2024)  fosaprepitant (EMEND) IVPB, 150 mg, Intravenous, Once, 4 of 4 cycles  Administration: 150 mg (4/8/2024), 150 mg (4/29/2024), 150 mg (5/20/2024), 150 mg (6/17/2024)  etoposide (TOPOSAR), 80 mg/m2 = 164 mg, Intravenous, Once, 4 of 4 cycles  Dose modification: 100 mg/m2 (original dose 80 mg/m2, Cycle 1, Reason: Anticipated Tolerance), 80 mg/m2 (original dose 80 mg/m2, Cycle 1, Reason: Anticipated Tolerance), 100 mg/m2 (original dose 80 mg/m2, Cycle 2, Reason: Anticipated Tolerance)  Administration: 164 mg (4/8/2024), 164 mg (4/9/2024), 164 mg (4/10/2024), 205 mg (4/29/2024), 205 mg (4/30/2024), 205 mg (5/1/2024), 200 mg (5/20/2024), 200 mg (5/21/2024), 200 mg (5/22/2024), 200 mg  (6/17/2024), 200 mg (6/18/2024), 200 mg (6/19/2024)  CARBOplatin (PARAPLATIN) IVPB (GOG AUC DOSING), 750 mg (574.7 % of original dose 130.5 mg), Intravenous, Once, 4 of 4 cycles  Dose modification: 130.5 mg (original dose 130.5 mg, Cycle 1, Reason: Anticipated Tolerance),   (original dose 130.5 mg, Cycle 1, Reason: Other (Must fill in a comment))  Administration: 750 mg (4/8/2024), 701.5 mg (4/29/2024), 664 mg (5/20/2024), 633 mg (6/17/2024)  durvalumab (IMFINZI) IVPB, 1,500 mg, Intravenous, Once, 8 of 10 cycles  Administration: 1,500 mg (4/8/2024), 1,500 mg (4/29/2024), 1,500 mg (5/20/2024), 1,500 mg (6/17/2024), 1,500 mg (7/10/2024), 1,500 mg (8/13/2024), 1,500 mg (9/9/2024), 1,500 mg (10/7/2024)         Patient Active Problem List   Diagnosis    Brain mass    COPD (chronic obstructive pulmonary disease) (HCC)    Hx of testicular cancer    Small cell lung cancer (HCC)    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 No pertinent past medical history.  Social History     Socioeconomic History    Marital status: /Civil Union     Spouse name: Not on file    Number of children: Not on file    Years of education: Not on file    Highest education level: Not on file   Occupational History    Not on file   Tobacco Use    Smoking status: Never    Smokeless tobacco: Never   Vaping Use    Vaping status: Never Used   Substance and Sexual Activity    Alcohol use: Not Currently    Drug use: Not Currently    Sexual activity: Not on file   Other Topics Concern    Not on file   Social History Narrative    Not on file     Social Determinants of Health     Financial Resource  Strain: Not on file   Food Insecurity: No Food Insecurity (3/5/2024)    Nursing - Inadequate Food Risk Classification     Worried About Running Out of Food in the Last Year: Never true     Ran Out of Food in the Last Year: Never true     Ran Out of Food in the Last Year: Not on file   Transportation Needs: No Transportation Needs (3/5/2024)    PRAPARE - Transportation     Lack of Transportation (Medical): No     Lack of Transportation (Non-Medical): No   Physical Activity: Not on file   Stress: Not on file   Social Connections: Not on file   Intimate Partner Violence: Not on file   Housing Stability: Low Risk  (3/5/2024)    Housing Stability Vital Sign     Unable to Pay for Housing in the Last Year: No     Number of Times Moved in the Last Year: 1     Homeless in the Last Year: No     Family History   Problem Relation Age of Onset    Breast cancer additional onset Mother     No Known Problems Father     Stroke Brother      Past Surgical History:   Procedure Laterality Date    HERNIA REPAIR      IR BIOPSY LUNG  03/05/2024    ORCHIECTOMY Left        Current Outpatient Medications:     ascorbic acid (VITAMIN C) 250 mg tablet, Take 500 mg by mouth daily, Disp: , Rfl:     Cholecalciferol (Vitamin D) 50 MCG (2000 UT) tablet, Take 2,000 Units by mouth daily, Disp: , Rfl:     vitamin E, tocopherol, 400 units capsule, Take 400 Units by mouth daily, Disp: , Rfl:     Durvalumab (Imfinzi) 500 MG/10ML SOLN, Inject 30 mL (1,500 mg total) into a catheter in a vein every 21 days To be given at infusion center (Patient not taking: Reported on 11/5/2024), Disp: 30 mL, Rfl: 11    Multiple Vitamin (Multi Vitamin Daily) TABS, Take 1 tablet by mouth daily (Patient not taking: Reported on 10/9/2024), Disp: , Rfl:     predniSONE 5 mg tablet, Take 1 tablet (5 mg total) by mouth daily (Patient not taking: Reported on 11/5/2024), Disp: 20 tablet, Rfl: 1    umeclidinium-vilanterol (Anoro Ellipta) 62.5-25 mcg/actuation inhaler, Inhale 1 puff  daily (Patient not taking: Reported on 11/5/2024), Disp: 60 blister, Rfl: 2  No Known Allergies    Review of Systems:  Review of Systems   Constitutional:  Positive for fatigue (reports somewhat better).   HENT:  Positive for ear pain (left side by jaw. Tender to touch). Negative for trouble swallowing.         Reports throat feels dry at night but improving   Eyes: Negative.    Respiratory:  Positive for cough (mostly dry, intermittently productive in am-thick/yellowish. Reports was blood tinged last week) and shortness of breath (with exertion. Reports improving).         Wears 2L oxygen via NC as needed. Reports does check his pulse ox at home.    Cardiovascular: Negative.    Gastrointestinal:  Positive for constipation. Negative for nausea and vomiting.   Endocrine: Positive for cold intolerance (feet). Negative for heat intolerance.   Genitourinary: Negative.    Musculoskeletal:  Positive for arthralgias (knees and hips).   Skin: Negative.    Allergic/Immunologic: Negative.    Neurological:  Positive for weakness (hands, unchanged), numbness (neuropathy feet) and headaches (every now and again reports mild headache). Negative for dizziness and seizures.   Hematological: Negative.    Psychiatric/Behavioral:  Positive for confusion (mild- reports short term memory issues).         Reports some memory issues. States it waxes and wanes     Physical Exam:  ECOG 1  Physical Exam  Vitals and nursing note reviewed.   Constitutional:       General: He is not in acute distress.     Appearance: Normal appearance.   HENT:      Nose: No congestion.   Eyes:      General: No scleral icterus.     Extraocular Movements: Extraocular movements intact.      Pupils: Pupils are equal, round, and reactive to light.   Pulmonary:      Effort: Pulmonary effort is normal. No respiratory distress.   Musculoskeletal:         General: No swelling. Normal range of motion.      Cervical back: Normal range of motion.   Lymphadenopathy:       Cervical: No cervical adenopathy.   Skin:     General: Skin is warm and dry.   Neurological:      General: No focal deficit present.      Mental Status: He is alert and oriented to person, place, and time.   Psychiatric:         Mood and Affect: Mood normal.         Thought Content: Thought content normal.         Judgment: Judgment normal.         Imaging:    MRI brain w wo contrast    Result Date: 10/23/2024  Narrative: MRI BRAIN WITH AND WITHOUT CONTRAST INDICATION: C34.90: Malignant neoplasm of unspecified part of unspecified bronchus or lung C79.31: Secondary malignant neoplasm of brain.   Status post whole brain radiation. COMPARISON: 6/29/2024 and 3/4/2024 TECHNIQUE: Multiplanar, multisequence imaging of the brain was performed before and after gadolinium administration. IV Contrast:  8 mL of Gadobutrol injection (SINGLE-DOSE) IMAGE QUALITY:   Diagnostic. FINDINGS: BRAIN PARENCHYMA: Previously seen hemorrhagic metastatic lesions are described below on series 10: - Interval decrease in size of the previously seen treated left parietal lobe cystic and hemorrhagic metastatic lesion currently measuring 1.0 x 0.4 cm on image 209 and previously measured 2.2 x 0.9 cm. Mild marginal enhancement noted. - Interval decrease in size of the treated cystic lesion within the right frontal periventricular region on image 210 measuring 3 mm. - Interval resolution of enhancement and decrease in size of the left temporal lobe periventricular lesion with minimal residual cystic change remaining and measuring 6 mm on image 134. - Stable right cerebellar peripheral enhancing lesion on image 107. - Lesion within the right middle cerebellar peduncle has improved since prior examinations with residual cystic change remaining up to 3 mm. There are no new areas of enhancing intracranial metastatic disease identified. No evidence for acute infarction. No midline shift. Confluent white matter FLAIR signal hyperintensity is most  consistent with postradiation treatment change and new since the prior exam. VENTRICLES:  Normal for the patient's age. SELLA AND PITUITARY GLAND:  Normal. ORBITS:  Normal. PARANASAL SINUSES: There are bilateral mastoid air cell effusions. VASCULATURE:  Evaluation of the major intracranial vasculature demonstrates appropriate flow voids. CALVARIUM AND SKULL BASE:  Normal. EXTRACRANIAL SOFT TISSUES:  Normal.     Impression: No new intracranial metastatic disease identified. Multiple additional treated metastatic lesions as described above are overall stable to improved since the prior examination. Confluent white matter FLAIR signal hyperintensity is consistent with post radiation treatment change. Workstation performed: [id number]     NM PET CT skull base to mid thigh    Result Date: 10/18/2024  Narrative: PET/CT SCAN INDICATION: C34.11: Malignant neoplasm of upper lobe, right bronchus or lung. History of small cell R lung cancer w/ brain mets. restaging post 8 of 10 cycles of Imfinzi. s/p radiation to whole brain 3/19/24. prior PET showed improved adenopathy in R neck, A/P and improved disease in L infrahilar region. h/o testicular ca (90s)- s/p resection and chemo. MODIFIER: PS COMPARISON: PET/CT 7/2/2024, 3/15/2024 CELL TYPE: Poorly diff. ca. w/ neuroendocrine features, favor large cell neuroendocrine ca. (bx: 3/5/24 lung, R UL +) TECHNIQUE:   8.1 mCi F-18-FDG administered IV. Multiplanar attenuation corrected and non attenuation corrected PET images are available for interpretation, and contiguous, low dose, axial CT sections were obtained from the skull vertex through the femurs. Intravenous contrast material was not utilized. This examination, like all CT scans performed in the [facility], was performed utilizing techniques to minimize radiation dose exposure, including the use of iterative reconstruction and automated exposure control. Fasting serum glucose: 96 mg/dl FINDINGS: VISUALIZED  BRAIN: Limited assessment for known intracranial lesions due to normal FDG uptake in the brain parenchyma. HEAD/NECK: Increased symmetric FDG activity in the region of the posterior cricoarytenoid region, likely related to phonation. There is a physiologic distribution of FDG. No FDG avid cervical adenopathy is seen. CT images: Unremarkable. CHEST: - Anterior right upper lobe mass demonstrates SUV max of 17, similar to prior (previously 19). There has been interval increase in the size of the hypermetabolic component of the mass currently measuring 2.9 x 2.5 cm on PET image 98 (previously 1.6 x 1.3  cm when measured similarly). - Right perihilar mass demonstrates SUV max of 17.5 (previously 12). This mass also demonstrates interval increase in size of the hypermetabolic component currently measuring 3.4 x 3.2 cm on PET image 101 (previously 2.1 x 1.8 cm when measured similarly). - Right superior mediastinal lymph node demonstrates increased FDG activity with SUV max of 5.1 and measures 0.9 x 0.8 cm (series 4 image 87). This previously measured 0.7 x 0.5 cm on prior PET/CT study with SUV max of 2.2. - Enlarging FDG-avid right upper paratracheal lymph node with SUV max of 12 and measures 2.2 x 2.2 cm (series 4 image 85). This previously measured 0.7 x 0.6 cm on prior PET/CT study without significant FDG activity. - Additional FDG-avid enlarging right lower paratracheal lymph node with partial stable calcifications with SUV max of 15 and measures 2.7 x 1.5 cm (series 4 image 91). This previously measured 2.0 x 1.1 cm with SUV max of 14. CT images: Unchanged cardiomegaly. Coronary artery calcifications. Reticular opacifications with honeycombing, suggestive of UIP pattern of pulmonary fibrosis. Stable 3.5 cm right middle lobe subpleural bulla. ABDOMEN: New FDG avid left periaortic lymph node, SUV max of 4.8. This measures 6 mm short axis image 166 series 4. CT images: No additional significant findings. PELVIS: No FDG  avid soft tissue lesions are seen. Physiologic activity in the right distal ureter. CT images: Prostatomegaly. OSSEOUS STRUCTURES: No FDG avid lesions are seen. CT images: No significant findings.     Impression: 1. Overall findings concerning for disease progression. 2. More prominent FDG avid right upper lobe mass compatible with the primary malignancy. 3. Progression of FDG-avid disease in the mediastinum and the right perihilar region concerning for progression of metastasis. 4. New FDG avid left periaortic lymph node in the retroperitoneum suspicious for metastasis. The study was marked in EPIC for significant notification. Resident: CHRISTELLE Larson I, the attending radiologist, have reviewed the images and agree with the final report above. Workstation performed: [id number]       LABS:    CBC  Diff   Lab Results   Component Value Date/Time    WBC 6.70 11/01/2024 10:31 AM    HGB 12.6 11/01/2024 10:31 AM    HCT 38.1 11/01/2024 10:31 AM    RBC 4.00 11/01/2024 10:31 AM    MCV 95 11/01/2024 10:31 AM    MCHC 33.1 11/01/2024 10:31 AM    MCH 31.5 11/01/2024 10:31 AM    RDW 12.7 11/01/2024 10:31 AM    MPV 10.1 11/01/2024 10:31 AM    Lab Results   Component Value Date/Time    LYMPHSABS 1.13 11/01/2024 10:31 AM    EOSABS 0.18 11/01/2024 10:31 AM    MONOSABS 0.64 11/01/2024 10:31 AM    BASOSABS 0.04 11/01/2024 10:31 AM        Basic Metabolic Profile    Lab Results   Component Value Date/Time    SODIUM 139 11/01/2024 10:31 AM    CO2 28 11/01/2024 10:31 AM    CO2 25 03/04/2024 10:14 AM    Lab Results   Component Value Date/Time    BUN 15 11/01/2024 10:31 AM    CREATININE 0.84 11/01/2024 10:31 AM    GLUCOSE 116 03/04/2024 10:14 AM        I spent 30 minutes reviewing the medical record including multiple imaging studies and their reports, interval laboratories, provider encounter notes, discussing follow-up care with the patient and coordinating care, performing limited physical examination.

## 2024-11-07 LAB — COLOGUARD RESULT REPORTABLE: NEGATIVE

## 2024-11-10 NOTE — PROGRESS NOTES
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 time ago.             Interval History:  4/10/2024     Mr Silva completed WBRT and is at 1 mg decadron in his taper.     He started C1 Carbo/Etoposide/Durvalumab 8th April and had Day 3 Etoposide today     He has had no issues with N/V; he is constipated but has not needed zofran except in the premed cocktail and we discussed laxatives today.     Has noted at night increased \"wheezing\" left neck; has right sided cervical nodes on PET but all less than 1 cm level 2B/3 and none on left.  He states he can hear this through the day as well but has no SOB and no overt stridor.  It started post RT     He denies any CP, LANZA, change in bowel/bladder, blood stool/urine.  He has less confusion than while getting WBRT.     Interval History 5/3/2024     Mr Silva is doing well, tolerating chemotherapy.  Last labs with WBC 3.1 ANC 1230 Hgb 12.9 plts 285 4/26/2024.  Concern with borderline neutropenia.  Has no IO related issues.  Did have more nausea requiring Zofran and thus had issues with constipation as well which was discussed.  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  He has had increased issues with fatigue, rash in left mid back/flank which appears to be dry skin and did have a headache " "prior to C3 for days which resolved after treatment.  He does not some increased SOB/LANZA which is not debilitating, suggestive of pneumonitis.  His weight is slightly decreased at 182 pounds.  He did have constipation from zofran which he was taking for nausea.      Labs were done today and not available at the visit; Na 140 K 4.0 Cr 0.80 ALT/AST 14/17 TB 0.62 WBC 2.27 Hgb 10.2 MCV 99 plts 224      TSH 0.421 with normal free T4 so will not intervene as appears to be subclinical hyperthyroidism     ESR 37         Interval History:  7/10/2024     Doing well post chemotherapy but remains very fatigued with chemotherapy \"fog\".  Weight is improved at 177 pounds.  Doing more at home.  Staging as below:        PET 7/2/2024        IMPRESSION:     1. Significant improvement of previously seen adenopathy in the right neck, abdomen and pelvis, as above further delineated  2. There is also significantly improved disease in the left infrahilar region and AP window  3. Within the right chest, persistent viable tumor as above described. Some regions demonstrate a greater SUV value, while others are unchanged in size. Some regions have shown partial improvement  4. No development of osseous metastasis        MRI brain 6/29/2024     IMPRESSION:     Overall positive treatment response since 3/4/2024.  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  - Resolved enhancing metastatic lesions in right thalamus, right medial occipital lobe, and right middle cerebellar peduncle.  - No acute intracranial abnormality.     Interval History:  8/16/2024        Seen by Rad Onc; restaging MRI of the brain to be performed in 3 months.  He may be a candidate for consolidation radiation to the disease in the chest and " will be further evaluated for that.  We will see him for routine follow-up in 4 months.     Doing well in terms of IO therapy-had second cycle of Durvalumab without issues.  Has continued constipation still using sennekot.  No rash, SOB not worse but will be getting home oxygen.  Still feels some LANZA when exercising.  Overall though looks well, eating, weight maintained 179 pounds      Having issues getting Durvalumab-has to self  due to insurance and will look into this as should be delivered to pharmacy here at [facility]            Interval History:  9/18/2024     Doing well.  Has few issues as above with LANZA/SOB using O2.  Pulse ox can decrease to 72% and then up to 90s with oxygen.  Is 92% today.  Had some pain inner tragus left ear for several days, now resolved; then noted posterior right ear and again resolved.  Continues with IO induced constipation.  Will continue treatment with IO.             Interval History: 10/30/2024     Doing well; was surprised at scan results with progression mostly in right lung      RUL mass SUV 17 (was 19) but increase size 2.9x2.5 and was 1.6x1.3 cm; right perihilar mass SUV 18 (12) also increased to 3.4x3.2 was 2.1x1.8 cm.  Right mediastinal disease also increased as below. Has no symptoms.  Will discuss with Rad Onc whether could do RT at this point as consolidative RT was suggested but not done when seen in July 2024.  Has been getting Durvalumab which is tolerating.  No other disease; brain mets stable     Did discuss Lurbinectedin as wellas tarlatamab but will do lurbinectedin as second line if RT is not feasible.             MRI brain 10/21/2024          BRAIN PARENCHYMA: Previously seen hemorrhagic metastatic lesions are described below on series 10:     - Interval decrease in size of the previously seen treated left parietal lobe cystic and hemorrhagic metastatic lesion currently measuring 1.0 x 0.4 cm on image 209 and previously measured 2.2 x 0.9 cm. Mild marginal  enhancement noted.  - Interval decrease in size of the treated cystic lesion within the right frontal periventricular region on image 210 measuring 3 mm.  - Interval resolution of enhancement and decrease in size of the left temporal lobe periventricular lesion with minimal residual cystic change remaining and measuring 6 mm on image 134.  - Stable right cerebellar peripheral enhancing lesion on image 107.  - Lesion within the right middle cerebellar peduncle has improved since prior examinations with residual cystic change remaining up to 3 mm.     There are no new areas of enhancing intracranial metastatic disease identified.     No evidence for acute infarction. No midline shift. Confluent white matter FLAIR signal hyperintensity is most consistent with postradiation treatment change and new since the prior exam.     VENTRICLES:  Normal for the patient's age.     SELLA AND PITUITARY GLAND:  Normal.     ORBITS:  Normal.     PARANASAL SINUSES: There are bilateral mastoid air cell effusions.     VASCULATURE:  Evaluation of the major intracranial vasculature demonstrates appropriate flow voids.     CALVARIUM AND SKULL BASE:  Normal.     EXTRACRANIAL SOFT TISSUES:  Normal.     IMPRESSION:     No new intracranial metastatic disease identified.     Multiple additional treated metastatic lesions as described above are overall stable to improved since the prior examination.     PET 10/18/2024       HEAD/NECK:  Increased symmetric FDG activity in the region of the posterior cricoarytenoid region, likely related to phonation. There is a physiologic distribution of FDG. No FDG avid cervical adenopathy is seen.  CT images: Unremarkable.     CHEST:  - Anterior right upper lobe mass demonstrates SUV max of 17, similar to prior (previously 19). There has been interval increase in the size of the hypermetabolic component of the mass currently measuring 2.9 x 2.5 cm on PET image 98 (previously 1.6 x 1.3   cm when measured  similarly).  - Right perihilar mass demonstrates SUV max of 17.5 (previously 12). This mass also demonstrates interval increase in size of the hypermetabolic component currently measuring 3.4 x 3.2 cm on PET image 101 (previously 2.1 x 1.8 cm when measured   similarly).  - Right superior mediastinal lymph node demonstrates increased FDG activity with SUV max of 5.1 and measures 0.9 x 0.8 cm (series 4 image 87). This previously measured 0.7 x 0.5 cm on prior PET/CT study with SUV max of 2.2.  - Enlarging FDG-avid right upper paratracheal lymph node with SUV max of 12 and measures 2.2 x 2.2 cm (series 4 image 85). This previously measured 0.7 x 0.6 cm on prior PET/CT study without significant FDG activity.  - Additional FDG-avid enlarging right lower paratracheal lymph node with partial stable calcifications with SUV max of 15 and measures 2.7 x 1.5 cm (series 4 image 91). This previously measured 2.0 x 1.1 cm with SUV max of 14.     CT images: Unchanged cardiomegaly. Coronary artery calcifications. Reticular opacifications with honeycombing, suggestive of UIP pattern of pulmonary fibrosis. Stable 3.5 cm right middle lobe subpleural bulla.     ABDOMEN:  New FDG avid left periaortic lymph node, SUV max of 4.8. This measures 6 mm short axis image 166 series 4.     CT images: No additional significant findings.     PELVIS:  No FDG avid soft tissue lesions are seen. Physiologic activity in the right distal ureter.  CT images: Prostatomegaly.     OSSEOUS STRUCTURES:  No FDG avid lesions are seen.  CT images: No significant findings.     IMPRESSION:     1. Overall findings concerning for disease progression.     2. More prominent FDG avid right upper lobe mass compatible with the primary malignancy.     3. Progression of FDG-avid disease in the mediastinum and the right perihilar region concerning for progression of metastasis.     4. New FDG avid left periaortic lymph node in the retroperitoneum suspicious for  metastasis.             Interval History:  11/13/2024       Sent to Rad Onc to consider RT which is not an option.  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  Will hold on IR evaluation and start treatment at this point.  If there is no response/progression at that time will re-biopsy.  Informed consent signed. Will start next week.  Doing well otherwise, no issues. Occasional LANZA.      REVIEW OF SYSTEMS: as above   Please note that a 14-point review of systems was performed to include Constitutional, HEENT, Respiratory, CVS, GI, , Musculoskeletal, Integumentary, Neurologic, Rheumatologic, Endocrinologic, Psychiatric, Lymphatic, and Hematologic/Oncologic systems were reviewed and are negative unless otherwise stated in HPI. Positive and negative findings pertinent to this evaluation are incorporated into the history of present illness.      ECOG PS: 0-1    PROBLEM LIST:  Patient Active Problem List   Diagnosis    Brain mass    COPD (chronic obstructive pulmonary disease) (HCC)    Hx of testicular cancer    Small cell lung cancer (HCC)    Lung cancer metastatic to brain (HCC)       Past Medical History:   has no past medical history on file.    PAST SURGICAL HISTORY:   has a past surgical history that includes Hernia repair; IR biopsy lung (03/05/2024); and Orchiectomy (Left).    CURRENT MEDICATIONS  Current Outpatient Medications   Medication Sig Dispense Refill    ascorbic acid (VITAMIN C) 250 mg tablet Take 500 mg by mouth daily      Cholecalciferol (Vitamin D) 50 MCG (2000 UT) tablet Take 2,000 Units by mouth daily      Durvalumab (Imfinzi) 500 MG/10ML SOLN Inject 30 mL (1,500 mg total) into a catheter in a vein every 21 days To be given at infusion center (Patient not taking: Reported on 11/5/2024) 30 mL 11    Multiple Vitamin (Multi Vitamin Daily) TABS Take 1 tablet by  mouth daily (Patient not taking: Reported on 10/9/2024)      predniSONE 5 mg tablet Take 1 tablet (5 mg total) by mouth daily (Patient not taking: Reported on 11/5/2024) 20 tablet 1    umeclidinium-vilanterol (Anoro Ellipta) 62.5-25 mcg/actuation inhaler Inhale 1 puff daily (Patient not taking: Reported on 11/5/2024) 60 blister 2    vitamin E, tocopherol, 400 units capsule Take 400 Units by mouth daily       No current facility-administered medications for this visit.     [unfilled]    SOCIAL HISTORY:   reports that he has never smoked. He has never used smokeless tobacco. He reports that he does not currently use alcohol. He reports that he does not currently use drugs.     FAMILY HISTORY:  family history includes Breast cancer additional onset in his mother; No Known Problems in his father; Stroke in his brother.     ALLERGIES:  has No Known Allergies.      Physical Exam:  Vital Signs:   Visit Vitals  Smoking Status Never     There is no height or weight on file to calculate BMI.  There is no height or weight on file to calculate BSA.    GEN: Alert, awake oriented x3, in no acute distress  HEENT- No pallor, icterus, cyanosis, no oral mucosal lesions,   LAD - no palpable cervical, clavicle, axillary, inguinal LAD  Heart- normal S1 S2, regular rate and rhythm, No murmur, rubs.   Lungs- clear breathing sound bilateral.   Abdomen- soft, Non tender, bowel sounds present  Extremities- No cyanosis, clubbing, edema  Neuro- No focal neurological deficit    Labs:  Lab Results   Component Value Date    WBC 6.70 11/01/2024    HGB 12.6 11/01/2024    HCT 38.1 11/01/2024    MCV 95 11/01/2024     11/01/2024     Lab Results   Component Value Date    SODIUM 139 11/01/2024    K 4.3 11/01/2024     11/01/2024    CO2 28 11/01/2024    AGAP 8 11/01/2024    BUN 15 11/01/2024    CREATININE 0.84 11/01/2024    GLUC 89 11/01/2024    GLUF 96 09/06/2024    CALCIUM 9.2 11/01/2024    AST 19 11/01/2024    ALT 17 11/01/2024    ALKPHOS 51  11/01/2024    TP 7.7 11/01/2024    TBILI 0.49 11/01/2024    EGFR 97 08/02/2024           I spent 40 minutes on chart review, face to face counseling time, coordination of care and documentation.    Cortney Morrell MD PhD

## 2024-11-13 ENCOUNTER — TELEPHONE (OUTPATIENT)
Age: 59
End: 2024-11-13

## 2024-11-13 ENCOUNTER — OFFICE VISIT (OUTPATIENT)
Age: 59
End: 2024-11-13
Payer: COMMERCIAL

## 2024-11-13 VITALS
WEIGHT: 180 LBS | BODY MASS INDEX: 25.77 KG/M2 | TEMPERATURE: 97 F | OXYGEN SATURATION: 95 % | HEART RATE: 64 BPM | DIASTOLIC BLOOD PRESSURE: 66 MMHG | RESPIRATION RATE: 18 BRPM | HEIGHT: 70 IN | SYSTOLIC BLOOD PRESSURE: 108 MMHG

## 2024-11-13 DIAGNOSIS — C34.00 SMALL CELL CARCINOMA OF HILUM OF LUNG, UNSPECIFIED LATERALITY (HCC): Primary | ICD-10-CM

## 2024-11-13 PROCEDURE — 99215 OFFICE O/P EST HI 40 MIN: CPT | Performed by: INTERNAL MEDICINE

## 2024-11-13 RX ORDER — SODIUM CHLORIDE 9 MG/ML
20 INJECTION, SOLUTION INTRAVENOUS ONCE
Status: CANCELLED | OUTPATIENT
Start: 2024-11-19

## 2024-11-14 ENCOUNTER — TELEPHONE (OUTPATIENT)
Age: 59
End: 2024-11-14

## 2024-11-14 NOTE — TELEPHONE ENCOUNTER
Patient's spouse calling in to speak with Jane regarding patient's FMLA. Please call Leslie at [phone].    Thank you.

## 2024-11-18 ENCOUNTER — TELEPHONE (OUTPATIENT)
Facility: HOSPITAL | Age: 59
End: 2024-11-18

## 2024-11-18 ENCOUNTER — TELEPHONE (OUTPATIENT)
Dept: HEMATOLOGY ONCOLOGY | Facility: CLINIC | Age: 59
End: 2024-11-18

## 2024-11-18 ENCOUNTER — PATIENT OUTREACH (OUTPATIENT)
Dept: HEMATOLOGY ONCOLOGY | Facility: CLINIC | Age: 59
End: 2024-11-18

## 2024-11-18 ENCOUNTER — APPOINTMENT (OUTPATIENT)
Dept: LAB | Facility: HOSPITAL | Age: 59
End: 2024-11-18
Payer: COMMERCIAL

## 2024-11-18 DIAGNOSIS — C34.00 SMALL CELL CARCINOMA OF HILUM OF LUNG, UNSPECIFIED LATERALITY (HCC): ICD-10-CM

## 2024-11-18 LAB
ALBUMIN SERPL BCG-MCNC: 4.2 G/DL (ref 3.5–5)
ALP SERPL-CCNC: 54 U/L (ref 34–104)
ALT SERPL W P-5'-P-CCNC: 15 U/L (ref 7–52)
ANION GAP SERPL CALCULATED.3IONS-SCNC: 7 MMOL/L (ref 4–13)
AST SERPL W P-5'-P-CCNC: 18 U/L (ref 5–45)
BASOPHILS # BLD AUTO: 0.04 THOUSANDS/ÂΜL (ref 0–0.1)
BASOPHILS NFR BLD AUTO: 1 % (ref 0–1)
BILIRUB SERPL-MCNC: 0.61 MG/DL (ref 0.2–1)
BUN SERPL-MCNC: 18 MG/DL (ref 5–25)
CALCIUM SERPL-MCNC: 8.7 MG/DL (ref 8.4–10.2)
CHLORIDE SERPL-SCNC: 104 MMOL/L (ref 96–108)
CHOLEST SERPL-MCNC: 235 MG/DL (ref ?–200)
CK SERPL-CCNC: 95 U/L (ref 39–192)
CO2 SERPL-SCNC: 29 MMOL/L (ref 21–32)
CREAT SERPL-MCNC: 0.82 MG/DL (ref 0.6–1.3)
EOSINOPHIL # BLD AUTO: 0.18 THOUSAND/ÂΜL (ref 0–0.61)
EOSINOPHIL NFR BLD AUTO: 3 % (ref 0–6)
ERYTHROCYTE [DISTWIDTH] IN BLOOD BY AUTOMATED COUNT: 13.2 % (ref 11.6–15.1)
GLUCOSE P FAST SERPL-MCNC: 95 MG/DL (ref 65–99)
HCT VFR BLD AUTO: 38.9 % (ref 36.5–46.1)
HDLC SERPL-MCNC: 55 MG/DL
HGB BLD-MCNC: 12.6 G/DL (ref 12–15.4)
IMM GRANULOCYTES # BLD AUTO: 0.01 THOUSAND/UL (ref 0–0.2)
IMM GRANULOCYTES NFR BLD AUTO: 0 % (ref 0–2)
LDH SERPL-CCNC: 209 U/L (ref 140–271)
LDLC SERPL CALC-MCNC: 156 MG/DL (ref 0–100)
LYMPHOCYTES # BLD AUTO: 1.05 THOUSANDS/ÂΜL (ref 0.6–4.47)
LYMPHOCYTES NFR BLD AUTO: 16 % (ref 14–44)
MAGNESIUM SERPL-MCNC: 2 MG/DL (ref 1.9–2.7)
MCH RBC QN AUTO: 30.7 PG (ref 26.8–34.3)
MCHC RBC AUTO-ENTMCNC: 32.4 G/DL (ref 31.4–37.4)
MCV RBC AUTO: 95 FL (ref 82–98)
MONOCYTES # BLD AUTO: 0.7 THOUSAND/ÂΜL (ref 0.17–1.22)
MONOCYTES NFR BLD AUTO: 11 % (ref 4–12)
NEUTROPHILS # BLD AUTO: 4.56 THOUSANDS/ÂΜL (ref 1.85–7.62)
NEUTS SEG NFR BLD AUTO: 69 % (ref 43–75)
NONHDLC SERPL-MCNC: 180 MG/DL
NRBC BLD AUTO-RTO: 0 /100 WBCS
PLATELET # BLD AUTO: 256 THOUSANDS/UL (ref 149–390)
PMV BLD AUTO: 10.2 FL (ref 8.9–12.7)
POTASSIUM SERPL-SCNC: 4.2 MMOL/L (ref 3.5–5.3)
PROT SERPL-MCNC: 7.3 G/DL (ref 6.4–8.4)
RBC # BLD AUTO: 4.1 MILLION/UL (ref 3.88–5.12)
SODIUM SERPL-SCNC: 140 MMOL/L (ref 135–147)
TRIGL SERPL-MCNC: 121 MG/DL (ref ?–150)
WBC # BLD AUTO: 6.54 THOUSAND/UL (ref 4.31–10.16)

## 2024-11-18 PROCEDURE — 83735 ASSAY OF MAGNESIUM: CPT

## 2024-11-18 PROCEDURE — 85025 COMPLETE CBC W/AUTO DIFF WBC: CPT

## 2024-11-18 PROCEDURE — 80061 LIPID PANEL: CPT

## 2024-11-18 PROCEDURE — 82550 ASSAY OF CK (CPK): CPT

## 2024-11-18 PROCEDURE — 36415 COLL VENOUS BLD VENIPUNCTURE: CPT

## 2024-11-18 PROCEDURE — 83615 LACTATE (LD) (LDH) ENZYME: CPT

## 2024-11-18 PROCEDURE — 80053 COMPREHEN METABOLIC PANEL: CPT

## 2024-11-18 NOTE — TELEPHONE ENCOUNTER
RN discussed with Dr. López as patient questioned why chemo. Explained to patient that there was more than one site above and below diaphragm and lymph node in abdomen and the progression is why systemic therapy was changed. Explained that RT is more targeted. He verbalized understanding. He is aware that if in future, Radiation is offered/and option that if he did not wish to come to [facility] for Dr. López or Dr. Funes that there are other sites at [facility] and [facility] but he would need to travel to those. He verbalized understanding and appreciative of call back.

## 2024-11-18 NOTE — TELEPHONE ENCOUNTER
RN spoke to Dr. López. Patient was presented in working group with Radiation Oncology team and was felt that the best next step was to proceed with second line chemotherapy unless symptomatic. At time of follow up, his symptoms were mild. He was also aware that there were multiple Radiation Oncology Dr's involved in the presentation. He states he is starting chemo tomorrow and has that lined up and does not want to change that now and does not want to let this wait. He reports that he is comfortable with that decision and that he was aware after speaking to Dr. Morrell that chemo would be given and that if physicians feel that is the best way to go than he trust's Dr. Juarez recommendations. He does keep saying that the radiation did work on his brain. He was then asking if there was a way to clarify why the chemo was recommended. RN did state she would attempt to get him an answer and call him back. He was very appreciative of RN call and information so far.

## 2024-11-18 NOTE — TELEPHONE ENCOUNTER
"Called and spoke to Nino to follow up on his questions about his new treatment plan. I told him that, per Dr. Morrell's note, \"Single-agent ?h?m?th?r?py is standard second-line treatment after ?l?ti?um-based ?h?m?th?rap? and immunotherapy.\" Also per Dr. Morrell's note, depending on how his cancer responds to this new treatment, Dr. Morrell may want to get another tissue biopsy to reassess treatment. We placed a referral to pulmonology in case he would need a bronch and biopsy. I explained to him what a bronchoscopy was.     Nino also had some questions about his labs and if the right ones were drawn, because there was some confusion at the lab. I explained that some of the labs had [facility] listed as the resulting agency. So this may have been why there was confusion, but all of the correct labs were drawn and they look good for treatment tomorrow.     Nino's wife asked about her [facility] paperwork and I explained that our MA Jane handles that and was forwarded her message. Jane will let her know when that is complete. Nino and his wife had no further questions.  "

## 2024-11-18 NOTE — PROGRESS NOTES
I returned Nino's call. He explained his feelings of frustration, anxiety and confusion. He states that he has small cell lung cancer with mets to his brain. He shared he completed chemo and immuno therapy with brain radiation and then had progression noted on his scans. He is confused regarding his chemo treatment. He doesn't feel confident with there radiation oncologist he has seen recently and would like to meet with a new physician who specializes in lung cancer. I will send messages for him to Hem/Onc clinical as well as radiation oncology to help educate and discuss treatment options and assist with getting an appnt with a new radiation oncologist. He was appreciative.

## 2024-11-18 NOTE — PROGRESS NOTES
"Received  this morning:    \"Good morning, Yuko, this is Nino Nashn [id number]. I'm not sure what other information you needed. I had talked to you back in September and about some help with finding the right things to do. Anyway, I had a couple questions if there is any it available. I keep getting conflicting information from doctors and there seems to be a big conflict between the doctors and the lab for blood work and I just need someone that I can talk to that knows about oncology that can help me decipher. They keep asking me questions that I don't know and I just need someone to help me to do that. I didn't know if there is an advocate or somebody available that I could talk to. And then also I was curious as to if there is any other radiologist that work at [facility] besides Doctor López. Dr. López seems to be, she's a great doctor but she seems to be very busy and she told me twice that a lung cancer is not her specialty. I had already talked to Doctor Funes and that I wasn't really impressed with him and I was just wondering if there's any other doctors that work for [facility] either in [place] or one of your other offices or something that I could talk to find out which way to progress. Those are the two main issues. I'd just like to talk to you a little more in person. If you could give me a call back at your convenience, I would appreciate it. Thank you. And my phone number is [phone]. I think that was all the stuff you've asked for. Nino Hilton [id number] Thank you and have a nice day.\"  "

## 2024-11-19 ENCOUNTER — HOSPITAL ENCOUNTER (OUTPATIENT)
Dept: INFUSION CENTER | Facility: HOSPITAL | Age: 59
Discharge: HOME | End: 2024-11-19
Attending: INTERNAL MEDICINE
Payer: COMMERCIAL

## 2024-11-19 VITALS
BODY MASS INDEX: 26.01 KG/M2 | WEIGHT: 181.66 LBS | TEMPERATURE: 96.7 F | HEART RATE: 72 BPM | DIASTOLIC BLOOD PRESSURE: 72 MMHG | SYSTOLIC BLOOD PRESSURE: 113 MMHG | RESPIRATION RATE: 16 BRPM | HEIGHT: 70 IN

## 2024-11-19 DIAGNOSIS — C34.00 SMALL CELL CARCINOMA OF HILUM OF LUNG, UNSPECIFIED LATERALITY (HCC): Primary | ICD-10-CM

## 2024-11-19 RX ORDER — SODIUM CHLORIDE 9 MG/ML
20 INJECTION, SOLUTION INTRAVENOUS ONCE
Status: COMPLETED | OUTPATIENT
Start: 2024-11-19 | End: 2024-11-19

## 2024-11-19 RX ADMIN — SODIUM CHLORIDE 20 ML/HR: 0.9 INJECTION, SOLUTION INTRAVENOUS at 09:23

## 2024-11-19 RX ADMIN — DEXAMETHASONE SODIUM PHOSPHATE: 100 INJECTION INTRAMUSCULAR; INTRAVENOUS at 09:23

## 2024-11-19 RX ADMIN — LURBINECTEDIN 6.4 MG: 0.5 INJECTION, POWDER, LYOPHILIZED, FOR SOLUTION INTRAVENOUS at 10:01

## 2024-11-19 NOTE — PROGRESS NOTES
Nino Silva  tolerated treatment well with no complications.      Nino Silva is aware of future appt on 12/10/24 at 0830.     AVS printed and given to Nino Silva:  Yes

## 2024-11-22 ENCOUNTER — TELEPHONE (OUTPATIENT)
Age: 59
End: 2024-11-22

## 2024-11-22 NOTE — TELEPHONE ENCOUNTER
Pt wife Leslie called in still waiting on the FMLA forms to be completed and sent back via [employer]. The deadline for her to hand this in is 11/25/24. She also would like this faxed to [phone] with attention to Brian. Pt would also like a call back to confirm that this was completed at [phone]. Thank you.

## 2024-11-27 ENCOUNTER — OFFICE VISIT (OUTPATIENT)
Age: 59
End: 2024-11-27
Payer: COMMERCIAL

## 2024-11-27 VITALS
RESPIRATION RATE: 16 BRPM | SYSTOLIC BLOOD PRESSURE: 108 MMHG | TEMPERATURE: 97.9 F | HEART RATE: 70 BPM | WEIGHT: 183 LBS | OXYGEN SATURATION: 97 % | BODY MASS INDEX: 26.2 KG/M2 | HEIGHT: 70 IN | DIASTOLIC BLOOD PRESSURE: 77 MMHG

## 2024-11-27 DIAGNOSIS — C34.00 SMALL CELL CARCINOMA OF HILUM OF LUNG, UNSPECIFIED LATERALITY (HCC): Primary | ICD-10-CM

## 2024-11-27 PROCEDURE — 99214 OFFICE O/P EST MOD 30 MIN: CPT | Performed by: INTERNAL MEDICINE

## 2024-11-27 NOTE — PROGRESS NOTES
Hematology Outpatient Follow - Up Note  Nino Silva 59 y.o. adult MRN: @ Encounter: [id number]    Date:  11/27/2024    Assessment/ Plan:      1. 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 Silva is a 59 year old male with extensive stage large cell neuroendocrine carcinoma, currently on second line of treatment with lurbinectedin.  He was originally diagnosed with extensive stage in March 2024.  Baseline imaging had shown mediastinal and hilar adenopathy, suspicious for metastatic disease, along with iliac chain lymphadenopathy and periaortic beata disease. MR brain was also concerning for multiple supra and infratentorial lesions, concerning for metastatic disease.  Right upper lobe lung biopsy was consistent with poorly differentiated carcinoma with neuroendocrine features, favoring large cell neuroendocrine carcinoma.  Patient underwent brain radiation, after which she was started on systemic therapy with carboplatin, etoposide, and durvalumab.  After completing 4 cycles of chemo/immunotherapy combination (April - June 2024), patient was continued on durvalumab maintenance in July.  PET scan from July 2024 which showed significant improvement of previously seen adenopathy in the neck, chest, abdomen, and pelvis.      However, PET scan from October 2024 did raise concern for disease progression with more prominent FDG avid right upper lobe mass and progression of FDG avid disease in the mediastinum, right perihilar region, and new FDG avid left periaortic lymph node.  Therefore, patient was started on second line of treatment with lurbinectedin.     Patient presented to the office today with his wife for routine follow-up.  He is status post 1 cycle of lurbinectedin on 11/19/2024.  He tolerated the first cycle fairly well. Other than fatigue starting about 1 day after treatment, lasting for 3-4 days, patient denied any acute complaints.     Patient was recommended to  continue treatment with lurbinectedin every 3 weeks.  He was recommended to follow-up in office shortly after cycle 3 of treatment.    Patient and his wife were in agreement with the plan as noted above.  They know to call the office with any questions or concerns in the interim.    Summary of recommendations:   Continue 2L of treatment with lurbinectidin 3.2 mg/m2   Return to office on 1/3/25 for routine follow-up.   Will plan on ordering imaging with PET scan and MR brain during next office visit to evaluate treatment response    Labs and imaging studies are reviewed by ordering provider once results are available. If there are findings that need immediate attention, you will be contacted when results available. Discussing results and the implication on your healthcare is best discussed in person at your follow-up visit.       Oncology History   Small cell lung cancer (HCC)   3/5/2024 Initial Diagnosis    Small cell lung cancer (HCC)     3/5/2024 Biopsy    Final Diagnosis  A. Lung, Right Upper Lobe, biopsy:  - Poorly differentiated carcinoma with neuroendocrine features, favor large cell neuroendocrine carcinoma.  - See note.     3/5/2024 -  Cancer Staged    Staging form: Lung, AJCC 8th Edition  - Clinical stage from 3/5/2024: Stage IV (cT2, cN3, cM1) - Signed by Lizbeth López MD on 4/18/2024  Histopathologic type: Small cell carcinoma, NOS  Stage prefix: Initial diagnosis  Histologic grade (G): G3  Histologic grading system: 4 grade system       3/6/2024 - 3/19/2024 Radiation      Plan ID Energy Fractions Dose per Fraction (cGy) Dose Correction (cGy) Total Dose Delivered (cGy) Elapsed Days   Whole Brain 6X 10 / 10 300 0 3,000 13        4/8/2024 - 10/7/2024 Chemotherapy    alteplase (CATHFLO), 2 mg, Intracatheter, Every 1 Minute as needed, 8 of 10 cycles  palonosetron (ALOXI), 0.25 mg, Intravenous, Once, 3 of 3 cycles  Administration: 0.25 mg (4/29/2024), 0.25 mg (5/20/2024), 0.25 mg (6/17/2024)  fosaprepitant  (EMEND) IVPB, 150 mg, Intravenous, Once, 4 of 4 cycles  Administration: 150 mg (4/8/2024), 150 mg (4/29/2024), 150 mg (5/20/2024), 150 mg (6/17/2024)  etoposide (TOPOSAR), 80 mg/m2 = 164 mg, Intravenous, Once, 4 of 4 cycles  Dose modification: 100 mg/m2 (original dose 80 mg/m2, Cycle 1, Reason: Anticipated Tolerance), 80 mg/m2 (original dose 80 mg/m2, Cycle 1, Reason: Anticipated Tolerance), 100 mg/m2 (original dose 80 mg/m2, Cycle 2, Reason: Anticipated Tolerance)  Administration: 164 mg (4/8/2024), 164 mg (4/9/2024), 164 mg (4/10/2024), 205 mg (4/29/2024), 205 mg (4/30/2024), 205 mg (5/1/2024), 200 mg (5/20/2024), 200 mg (5/21/2024), 200 mg (5/22/2024), 200 mg (6/17/2024), 200 mg (6/18/2024), 200 mg (6/19/2024)  CARBOplatin (PARAPLATIN) IVPB ([facility] [place] [facility] AUC DOSING), 750 mg (574.7 % of original dose 130.5 mg), Intravenous, Once, 4 of 4 cycles  Dose modification: 130.5 mg (original dose 130.5 mg, Cycle 1, Reason: Anticipated Tolerance),   (original dose 130.5 mg, Cycle 1, Reason: Other (Must fill in a comment))  Administration: 750 mg (4/8/2024), 701.5 mg (4/29/2024), 664 mg (5/20/2024), 633 mg (6/17/2024)  durvalumab (IMFINZI) IVPB, 1,500 mg, Intravenous, Once, 8 of 10 cycles  Administration: 1,500 mg (4/8/2024), 1,500 mg (4/29/2024), 1,500 mg (5/20/2024), 1,500 mg (6/17/2024), 1,500 mg (7/10/2024), 1,500 mg (8/13/2024), 1,500 mg (9/9/2024), 1,500 mg (10/7/2024)     11/19/2024 -  Chemotherapy    alteplase (CATHFLO), 2 mg, Intracatheter, Every 1 Minute as needed, 1 of 6 cycles  Lurbinectedin (ZEPZELCA) IVPB, 3.2 mg/m2 = 6.4 mg, Intravenous, Once, 1 of 6 cycles  Administration: 6.4 mg (11/19/2024)         Interval History:      Mr. Silva is a 59 year old male with extensive stage large cell neuroendocrine carcinoma, currently on second line of treatment with lurbinectedin.  He was originally diagnosed with extensive stage in March 2024.  Baseline imaging had shown mediastinal and hilar adenopathy, suspicious for metastatic  disease, along with iliac chain lymphadenopathy and periaortic beata disease. MR brain was also concerning for multiple supra and infratentorial lesions, concerning for metastatic disease.  Right upper lobe lung biopsy was consistent with poorly differentiated carcinoma with neuroendocrine features, favoring large cell neuroendocrine carcinoma.  Patient underwent brain radiation, after which she was started on systemic therapy with carboplatin, etoposide, and durvalumab.  After completing 4 cycles of chemo/immunotherapy combination (April - June 2024), patient was continued on durvalumab maintenance in July.  PET scan from July 2024 which showed significant improvement of previously seen adenopathy in the neck, chest, abdomen, and pelvis.      However, PET scan from October 2024 did raise concern for disease progression with more prominent FDG avid right upper lobe mass and progression of FDG avid disease in the mediastinum, right perihilar region, and new FDG avid left periaortic lymph node.  Therefore, patient was started on second line of treatment with lurbinectedin.     Patient presented to the office today with his wife for routine follow-up.  He is status post 1 cycle of lurbinectedin on 11/19/2024.  He tolerated the first cycle fairly well. Other than fatigue starting about 1 day after treatment, lasting for 3-4 days, patient denied any acute complaints.     Previous Treatment:    WBRT x 10 fractions in March 2024  Carboplatin plus etoposide plus durvalumab x 4 cycles (April - June 2024)   Durvalumab maintenance (July - October 2024). Disease progression noted on Oct  2024 PET scan  Lurbinectidin started in November 2024     Test Results:    Imaging: No results found.    Labs:   Lab Results   Component Value Date    WBC 6.54 11/18/2024    HGB 12.6 11/18/2024    HCT 38.9 11/18/2024    MCV 95 11/18/2024     11/18/2024     Lab Results   Component Value Date    K 4.2 11/18/2024     11/18/2024    CO2  29 11/18/2024    BUN 18 11/18/2024    CREATININE 0.82 11/18/2024    GLUCOSE 116 03/04/2024    GLUF 95 11/18/2024    CALCIUM 8.7 11/18/2024    AST 18 11/18/2024    ALT 15 11/18/2024    ALKPHOS 54 11/18/2024    EGFR 97 08/02/2024       ROS: Review of Systems   Constitutional:  Positive for fatigue. Negative for appetite change, chills, fever and unexpected weight change.   HENT:   Negative for hearing loss, mouth sores, nosebleeds and sore throat.    Eyes:  Negative for eye problems and icterus.   Respiratory:  Negative for chest tightness, cough, shortness of breath and wheezing.    Cardiovascular:  Negative for chest pain and palpitations.   Gastrointestinal:  Negative for abdominal pain, blood in stool, constipation, diarrhea, nausea and vomiting.   Genitourinary:  Negative for difficulty urinating, dysuria, frequency and hematuria.    Musculoskeletal:  Negative for back pain, flank pain, gait problem, myalgias and neck pain.   Skin:  Negative for itching and rash.   Neurological:  Negative for dizziness, extremity weakness, gait problem, headaches, light-headedness, numbness and speech difficulty.   Hematological:  Negative for adenopathy. Does not bruise/bleed easily.   Psychiatric/Behavioral:  Negative for confusion. The patient is not nervous/anxious.           Current Medications: Reviewed  Allergies: Reviewed  PMH/FH/SH:  Reviewed      Physical Exam:    Body surface area is 2.01 meters squared.    Wt Readings from Last 3 Encounters:   11/27/24 83 kg (183 lb)   11/19/24 82.4 kg (181 lb 10.5 oz)   11/13/24 81.6 kg (180 lb)        Temp Readings from Last 3 Encounters:   11/27/24 97.9 °F (36.6 °C) (Temporal)   11/19/24 (!) 96.7 °F (35.9 °C)   11/13/24 (!) 97 °F (36.1 °C) (Temporal)        BP Readings from Last 3 Encounters:   11/27/24 108/77   11/19/24 113/72   11/13/24 108/66         Pulse Readings from Last 3 Encounters:   11/27/24 70   11/19/24 72   11/13/24 64        Physical Exam  Vitals reviewed.    Constitutional:       General: He is not in acute distress.     Appearance: He is not ill-appearing, toxic-appearing or diaphoretic.   HENT:      Head: Normocephalic and atraumatic.      Mouth/Throat:      Mouth: Mucous membranes are moist.      Pharynx: No oropharyngeal exudate or posterior oropharyngeal erythema.   Eyes:      General: No scleral icterus.     Extraocular Movements: Extraocular movements intact.      Conjunctiva/sclera: Conjunctivae normal.   Cardiovascular:      Rate and Rhythm: Normal rate and regular rhythm.      Heart sounds: No murmur heard.     No gallop.   Pulmonary:      Effort: No respiratory distress.      Breath sounds: Normal breath sounds. No wheezing, rhonchi or rales.   Abdominal:      General: Bowel sounds are normal. There is no distension.      Palpations: Abdomen is soft. There is no mass.   Musculoskeletal:         General: No swelling, tenderness or deformity.      Cervical back: Normal range of motion.      Right lower leg: No edema.      Left lower leg: No edema.   Skin:     General: Skin is warm and dry.      Findings: No erythema, lesion or rash.   Neurological:      General: No focal deficit present.      Mental Status: He is alert and oriented to person, place, and time.   Psychiatric:         Mood and Affect: Mood normal.         Judgment: Judgment normal.       ECOG PS: 1

## 2024-12-04 RX ORDER — SODIUM CHLORIDE 9 MG/ML
20 INJECTION, SOLUTION INTRAVENOUS ONCE
Status: CANCELLED | OUTPATIENT
Start: 2024-12-10

## 2024-12-06 ENCOUNTER — APPOINTMENT (OUTPATIENT)
Dept: LAB | Facility: HOSPITAL | Age: 59
End: 2024-12-06
Payer: COMMERCIAL

## 2024-12-06 DIAGNOSIS — C34.00 SMALL CELL CARCINOMA OF HILUM OF LUNG, UNSPECIFIED LATERALITY (HCC): ICD-10-CM

## 2024-12-06 LAB
ALBUMIN SERPL BCG-MCNC: 4.3 G/DL (ref 3.5–5)
ALP SERPL-CCNC: 60 U/L (ref 34–104)
ALT SERPL W P-5'-P-CCNC: 31 U/L (ref 7–52)
ANION GAP SERPL CALCULATED.3IONS-SCNC: 9 MMOL/L (ref 4–13)
AST SERPL W P-5'-P-CCNC: 23 U/L (ref 5–45)
BASOPHILS # BLD AUTO: 0.04 THOUSANDS/ÂΜL (ref 0–0.1)
BASOPHILS NFR BLD AUTO: 1 % (ref 0–1)
BILIRUB SERPL-MCNC: 0.63 MG/DL (ref 0.2–1)
BUN SERPL-MCNC: 16 MG/DL (ref 5–25)
CALCIUM SERPL-MCNC: 9.3 MG/DL (ref 8.4–10.2)
CHLORIDE SERPL-SCNC: 103 MMOL/L (ref 96–108)
CK SERPL-CCNC: 74 U/L (ref 39–192)
CO2 SERPL-SCNC: 27 MMOL/L (ref 21–32)
CREAT SERPL-MCNC: 0.9 MG/DL (ref 0.6–1.3)
EOSINOPHIL # BLD AUTO: 0.18 THOUSAND/ÂΜL (ref 0–0.61)
EOSINOPHIL NFR BLD AUTO: 4 % (ref 0–6)
ERYTHROCYTE [DISTWIDTH] IN BLOOD BY AUTOMATED COUNT: 13.2 % (ref 11.6–15.1)
GLUCOSE P FAST SERPL-MCNC: 104 MG/DL (ref 65–99)
HCT VFR BLD AUTO: 39.3 % (ref 36.5–46.1)
HGB BLD-MCNC: 13 G/DL (ref 12–15.4)
IMM GRANULOCYTES # BLD AUTO: 0.03 THOUSAND/UL (ref 0–0.2)
IMM GRANULOCYTES NFR BLD AUTO: 1 % (ref 0–2)
LDH SERPL-CCNC: 177 U/L (ref 140–271)
LYMPHOCYTES # BLD AUTO: 1.19 THOUSANDS/ÂΜL (ref 0.6–4.47)
LYMPHOCYTES NFR BLD AUTO: 25 % (ref 14–44)
MAGNESIUM SERPL-MCNC: 2 MG/DL (ref 1.9–2.7)
MCH RBC QN AUTO: 31.5 PG (ref 26.8–34.3)
MCHC RBC AUTO-ENTMCNC: 33.1 G/DL (ref 31.4–37.4)
MCV RBC AUTO: 95 FL (ref 82–98)
MONOCYTES # BLD AUTO: 0.81 THOUSAND/ÂΜL (ref 0.17–1.22)
MONOCYTES NFR BLD AUTO: 17 % (ref 4–12)
NEUTROPHILS # BLD AUTO: 2.51 THOUSANDS/ÂΜL (ref 1.85–7.62)
NEUTS SEG NFR BLD AUTO: 52 % (ref 43–75)
NRBC BLD AUTO-RTO: 0 /100 WBCS
PLATELET # BLD AUTO: 312 THOUSANDS/UL (ref 149–390)
PMV BLD AUTO: 10 FL (ref 8.9–12.7)
POTASSIUM SERPL-SCNC: 4.6 MMOL/L (ref 3.5–5.3)
PROT SERPL-MCNC: 7.5 G/DL (ref 6.4–8.4)
RBC # BLD AUTO: 4.13 MILLION/UL (ref 3.88–5.12)
SODIUM SERPL-SCNC: 139 MMOL/L (ref 135–147)
WBC # BLD AUTO: 4.76 THOUSAND/UL (ref 4.31–10.16)

## 2024-12-06 PROCEDURE — 83735 ASSAY OF MAGNESIUM: CPT

## 2024-12-06 PROCEDURE — 85025 COMPLETE CBC W/AUTO DIFF WBC: CPT

## 2024-12-06 PROCEDURE — 80053 COMPREHEN METABOLIC PANEL: CPT

## 2024-12-06 PROCEDURE — 83615 LACTATE (LD) (LDH) ENZYME: CPT

## 2024-12-06 PROCEDURE — 36415 COLL VENOUS BLD VENIPUNCTURE: CPT

## 2024-12-06 PROCEDURE — 82550 ASSAY OF CK (CPK): CPT

## 2024-12-10 ENCOUNTER — HOSPITAL ENCOUNTER (OUTPATIENT)
Dept: INFUSION CENTER | Facility: HOSPITAL | Age: 59
Discharge: HOME | End: 2024-12-10
Attending: INTERNAL MEDICINE
Payer: COMMERCIAL

## 2024-12-10 VITALS
WEIGHT: 187.2 LBS | BODY MASS INDEX: 26.8 KG/M2 | HEART RATE: 72 BPM | HEIGHT: 70 IN | SYSTOLIC BLOOD PRESSURE: 104 MMHG | OXYGEN SATURATION: 97 % | RESPIRATION RATE: 17 BRPM | TEMPERATURE: 97 F | DIASTOLIC BLOOD PRESSURE: 75 MMHG

## 2024-12-10 DIAGNOSIS — C34.00 SMALL CELL CARCINOMA OF HILUM OF LUNG, UNSPECIFIED LATERALITY (HCC): Primary | ICD-10-CM

## 2024-12-10 PROCEDURE — 96367 TX/PROPH/DG ADDL SEQ IV INF: CPT

## 2024-12-10 PROCEDURE — 96413 CHEMO IV INFUSION 1 HR: CPT

## 2024-12-10 RX ORDER — SODIUM CHLORIDE 9 MG/ML
20 INJECTION, SOLUTION INTRAVENOUS ONCE
Status: COMPLETED | OUTPATIENT
Start: 2024-12-10 | End: 2024-12-10

## 2024-12-10 RX ADMIN — SODIUM CHLORIDE 20 ML/HR: 0.9 INJECTION, SOLUTION INTRAVENOUS at 08:41

## 2024-12-10 RX ADMIN — DEXAMETHASONE SODIUM PHOSPHATE: 100 INJECTION INTRAMUSCULAR; INTRAVENOUS at 08:41

## 2024-12-10 RX ADMIN — LURBINECTEDIN 6.4 MG: 0.5 INJECTION, POWDER, LYOPHILIZED, FOR SOLUTION INTRAVENOUS at 09:20

## 2024-12-10 NOTE — PROGRESS NOTES
Nino Silva  tolerated treatment well with no complications.      Nino Silva is aware of future appt on 12/31 at 8 am.     AVS printed and given to Nino Silva:  Yes

## 2024-12-24 DIAGNOSIS — C34.00 SMALL CELL CARCINOMA OF HILUM OF LUNG, UNSPECIFIED LATERALITY (HCC): Primary | ICD-10-CM

## 2024-12-24 RX ORDER — SODIUM CHLORIDE 9 MG/ML
20 INJECTION, SOLUTION INTRAVENOUS ONCE
OUTPATIENT
Start: 2024-12-31

## 2024-12-27 ENCOUNTER — APPOINTMENT (OUTPATIENT)
Dept: LAB | Facility: HOSPITAL | Age: 59
End: 2024-12-27
Payer: COMMERCIAL

## 2024-12-27 DIAGNOSIS — C34.00 SMALL CELL CARCINOMA OF HILUM OF LUNG, UNSPECIFIED LATERALITY (HCC): ICD-10-CM

## 2024-12-27 LAB
ALBUMIN SERPL BCG-MCNC: 4.3 G/DL (ref 3.5–5)
ALP SERPL-CCNC: 59 U/L (ref 34–104)
ALT SERPL W P-5'-P-CCNC: 49 U/L (ref 7–52)
ANION GAP SERPL CALCULATED.3IONS-SCNC: 7 MMOL/L (ref 4–13)
AST SERPL W P-5'-P-CCNC: 34 U/L (ref 13–39)
BASOPHILS # BLD AUTO: 0.05 THOUSANDS/ÂΜL (ref 0–0.1)
BASOPHILS NFR BLD AUTO: 1 % (ref 0–1)
BILIRUB SERPL-MCNC: 0.48 MG/DL (ref 0.2–1)
BUN SERPL-MCNC: 20 MG/DL (ref 5–25)
CALCIUM SERPL-MCNC: 9 MG/DL (ref 8.4–10.2)
CHLORIDE SERPL-SCNC: 104 MMOL/L (ref 96–108)
CK SERPL-CCNC: 80 U/L (ref 39–308)
CO2 SERPL-SCNC: 29 MMOL/L (ref 21–32)
CREAT SERPL-MCNC: 0.9 MG/DL (ref 0.6–1.3)
EOSINOPHIL # BLD AUTO: 0.24 THOUSAND/ÂΜL (ref 0–0.61)
EOSINOPHIL NFR BLD AUTO: 5 % (ref 0–6)
ERYTHROCYTE [DISTWIDTH] IN BLOOD BY AUTOMATED COUNT: 13.8 % (ref 11.6–15.1)
GFR SERPL CREATININE-BSD FRML MDRD: 92 ML/MIN/1.73SQ M
GLUCOSE P FAST SERPL-MCNC: 92 MG/DL (ref 65–99)
HCT VFR BLD AUTO: 39.1 % (ref 36.5–49.3)
HGB BLD-MCNC: 13 G/DL (ref 12–17)
IMM GRANULOCYTES # BLD AUTO: 0.02 THOUSAND/UL (ref 0–0.2)
IMM GRANULOCYTES NFR BLD AUTO: 0 % (ref 0–2)
LDH SERPL-CCNC: 225 U/L (ref 140–271)
LYMPHOCYTES # BLD AUTO: 1.29 THOUSANDS/ÂΜL (ref 0.6–4.47)
LYMPHOCYTES NFR BLD AUTO: 27 % (ref 14–44)
MAGNESIUM SERPL-MCNC: 1.9 MG/DL (ref 1.9–2.7)
MCH RBC QN AUTO: 31.8 PG (ref 26.8–34.3)
MCHC RBC AUTO-ENTMCNC: 33.2 G/DL (ref 31.4–37.4)
MCV RBC AUTO: 96 FL (ref 82–98)
MONOCYTES # BLD AUTO: 0.71 THOUSAND/ÂΜL (ref 0.17–1.22)
MONOCYTES NFR BLD AUTO: 15 % (ref 4–12)
NEUTROPHILS # BLD AUTO: 2.47 THOUSANDS/ÂΜL (ref 1.85–7.62)
NEUTS SEG NFR BLD AUTO: 52 % (ref 43–75)
NRBC BLD AUTO-RTO: 0 /100 WBCS
PLATELET # BLD AUTO: 302 THOUSANDS/UL (ref 149–390)
PMV BLD AUTO: 9.7 FL (ref 8.9–12.7)
POTASSIUM SERPL-SCNC: 4 MMOL/L (ref 3.5–5.3)
PROT SERPL-MCNC: 7.6 G/DL (ref 6.4–8.4)
RBC # BLD AUTO: 4.09 MILLION/UL (ref 3.88–5.62)
SODIUM SERPL-SCNC: 140 MMOL/L (ref 135–147)
WBC # BLD AUTO: 4.78 THOUSAND/UL (ref 4.31–10.16)

## 2024-12-27 PROCEDURE — 36415 COLL VENOUS BLD VENIPUNCTURE: CPT

## 2024-12-27 PROCEDURE — 80053 COMPREHEN METABOLIC PANEL: CPT

## 2024-12-27 PROCEDURE — 83615 LACTATE (LD) (LDH) ENZYME: CPT

## 2024-12-27 PROCEDURE — 83735 ASSAY OF MAGNESIUM: CPT

## 2024-12-27 PROCEDURE — 85025 COMPLETE CBC W/AUTO DIFF WBC: CPT

## 2024-12-27 PROCEDURE — 82550 ASSAY OF CK (CPK): CPT

## 2024-12-31 ENCOUNTER — HOSPITAL ENCOUNTER (OUTPATIENT)
Dept: INFUSION CENTER | Facility: HOSPITAL | Age: 59
Discharge: HOME | End: 2024-12-31
Attending: INTERNAL MEDICINE
Payer: COMMERCIAL

## 2024-12-31 VITALS
SYSTOLIC BLOOD PRESSURE: 112 MMHG | BODY MASS INDEX: 26.86 KG/M2 | DIASTOLIC BLOOD PRESSURE: 74 MMHG | OXYGEN SATURATION: 95 % | HEIGHT: 70 IN | TEMPERATURE: 97.8 F | RESPIRATION RATE: 16 BRPM | WEIGHT: 187.61 LBS | HEART RATE: 72 BPM

## 2024-12-31 DIAGNOSIS — C34.00 SMALL CELL CARCINOMA OF HILUM OF LUNG, UNSPECIFIED LATERALITY (HCC): Primary | ICD-10-CM

## 2024-12-31 RX ORDER — SODIUM CHLORIDE 9 MG/ML
20 INJECTION, SOLUTION INTRAVENOUS ONCE
Status: COMPLETED | OUTPATIENT
Start: 2024-12-31 | End: 2024-12-31

## 2024-12-31 RX ADMIN — LURBINECTEDIN 6.4 MG: 0.5 INJECTION, POWDER, LYOPHILIZED, FOR SOLUTION INTRAVENOUS at 09:19

## 2024-12-31 RX ADMIN — SODIUM CHLORIDE 20 ML/HR: 9 INJECTION, SOLUTION INTRAVENOUS at 08:27

## 2024-12-31 RX ADMIN — DEXAMETHASONE SODIUM PHOSPHATE: 100 INJECTION INTRAMUSCULAR; INTRAVENOUS at 08:28

## 2024-12-31 NOTE — PROGRESS NOTES
Nino Silva  tolerated treatment well with no complications.      Nino Silva is aware of future appt on 1/21 at 0830.     AVS printed and given to Nino Silva:  Yes

## 2025-01-03 ENCOUNTER — OFFICE VISIT (OUTPATIENT)
Age: 60
End: 2025-01-03
Payer: COMMERCIAL

## 2025-01-03 VITALS
RESPIRATION RATE: 16 BRPM | DIASTOLIC BLOOD PRESSURE: 79 MMHG | HEART RATE: 76 BPM | WEIGHT: 186 LBS | TEMPERATURE: 97.3 F | HEIGHT: 70 IN | OXYGEN SATURATION: 94 % | BODY MASS INDEX: 26.63 KG/M2 | SYSTOLIC BLOOD PRESSURE: 122 MMHG

## 2025-01-03 DIAGNOSIS — C34.00 SMALL CELL CARCINOMA OF HILUM OF LUNG, UNSPECIFIED LATERALITY (HCC): Primary | ICD-10-CM

## 2025-01-03 PROCEDURE — 99215 OFFICE O/P EST HI 40 MIN: CPT | Performed by: INTERNAL MEDICINE

## 2025-01-03 NOTE — PROGRESS NOTES
Outpatient Follow - Up Note  Nino Silva 59 y.o. male MRN: @ Encounter: [id number]    Date:  1/3/2025    Assessment/ Plan:      1. 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 Silva is a 59 year old male with extensive stage large cell neuroendocrine carcinoma, currently on second line of treatment with lurbinectedin.  He was originally diagnosed with extensive stage in March 2024.  Baseline imaging had shown mediastinal and hilar adenopathy, suspicious for metastatic disease, along with iliac chain lymphadenopathy and periaortic beata disease. MR brain was also concerning for multiple supra and infratentorial lesions, concerning for metastatic disease.  Right upper lobe lung biopsy was consistent with poorly differentiated carcinoma with neuroendocrine features, favoring large cell neuroendocrine carcinoma.  Patient underwent brain radiation, after which she was started on systemic therapy with carboplatin, etoposide, and durvalumab.  After completing 4 cycles of chemo/immunotherapy combination (April - June 2024), patient was continued on durvalumab maintenance in July.  PET scan from July 2024 which showed significant improvement of previously seen adenopathy in the neck, chest, abdomen, and pelvis.      However, PET scan from October 2024 did raise concern for disease progression with more prominent FDG avid right upper lobe mass and progression of FDG avid disease in the mediastinum, right perihilar region, and new FDG avid left periaortic lymph node.  Therefore, patient was started on second line of treatment with lurbinectedin.     Patient presented to the office today with his wife for routine follow-up.  He is status post 3 cycles of lurbinectedin. Other than occasional joint aches and intermittent episodes of left lower back pain, patient denied any acute complaints and has been able to tolerate lurbinectedin fairly well.     Patient was recommended to continue  treatment with lurbinectedin every 3 weeks.  He is scheduled for cycle 4 on 1/21/2025.  We will see him in office prior to C5 with PET scan and MR brain prior.     Patient and his wife were in agreement with the plan as noted above.  They know to call the office with any questions or concerns in the interim.    Summary of recommendations:   Continue 2L of treatment with lurbinectidin at 3.2 mg/m2 dosing  Proceed with C4 as scheduled on 1/21/25  PET scan and MR brain prior to C5 to evaluate treatment response  Follow-up in office after imaging to review results.    Labs and imaging studies are reviewed by ordering provider once results are available. If there are findings that need immediate attention, you will be contacted when results available. Discussing results and the implication on your healthcare is best discussed in person at your follow-up visit.       Oncology History   Small cell lung cancer (HCC)   3/5/2024 Initial Diagnosis    Small cell lung cancer (HCC)     3/5/2024 Biopsy    Final Diagnosis  A. Lung, Right Upper Lobe, biopsy:  - Poorly differentiated carcinoma with neuroendocrine features, favor large cell neuroendocrine carcinoma.  - See note.     3/5/2024 -  Cancer Staged    Staging form: Lung, AJCC 8th Edition  - Clinical stage from 3/5/2024: Stage IV (cT2, cN3, cM1) - Signed by Lizbeth López MD on 4/18/2024  Histopathologic type: Small cell carcinoma, NOS  Stage prefix: Initial diagnosis  Histologic grade (G): G3  Histologic grading system: 4 grade system       3/6/2024 - 3/19/2024 Radiation      Plan ID Energy Fractions Dose per Fraction (cGy) Dose Correction (cGy) Total Dose Delivered (cGy) Elapsed Days   Whole Brain 6X 10 / 10 300 0 3,000 13        4/8/2024 - 10/7/2024 Chemotherapy    alteplase (CATHFLO), 2 mg, Intracatheter, Every 1 Minute as needed, 8 of 10 cycles  palonosetron (ALOXI), 0.25 mg, Intravenous, Once, 3 of 3 cycles  Administration: 0.25 mg (4/29/2024), 0.25 mg (5/20/2024),  0.25 mg (6/17/2024)  fosaprepitant (EMEND) IVPB, 150 mg, Intravenous, Once, 4 of 4 cycles  Administration: 150 mg (4/8/2024), 150 mg (4/29/2024), 150 mg (5/20/2024), 150 mg (6/17/2024)  etoposide (TOPOSAR), 80 mg/m2 = 164 mg, Intravenous, Once, 4 of 4 cycles  Dose modification: 100 mg/m2 (original dose 80 mg/m2, Cycle 1, Reason: Anticipated Tolerance), 80 mg/m2 (original dose 80 mg/m2, Cycle 1, Reason: Anticipated Tolerance), 100 mg/m2 (original dose 80 mg/m2, Cycle 2, Reason: Anticipated Tolerance)  Administration: 164 mg (4/8/2024), 164 mg (4/9/2024), 164 mg (4/10/2024), 205 mg (4/29/2024), 205 mg (4/30/2024), 205 mg (5/1/2024), 200 mg (5/20/2024), 200 mg (5/21/2024), 200 mg (5/22/2024), 200 mg (6/17/2024), 200 mg (6/18/2024), 200 mg (6/19/2024)  CARBOplatin (PARAPLATIN) IVPB ([facility] AUC DOSING), 750 mg (574.7 % of original dose 130.5 mg), Intravenous, Once, 4 of 4 cycles  Dose modification: 130.5 mg (original dose 130.5 mg, Cycle 1, Reason: Anticipated Tolerance),   (original dose 130.5 mg, Cycle 1, Reason: Other (Must fill in a comment))  Administration: 750 mg (4/8/2024), 701.5 mg (4/29/2024), 664 mg (5/20/2024), 633 mg (6/17/2024)  durvalumab (IMFINZI) IVPB, 1,500 mg, Intravenous, Once, 8 of 10 cycles  Administration: 1,500 mg (4/8/2024), 1,500 mg (4/29/2024), 1,500 mg (5/20/2024), 1,500 mg (6/17/2024), 1,500 mg (7/10/2024), 1,500 mg (8/13/2024), 1,500 mg (9/9/2024), 1,500 mg (10/7/2024)     11/19/2024 -  Chemotherapy    alteplase (CATHFLO), 2 mg, Intracatheter, Every 1 Minute as needed, 3 of 6 cycles  Lurbinectedin (ZEPZELCA) IVPB, 3.2 mg/m2 = 6.4 mg, Intravenous, Once, 3 of 6 cycles  Administration: 6.4 mg (11/19/2024), 6.4 mg (12/10/2024), 6.4 mg (12/31/2024)         Interval History:      Mr. Silva is a 59 year old male with extensive stage large cell neuroendocrine carcinoma, currently on second line of treatment with lurbinectedin.  He was originally diagnosed with extensive stage in March 2024.  Baseline  imaging had shown mediastinal and hilar adenopathy, suspicious for metastatic disease, along with iliac chain lymphadenopathy and periaortic beata disease. MR brain was also concerning for multiple supra and infratentorial lesions, concerning for metastatic disease.  Right upper lobe lung biopsy was consistent with poorly differentiated carcinoma with neuroendocrine features, favoring large cell neuroendocrine carcinoma.  Patient underwent brain radiation, after which she was started on systemic therapy with carboplatin, etoposide, and durvalumab.  After completing 4 cycles of chemo/immunotherapy combination (April - June 2024), patient was continued on durvalumab maintenance in July.  PET scan from July 2024 which showed significant improvement of previously seen adenopathy in the neck, chest, abdomen, and pelvis.      However, PET scan from October 2024 did raise concern for disease progression with more prominent FDG avid right upper lobe mass and progression of FDG avid disease in the mediastinum, right perihilar region, and new FDG avid left periaortic lymph node.  Therefore, patient was started on second line of treatment with lurbinectedin.     Patient presented to the office today with his wife for routine follow-up.  He is status post 3 cycles of lurbinectedin thus far. Other than occasional joint aches, intermittent episodes of left lower back pain, and mild constipation, patient denied any acute complaints and has been able to tolerate lurbinectedin fairly well. Patient denied any balance difficulties, sensation deficits, or bowel/bladder incontinence.     C4 is scheduled for 1/21/25.    Previous Treatment:    WBRT x 10 fractions in March 2024  Carboplatin plus etoposide plus durvalumab x 4 cycles (April - June 2024)   Durvalumab maintenance (July - October 2024). Disease progression noted on Oct  2024 PET scan  Lurbinectidin started in November 2024     Test Results:    Imaging: No results  found.    Labs:   Lab Results   Component Value Date    WBC 4.78 12/27/2024    HGB 13.0 12/27/2024    HCT 39.1 12/27/2024    MCV 96 12/27/2024     12/27/2024     Lab Results   Component Value Date    K 4.0 12/27/2024     12/27/2024    CO2 29 12/27/2024    BUN 20 12/27/2024    CREATININE 0.90 12/27/2024    GLUCOSE 116 03/04/2024    GLUF 92 12/27/2024    CALCIUM 9.0 12/27/2024    AST 34 12/27/2024    ALT 49 12/27/2024    ALKPHOS 59 12/27/2024    EGFR 92 12/27/2024     ROS: Review of Systems   Constitutional:  Positive for fatigue (occasional). Negative for appetite change, chills, fever and unexpected weight change.   HENT:   Negative for mouth sores, nosebleeds, sore throat, trouble swallowing and voice change.    Eyes:  Negative for eye problems and icterus.   Respiratory:  Negative for chest tightness, shortness of breath and wheezing.    Cardiovascular:  Negative for chest pain, leg swelling and palpitations.   Gastrointestinal:  Positive for constipation (for a few days following treatment). Negative for abdominal distention, abdominal pain, blood in stool, diarrhea, nausea and vomiting.   Endocrine: Negative for hot flashes.   Genitourinary:  Negative for difficulty urinating, dysuria and frequency.    Musculoskeletal:  Positive for arthralgias and back pain (intermittent episodes of achy left lower back pain). Negative for gait problem.   Skin:  Negative for itching, rash and wound.   Neurological:  Positive for numbness (involving feet). Negative for dizziness, extremity weakness, gait problem, headaches, seizures and speech difficulty.   Hematological:  Negative for adenopathy. Does not bruise/bleed easily.      Current Medications: Reviewed  Allergies: Reviewed  PMH/FH/SH:  Reviewed      Physical Exam:    Body surface area is 2.02 meters squared.    Wt Readings from Last 3 Encounters:   01/03/25 84.4 kg (186 lb)   12/31/24 85.1 kg (187 lb 9.8 oz)   12/10/24 84.9 kg (187 lb 3.2 oz)        Temp  Readings from Last 3 Encounters:   01/03/25 (!) 97.3 °F (36.3 °C) (Temporal)   12/31/24 97.8 °F (36.6 °C) (Temporal)   12/10/24 (!) 97 °F (36.1 °C) (Temporal)        BP Readings from Last 3 Encounters:   01/03/25 122/79   12/31/24 112/74   12/10/24 104/75         Pulse Readings from Last 3 Encounters:   01/03/25 76   12/31/24 72   12/10/24 72        Physical Exam  Vitals reviewed.   Constitutional:       General: He is not in acute distress.     Appearance: He is not ill-appearing, toxic-appearing or diaphoretic.   HENT:      Head: Normocephalic and atraumatic.      Mouth/Throat:      Mouth: Mucous membranes are moist.      Pharynx: No oropharyngeal exudate or posterior oropharyngeal erythema.   Eyes:      General: No scleral icterus.     Extraocular Movements: Extraocular movements intact.      Conjunctiva/sclera: Conjunctivae normal.   Cardiovascular:      Rate and Rhythm: Normal rate and regular rhythm.      Heart sounds: No murmur heard.     No gallop.   Pulmonary:      Effort: No respiratory distress.      Breath sounds: Normal breath sounds. No wheezing, rhonchi or rales.   Abdominal:      General: Bowel sounds are normal. There is no distension.      Palpations: Abdomen is soft. There is no mass.      Tenderness: There is no abdominal tenderness.   Musculoskeletal:         General: No swelling, tenderness or deformity.      Cervical back: Normal range of motion. No rigidity.      Right lower leg: No edema.      Left lower leg: No edema.   Lymphadenopathy:      Cervical: No cervical adenopathy.   Skin:     Findings: No erythema, lesion or rash.   Neurological:      General: No focal deficit present.      Mental Status: He is alert and oriented to person, place, and time.   Psychiatric:         Mood and Affect: Mood normal.         Judgment: Judgment normal.       ECOG PS: 1

## 2025-01-03 NOTE — PATIENT INSTRUCTIONS
Please continue with current treatment. PET scan and MRI prior to Cycle 5. Follow-up with Dr. Morrell after imaging.

## 2025-01-14 DIAGNOSIS — C34.00 SMALL CELL CARCINOMA OF HILUM OF LUNG, UNSPECIFIED LATERALITY (HCC): Primary | ICD-10-CM

## 2025-01-14 RX ORDER — SODIUM CHLORIDE 9 MG/ML
20 INJECTION, SOLUTION INTRAVENOUS ONCE
Status: CANCELLED | OUTPATIENT
Start: 2025-01-21

## 2025-01-17 ENCOUNTER — APPOINTMENT (OUTPATIENT)
Dept: LAB | Facility: HOSPITAL | Age: 60
End: 2025-01-17
Payer: COMMERCIAL

## 2025-01-17 DIAGNOSIS — C34.00 SMALL CELL CARCINOMA OF HILUM OF LUNG, UNSPECIFIED LATERALITY (HCC): ICD-10-CM

## 2025-01-17 LAB
ALBUMIN SERPL BCG-MCNC: 4.3 G/DL (ref 3.5–5)
ALP SERPL-CCNC: 56 U/L (ref 34–104)
ALT SERPL W P-5'-P-CCNC: 33 U/L (ref 7–52)
ANION GAP SERPL CALCULATED.3IONS-SCNC: 7 MMOL/L (ref 4–13)
AST SERPL W P-5'-P-CCNC: 24 U/L (ref 13–39)
BASOPHILS # BLD AUTO: 0.03 THOUSANDS/ΜL (ref 0–0.1)
BASOPHILS NFR BLD AUTO: 1 % (ref 0–1)
BILIRUB SERPL-MCNC: 0.46 MG/DL (ref 0.2–1)
BUN SERPL-MCNC: 15 MG/DL (ref 5–25)
CALCIUM SERPL-MCNC: 9.2 MG/DL (ref 8.4–10.2)
CHLORIDE SERPL-SCNC: 104 MMOL/L (ref 96–108)
CK SERPL-CCNC: 99 U/L (ref 39–308)
CO2 SERPL-SCNC: 28 MMOL/L (ref 21–32)
CREAT SERPL-MCNC: 0.92 MG/DL (ref 0.6–1.3)
EOSINOPHIL # BLD AUTO: 0.19 THOUSAND/ΜL (ref 0–0.61)
EOSINOPHIL NFR BLD AUTO: 4 % (ref 0–6)
ERYTHROCYTE [DISTWIDTH] IN BLOOD BY AUTOMATED COUNT: 13.6 % (ref 11.6–15.1)
GFR SERPL CREATININE-BSD FRML MDRD: 90 ML/MIN/1.73SQ M
GLUCOSE P FAST SERPL-MCNC: 94 MG/DL (ref 65–99)
HCT VFR BLD AUTO: 37.6 % (ref 36.5–49.3)
HGB BLD-MCNC: 12.4 G/DL (ref 12–17)
IMM GRANULOCYTES # BLD AUTO: 0.01 THOUSAND/UL (ref 0–0.2)
IMM GRANULOCYTES NFR BLD AUTO: 0 % (ref 0–2)
LDH SERPL-CCNC: 198 U/L (ref 140–271)
LYMPHOCYTES # BLD AUTO: 1.07 THOUSANDS/ΜL (ref 0.6–4.47)
LYMPHOCYTES NFR BLD AUTO: 24 % (ref 14–44)
MAGNESIUM SERPL-MCNC: 2.1 MG/DL (ref 1.9–2.7)
MCH RBC QN AUTO: 32.2 PG (ref 26.8–34.3)
MCHC RBC AUTO-ENTMCNC: 33 G/DL (ref 31.4–37.4)
MCV RBC AUTO: 98 FL (ref 82–98)
MONOCYTES # BLD AUTO: 0.88 THOUSAND/ΜL (ref 0.17–1.22)
MONOCYTES NFR BLD AUTO: 20 % (ref 4–12)
NEUTROPHILS # BLD AUTO: 2.27 THOUSANDS/ΜL (ref 1.85–7.62)
NEUTS SEG NFR BLD AUTO: 51 % (ref 43–75)
NRBC BLD AUTO-RTO: 0 /100 WBCS
PLATELET # BLD AUTO: 290 THOUSANDS/UL (ref 149–390)
PMV BLD AUTO: 9.9 FL (ref 8.9–12.7)
POTASSIUM SERPL-SCNC: 4.3 MMOL/L (ref 3.5–5.3)
PROT SERPL-MCNC: 7.6 G/DL (ref 6.4–8.4)
RBC # BLD AUTO: 3.85 MILLION/UL (ref 3.88–5.62)
SODIUM SERPL-SCNC: 139 MMOL/L (ref 135–147)
WBC # BLD AUTO: 4.45 THOUSAND/UL (ref 4.31–10.16)

## 2025-01-17 PROCEDURE — 85025 COMPLETE CBC W/AUTO DIFF WBC: CPT

## 2025-01-17 PROCEDURE — 82550 ASSAY OF CK (CPK): CPT

## 2025-01-17 PROCEDURE — 36415 COLL VENOUS BLD VENIPUNCTURE: CPT

## 2025-01-17 PROCEDURE — 80053 COMPREHEN METABOLIC PANEL: CPT

## 2025-01-17 PROCEDURE — 83615 LACTATE (LD) (LDH) ENZYME: CPT

## 2025-01-17 PROCEDURE — 83735 ASSAY OF MAGNESIUM: CPT

## 2025-01-21 ENCOUNTER — HOSPITAL ENCOUNTER (OUTPATIENT)
Dept: INFUSION CENTER | Facility: HOSPITAL | Age: 60
Discharge: HOME | End: 2025-01-21
Attending: INTERNAL MEDICINE
Payer: COMMERCIAL

## 2025-01-21 VITALS
BODY MASS INDEX: 27.4 KG/M2 | HEIGHT: 70 IN | HEART RATE: 77 BPM | SYSTOLIC BLOOD PRESSURE: 121 MMHG | OXYGEN SATURATION: 96 % | RESPIRATION RATE: 17 BRPM | DIASTOLIC BLOOD PRESSURE: 75 MMHG | WEIGHT: 191.36 LBS | TEMPERATURE: 97.3 F

## 2025-01-21 DIAGNOSIS — C34.00 SMALL CELL CARCINOMA OF HILUM OF LUNG, UNSPECIFIED LATERALITY (HCC): Primary | ICD-10-CM

## 2025-01-21 RX ORDER — SODIUM CHLORIDE 9 MG/ML
20 INJECTION, SOLUTION INTRAVENOUS ONCE
Status: COMPLETED | OUTPATIENT
Start: 2025-01-21 | End: 2025-01-21

## 2025-01-21 RX ADMIN — SODIUM CHLORIDE 20 ML/HR: 0.9 INJECTION, SOLUTION INTRAVENOUS at 08:44

## 2025-01-21 RX ADMIN — LURBINECTEDIN 6.4 MG: 0.5 INJECTION, POWDER, LYOPHILIZED, FOR SOLUTION INTRAVENOUS at 09:36

## 2025-01-21 RX ADMIN — DEXAMETHASONE SODIUM PHOSPHATE: 100 INJECTION INTRAMUSCULAR; INTRAVENOUS at 08:44

## 2025-01-21 NOTE — PROGRESS NOTES
Nino Silva  tolerated treatment well with no complications.      Nino Silva is aware of future appt on 02/11/2025 at 08:30 AM.     AVS printed and given to Nino Silva:  Yes

## 2025-01-27 ENCOUNTER — OFFICE VISIT (OUTPATIENT)
Age: 60
End: 2025-01-27
Payer: COMMERCIAL

## 2025-01-27 VITALS
WEIGHT: 190.6 LBS | BODY MASS INDEX: 27.29 KG/M2 | DIASTOLIC BLOOD PRESSURE: 70 MMHG | HEIGHT: 70 IN | TEMPERATURE: 97 F | OXYGEN SATURATION: 95 % | SYSTOLIC BLOOD PRESSURE: 122 MMHG | HEART RATE: 70 BPM

## 2025-01-27 DIAGNOSIS — C79.31 LUNG CANCER METASTATIC TO BRAIN (HCC): ICD-10-CM

## 2025-01-27 DIAGNOSIS — C34.90 LUNG CANCER METASTATIC TO BRAIN (HCC): ICD-10-CM

## 2025-01-27 DIAGNOSIS — C34.90 SMALL CELL CARCINOMA OF LUNG, UNSPECIFIED LATERALITY, UNSPECIFIED PART OF LUNG (HCC): ICD-10-CM

## 2025-01-27 DIAGNOSIS — J44.9 CHRONIC OBSTRUCTIVE PULMONARY DISEASE, UNSPECIFIED COPD TYPE (HCC): Primary | ICD-10-CM

## 2025-01-27 PROCEDURE — 99213 OFFICE O/P EST LOW 20 MIN: CPT | Performed by: INTERNAL MEDICINE

## 2025-01-27 NOTE — PROGRESS NOTES
"Pulmonary Follow Up Note  Nino Silva 59 y.o. male MRN: [id number]  1/27/2025      HPI:    Patient has an acute URI at this time.  + Cough with sputum production.  Otherwise, only tried Anoro for 1 day and stopped.    Exercise Tolerance: Currently diminished due to acute URI.  Otherwise fairly normal.       Meds:  No inhaled medications    ROS:  Constitutional: - Fatigue, - chills, - fever, - weight change.   HEENT: - rhinorrhea, - sneezing, - sore throat.    Respiratory: + Productive cough, - shortness of breath, - wheezing.    Cardiovascular: - chest pain,  -palpitations, - leg swelling.   Gastrointestinal: - abdominal pain, - constipation, - diarrhea, - nausea, - vomiting.   Endocrine: - cold intolerance, - heat intolerance.   Genitourinary: - dysuria.   Musculoskeletal: - arthralgias.   Skin:- rash, - wound.   Allergic/Immunologic: - allergies  Neurological: - dizziness, - numbness        Vitals: Blood pressure 122/70, pulse 70, temperature (!) 97 °F (36.1 °C), temperature source Tympanic, height 5' 10\" (1.778 m), weight 86.5 kg (190 lb 9.6 oz), SpO2 95%., Body mass index is 27.35 kg/m².    Physical Exam:  GEN  NAD  HEENT  ncat, non icteric, MM moist  NECK  supple, no JVD, no LAD  CV  +s1s2, no mrg, RRR  PULM right basilar rales, no wheeze, no rhonchi  ABD  soft, ntnd  EXT  no cyanosis, no clubbing  NEURO  Aox3, no focal weakness      Pulmonary function testing:   PFT 8/12/2024 shows normal spirometry and lung volumes.       Assessment:  COPD, Gold stage 0-not in exacerbation  Acute upper respiratory tract infection  Small cell lung cancer  Suspected IPF    Plan:  Again recommended trialing Anoro daily for at least 1 to 2 weeks.  Supplemental oxygen as needed for goal SpO2 of 88% to 92%.  Small cell lung cancer treatment per oncology  Do not recommend any antifibrotic medications for IPF at this time.  Patient will call pulmonary office with any worsening or development of new pulmonary symptoms prior to next " "visit.    Return visit in 4 to 5 months  On next visit we will evaluate symptoms, success with Anoro, oncology follow-ups    Note: Portions of the record may have been created with voice recognition software. Occasional wrong word or \"sound a like\" substitutions may have occurred due to the inherent limitations of voice recognition software. Read the chart carefully and recognize, using context, where substitutions have occurred.     Harmeet Webster M.D.  [facility]    "

## 2025-01-29 ENCOUNTER — TELEPHONE (OUTPATIENT)
Age: 60
End: 2025-01-29

## 2025-01-29 NOTE — TELEPHONE ENCOUNTER
Called patient back to advise that 2/12 appt will remain at 820 am.  Office was able to make another adjustment.

## 2025-01-29 NOTE — TELEPHONE ENCOUNTER
Called patient to see if appt on 2/12 could be pushed back from 820 am to 940 am due to a change in the provider's schedule.  Patient would prefer to keep 820 am.  Explained office would try another patient and get back to patient.

## 2025-02-05 ENCOUNTER — HOSPITAL ENCOUNTER (OUTPATIENT)
Dept: MRI IMAGING | Facility: HOSPITAL | Age: 60
Discharge: HOME | End: 2025-02-05
Attending: INTERNAL MEDICINE
Payer: COMMERCIAL

## 2025-02-05 DIAGNOSIS — C34.00 SMALL CELL CARCINOMA OF HILUM OF LUNG, UNSPECIFIED LATERALITY (HCC): ICD-10-CM

## 2025-02-05 PROCEDURE — 70553 MRI BRAIN STEM W/O & W/DYE: CPT

## 2025-02-05 PROCEDURE — A9585 GADOBUTROL INJECTION: HCPCS | Performed by: INTERNAL MEDICINE

## 2025-02-05 RX ORDER — GADOBUTROL 604.72 MG/ML
8 INJECTION INTRAVENOUS
Status: COMPLETED | OUTPATIENT
Start: 2025-02-05 | End: 2025-02-05

## 2025-02-05 RX ADMIN — GADOBUTROL 8 ML: 604.72 INJECTION INTRAVENOUS at 09:10

## 2025-02-07 ENCOUNTER — APPOINTMENT (OUTPATIENT)
Dept: LAB | Facility: HOSPITAL | Age: 60
End: 2025-02-07
Payer: COMMERCIAL

## 2025-02-07 ENCOUNTER — HOSPITAL ENCOUNTER (OUTPATIENT)
Dept: NUCLEAR MEDICINE | Facility: HOSPITAL | Age: 60
End: 2025-02-07
Attending: INTERNAL MEDICINE
Payer: COMMERCIAL

## 2025-02-07 DIAGNOSIS — C34.00 SMALL CELL CARCINOMA OF HILUM OF LUNG, UNSPECIFIED LATERALITY (HCC): ICD-10-CM

## 2025-02-07 LAB
ALBUMIN SERPL BCG-MCNC: 4.2 G/DL (ref 3.5–5)
ALP SERPL-CCNC: 56 U/L (ref 34–104)
ALT SERPL W P-5'-P-CCNC: 46 U/L (ref 7–52)
ANION GAP SERPL CALCULATED.3IONS-SCNC: 8 MMOL/L (ref 4–13)
AST SERPL W P-5'-P-CCNC: 30 U/L (ref 13–39)
BASOPHILS # BLD AUTO: 0.03 THOUSANDS/ΜL (ref 0–0.1)
BASOPHILS NFR BLD AUTO: 1 % (ref 0–1)
BILIRUB SERPL-MCNC: 0.4 MG/DL (ref 0.2–1)
BUN SERPL-MCNC: 21 MG/DL (ref 5–25)
CALCIUM SERPL-MCNC: 9.2 MG/DL (ref 8.4–10.2)
CHLORIDE SERPL-SCNC: 103 MMOL/L (ref 96–108)
CHOLEST SERPL-MCNC: 260 MG/DL (ref ?–200)
CK SERPL-CCNC: 70 U/L (ref 39–308)
CO2 SERPL-SCNC: 28 MMOL/L (ref 21–32)
CREAT SERPL-MCNC: 0.87 MG/DL (ref 0.6–1.3)
EOSINOPHIL # BLD AUTO: 0.23 THOUSAND/ΜL (ref 0–0.61)
EOSINOPHIL NFR BLD AUTO: 5 % (ref 0–6)
ERYTHROCYTE [DISTWIDTH] IN BLOOD BY AUTOMATED COUNT: 13.2 % (ref 11.6–15.1)
GFR SERPL CREATININE-BSD FRML MDRD: 93 ML/MIN/1.73SQ M
GLUCOSE P FAST SERPL-MCNC: 99 MG/DL (ref 65–99)
GLUCOSE SERPL-MCNC: 99 MG/DL (ref 65–140)
HCT VFR BLD AUTO: 38.3 % (ref 36.5–49.3)
HDLC SERPL-MCNC: 55 MG/DL
HGB BLD-MCNC: 12.4 G/DL (ref 12–17)
IMM GRANULOCYTES # BLD AUTO: 0.04 THOUSAND/UL (ref 0–0.2)
IMM GRANULOCYTES NFR BLD AUTO: 1 % (ref 0–2)
LDH SERPL-CCNC: 203 U/L (ref 140–271)
LDLC SERPL CALC-MCNC: 180 MG/DL (ref 0–100)
LYMPHOCYTES # BLD AUTO: 1.08 THOUSANDS/ΜL (ref 0.6–4.47)
LYMPHOCYTES NFR BLD AUTO: 21 % (ref 14–44)
MAGNESIUM SERPL-MCNC: 2 MG/DL (ref 1.9–2.7)
MCH RBC QN AUTO: 31.4 PG (ref 26.8–34.3)
MCHC RBC AUTO-ENTMCNC: 32.4 G/DL (ref 31.4–37.4)
MCV RBC AUTO: 97 FL (ref 82–98)
MONOCYTES # BLD AUTO: 0.85 THOUSAND/ΜL (ref 0.17–1.22)
MONOCYTES NFR BLD AUTO: 17 % (ref 4–12)
NEUTROPHILS # BLD AUTO: 2.92 THOUSANDS/ΜL (ref 1.85–7.62)
NEUTS SEG NFR BLD AUTO: 55 % (ref 43–75)
NONHDLC SERPL-MCNC: 205 MG/DL
NRBC BLD AUTO-RTO: 0 /100 WBCS
PLATELET # BLD AUTO: 333 THOUSANDS/UL (ref 149–390)
PMV BLD AUTO: 9.3 FL (ref 8.9–12.7)
POTASSIUM SERPL-SCNC: 4.2 MMOL/L (ref 3.5–5.3)
PROT SERPL-MCNC: 7.7 G/DL (ref 6.4–8.4)
RBC # BLD AUTO: 3.95 MILLION/UL (ref 3.88–5.62)
SODIUM SERPL-SCNC: 139 MMOL/L (ref 135–147)
TRIGL SERPL-MCNC: 123 MG/DL (ref ?–150)
WBC # BLD AUTO: 5.15 THOUSAND/UL (ref 4.31–10.16)

## 2025-02-07 PROCEDURE — A9552 F18 FDG: HCPCS

## 2025-02-07 PROCEDURE — 36415 COLL VENOUS BLD VENIPUNCTURE: CPT

## 2025-02-07 PROCEDURE — 83615 LACTATE (LD) (LDH) ENZYME: CPT

## 2025-02-07 PROCEDURE — 82550 ASSAY OF CK (CPK): CPT

## 2025-02-07 PROCEDURE — 80061 LIPID PANEL: CPT

## 2025-02-07 PROCEDURE — 78815 PET IMAGE W/CT SKULL-THIGH: CPT

## 2025-02-07 PROCEDURE — 82948 REAGENT STRIP/BLOOD GLUCOSE: CPT

## 2025-02-07 PROCEDURE — 85025 COMPLETE CBC W/AUTO DIFF WBC: CPT

## 2025-02-07 PROCEDURE — 80053 COMPREHEN METABOLIC PANEL: CPT

## 2025-02-07 PROCEDURE — 83735 ASSAY OF MAGNESIUM: CPT

## 2025-02-09 NOTE — PROGRESS NOTES
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 confusion, lethargy, or disorientation (among other symptoms) occurred in 9 percent. It most frequently occurred following cycle 2 day 1 and had a median time of onset from first dose of 29.5 days. Further discussion of clinical features and management of ICANS is found elsewhere           Review of Systems   Constitutional:  Positive for fatigue.   HENT: Negative.     Eyes: Negative.    Respiratory: Negative.     Cardiovascular: Negative.    Gastrointestinal: Negative.    Endocrine: Negative.    Genitourinary: Negative.    Musculoskeletal: Negative.    Neurological:  Positive for headaches.   Hematological: Negative.    Psychiatric/Behavioral: Negative.             Objective   There were no vitals taken for this visit.    Pain Screening:     ECOG   0-1  Physical Exam  Vitals reviewed.   HENT:      Head: Normocephalic and atraumatic.      Nose: Nose normal.      Mouth/Throat:      Mouth: Mucous membranes are moist.      Pharynx: Oropharynx is clear.   Eyes:      Pupils: Pupils are equal, round, and reactive to light.   Cardiovascular:      Rate and Rhythm: Normal rate.      Pulses: Normal pulses.   Pulmonary:      Effort: Pulmonary effort is normal.   Abdominal:      General: Bowel sounds are normal.      Palpations: Abdomen is soft.   Musculoskeletal:         General: Normal range of motion.      Cervical  back: Normal range of motion.   Skin:     General: Skin is warm.   Neurological:      General: No focal deficit present.      Mental Status: He is alert and oriented to person, place, and time.   Psychiatric:         Mood and Affect: Mood normal.         Labs: I have reviewed the following labs:  Lab Results   Component Value Date/Time    WBC 5.15 02/07/2025 07:16 AM    RBC 3.95 02/07/2025 07:16 AM    Hemoglobin 12.4 02/07/2025 07:16 AM    Hematocrit 38.3 02/07/2025 07:16 AM    MCV 97 02/07/2025 07:16 AM    MCH 31.4 02/07/2025 07:16 AM    RDW 13.2 02/07/2025 07:16 AM    Platelets 333 02/07/2025 07:16 AM    Segmented % 55 02/07/2025 07:16 AM    Lymphocytes % 21 02/07/2025 07:16 AM    Monocytes % 17 (H) 02/07/2025 07:16 AM    Eosinophils Relative 5 02/07/2025 07:16 AM    Basophils Relative 1 02/07/2025 07:16 AM    Immature Grans % 1 02/07/2025 07:16 AM    Absolute Neutrophils 2.92 02/07/2025 07:16 AM     Lab Results   Component Value Date/Time    Potassium 4.2 02/07/2025 07:16 AM    Chloride 103 02/07/2025 07:16 AM    CO2 28 02/07/2025 07:16 AM    BUN 21 02/07/2025 07:16 AM    Creatinine 0.87 02/07/2025 07:16 AM    Glucose, Fasting 99 02/07/2025 07:16 AM    Calcium 9.2 02/07/2025 07:16 AM    AST 30 02/07/2025 07:16 AM    ALT 46 02/07/2025 07:16 AM    Alkaline Phosphatase 56 02/07/2025 07:16 AM    Total Protein 7.7 02/07/2025 07:16 AM    Albumin 4.2 02/07/2025 07:16 AM    Total Bilirubin 0.40 02/07/2025 07:16 AM    eGFR 93 02/07/2025 07:16 AM             Administrative Statements   I have spent a total time of 40 minutes in caring for this patient on the day of the visit/encounter including Prognosis, Patient and family education, Impressions, Counseling / Coordination of care, Documenting in the medical record, Reviewing / ordering tests, medicine, procedures  , and Obtaining or reviewing history  .

## 2025-02-09 NOTE — ASSESSMENT & PLAN NOTE
Mr Silva is  a 58-year-old gentleman who presented with exertional dyspnea, feelings of confusion/brain fogginess.  Workup done in ED demonstrates findings of a large mass in the chest along with adenopathy consistent with a primary lung cancer as well as multiple masses in the brain concerning for brain metastases        1/3/2025      He is status post 3 cycles of lurbinectedin.      Other than occasional joint aches and intermittent episodes of left lower back pain, patient denied any acute complaints and has been able to tolerate lurbinectedin fairly well.      Patient was recommended to continue treatment with lurbinectedin every 3 weeks.       Cycle 4 on 1/21/2025.          2/12/2025    Now s/p 4 cycles lurbinectedin  with clear PD    PET 2/7/2025      IMPRESSION:  1.  The right upper lobe and right perihilar masses have both mildly increased in size.  2.  Multiple FDG avid mediastinal lymph nodes persist. New FDG avid right inferior hilar activity. Suspected developing right sided subcarinal adenopathy  3.  Interval improvement of previously seen FDG avid left para-aortic lymph node  4.  Interval development of several FDG avid osseous metastases  5.  FDG avid left cerebellar hemisphere lesion. Please refer to prior MRI      MRI brain 2/5/2025      New left supratentorial reference lesions:     7 mm left centrum semiovale white matter lesion on series 10 image 217 with surrounding edema.     3 mm juxtacortical anterior left frontal lesion seen on series 10 image 199.     4 mm lateral left parietal cortical lesion seen on series 10 image 174     4 mm left frontal opercular lesion seen on series 10 image 174     7 mm x 6 mm ring-enhancing lateral left temporal lesion with surrounding vasogenic edema on series 10 image 125.     5 mm left parafalcine occipital lesion seen on series 10 image 150.     New right supratentorial reference lesions:     3 mm juxtacortical anterior right frontal lesion seen on series 10  image 226     8 mm parafalcine right parietal lesion seen on series 10 image 2021 with surrounding edema.     3 mm periventricular right parietal white matter lesion seen on series 10 image 170     3 mm right parietal periventricular white matter lesion seen on series 10 image 185     4 mm right parafalcine occipital lesion seen on series 10 image 150.     Multiple additional small lesions are noted involving the right basal ganglia, right thalamus, involving the body of the corpus callosum centered to the right of midline, and involving the juxtacortical right frontal lobe.     New infratentorial reference lesions:     Dominant 1.2 cm x 1.1 cm predominately solidly enhancing lesion lateral left cerebellar hemisphere on series 10 image 76.     9 mm x 7 mm enhancing lesion left parietal lobe posterior to the vermis seen on series 10 image 75.     6 mm right cerebellar enhancing lesion posteriorly seen on series 10 image 94.     4 mm right lateral cerebellar lesion seen on series 10 image 94.     7 mm right ventrolateral pontine enhancing lesion on series 10 image 112 with surrounding edema.     7 mm left posterior lateral pontine enhancing lesion seen on series 10 image 112.     Additional smaller enhancing lesions noted involving the central and posterior mitchel, as well as the left cerebellar hemisphere.     Previous lesions:     Previous partially cystic, treated metastatic lesions with peripheral enhancement appear grossly unchanged involving the bilateral cerebral hemispheres, and the posterior right cerebellar hemisphere.        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     Is treated with  decadron 10 mg IV q 6 or solumedrol 1 mg/kg q 12 depending on grade    Premed with decadron    Other symptoms besides CRS or ICANS may be hypersensitivity reactions, fevers, myalgias, LFT elevations    Patient would prefer to start after 2 week vacation with family in early March; thus plan after 15th March    No symptoms except occasional balance issues, headaches    As has significant brain lesions supratentorial as well as infratentorial, concern with potential leptomeningeal disease; if headaches worsen, persist, may consider LP/paraneoplastic issues    May consider gamma knife if any lesions become symptomatic neurologically    Has some vasogenic edema; will try trial of low dose steroids to see if headaches/balance improve at 5 mg daily prednisone    Has new FDG uptake T11 and 5 and is having mid back pain-no neurological issues but will get MRI T/L spine      If 3rd line therapy with tarlatamab is ineffective or insurance does not pay, then will attempt Topotecan     Summary/Recommendations    MRI T/L spine    Low dose prednisone for vasogenic edema/headaches; if any worsening neurological symptoms consider LP    Tarlatamab/admission post C1D1    Follow up after returns from family vacation mid March 2025

## 2025-02-10 ENCOUNTER — TELEPHONE (OUTPATIENT)
Dept: HEMATOLOGY ONCOLOGY | Facility: CLINIC | Age: 60
End: 2025-02-10

## 2025-02-10 NOTE — TELEPHONE ENCOUNTER
Called and spoke to Nino. Based on his scans, Dr. Morrell would like to hold off on his treatment tomorrow. She is going to be discussing a new treatment plan with him on Wednesday when she sees him in office and reviews the scans. Nino verbalize understanding.     [facility] infusion made aware to cancel future treatment appointments

## 2025-02-11 ENCOUNTER — HOSPITAL ENCOUNTER (OUTPATIENT)
Dept: INFUSION CENTER | Facility: HOSPITAL | Age: 60
Discharge: HOME | End: 2025-02-11
Attending: INTERNAL MEDICINE

## 2025-02-12 ENCOUNTER — OFFICE VISIT (OUTPATIENT)
Age: 60
End: 2025-02-12
Payer: COMMERCIAL

## 2025-02-12 VITALS
SYSTOLIC BLOOD PRESSURE: 107 MMHG | WEIGHT: 192 LBS | RESPIRATION RATE: 16 BRPM | BODY MASS INDEX: 27.49 KG/M2 | HEART RATE: 68 BPM | HEIGHT: 70 IN | TEMPERATURE: 97 F | DIASTOLIC BLOOD PRESSURE: 70 MMHG | OXYGEN SATURATION: 95 %

## 2025-02-12 DIAGNOSIS — C34.00 SMALL CELL CARCINOMA OF HILUM OF LUNG, UNSPECIFIED LATERALITY (HCC): Primary | ICD-10-CM

## 2025-02-12 DIAGNOSIS — Z29.89 IMMUNOTHERAPY: ICD-10-CM

## 2025-02-12 PROCEDURE — 99215 OFFICE O/P EST HI 40 MIN: CPT | Performed by: INTERNAL MEDICINE

## 2025-02-12 RX ORDER — ONDANSETRON 2 MG/ML
4 INJECTION INTRAMUSCULAR; INTRAVENOUS EVERY 6 HOURS PRN
OUTPATIENT
Start: 2025-02-12

## 2025-02-12 RX ORDER — PREDNISONE 5 MG/1
5 TABLET ORAL DAILY
Qty: 100 TABLET | Refills: 1 | Status: SHIPPED | OUTPATIENT
Start: 2025-02-12

## 2025-02-12 RX ORDER — SODIUM CHLORIDE 9 MG/ML
20 INJECTION, SOLUTION INTRAVENOUS ONCE AS NEEDED
OUTPATIENT
Start: 2025-02-12

## 2025-02-12 RX ORDER — SODIUM CHLORIDE 9 MG/ML
20 INJECTION, SOLUTION INTRAVENOUS ONCE
OUTPATIENT
Start: 2025-02-26

## 2025-02-12 RX ORDER — SODIUM CHLORIDE 9 MG/ML
20 INJECTION, SOLUTION INTRAVENOUS ONCE AS NEEDED
OUTPATIENT
Start: 2025-02-19

## 2025-02-13 ENCOUNTER — TELEPHONE (OUTPATIENT)
Age: 60
End: 2025-02-13

## 2025-02-25 DIAGNOSIS — C34.00 SMALL CELL CARCINOMA OF HILUM OF LUNG, UNSPECIFIED LATERALITY (HCC): Primary | ICD-10-CM

## 2025-03-11 ENCOUNTER — TELEPHONE (OUTPATIENT)
Dept: HEMATOLOGY ONCOLOGY | Facility: CLINIC | Age: 60
End: 2025-03-11

## 2025-03-11 ENCOUNTER — PATIENT OUTREACH (OUTPATIENT)
Dept: HEMATOLOGY ONCOLOGY | Facility: CLINIC | Age: 60
End: 2025-03-11

## 2025-03-11 NOTE — TELEPHONE ENCOUNTER
Called and left voicemail for Nino. I wanted to confirm that he will be returning from vacation on Friday so we can try to admit him for inpatient chemo on Monday, 3/17. Call back requested and number provided.

## 2025-03-11 NOTE — PROGRESS NOTES
Outreach to Nino to introduce myself as his patient navigator. 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 He took a vacation this past week, and said he has not been feeling so good. He has been losing his balance, and he seems to be progressing in a downhill way. He stated he also feels very weak. He and his wife are looking forward to see Dr Morrell to get his treatment plan, to start to feel better. I told him I will touch base after that appointment, so we know the plan. He and his wife were very appreciative.

## 2025-03-13 ENCOUNTER — TELEPHONE (OUTPATIENT)
Age: 60
End: 2025-03-13

## 2025-03-13 DIAGNOSIS — C34.00 SMALL CELL CARCINOMA OF HILUM OF LUNG, UNSPECIFIED LATERALITY (HCC): Primary | ICD-10-CM

## 2025-03-13 NOTE — TELEPHONE ENCOUNTER
Called and spoke to Nino. Dr. Morrell is okay with him taking 2 mg of decadron to help his headaches. We are also working on getting him admitted on Monday, 3/17, to start his new treatment. Hopefully, once he starts treatments, his symptoms will start to improve. Someone will be reaching out to him to let him know when and where he will need to arrive on Monday. Nino verbalized understanding.

## 2025-03-14 ENCOUNTER — APPOINTMENT (OUTPATIENT)
Dept: LAB | Facility: HOSPITAL | Age: 60
End: 2025-03-14
Payer: COMMERCIAL

## 2025-03-14 ENCOUNTER — TELEPHONE (OUTPATIENT)
Dept: HEMATOLOGY ONCOLOGY | Facility: CLINIC | Age: 60
End: 2025-03-14

## 2025-03-14 DIAGNOSIS — C34.00 SMALL CELL CARCINOMA OF HILUM OF LUNG, UNSPECIFIED LATERALITY (HCC): ICD-10-CM

## 2025-03-14 LAB
ALBUMIN SERPL BCG-MCNC: 4.5 G/DL (ref 3.5–5)
ALP SERPL-CCNC: 54 U/L (ref 34–104)
ALT SERPL W P-5'-P-CCNC: 19 U/L (ref 7–52)
ANION GAP SERPL CALCULATED.3IONS-SCNC: 8 MMOL/L (ref 4–13)
AST SERPL W P-5'-P-CCNC: 20 U/L (ref 13–39)
BASOPHILS # BLD AUTO: 0.04 THOUSANDS/ÂΜL (ref 0–0.1)
BASOPHILS NFR BLD AUTO: 1 % (ref 0–1)
BILIRUB SERPL-MCNC: 0.65 MG/DL (ref 0.2–1)
BUN SERPL-MCNC: 20 MG/DL (ref 5–25)
CALCIUM SERPL-MCNC: 9.5 MG/DL (ref 8.4–10.2)
CHLORIDE SERPL-SCNC: 101 MMOL/L (ref 96–108)
CO2 SERPL-SCNC: 29 MMOL/L (ref 21–32)
CREAT SERPL-MCNC: 0.91 MG/DL (ref 0.6–1.3)
EOSINOPHIL # BLD AUTO: 0.25 THOUSAND/ÂΜL (ref 0–0.61)
EOSINOPHIL NFR BLD AUTO: 3 % (ref 0–6)
ERYTHROCYTE [DISTWIDTH] IN BLOOD BY AUTOMATED COUNT: 13.1 % (ref 11.6–15.1)
GFR SERPL CREATININE-BSD FRML MDRD: 91 ML/MIN/1.73SQ M
GLUCOSE SERPL-MCNC: 99 MG/DL (ref 65–140)
HCT VFR BLD AUTO: 40.4 % (ref 36.5–49.3)
HGB BLD-MCNC: 13.2 G/DL (ref 12–17)
IMM GRANULOCYTES # BLD AUTO: 0.04 THOUSAND/UL (ref 0–0.2)
IMM GRANULOCYTES NFR BLD AUTO: 1 % (ref 0–2)
LDH SERPL-CCNC: 250 U/L (ref 140–271)
LYMPHOCYTES # BLD AUTO: 1.19 THOUSANDS/ÂΜL (ref 0.6–4.47)
LYMPHOCYTES NFR BLD AUTO: 15 % (ref 14–44)
MAGNESIUM SERPL-MCNC: 2.1 MG/DL (ref 1.9–2.7)
MCH RBC QN AUTO: 31.4 PG (ref 26.8–34.3)
MCHC RBC AUTO-ENTMCNC: 32.7 G/DL (ref 31.4–37.4)
MCV RBC AUTO: 96 FL (ref 82–98)
MONOCYTES # BLD AUTO: 0.8 THOUSAND/ÂΜL (ref 0.17–1.22)
MONOCYTES NFR BLD AUTO: 10 % (ref 4–12)
NEUTROPHILS # BLD AUTO: 5.88 THOUSANDS/ÂΜL (ref 1.85–7.62)
NEUTS SEG NFR BLD AUTO: 70 % (ref 43–75)
NRBC BLD AUTO-RTO: 0 /100 WBCS
PLATELET # BLD AUTO: 292 THOUSANDS/UL (ref 149–390)
PMV BLD AUTO: 9.7 FL (ref 8.9–12.7)
POTASSIUM SERPL-SCNC: 4.2 MMOL/L (ref 3.5–5.3)
PROT SERPL-MCNC: 7.6 G/DL (ref 6.4–8.4)
RBC # BLD AUTO: 4.21 MILLION/UL (ref 3.88–5.62)
SODIUM SERPL-SCNC: 138 MMOL/L (ref 135–147)
WBC # BLD AUTO: 8.2 THOUSAND/UL (ref 4.31–10.16)

## 2025-03-14 PROCEDURE — 36415 COLL VENOUS BLD VENIPUNCTURE: CPT

## 2025-03-14 PROCEDURE — 83615 LACTATE (LD) (LDH) ENZYME: CPT

## 2025-03-14 PROCEDURE — 80053 COMPREHEN METABOLIC PANEL: CPT

## 2025-03-14 PROCEDURE — 85025 COMPLETE CBC W/AUTO DIFF WBC: CPT

## 2025-03-14 PROCEDURE — 83735 ASSAY OF MAGNESIUM: CPT

## 2025-03-14 NOTE — TELEPHONE ENCOUNTER
I phoned the patient and was able to speak with him and his wife on speaker phone. We reviewed that that I was calling from [facility], Dr. Morrell's office, regarding his admission on Monday 3/17 to [facility] for his chemotherapy. We also reviewed the hospital address, how to enter the campus at Entrance [address] ([address]), where to park for this entrance, and how to reach P9 from this entrance. Additionally, we reviewed that he should arrive to the floor as close to 0800 as he is safely able. Nino and his wife expressed understanding and indicated that he will be getting his labs drawn today. I provided my direct phone number in the event that they would need assistance on Monday morning.

## 2025-03-15 ENCOUNTER — HOSPITAL ENCOUNTER (OUTPATIENT)
Dept: MRI IMAGING | Facility: HOSPITAL | Age: 60
Discharge: HOME | End: 2025-03-15
Attending: INTERNAL MEDICINE
Payer: COMMERCIAL

## 2025-03-15 DIAGNOSIS — C34.00 SMALL CELL CARCINOMA OF HILUM OF LUNG, UNSPECIFIED LATERALITY (HCC): ICD-10-CM

## 2025-03-15 PROCEDURE — 72157 MRI CHEST SPINE W/O & W/DYE: CPT

## 2025-03-15 PROCEDURE — A9585 GADOBUTROL INJECTION: HCPCS | Performed by: INTERNAL MEDICINE

## 2025-03-15 PROCEDURE — 72158 MRI LUMBAR SPINE W/O & W/DYE: CPT

## 2025-03-15 RX ORDER — GADOBUTROL 604.72 MG/ML
9 INJECTION INTRAVENOUS
Status: COMPLETED | OUTPATIENT
Start: 2025-03-15 | End: 2025-03-15

## 2025-03-15 RX ADMIN — GADOBUTROL 9 ML: 604.72 INJECTION INTRAVENOUS at 10:03

## 2025-03-17 ENCOUNTER — HOSPITAL ENCOUNTER (INPATIENT)
Facility: HOSPITAL | Age: 60
LOS: 2 days | Discharge: HOME | End: 2025-03-19
Attending: FAMILY MEDICINE
Payer: COMMERCIAL

## 2025-03-17 ENCOUNTER — TELEPHONE (OUTPATIENT)
Dept: HEMATOLOGY ONCOLOGY | Facility: CLINIC | Age: 60
End: 2025-03-17

## 2025-03-17 DIAGNOSIS — C79.31 LUNG CANCER METASTATIC TO BRAIN (HCC): Primary | ICD-10-CM

## 2025-03-17 DIAGNOSIS — C34.00 SMALL CELL CARCINOMA OF HILUM OF LUNG, UNSPECIFIED LATERALITY (HCC): Primary | ICD-10-CM

## 2025-03-17 DIAGNOSIS — C34.00 SMALL CELL CARCINOMA OF HILUM OF LUNG, UNSPECIFIED LATERALITY (HCC): ICD-10-CM

## 2025-03-17 DIAGNOSIS — C34.90 LUNG CANCER METASTATIC TO BRAIN (HCC): Primary | ICD-10-CM

## 2025-03-17 PROBLEM — E86.1 HYPOTENSION DUE TO HYPOVOLEMIA: Status: ACTIVE | Noted: 2025-03-17

## 2025-03-17 LAB
ALBUMIN SERPL BCG-MCNC: 4.1 G/DL (ref 3.5–5)
ALP SERPL-CCNC: 52 U/L (ref 34–104)
ALT SERPL W P-5'-P-CCNC: 14 U/L (ref 7–52)
ANION GAP SERPL CALCULATED.3IONS-SCNC: 9 MMOL/L (ref 4–13)
AST SERPL W P-5'-P-CCNC: 17 U/L (ref 13–39)
BASOPHILS # BLD AUTO: 0.05 THOUSANDS/ÂΜL (ref 0–0.1)
BASOPHILS NFR BLD AUTO: 0 % (ref 0–1)
BILIRUB SERPL-MCNC: 0.5 MG/DL (ref 0.2–1)
BUN SERPL-MCNC: 26 MG/DL (ref 5–25)
CALCIUM SERPL-MCNC: 9.1 MG/DL (ref 8.4–10.2)
CHLORIDE SERPL-SCNC: 102 MMOL/L (ref 96–108)
CO2 SERPL-SCNC: 28 MMOL/L (ref 21–32)
CREAT SERPL-MCNC: 0.84 MG/DL (ref 0.6–1.3)
EOSINOPHIL # BLD AUTO: 0.19 THOUSAND/ÂΜL (ref 0–0.61)
EOSINOPHIL NFR BLD AUTO: 1 % (ref 0–6)
ERYTHROCYTE [DISTWIDTH] IN BLOOD BY AUTOMATED COUNT: 13.1 % (ref 11.6–15.1)
GFR SERPL CREATININE-BSD FRML MDRD: 95 ML/MIN/1.73SQ M
GLUCOSE SERPL-MCNC: 128 MG/DL (ref 65–140)
HCT VFR BLD AUTO: 38.6 % (ref 36.5–49.3)
HGB BLD-MCNC: 12.8 G/DL (ref 12–17)
IMM GRANULOCYTES # BLD AUTO: 0.07 THOUSAND/UL (ref 0–0.2)
IMM GRANULOCYTES NFR BLD AUTO: 1 % (ref 0–2)
LYMPHOCYTES # BLD AUTO: 1.07 THOUSANDS/ÂΜL (ref 0.6–4.47)
LYMPHOCYTES NFR BLD AUTO: 8 % (ref 14–44)
MCH RBC QN AUTO: 31.3 PG (ref 26.8–34.3)
MCHC RBC AUTO-ENTMCNC: 33.2 G/DL (ref 31.4–37.4)
MCV RBC AUTO: 94 FL (ref 82–98)
MONOCYTES # BLD AUTO: 0.72 THOUSAND/ÂΜL (ref 0.17–1.22)
MONOCYTES NFR BLD AUTO: 5 % (ref 4–12)
NEUTROPHILS # BLD AUTO: 11.46 THOUSANDS/ÂΜL (ref 1.85–7.62)
NEUTS SEG NFR BLD AUTO: 85 % (ref 43–75)
NRBC BLD AUTO-RTO: 0 /100 WBCS
PLATELET # BLD AUTO: 287 THOUSANDS/UL (ref 149–390)
PMV BLD AUTO: 9.7 FL (ref 8.9–12.7)
POTASSIUM SERPL-SCNC: 3.8 MMOL/L (ref 3.5–5.3)
PROT SERPL-MCNC: 7.5 G/DL (ref 6.4–8.4)
RBC # BLD AUTO: 4.09 MILLION/UL (ref 3.88–5.62)
SODIUM SERPL-SCNC: 139 MMOL/L (ref 135–147)
TSH SERPL DL<=0.05 MIU/L-ACNC: 1 UIU/ML (ref 0.45–4.5)
WBC # BLD AUTO: 13.56 THOUSAND/UL (ref 4.31–10.16)

## 2025-03-17 PROCEDURE — 36569 INSJ PICC 5 YR+ W/O IMAGING: CPT

## 2025-03-17 PROCEDURE — 85025 COMPLETE CBC W/AUTO DIFF WBC: CPT

## 2025-03-17 PROCEDURE — 80053 COMPREHEN METABOLIC PANEL: CPT

## 2025-03-17 PROCEDURE — 84443 ASSAY THYROID STIM HORMONE: CPT | Performed by: INTERNAL MEDICINE

## 2025-03-17 PROCEDURE — 99223 1ST HOSP IP/OBS HIGH 75: CPT

## 2025-03-17 PROCEDURE — C1751 CATH, INF, PER/CENT/MIDLINE: HCPCS

## 2025-03-17 PROCEDURE — 99255 IP/OBS CONSLTJ NEW/EST HI 80: CPT | Performed by: INTERNAL MEDICINE

## 2025-03-17 RX ORDER — ENOXAPARIN SODIUM 100 MG/ML
40 INJECTION SUBCUTANEOUS DAILY
Status: DISCONTINUED | OUTPATIENT
Start: 2025-03-17 | End: 2025-03-19 | Stop reason: HOSPADM

## 2025-03-17 RX ORDER — ACETAMINOPHEN 325 MG/1
650 TABLET ORAL EVERY 6 HOURS PRN
Status: DISCONTINUED | OUTPATIENT
Start: 2025-03-17 | End: 2025-03-19 | Stop reason: HOSPADM

## 2025-03-17 RX ORDER — SODIUM CHLORIDE, SODIUM GLUCONATE, SODIUM ACETATE, POTASSIUM CHLORIDE, MAGNESIUM CHLORIDE, SODIUM PHOSPHATE, DIBASIC, AND POTASSIUM PHOSPHATE .53; .5; .37; .037; .03; .012; .00082 G/100ML; G/100ML; G/100ML; G/100ML; G/100ML; G/100ML; G/100ML
100 INJECTION, SOLUTION INTRAVENOUS CONTINUOUS
Status: DISCONTINUED | OUTPATIENT
Start: 2025-03-17 | End: 2025-03-19 | Stop reason: HOSPADM

## 2025-03-17 RX ORDER — SODIUM CHLORIDE 9 MG/ML
20 INJECTION, SOLUTION INTRAVENOUS ONCE AS NEEDED
Status: DISCONTINUED | OUTPATIENT
Start: 2025-03-17 | End: 2025-03-19 | Stop reason: HOSPADM

## 2025-03-17 RX ORDER — ONDANSETRON 2 MG/ML
4 INJECTION INTRAMUSCULAR; INTRAVENOUS EVERY 6 HOURS PRN
Status: DISCONTINUED | OUTPATIENT
Start: 2025-03-17 | End: 2025-03-19 | Stop reason: HOSPADM

## 2025-03-17 RX ORDER — CALCIUM CARBONATE 500 MG/1
1000 TABLET, CHEWABLE ORAL DAILY PRN
Status: DISCONTINUED | OUTPATIENT
Start: 2025-03-17 | End: 2025-03-19 | Stop reason: HOSPADM

## 2025-03-17 RX ORDER — SODIUM CHLORIDE, SODIUM GLUCONATE, SODIUM ACETATE, POTASSIUM CHLORIDE, MAGNESIUM CHLORIDE, SODIUM PHOSPHATE, DIBASIC, AND POTASSIUM PHOSPHATE .53; .5; .37; .037; .03; .012; .00082 G/100ML; G/100ML; G/100ML; G/100ML; G/100ML; G/100ML; G/100ML
1000 INJECTION, SOLUTION INTRAVENOUS ONCE
Status: COMPLETED | OUTPATIENT
Start: 2025-03-17 | End: 2025-03-17

## 2025-03-17 RX ORDER — PREDNISONE 5 MG/1
5 TABLET ORAL DAILY
Status: DISCONTINUED | OUTPATIENT
Start: 2025-03-17 | End: 2025-03-17

## 2025-03-17 RX ORDER — PREDNISONE 5 MG/1
5 TABLET ORAL DAILY
Status: DISCONTINUED | OUTPATIENT
Start: 2025-03-17 | End: 2025-03-19 | Stop reason: HOSPADM

## 2025-03-17 RX ADMIN — SODIUM CHLORIDE 1000 ML: 0.9 INJECTION, SOLUTION INTRAVENOUS at 16:10

## 2025-03-17 RX ADMIN — ENOXAPARIN SODIUM 40 MG: 40 INJECTION SUBCUTANEOUS at 09:35

## 2025-03-17 RX ADMIN — SODIUM CHLORIDE, SODIUM GLUCONATE, SODIUM ACETATE, POTASSIUM CHLORIDE, MAGNESIUM CHLORIDE, SODIUM PHOSPHATE, DIBASIC, AND POTASSIUM PHOSPHATE 1000 ML: .53; .5; .37; .037; .03; .012; .00082 INJECTION, SOLUTION INTRAVENOUS at 09:36

## 2025-03-17 RX ADMIN — SODIUM CHLORIDE, SODIUM GLUCONATE, SODIUM ACETATE, POTASSIUM CHLORIDE, MAGNESIUM CHLORIDE, SODIUM PHOSPHATE, DIBASIC, AND POTASSIUM PHOSPHATE 100 ML/HR: .53; .5; .37; .037; .03; .012; .00082 INJECTION, SOLUTION INTRAVENOUS at 11:35

## 2025-03-17 RX ADMIN — DEXAMETHASONE SODIUM PHOSPHATE 8 MG: 10 INJECTION, SOLUTION INTRAMUSCULAR; INTRAVENOUS at 13:43

## 2025-03-17 RX ADMIN — Medication 1 MG: at 14:59

## 2025-03-17 RX ADMIN — SODIUM CHLORIDE, SODIUM GLUCONATE, SODIUM ACETATE, POTASSIUM CHLORIDE, MAGNESIUM CHLORIDE, SODIUM PHOSPHATE, DIBASIC, AND POTASSIUM PHOSPHATE 100 ML/HR: .53; .5; .37; .037; .03; .012; .00082 INJECTION, SOLUTION INTRAVENOUS at 20:36

## 2025-03-17 RX ADMIN — PREDNISONE 5 MG: 5 TABLET ORAL at 11:35

## 2025-03-17 RX ADMIN — Medication 2000 UNITS: at 09:35

## 2025-03-17 NOTE — ASSESSMENT & PLAN NOTE
Originally diagnosed 3/2024 when he presented to ED with dyspnea and confusion, found to have large R lung mass and brain masses, follows with Dr. Morrell  Continued progression of disease on multiple chemotherapeutic agents, now admitted to start 3rd line therapy with tarlatamab  Recent PET 2/7 with increasing RUL and R perihilar masses, interval development of osseous metastases  Recently underwent MRI T/L spine for new FDG uptake T11 and T5 given mid back pain without neurologic deficits, read pending  Will need IP monitoring for cytokine release syndrome, neurotoxocity/ICANS  Will need premedication with decadron  Heme/onc consult

## 2025-03-17 NOTE — CONSULTS
Medical Oncology/Hematology Consult Note  Nino Silva, male, 59 y.o., 1965,  [id number], [id number]     Assessment and Plan  1. 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  2. Hx of left-sided testicular cancer   3. COPD    PMHx of left-sided testicular cancer s/p resection and chemo in early 90s, recent diagnosis of stage IV small cell lung cancer in 3/2024 with multiple cortical brain lesions s/p brain radiation and chemotherapy. Right upper lobe lung biopsy was consistent with poorly differentiated carcinoma with neuroendocrine features, favoring large cell neuroendocrine carcinoma.     Treatment history:   WBRT x 10 fractions in March 2024  Carboplatin plus etoposide plus durvalumab x 4 cycles (April - June 2024)   Durvalumab maintenance (July - October 2024). 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    I did review this patient with Dr. Bhatti and he is in agreement with this plan.      Reason for consultation: Small cell lung cancer with  mets to brain, bone, initiation of Tarlatamab    History of present illness:  Nino Silva is a 59 y.o. male with PMHx of left-sided testicular cancer s/p resection and chemo in early 90s, recent diagnosis of stage IV small cell lung cancer in 3/2024 with multiple cortical brain lesions s/p brain radiation and chemotherapy who presents as direct admission for initiation of cancer treatment. Previously, right upper lobe lung biopsy was consistent with poorly differentiated carcinoma with neuroendocrine features, favoring large cell neuroendocrine carcinoma. Patient follows with Dr. Morrell. Treatment history includes: WBRT x 10 fractions in March 2024, Carboplatin plus etoposide plus durvalumab x 4 cycles (April - June 2024), Durvalumab maintenance (July - October 2024). Disease progression noted on Oct  2024 PET scan, Lurbinectidin started in November 2024, s/p 4 cycles. Worsening PET scan 2/7/25 significant progression of disease in the teo as well as chest mediastinum, development of osseus mets.     Patient was seen and examined at bedside. He was accompanied by his wife. At this time, patient offers no complaints and states he is feeling well. Patient just returned from cruise. Denies SOB, chest pain, N/V/D, good appetite. Was previously having symptoms of weakness, and loss of a balance outpatient. Overall, no additional complaints or concerns at this time.     Review of Systems:    Review of Systems   Constitutional:  Negative for chills and fever.   HENT:  Negative for ear pain and sore throat.    Eyes:  Negative for pain and visual disturbance.   Respiratory:  Negative for cough and shortness of breath.    Cardiovascular:  Negative for chest pain and palpitations.   Gastrointestinal:  Negative for abdominal pain and vomiting.   Genitourinary:  Negative for dysuria and hematuria.   Musculoskeletal:  Negative for arthralgias and back pain.   Skin:  Negative for color change and rash.   Neurological:   Negative for seizures and syncope.   All other systems reviewed and are negative.      Oncology History:   Cancer Staging   Small cell lung cancer (HCC)  Staging form: Lung, AJCC 8th Edition  - Clinical stage from 3/5/2024: Stage IV (cT2, cN3, cM1) - Signed by Lizbeth López MD on 4/18/2024    Oncology History   Small cell lung cancer (HCC)   3/5/2024 Initial Diagnosis    Small cell lung cancer (HCC)     3/5/2024 Biopsy    Final Diagnosis  A. Lung, Right Upper Lobe, biopsy:  - Poorly differentiated carcinoma with neuroendocrine features, favor large cell neuroendocrine carcinoma.  - See note.     3/5/2024 -  Cancer Staged    Staging form: Lung, AJCC 8th Edition  - Clinical stage from 3/5/2024: Stage IV (cT2, cN3, cM1) - Signed by Lizbeth López MD on 4/18/2024  Histopathologic type: Small cell carcinoma, NOS  Stage prefix: Initial diagnosis  Histologic grade (G): G3  Histologic grading system: 4 grade system       3/6/2024 - 3/19/2024 Radiation      Plan ID Energy Fractions Dose per Fraction (cGy) Dose Correction (cGy) Total Dose Delivered (cGy) Elapsed Days   Whole Brain 6X 10 / 10 300 0 3,000 13        4/8/2024 - 10/7/2024 Chemotherapy    alteplase (CATHFLO), 2 mg, Intracatheter, Every 1 Minute as needed, 8 of 10 cycles  palonosetron (ALOXI), 0.25 mg, Intravenous, Once, 3 of 3 cycles  Administration: 0.25 mg (4/29/2024), 0.25 mg (5/20/2024), 0.25 mg (6/17/2024)  fosaprepitant (EMEND) IVPB, 150 mg, Intravenous, Once, 4 of 4 cycles  Administration: 150 mg (4/8/2024), 150 mg (4/29/2024), 150 mg (5/20/2024), 150 mg (6/17/2024)  etoposide (TOPOSAR), 80 mg/m2 = 164 mg, Intravenous, Once, 4 of 4 cycles  Dose modification: 100 mg/m2 (original dose 80 mg/m2, Cycle 1, Reason: Anticipated Tolerance), 80 mg/m2 (original dose 80 mg/m2, Cycle 1, Reason: Anticipated Tolerance), 100 mg/m2 (original dose 80 mg/m2, Cycle 2, Reason: Anticipated Tolerance)  Administration: 164 mg (4/8/2024), 164 mg (4/9/2024), 164 mg  (4/10/2024), 205 mg (4/29/2024), 205 mg (4/30/2024), 205 mg (5/1/2024), 200 mg (5/20/2024), 200 mg (5/21/2024), 200 mg (5/22/2024), 200 mg (6/17/2024), 200 mg (6/18/2024), 200 mg (6/19/2024)  CARBOplatin (PARAPLATIN) IVPB ([facility] AUC DOSING), 750 mg (574.7 % of original dose 130.5 mg), Intravenous, Once, 4 of 4 cycles  Dose modification: 130.5 mg (original dose 130.5 mg, Cycle 1, Reason: Anticipated Tolerance),   (original dose 130.5 mg, Cycle 1, Reason: Other (Must fill in a comment))  Administration: 750 mg (4/8/2024), 701.5 mg (4/29/2024), 664 mg (5/20/2024), 633 mg (6/17/2024)  durvalumab (IMFINZI) IVPB, 1,500 mg, Intravenous, Once, 8 of 10 cycles  Administration: 1,500 mg (4/8/2024), 1,500 mg (4/29/2024), 1,500 mg (5/20/2024), 1,500 mg (6/17/2024), 1,500 mg (7/10/2024), 1,500 mg (8/13/2024), 1,500 mg (9/9/2024), 1,500 mg (10/7/2024)     11/19/2024 - 1/21/2025 Chemotherapy    alteplase (CATHFLO), 2 mg, Intracatheter, Every 1 Minute as needed, 4 of 6 cycles  Lurbinectedin (ZEPZELCA) IVPB, 3.2 mg/m2 = 6.4 mg, Intravenous, Once, 4 of 6 cycles  Administration: 6.4 mg (11/19/2024), 6.4 mg (12/10/2024), 6.4 mg (12/31/2024), 6.4 mg (1/21/2025)     3/17/2025 -  Chemotherapy    alteplase (CATHFLO), 2 mg, Intracatheter, Every 1 Minute as needed, 0 of 10 cycles  sodium chloride, 1,000 mL, Intravenous, Once, 0 of 1 cycle  tarlatamab (Imdelltra) 10 mg IVPB, 10 mg, Intravenous, Once, 0 of 10 cycles  tarlatamab (Imdelltra) 1 mg IVPB, 1 mg, Intravenous, Once, 0 of 1 cycle         Past Medical History:   No past medical history on file.    Past Surgical History:   Procedure Laterality Date    HERNIA REPAIR      IR BIOPSY LUNG  03/05/2024    ORCHIECTOMY Left        Family History   Problem Relation Age of Onset    Breast cancer additional onset Mother     No Known Problems Father     Stroke Brother        Social History     Socioeconomic History    Marital status: /Civil Union     Spouse name: Not on file    Number of  children: Not on file    Years of education: Not on file    Highest education level: Not on file   Occupational History    Not on file   Tobacco Use    Smoking status: Never    Smokeless tobacco: Never   Vaping Use    Vaping status: Never Used   Substance and Sexual Activity    Alcohol use: Not Currently    Drug use: Not Currently    Sexual activity: Not on file   Other Topics Concern    Not on file   Social History Narrative    Not on file     Social Drivers of Health     Financial Resource Strain: Not on file   Food Insecurity: No Food Insecurity (3/5/2024)    Nursing - Inadequate Food Risk Classification     Worried About Running Out of Food in the Last Year: Never true     Ran Out of Food in the Last Year: Never true     Ran Out of Food in the Last Year: Not on file   Transportation Needs: No Transportation Needs (3/5/2024)    PRAPARE - Transportation     Lack of Transportation (Medical): No     Lack of Transportation (Non-Medical): No   Physical Activity: Not on file   Stress: Not on file   Social Connections: Not on file   Intimate Partner Violence: Not on file   Housing Stability: Low Risk  (3/5/2024)    Housing Stability Vital Sign     Unable to Pay for Housing in the Last Year: No     Number of Times Moved in the Last Year: 1     Homeless in the Last Year: No         Current Facility-Administered Medications:     acetaminophen (TYLENOL) tablet 650 mg, 650 mg, Oral, Q6H PRN, Pavel Rico MD    calcium carbonate (TUMS) chewable tablet 1,000 mg, 1,000 mg, Oral, Daily PRN, Pavel Rico MD    Cholecalciferol (VITAMIN D3) tablet 2,000 Units, 2,000 Units, Oral, Daily, Pavel Rico MD    enoxaparin (LOVENOX) subcutaneous injection 40 mg, 40 mg, Subcutaneous, Daily, Pavel Rico MD    ondansetron (ZOFRAN) injection 4 mg, 4 mg, Intravenous, Q6H PRN, Pavel Rico MD    predniSONE tablet 5 mg, 5 mg, Oral, Daily, Pavel Rico MD    umeclidinium-vilanterol  "62.5-25 mcg/actuation inhaler 1 puff, 1 puff, Inhalation, Daily, Pavel Rico MD      Medications Prior to Admission:     ascorbic acid (VITAMIN C) 250 mg tablet    Cholecalciferol (Vitamin D) 50 MCG (2000 UT) tablet    Multiple Vitamin (Multi Vitamin Daily) TABS    predniSONE 5 mg tablet    predniSONE 5 mg tablet    umeclidinium-vilanterol (Anoro Ellipta) 62.5-25 mcg/actuation inhaler    vitamin E, tocopherol, 400 units capsule    No Known Allergies      Physical Exam:     BP (!) 88/59   Pulse 71   Temp 98.3 °F (36.8 °C)   Resp 16   Ht 5' 10\" (1.778 m)   Wt 89 kg (196 lb 3.4 oz)   SpO2 94%   BMI 28.15 kg/m²     Physical Exam  Vitals and nursing note reviewed.   Constitutional:       General: He is not in acute distress.     Appearance: He is not ill-appearing or diaphoretic.   HENT:      Head: Normocephalic and atraumatic.      Mouth/Throat:      Pharynx: Oropharynx is clear.   Eyes:      Conjunctiva/sclera: Conjunctivae normal.   Cardiovascular:      Rate and Rhythm: Normal rate and regular rhythm.      Heart sounds: Normal heart sounds.   Pulmonary:      Effort: Pulmonary effort is normal. No respiratory distress.      Breath sounds: Normal breath sounds.   Abdominal:      General: Bowel sounds are normal. There is no distension.   Musculoskeletal:         General: Normal range of motion.      Cervical back: Normal range of motion.      Right lower leg: No edema.      Left lower leg: No edema.   Skin:     General: Skin is warm.      Capillary Refill: Capillary refill takes less than 2 seconds.   Neurological:      Mental Status: He is alert and oriented to person, place, and time.   Psychiatric:         Mood and Affect: Mood normal.       No results found for this or any previous visit (from the past 48 hours).    MRI lumbar spine w wo contrast  Result Date: 3/17/2025  Narrative: MRI LUMBAR SPINE WITH AND WITHOUT CONTRAST INDICATION: C34.00: Malignant neoplasm of unspecified main bronchus.   " extended stage SCLC now with new bone mets T11 and L5  assess r/o compression COMPARISON: Same day MRI thoracic spine with and without contrast NM PET CT skull base to MID thigh 2/7/2025. MRI brain with and without contrast 2/5/2021. TECHNIQUE:  Multiplanar, multisequence imaging of the lumbar spine was performed before and after gadolinium administration. . IV Contrast:  9 mL of Gadobutrol injection IMAGE QUALITY:  Diagnostic FINDINGS: VERTEBRAL BODIES:  There are 5 lumbar type vertebral bodies.  Normal alignment of the lumbar spine.  No spondylolysis or spondylolisthesis. No scoliosis.  No compression fracture. Small pathologic marrow replacing lesions in L1 and L2 vertebral body with associated enhancement, compatible with osseous metastasis. Mildly heterogeneous bone marrow signal. SACRUM: The heterogeneous bone marrow signal in visualized sacrum. No evidence of insufficiency or stress fracture. DISTAL CORD AND CONUS:  Normal size and signal within the distal cord and conus. PARASPINAL SOFT TISSUES:  Paraspinal soft tissues are unremarkable. LOWER THORACIC DISC SPACES: Please see same day MRI thoracic spine with and without contrast for further evaluation. LUMBAR DISC SPACES: Multilevel endplate osteophytes, mild disc height loss, and facet arthropathy. L1-L2: Mild disc bulge. No significant canal stenosis or foraminal narrowing. L2-L3: Mild disc bulge, small central disc protrusion. Mild canal stenosis. No significant foraminal narrowing. L3-L4: Mild disc bulge, small left posterior annular fissure. No significant canal stenosis or foraminal narrowing. L4-L5: Mild disc bulge. Facet arthropathy. Mild canal stenosis. Mild bilateral foraminal narrowing. L5-S1: Mild disc bulge, small posterior annular fissure. Small synovial cyst adjacent to the right L5 pars interarticularis. No significant canal stenosis or foraminal narrowing. POSTCONTRAST IMAGING: Please see above discussion. OTHER FINDINGS: Partially imaged  left renal cyst.     Impression: Osseous metastasis in L1 and L2 vertebral bodies. No pathologic compression fracture, as clinically questioned. Multilevel degenerative changes of lumbar spine with varying degrees of canal stenosis (mild L2-L3 and L4-L5) and foraminal narrowing (mild bilateral L4-L5), as detailed above. Please see same day MRI thoracic spine with and without contrast for further evaluation. The study was marked in EPIC for immediate notification. Workstation performed: [id number]     MRI thoracic spine w wo contrast  Result Date: 3/17/2025  Narrative: MRI THORACIC SPINE WITH AND WITHOUT CONTRAST INDICATION: C34.00: Malignant neoplasm of unspecified main bronchus.   extended stage SCLC now with new bone mets T11 and L5  assess r/o compression COMPARISON: Same day MRI lumbar spine with and without contrast. NM PET CT skull base to MID thigh on 2/7/2025. MRI brain with and without contrast 2/5/2025. TECHNIQUE:  Multiplanar, multisequence imaging of the thoracic spine was performed before and after gadolinium administration. . IV Contrast:  9 mL of Gadobutrol injection IMAGE QUALITY:  Diagnostic. FINDINGS: ALIGNMENT:  Normal alignment of the thoracic spine.  No compression fracture.  No subluxation.  No evidence of scoliosis. MARROW SIGNAL: Small right T4 transverse process and L1 vertebral body mildly enhancing pathologic marrow replacing lesions, compatible with osseous metastasis. Mildly heterogeneous bone marrow signal. Type II Modic endplate change C5-6, T6-T7, T7-T8. THORACIC CORD:  Normal signal within the thoracic cord. PREVERTEBRAL AND PARASPINAL SOFT TISSUES:   Normal. THORACIC DEGENERATIVE CHANGE: Mild multilevel degenerative changes consisting of endplate osteophytes and mild disc height loss. No significant canal stenosis or foraminal narrowing. POSTCONTRAST: Please see above discussion. OTHER FINDINGS: Partially imaged known right upper lobe malignancy, mediastinal and hilar beata  metastasis, left renal cyst.     Impression: Osseous metastasis in right T4 transverse process and L1 vertebral body. No pathologic compression fracture, as clinically questioned. Partially imaged known right upper lobe malignancy, mediastinal and hilar beata metastasis. Mild degenerative changes of thoracic spine, as detailed above. No significant canal stenosis or foraminal narrowing. Please see same day MRI lumbar spine with and without contrast for further evaluation. The study was marked in EPIC for immediate notification. Workstation performed: [id number]       Labs and pertinent reports reviewed.      This note has been generated by voice recognition software system.  Therefore, there may be spelling, grammar, and or syntax errors. Please contact if questions arise.

## 2025-03-17 NOTE — ASSESSMENT & PLAN NOTE
No signs and symptoms of infection at this time.  Will give a liter bolus and give maintenance fluids 100 mL/h  Continue to monitor

## 2025-03-17 NOTE — ASSESSMENT & PLAN NOTE
Ongoing occasional balance issues and headaches but no other neurologic symptoms per oncology notes  Per oncology as he has significant brain lesions supratentorial and infratentorial, concern with potential leptomeningeal disease so if headaches worsen/persist may need LP  Also for consideration for gamma knife if any lesions become symptomatic  On daily prednisone 5 mg for vasogenic edema per oncology to see if headaches/balance issues improve

## 2025-03-17 NOTE — H&P
H&P - Hospitalist   Name: Nino Silva 59 y.o. 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 Morrell  Continued progression of disease on multiple chemotherapeutic agents, now admitted to start 3rd line therapy with tarlatamab  Recent PET 2/7 with increasing RUL and R perihilar masses, interval development of osseous metastases  Recently underwent MRI T/L spine for new FDG uptake T11 and T5 given mid back pain without neurologic deficits, read pending  Will need IP monitoring for cytokine release syndrome, neurotoxocity/ICANS  Will need premedication with decadron  Heme/onc consult    Brain mass  Ongoing occasional balance issues and headaches but no other neurologic symptoms per oncology notes  Per oncology as he has significant brain lesions supratentorial and infratentorial, concern with potential leptomeningeal disease so if headaches worsen/persist may need LP  Also for consideration for gamma knife if any lesions become symptomatic  On daily prednisone 5 mg for vasogenic edema per oncology to see if headaches/balance issues improve   Hypotension due to hypovolemia  No signs and symptoms of infection at this time.  Will give a liter bolus and give maintenance fluids 100 mL/h  Continue to monitor    COPD (chronic obstructive pulmonary disease) (HCC)  Follows with pulm for COPD Gold stage 0, also with suspected IPF  Holding Anoro Ellipta, breathing is good and patient denies using this medication.    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  Code Status: Level 1 - Full Code   Discussion with family: Updated  (wife) via phone.    Anticipated Length of Stay: Patient will be admitted on an inpatient basis with an anticipated length of stay of greater than 2 midnights secondary to Chemo.    History of Present Illness   Chief Complaint: chemo    Nino Silva is a 59 y.o. male with a P past medical history of small cell metastatic cancer to the brain.  Coming in for chemotherapy.  Denies any signs and symptoms of infection.  No shortness of breath, no chest pain, no cough, no fevers no chills.  Good appetite.  Review of Systems   Constitutional:  Negative for chills and fever.   HENT:  Negative for ear pain and sore throat.    Eyes:  Negative for pain and visual disturbance.   Respiratory:  Negative for cough and shortness of breath.    Cardiovascular:  Negative for chest pain and palpitations.   Gastrointestinal:  Negative for abdominal pain and vomiting.   Genitourinary:  Negative for dysuria and hematuria.   Musculoskeletal:  Negative for arthralgias and back pain.   Skin:  Negative for color change and rash.   Neurological:  Negative for seizures and syncope.   All other systems reviewed and are negative.      Historical Information   No past medical history on file.  Past Surgical History:   Procedure Laterality Date    HERNIA REPAIR      IR BIOPSY LUNG  03/05/2024    ORCHIECTOMY Left      Social History     Tobacco Use    Smoking status: Never    Smokeless tobacco: Never   Vaping Use    Vaping status: Never Used   Substance and Sexual Activity    Alcohol use: Not Currently    Drug use: Not Currently    Sexual activity: Not on file     E-Cigarette/Vaping    E-Cigarette Use Never User      E-Cigarette/Vaping Substances     Family history non-contributory  Social History:  Marital Status: /Civil Union   Occupation: None  Patient Pre-hospital Living Situation: Home  Patient Pre-hospital Level of Mobility:  walks  Patient Pre-hospital Diet Restrictions: None    Meds/Allergies   I have reviewed home medications with patient personally.  Prior to Admission medications    Medication Sig Start Date End Date Taking? Authorizing Provider   ascorbic acid (VITAMIN C) 250 mg tablet Take 500 mg by mouth daily    Historical Provider, MD   Cholecalciferol (Vitamin D) 50 MCG (2000 UT) tablet Take 2,000 Units by mouth daily    Historical Provider, MD   Multiple Vitamin (Multi Vitamin Daily) TABS Take 1 tablet by mouth daily  Patient not taking: Reported on 2/12/2025    Historical Provider, MD   predniSONE 5 mg tablet Take 1 tablet (5 mg total) by mouth daily  Patient not taking: Reported on 2/12/2025 9/18/24   Cortney Morrell MD   predniSONE 5 mg tablet Take 1 tablet (5 mg total) by mouth daily 2/12/25   Cortney Morrell MD   umeclidinium-vilanterol (Anoro Ellipta) 62.5-25 mcg/actuation inhaler Inhale 1 puff daily  Patient not taking: Reported on 11/5/2024 11/5/24 2/3/25  Harmeet Webster MD   vitamin E, tocopherol, 400 units capsule Take 400 Units by mouth daily    Historical Provider, MD     No Known Allergies    Objective :  Temp:  [98.3 °F (36.8 °C)] 98.3 °F (36.8 °C)  HR:  [60-71] 71  BP: (88)/(59) 88/59  Resp:  [16] 16  SpO2:  [93 %-94 %] 94 %    Physical Exam  Vitals and nursing note reviewed.   Constitutional:       Appearance: He is well-developed and normal weight.   HENT:      Head: Normocephalic and atraumatic.      Nose: Nose normal.      Mouth/Throat:      Mouth: Mucous membranes are moist.   Eyes:      Conjunctiva/sclera: Conjunctivae normal.   Cardiovascular:      Rate and Rhythm: Normal rate and regular rhythm.      Heart sounds: Normal heart sounds. No murmur heard.  Pulmonary:      Effort: Pulmonary effort is normal. No respiratory distress.      Breath sounds: Normal breath sounds.   Abdominal:      General: Abdomen is flat.      Palpations: Abdomen is soft.      Tenderness: There is no abdominal tenderness.  "  Musculoskeletal:         General: No swelling.      Cervical back: Neck supple.      Right lower leg: No edema.      Left lower leg: No edema.   Skin:     General: Skin is warm and dry.      Capillary Refill: Capillary refill takes less than 2 seconds.   Neurological:      General: No focal deficit present.      Mental Status: He is alert and oriented to person, place, and time. Mental status is at baseline.   Psychiatric:         Mood and Affect: Mood normal.          Lines/Drains:            Lab Results: I have reviewed the following results:  Results from last 7 days   Lab Units 03/17/25  0844   WBC Thousand/uL 13.56*   HEMOGLOBIN g/dL 12.8   HEMATOCRIT % 38.6   PLATELETS Thousands/uL 287   SEGS PCT % 85*   LYMPHO PCT % 8*   MONO PCT % 5   EOS PCT % 1     Results from last 7 days   Lab Units 03/14/25  1025   SODIUM mmol/L 138   POTASSIUM mmol/L 4.2   CHLORIDE mmol/L 101   CO2 mmol/L 29   BUN mg/dL 20   CREATININE mg/dL 0.91   ANION GAP mmol/L 8   CALCIUM mg/dL 9.5   ALBUMIN g/dL 4.5   TOTAL BILIRUBIN mg/dL 0.65   ALK PHOS U/L 54   ALT U/L 19   AST U/L 20   GLUCOSE RANDOM mg/dL 99             No results found for: \"HGBA1C\"          Administrative Statements   I have spent a total time of 80 minutes in caring for this patient on the day of the visit/encounter including Diagnostic results, Prognosis, Risks and benefits of tx options, Instructions for management, Patient and family education, Importance of tx compliance, Risk factor reductions, Impressions, Counseling / Coordination of care, Documenting in the medical record, Reviewing/placing orders in the medical record (including tests, medications, and/or procedures), Obtaining or reviewing history  , and Communicating with other healthcare professionals .    ** Please Note: This note has been constructed using a voice recognition system. **    "

## 2025-03-17 NOTE — ASSESSMENT & PLAN NOTE
Follows with pulm for COPD Gold stage 0, also with suspected IPF  Holding Anoro Ellipta, breathing is good and patient denies using this medication.

## 2025-03-17 NOTE — TELEPHONE ENCOUNTER
Patient starting new treatment plan. He is currently admitted for day 1 and will also be admitted for day 8 of cycle 1. Please schedule day 15 of cycle 1 and cycle 2.

## 2025-03-17 NOTE — LETTER
Thank you for allowing us to participate in the care of your patient, Nino Silva, who was hospitalized specifically for immunotherapy.  Will have another cycle next week.  Hematology oncology is following.  He tolerated infusion very well. Thank you. No changes in meds  If you have any additional questions or would like to discuss further, please feel free to contact me.    Pavel Rico MD  [facility] Internal Medicine, Hospitalist  [phone]

## 2025-03-18 LAB
ALBUMIN SERPL BCG-MCNC: 3.9 G/DL (ref 3.5–5)
ALP SERPL-CCNC: 48 U/L (ref 34–104)
ALT SERPL W P-5'-P-CCNC: 14 U/L (ref 7–52)
ANION GAP SERPL CALCULATED.3IONS-SCNC: 10 MMOL/L (ref 4–13)
AST SERPL W P-5'-P-CCNC: 18 U/L (ref 13–39)
BASOPHILS # BLD AUTO: 0.03 THOUSANDS/ÂΜL (ref 0–0.1)
BASOPHILS NFR BLD AUTO: 0 % (ref 0–1)
BILIRUB SERPL-MCNC: 0.51 MG/DL (ref 0.2–1)
BUN SERPL-MCNC: 17 MG/DL (ref 5–25)
CALCIUM SERPL-MCNC: 8.9 MG/DL (ref 8.4–10.2)
CHLORIDE SERPL-SCNC: 103 MMOL/L (ref 96–108)
CO2 SERPL-SCNC: 25 MMOL/L (ref 21–32)
CREAT SERPL-MCNC: 0.73 MG/DL (ref 0.6–1.3)
EOSINOPHIL # BLD AUTO: 0.02 THOUSAND/ÂΜL (ref 0–0.61)
EOSINOPHIL NFR BLD AUTO: 0 % (ref 0–6)
ERYTHROCYTE [DISTWIDTH] IN BLOOD BY AUTOMATED COUNT: 13.2 % (ref 11.6–15.1)
GFR SERPL CREATININE-BSD FRML MDRD: 100 ML/MIN/1.73SQ M
GLUCOSE SERPL-MCNC: 111 MG/DL (ref 65–140)
HCT VFR BLD AUTO: 36.1 % (ref 36.5–49.3)
HGB BLD-MCNC: 11.9 G/DL (ref 12–17)
IMM GRANULOCYTES # BLD AUTO: 0.09 THOUSAND/UL (ref 0–0.2)
IMM GRANULOCYTES NFR BLD AUTO: 1 % (ref 0–2)
LYMPHOCYTES # BLD AUTO: 0.77 THOUSANDS/ÂΜL (ref 0.6–4.47)
LYMPHOCYTES NFR BLD AUTO: 5 % (ref 14–44)
MAGNESIUM SERPL-MCNC: 1.8 MG/DL (ref 1.9–2.7)
MCH RBC QN AUTO: 31.2 PG (ref 26.8–34.3)
MCHC RBC AUTO-ENTMCNC: 33 G/DL (ref 31.4–37.4)
MCV RBC AUTO: 95 FL (ref 82–98)
MONOCYTES # BLD AUTO: 0.95 THOUSAND/ÂΜL (ref 0.17–1.22)
MONOCYTES NFR BLD AUTO: 6 % (ref 4–12)
NEUTROPHILS # BLD AUTO: 14.12 THOUSANDS/ÂΜL (ref 1.85–7.62)
NEUTS SEG NFR BLD AUTO: 88 % (ref 43–75)
NRBC BLD AUTO-RTO: 0 /100 WBCS
PLATELET # BLD AUTO: 262 THOUSANDS/UL (ref 149–390)
PMV BLD AUTO: 10.1 FL (ref 8.9–12.7)
POTASSIUM SERPL-SCNC: 3.7 MMOL/L (ref 3.5–5.3)
PROT SERPL-MCNC: 7 G/DL (ref 6.4–8.4)
RBC # BLD AUTO: 3.81 MILLION/UL (ref 3.88–5.62)
SODIUM SERPL-SCNC: 138 MMOL/L (ref 135–147)
WBC # BLD AUTO: 15.98 THOUSAND/UL (ref 4.31–10.16)

## 2025-03-18 PROCEDURE — 80053 COMPREHEN METABOLIC PANEL: CPT

## 2025-03-18 PROCEDURE — 85025 COMPLETE CBC W/AUTO DIFF WBC: CPT

## 2025-03-18 PROCEDURE — 99232 SBSQ HOSP IP/OBS MODERATE 35: CPT

## 2025-03-18 PROCEDURE — 99233 SBSQ HOSP IP/OBS HIGH 50: CPT | Performed by: INTERNAL MEDICINE

## 2025-03-18 PROCEDURE — 83735 ASSAY OF MAGNESIUM: CPT

## 2025-03-18 RX ORDER — MAGNESIUM SULFATE HEPTAHYDRATE 40 MG/ML
2 INJECTION, SOLUTION INTRAVENOUS ONCE
Status: COMPLETED | OUTPATIENT
Start: 2025-03-18 | End: 2025-03-18

## 2025-03-18 RX ADMIN — SODIUM CHLORIDE, SODIUM GLUCONATE, SODIUM ACETATE, POTASSIUM CHLORIDE, MAGNESIUM CHLORIDE, SODIUM PHOSPHATE, DIBASIC, AND POTASSIUM PHOSPHATE 100 ML/HR: .53; .5; .37; .037; .03; .012; .00082 INJECTION, SOLUTION INTRAVENOUS at 16:59

## 2025-03-18 RX ADMIN — ENOXAPARIN SODIUM 40 MG: 40 INJECTION SUBCUTANEOUS at 08:26

## 2025-03-18 RX ADMIN — MAGNESIUM SULFATE HEPTAHYDRATE 2 G: 40 INJECTION, SOLUTION INTRAVENOUS at 16:50

## 2025-03-18 RX ADMIN — ACETAMINOPHEN 650 MG: 325 TABLET, FILM COATED ORAL at 07:49

## 2025-03-18 RX ADMIN — PREDNISONE 5 MG: 5 TABLET ORAL at 08:26

## 2025-03-18 RX ADMIN — ONDANSETRON 4 MG: 2 INJECTION INTRAMUSCULAR; INTRAVENOUS at 07:49

## 2025-03-18 RX ADMIN — SODIUM CHLORIDE, SODIUM GLUCONATE, SODIUM ACETATE, POTASSIUM CHLORIDE, MAGNESIUM CHLORIDE, SODIUM PHOSPHATE, DIBASIC, AND POTASSIUM PHOSPHATE 100 ML/HR: .53; .5; .37; .037; .03; .012; .00082 INJECTION, SOLUTION INTRAVENOUS at 06:21

## 2025-03-18 RX ADMIN — Medication 2000 UNITS: at 08:27

## 2025-03-18 NOTE — ASSESSMENT & PLAN NOTE
Originally diagnosed 3/2024 when he presented to ED with dyspnea and confusion, found to have large R lung mass and brain masses, follows with Dr. Morrell  Continued progression of disease on multiple chemotherapeutic agents, now admitted to start 3rd line therapy with tarlatamab  Recent PET 2/7 with increasing RUL and R perihilar masses, interval development of osseous metastases  Recently underwent MRI T/L spine for new FDG uptake T11 and T5 given mid back pain without neurologic deficits, read pending  Status post cycle.  Tolerated well  Discussed with hematology oncology and they want to monitor for 1 more day  Will be readmitted next week  Will discharge first thing tomorrow

## 2025-03-18 NOTE — PLAN OF CARE
Problem: Knowledge Deficit  Goal: Patient/family/caregiver demonstrates understanding of disease process, treatment plan, medications, and discharge instructions  Description: Complete learning assessment and assess knowledge base.  Interventions:  - Provide teaching at level of understanding  - Provide teaching via preferred learning methods  Outcome: Progressing     Problem: NEUROSENSORY - ADULT  Goal: Achieves stable or improved neurological status  Description: INTERVENTIONS  - Monitor and report changes in neurological status  - Monitor vital signs such as temperature, blood pressure, glucose, and any other labs ordered   - Initiate measures to prevent increased intracranial pressure  - Monitor for seizure activity and implement precautions if appropriate      Outcome: Progressing  Goal: Remains free of injury related to seizures activity  Description: INTERVENTIONS  - Maintain airway, patient safety  and administer oxygen as ordered  - Monitor patient for seizure activity, document and report duration and description of seizure to physician/advanced practitioner  - If seizure occurs,  ensure patient safety during seizure  - Reorient patient post seizure  - Seizure pads on all 4 side rails  - Instruct patient/family to notify RN of any seizure activity including if an aura is experienced  - Instruct patient/family to call for assistance with activity based on nursing assessment  - Administer anti-seizure medications if ordered    Outcome: Progressing  Goal: Achieves maximal functionality and self care  Description: INTERVENTIONS  - Monitor swallowing and airway patency with patient fatigue and changes in neurological status  - Encourage and assist patient to increase activity and self care.   - Encourage visually impaired, hearing impaired and aphasic patients to use assistive/communication devices  Outcome: Progressing

## 2025-03-18 NOTE — UTILIZATION REVIEW
"NOTIFICATION OF INPATIENT ADMISSION   AUTHORIZATION REQUEST   SERVICING FACILITY:   [facility]  Address: [address] [place] [address]  Tax ID: [id number]  NPI: [id number] ATTENDING PROVIDER:  Attending Name and NPI#: Pavel Rico Md [[id number]]  Address: [address] [place] [address]  Phone: [phone]   ADMISSION INFORMATION:  Place of Service: Inpatient [facility]  Place of Service Code: 21  Inpatient Admission Date/Time: 3/17/25  7:55 AM  Discharge Date/Time: No discharge date for patient encounter.  Admitting Diagnosis Code/Description:  Small cell carcinoma of hilum of lung, unspecified laterality (HCC) [C34.00]     UTILIZATION REVIEW CONTACT:  Viviana \"Li\" Juan José Utilization   Network Utilization Review Department  Phone: [phone]  Fax: [phone]  Email: [email or web address]  Contact for approvals/pending authorizations, clinical reviews, and discharge.     PHYSICIAN ADVISORY SERVICES:  Medical Necessity Denial & Vgii-ih-Ftyg Review  Phone: [phone]  Fax: [phone]  Email: [email or web address]     DISCHARGE SUPPORT TEAM:  For Patients Discharge Needs & Updates  Phone: [phone] opt. 2 Fax: [phone]  Email: [email or web address]     "

## 2025-03-18 NOTE — PLAN OF CARE
Problem: Knowledge Deficit  Goal: Patient/family/caregiver demonstrates understanding of disease process, treatment plan, medications, and discharge instructions  Description: Complete learning assessment and assess knowledge base.  Interventions:  - Provide teaching at level of understanding  - Provide teaching via preferred learning methods  Outcome: Progressing     Problem: NEUROSENSORY - ADULT  Goal: Achieves stable or improved neurological status  Description: INTERVENTIONS  - Monitor and report changes in neurological status  - Monitor vital signs such as temperature, blood pressure, glucose, and any other labs ordered   Outcome: Progressing  Goal: Achieves maximal functionality and self care  Description: INTERVENTIONS  - Monitor swallowing and airway patency with patient fatigue and changes in neurological status  - Encourage and assist patient to increase activity and self care.   - Encourage visually impaired, hearing impaired and aphasic patients to use assistive/communication devices  Outcome: Progressing

## 2025-03-18 NOTE — PROGRESS NOTES
Progress Note - Hospitalist   Name: Nino Silva 59 y.o. male I MRN: [id number]  [facility] I Date of Admission: 3/17/2025   Date of Service: 3/18/2025 I Hospital Day: 1     Assessment & Plan  Small cell lung cancer metastatic to brain and bone (HCC)  Originally diagnosed 3/2024 when he presented to ED with dyspnea and confusion, found to have large R lung mass and brain masses, follows with Dr. Morrell  Continued progression of disease on multiple chemotherapeutic agents, now admitted to start 3rd line therapy with tarlatamab  Recent PET 2/7 with increasing RUL and R perihilar masses, interval development of osseous metastases  Recently underwent MRI T/L spine for new FDG uptake T11 and T5 given mid back pain without neurologic deficits, read pending  Status post cycle.  Tolerated well  Discussed with hematology oncology and they want to monitor for 1 more day  Will be readmitted next week  Will discharge first thing tomorrow    Brain mass  Ongoing occasional balance issues and headaches but no other neurologic symptoms per oncology notes  Per oncology as he has significant brain lesions supratentorial and infratentorial, concern with potential leptomeningeal disease so if headaches worsen/persist may need LP  Also for consideration for gamma knife if any lesions become symptomatic  On daily prednisone 5 mg for vasogenic edema per oncology to see if headaches/balance issues improve   Hypotension due to hypovolemia  No signs and symptoms of infection at this time.  Will give a liter bolus and give maintenance fluids 100 mL/h  Continue to monitor    COPD (chronic obstructive pulmonary disease) (HCC)  Follows with pulm for COPD Gold stage 0, also with suspected IPF  Holding Anoro Ellipta, breathing is good and patient denies using this medication.    Hx of testicular cancer  Hx of L testicular cancer in early 90s s/p resection and chemotherapy    VTE Pharmacologic Prophylaxis:   Moderate Risk (Score  3-4) - Pharmacological DVT Prophylaxis Ordered: enoxaparin (Lovenox).    Mobility:   Basic Mobility Inpatient Raw Score: 16  JH-HLM Goal: 5: Stand one or more mins  JH-HLM Achieved: 3: Sit at edge of bed  JH-HLM Goal achieved. Continue to encourage appropriate mobility.    Patient Centered Rounds: I performed bedside rounds with nursing staff today.   Discussions with Specialists or Other Care Team Provider: [facility] onc    Education and Discussions with Family / Patient: Updated  (wife) via phone.    Current Length of Stay: 1 day(s)  Current Patient Status: Inpatient   Certification Statement: The patient will continue to require additional inpatient hospital stay due to Monitoring post treatment   Discharge Plan: Anticipate discharge in 24-48 hrs to home.    Code Status: Level 1 - Full Code    Subjective   No infectious symptoms    Objective :  Temp:  [97.6 °F (36.4 °C)-98.3 °F (36.8 °C)] 98.2 °F (36.8 °C)  HR:  [60-73] 70  BP: (116-133)/(73-85) 119/74  Resp:  [14-18] 16  SpO2:  [92 %-95 %] 94 %  O2 Device: None (Room air)    Body mass index is 28.15 kg/m².     Input and Output Summary (last 24 hours):     Intake/Output Summary (Last 24 hours) at 3/18/2025 1528  Last data filed at 3/18/2025 1300  Gross per 24 hour   Intake 3860.94 ml   Output 250 ml   Net 3610.94 ml       Physical Exam  Vitals and nursing note reviewed.   Constitutional:       Appearance: He is well-developed and normal weight.   HENT:      Head: Normocephalic and atraumatic.      Nose: Nose normal.      Mouth/Throat:      Mouth: Mucous membranes are moist.   Eyes:      Conjunctiva/sclera: Conjunctivae normal.   Cardiovascular:      Rate and Rhythm: Normal rate and regular rhythm.      Heart sounds: Normal heart sounds. No murmur heard.  Pulmonary:      Effort: Pulmonary effort is normal. No respiratory distress.      Breath sounds: Normal breath sounds.   Abdominal:      General: Abdomen is flat.      Palpations: Abdomen is soft.       Tenderness: There is no abdominal tenderness.   Musculoskeletal:         General: No swelling.      Cervical back: Neck supple.      Right lower leg: No edema.      Left lower leg: No edema.   Skin:     General: Skin is warm and dry.      Capillary Refill: Capillary refill takes less than 2 seconds.   Neurological:      General: No focal deficit present.      Mental Status: He is alert and oriented to person, place, and time. Mental status is at baseline.   Psychiatric:         Mood and Affect: Mood normal.           Lines/Drains:  Lines/Drains/Airways       Active Status       Name Placement date Placement time Site Days    PICC Line 03/17/25 Left Basilic 03/17/25  1127  Basilic  1                    Central Line:  Goal for removal: N/A - Chronic PICC               Lab Results: I have reviewed the following results:   Results from last 7 days   Lab Units 03/18/25  0624   WBC Thousand/uL 15.98*   HEMOGLOBIN g/dL 11.9*   HEMATOCRIT % 36.1*   PLATELETS Thousands/uL 262   SEGS PCT % 88*   LYMPHO PCT % 5*   MONO PCT % 6   EOS PCT % 0     Results from last 7 days   Lab Units 03/18/25  0624   SODIUM mmol/L 138   POTASSIUM mmol/L 3.7   CHLORIDE mmol/L 103   CO2 mmol/L 25   BUN mg/dL 17   CREATININE mg/dL 0.73   ANION GAP mmol/L 10   CALCIUM mg/dL 8.9   ALBUMIN g/dL 3.9   TOTAL BILIRUBIN mg/dL 0.51   ALK PHOS U/L 48   ALT U/L 14   AST U/L 18   GLUCOSE RANDOM mg/dL 111                       Recent Cultures (last 7 days):       None    Last 24 Hours Medication List:     Current Facility-Administered Medications:     acetaminophen (TYLENOL) tablet 650 mg, Q6H PRN    alteplase (CATHFLO) injection 2 mg, Q1MIN PRN    calcium carbonate (TUMS) chewable tablet 1,000 mg, Daily PRN    Cholecalciferol (VITAMIN D3) tablet 2,000 Units, Daily    enoxaparin (LOVENOX) subcutaneous injection 40 mg, Daily    magnesium sulfate 2 g/50 mL IVPB (premix) 2 g, Once    multi-electrolyte (Plasmalyte-A/Isolyte-S PH 7.4/Normosol-R) IV solution,  Continuous, Last Rate: 100 mL/hr (03/18/25 0621)    ondansetron (ZOFRAN) injection 4 mg, Q6H PRN    ondansetron (ZOFRAN) injection 4 mg, Q6H PRN    predniSONE tablet 5 mg, Daily    sodium chloride 0.9 % infusion, Once PRN    Administrative Statements   Today, Patient Was Seen By: Pavel Rico MD  I have spent a total time of 40 minutes in caring for this patient on the day of the visit/encounter including Diagnostic results, Prognosis, Risks and benefits of tx options, Instructions for management, Patient and family education, Importance of tx compliance, Risk factor reductions, Impressions, Counseling / Coordination of care, Documenting in the medical record, Reviewing/placing orders in the medical record (including tests, medications, and/or procedures), Obtaining or reviewing history  , and Communicating with other healthcare professionals .    **Please Note: This note may have been constructed using a voice recognition system.**

## 2025-03-18 NOTE — CASE MANAGEMENT
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) 2024     Small cell lung cancer (HCC) 2024     Brain mass 2024     COPD (chronic obstructive pulmonary disease) (HCC) 2024     Hx of testicular cancer 2024       LOS (days): 1  Geometric Mean LOS (GMLOS) (days):   Days to GMLOS:     OBJECTIVE:    Risk of Unplanned Readmission Score: 12.03         Current admission status: Inpatient       Preferred Pharmacy:   [id number] - [place], [place] - [address]  [address] [place] [address]  Phone: [phone] Fax: [phone]    [id number] - [facility] [id number] [place] [address]  [address] [facility]  [address] [place] [address]  Phone: [phone] Fax: [phone]    Primary Care Provider: Obed Marinelli MD    Primary Insurance: [employer]  Secondary Insurance:     ASSESSMENT:  Active Health Care Proxies    There are no active Health Care Proxies on file.                 Readmission Root Cause  30 Day Readmission: No    Patient Information  Admitted from:: Home  Mental Status: Alert  During Assessment patient was accompanied by: Not accompanied during assessment  Primary Caregiver: Self  Support Systems: Spouse/significant other, Daughter, Family members  County of Residence: [place]  What city do you live in?: [place]  Home entry access options. Select all that apply.: Stairs  Number of steps to enter home.: 8  Do the steps have railings?: Yes  Type of Current Residence: Apartment  Floor Level: 2  Upon entering residence,  is there a bedroom on the main floor (no further steps)?: Yes  Upon entering residence, is there a bathroom on the main floor (no further steps)?: Yes  Living Arrangements: Lives w/ Spouse/significant other  Is patient a ?: No    Activities of Daily Living Prior to Admission  Functional Status: Independent  Completes ADLs independently?: Yes  Ambulates independently?: Yes  Does patient use assisted devices?: No  Does patient currently own DME?: No  Does patient have a history of Outpatient Therapy (PT/OT)?: Yes  Does the patient have a history of Short-Term Rehab?: No  Does patient have a history of HHC?: No  Does patient currently have HHC?: No         Patient Information Continued  Income Source: Unemployed  Does patient have prescription coverage?: Yes  Can the patient afford their medications and any related supplies (such as glucometers or test strips)?: N/A  Does patient receive dialysis treatments?: No  Does patient have a history of substance abuse?: No  Does patient have a history of Mental Health Diagnosis?: No         Means of Transportation  [facility]          DISCHARGE DETAILS:    Discharge planning discussed with:: pt at bedside  [place] of Choice: Yes  Comments - [place] of Choice: Discussed FOC. walker ordered and will be delievered to pt at bedside  CM contacted family/caregiver?: Yes  Were Treatment Team discharge recommendations reviewed with patient/caregiver?: Yes  Did patient/caregiver verbalize understanding of patient care needs?: Yes  Were patient/caregiver advised of the risks associated with not following Treatment Team discharge recommendations?: Yes    Contacts  Patient Contacts: Leslie Vides (Spouse)  [phone]  Relationship to Patient:: Family  Contact Method: Phone  Phone Number: [phone]  Reason/Outcome: Continuity of Care, Emergency Contact, Discharge Planning    Requested Home Health Care         Is the patient interested in HHC  at discharge?: No    DME Referral Provided  Referral made for DME?: Yes  DME referral completed for the following items:: Walker  DME Supplier Name:: [employer]         Would you like to participate in our [facility] service program?  : No - Declined    Treatment Team Recommendation: Home  Discharge Destination Plan:: Home  Transport at Discharge : Family                  CM introduced herself and role and completed initial assessment with pt at bedside. Pt spouse was present during assessment.   Pt is independent with ADLS/IADLS. No DME. Resides in an apartment with spouse.   Denied history with MH and D&A.   Spouse provides transport to medical appointments.   Walker ordered and will be delivered to pt room. CM will continue to follow as needed.

## 2025-03-18 NOTE — PROGRESS NOTES
Medical Oncology/Hematology Progress Note  Nino Silva, male, 59 y.o., 1965,  [facility], [id number]     Assessment and Plan  1. 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  2. Hx of left-sided testicular cancer   3. COPD     PMHx of left-sided testicular cancer s/p resection and chemo in early 90s, recent diagnosis of stage IV small cell lung cancer in 3/2024 with multiple cortical brain lesions s/p brain radiation and chemotherapy. Right upper lobe lung biopsy was consistent with poorly differentiated carcinoma with neuroendocrine features, favoring large cell neuroendocrine carcinoma.      Treatment history:   WBRT x 10 fractions in March 2024  Carboplatin plus etoposide plus durvalumab x 4 cycles (April - June 2024)   Durvalumab maintenance (July - October 2024). 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     I did review this patient with Dr. Bhatti and he is in agreement with this plan.     Subjective:    Patient seen and examined at bedside this AM. Wife also present at bedside. He states he  feels well this morning. Endorses a some mild nausea w/ dry heaves, headache, and fatigue from lack of sleep. Patient also endorses chills. Denies chest pain, SOB, diarrhea, dizziness, fever. Otherwise, no additional concerns.    Review of Systems   Constitutional:  Positive for chills and fatigue. Negative for appetite change and fever.   Respiratory:  Negative for shortness of breath and wheezing.    Cardiovascular:  Negative for chest pain, palpitations and leg swelling.   Gastrointestinal:  Positive for nausea. Negative for abdominal distention, abdominal pain, diarrhea and vomiting.   Skin:  Negative for rash.   Neurological:  Positive for headaches. Negative for dizziness.   All other systems reviewed and are negative.      Objective:     Medication Administration - last 24 hours from 03/17/2025 0857 to 03/18/2025 0857         Date/Time Order Dose Route Action Action by     03/17/2025 0928 EDT predniSONE tablet 5 mg -- Oral Canceled Entry Maday Gaytan RN     03/17/2025 0929 EDT umeclidinium-vilanterol 62.5-25 mcg/actuation inhaler 1 puff -- Inhalation Canceled Entry Maday Gaytan RN     03/18/2025 0827 EDT Cholecalciferol (VITAMIN D3) tablet 2,000 Units 2,000 Units Oral Given Debi Hudson RN     03/17/2025 0935 EDT Cholecalciferol (VITAMIN D3) tablet 2,000 Units 2,000 Units Oral Given Mariano Chavarria RN     03/18/2025 0749 EDT acetaminophen (TYLENOL) tablet 650 mg 650 mg Oral Given Debi Hudson RN     03/18/2025 0749 EDT ondansetron (ZOFRAN) injection 4 mg 4 mg Intravenous Given Debi Hudson RN     03/18/2025 0826 EDT enoxaparin (LOVENOX) subcutaneous injection 40 mg 40 mg Subcutaneous Given Debi Hudson RN     03/17/2025 0935 EDT enoxaparin (LOVENOX) subcutaneous injection 40 mg 40 mg Subcutaneous Given Mariano Chavarria RN     03/17/2025 1040 EDT multi-electrolyte (Plasmalyte-A/Isolyte-S PH 7.4/Normosol-R) IV bolus 1,000 mL 0 mL Intravenous Stopped Maday Gaytan RN     03/17/2025 0936 EDT  "multi-electrolyte (Plasmalyte-A/Isolyte-S PH 7.4/Normosol-R) IV bolus 1,000 mL 1,000 mL Intravenous New Vidya Chavarria, FRAN     03/18/2025 0621 EDT multi-electrolyte (Plasmalyte-A/Isolyte-S PH 7.4/Normosol-R) IV solution 100 mL/hr Intravenous New Bag Neelam Etienne     03/17/2025 2036 EDT multi-electrolyte (Plasmalyte-A/Isolyte-S PH 7.4/Normosol-R) IV solution 100 mL/hr Intravenous New Bag Neelam Lowell     03/17/2025 1930 EDT multi-electrolyte (Plasmalyte-A/Isolyte-S PH 7.4/Normosol-R) IV solution 100 mL/hr Intravenous Rate/Dose Verify Maday Gaytan RN     03/17/2025 1135 EDT multi-electrolyte (Plasmalyte-A/Isolyte-S PH 7.4/Normosol-R) IV solution 100 mL/hr Intravenous New Vidya Gaytan RN     03/17/2025 1405 EDT dexamethasone (DECADRON) 8 mg in sodium chloride 0.9 % 50 mL IVPB 0 mg Intravenous Stopped Maday Gaytan RN     03/17/2025 1343 EDT dexamethasone (DECADRON) 8 mg in sodium chloride 0.9 % 50 mL IVPB 8 mg Intravenous New Vidya Gaytan RN     03/17/2025 1600 EDT Tarlatamab-dlle (Imdelltra) 1 mg in 236 ml 0.9% Sodium Chloride IVPB 0 mg Intravenous Stopped Mdaay Gaytan RN     03/17/2025 1459 EDT Tarlatamab-dlle (Imdelltra) 1 mg in 236 ml 0.9% Sodium Chloride IVPB 1 mg Intravenous New Vidya Gaytan RN     03/17/2025 2124 EDT sodium chloride 0.9 % bolus 1,000 mL 0 mL Intravenous Stopped Neelam Etienne     03/17/2025 1610 EDT sodium chloride 0.9 % bolus 1,000 mL 1,000 mL Intravenous New Vidya Gaytan RN     03/18/2025 0826 EDT predniSONE tablet 5 mg 5 mg Oral Given Debi Hudson RN     03/17/2025 1135 EDT predniSONE tablet 5 mg 5 mg Oral Given Maday Gaytan RN            /77   Pulse 66   Temp 98.3 °F (36.8 °C)   Resp 14   Ht 5' 10\" (1.778 m)   Wt 89 kg (196 lb 3.4 oz)   SpO2 92%   BMI 28.15 kg/m²       Physical Exam  Vitals and nursing note reviewed.   Constitutional:       General: He is not in acute distress.     Appearance: Normal appearance. He is not diaphoretic.   HENT:      " Head: Normocephalic and atraumatic.      Nose: No congestion.      Mouth/Throat:      Pharynx: Oropharynx is clear.   Eyes:      Conjunctiva/sclera: Conjunctivae normal.   Cardiovascular:      Rate and Rhythm: Normal rate and regular rhythm.      Heart sounds: Normal heart sounds. No murmur heard.  Pulmonary:      Effort: Pulmonary effort is normal.      Breath sounds: Normal breath sounds.   Abdominal:      General: There is no distension.      Tenderness: There is no abdominal tenderness.   Musculoskeletal:         General: Normal range of motion.      Cervical back: Normal range of motion.      Right lower leg: No edema.      Left lower leg: No edema.   Skin:     General: Skin is warm.   Neurological:      Mental Status: He is alert. Mental status is at baseline.   Psychiatric:         Mood and Affect: Mood normal.         Recent Results (from the past 48 hours)   CBC and differential    Collection Time: 03/17/25  8:44 AM   Result Value Ref Range    WBC 13.56 (H) 4.31 - 10.16 Thousand/uL    RBC 4.09 3.88 - 5.62 Million/uL    Hemoglobin 12.8 12.0 - 17.0 g/dL    Hematocrit 38.6 36.5 - 49.3 %    MCV 94 82 - 98 fL    MCH 31.3 26.8 - 34.3 pg    MCHC 33.2 31.4 - 37.4 g/dL    RDW 13.1 11.6 - 15.1 %    MPV 9.7 8.9 - 12.7 fL    Platelets 287 149 - 390 Thousands/uL    nRBC 0 /100 WBCs    Segmented % 85 (H) 43 - 75 %    Immature Grans % 1 0 - 2 %    Lymphocytes % 8 (L) 14 - 44 %    Monocytes % 5 4 - 12 %    Eosinophils Relative 1 0 - 6 %    Basophils Relative 0 0 - 1 %    Absolute Neutrophils 11.46 (H) 1.85 - 7.62 Thousands/µL    Absolute Immature Grans 0.07 0.00 - 0.20 Thousand/uL    Absolute Lymphocytes 1.07 0.60 - 4.47 Thousands/µL    Absolute Monocytes 0.72 0.17 - 1.22 Thousand/µL    Eosinophils Absolute 0.19 0.00 - 0.61 Thousand/µL    Basophils Absolute 0.05 0.00 - 0.10 Thousands/µL   Comprehensive metabolic panel    Collection Time: 03/17/25  8:44 AM   Result Value Ref Range    Sodium 139 135 - 147 mmol/L    Potassium  3.8 3.5 - 5.3 mmol/L    Chloride 102 96 - 108 mmol/L    CO2 28 21 - 32 mmol/L    ANION GAP 9 4 - 13 mmol/L    BUN 26 (H) 5 - 25 mg/dL    Creatinine 0.84 0.60 - 1.30 mg/dL    Glucose 128 65 - 140 mg/dL    Calcium 9.1 8.4 - 10.2 mg/dL    AST 17 13 - 39 U/L    ALT 14 7 - 52 U/L    Alkaline Phosphatase 52 34 - 104 U/L    Total Protein 7.5 6.4 - 8.4 g/dL    Albumin 4.1 3.5 - 5.0 g/dL    Total Bilirubin 0.50 0.20 - 1.00 mg/dL    eGFR 95 ml/min/1.73sq m   TSH, 3rd generation with Free T4 reflex    Collection Time: 03/17/25  8:44 AM   Result Value Ref Range    TSH 3RD GENERATON 0.997 0.450 - 4.500 uIU/mL   Comprehensive metabolic panel    Collection Time: 03/18/25  6:24 AM   Result Value Ref Range    Sodium 138 135 - 147 mmol/L    Potassium 3.7 3.5 - 5.3 mmol/L    Chloride 103 96 - 108 mmol/L    CO2 25 21 - 32 mmol/L    ANION GAP 10 4 - 13 mmol/L    BUN 17 5 - 25 mg/dL    Creatinine 0.73 0.60 - 1.30 mg/dL    Glucose 111 65 - 140 mg/dL    Calcium 8.9 8.4 - 10.2 mg/dL    AST 18 13 - 39 U/L    ALT 14 7 - 52 U/L    Alkaline Phosphatase 48 34 - 104 U/L    Total Protein 7.0 6.4 - 8.4 g/dL    Albumin 3.9 3.5 - 5.0 g/dL    Total Bilirubin 0.51 0.20 - 1.00 mg/dL    eGFR 100 ml/min/1.73sq m   Magnesium    Collection Time: 03/18/25  6:24 AM   Result Value Ref Range    Magnesium 1.8 (L) 1.9 - 2.7 mg/dL   CBC and differential    Collection Time: 03/18/25  6:24 AM   Result Value Ref Range    WBC 15.98 (H) 4.31 - 10.16 Thousand/uL    RBC 3.81 (L) 3.88 - 5.62 Million/uL    Hemoglobin 11.9 (L) 12.0 - 17.0 g/dL    Hematocrit 36.1 (L) 36.5 - 49.3 %    MCV 95 82 - 98 fL    MCH 31.2 26.8 - 34.3 pg    MCHC 33.0 31.4 - 37.4 g/dL    RDW 13.2 11.6 - 15.1 %    MPV 10.1 8.9 - 12.7 fL    Platelets 262 149 - 390 Thousands/uL    nRBC 0 /100 WBCs    Segmented % 88 (H) 43 - 75 %    Immature Grans % 1 0 - 2 %    Lymphocytes % 5 (L) 14 - 44 %    Monocytes % 6 4 - 12 %    Eosinophils Relative 0 0 - 6 %    Basophils Relative 0 0 - 1 %    Absolute Neutrophils  14.12 (H) 1.85 - 7.62 Thousands/µL    Absolute Immature Grans 0.09 0.00 - 0.20 Thousand/uL    Absolute Lymphocytes 0.77 0.60 - 4.47 Thousands/µL    Absolute Monocytes 0.95 0.17 - 1.22 Thousand/µL    Eosinophils Absolute 0.02 0.00 - 0.61 Thousand/µL    Basophils Absolute 0.03 0.00 - 0.10 Thousands/µL       MRI lumbar spine w wo contrast  Result Date: 3/17/2025  Narrative: MRI LUMBAR SPINE WITH AND WITHOUT CONTRAST INDICATION: C34.00: Malignant neoplasm of unspecified main bronchus.   extended stage SCLC now with new bone mets T11 and L5  assess r/o compression COMPARISON: Same day MRI thoracic spine with and without contrast NM PET CT skull base to MID thigh 2/7/2025. MRI brain with and without contrast 2/5/2021. TECHNIQUE:  Multiplanar, multisequence imaging of the lumbar spine was performed before and after gadolinium administration. . IV Contrast:  9 mL of Gadobutrol injection IMAGE QUALITY:  Diagnostic FINDINGS: VERTEBRAL BODIES:  There are 5 lumbar type vertebral bodies.  Normal alignment of the lumbar spine.  No spondylolysis or spondylolisthesis. No scoliosis.  No compression fracture. Small pathologic marrow replacing lesions in L1 and L2 vertebral body with associated enhancement, compatible with osseous metastasis. Mildly heterogeneous bone marrow signal. SACRUM: The heterogeneous bone marrow signal in visualized sacrum. No evidence of insufficiency or stress fracture. DISTAL CORD AND CONUS:  Normal size and signal within the distal cord and conus. PARASPINAL SOFT TISSUES:  Paraspinal soft tissues are unremarkable. LOWER THORACIC DISC SPACES: Please see same day MRI thoracic spine with and without contrast for further evaluation. LUMBAR DISC SPACES: Multilevel endplate osteophytes, mild disc height loss, and facet arthropathy. L1-L2: Mild disc bulge. No significant canal stenosis or foraminal narrowing. L2-L3: Mild disc bulge, small central disc protrusion. Mild canal stenosis. No significant foraminal  narrowing. L3-L4: Mild disc bulge, small left posterior annular fissure. No significant canal stenosis or foraminal narrowing. L4-L5: Mild disc bulge. Facet arthropathy. Mild canal stenosis. Mild bilateral foraminal narrowing. L5-S1: Mild disc bulge, small posterior annular fissure. Small synovial cyst adjacent to the right L5 pars interarticularis. No significant canal stenosis or foraminal narrowing. POSTCONTRAST IMAGING: Please see above discussion. OTHER FINDINGS: Partially imaged left renal cyst.     Impression: Osseous metastasis in L1 and L2 vertebral bodies. No pathologic compression fracture, as clinically questioned. Multilevel degenerative changes of lumbar spine with varying degrees of canal stenosis (mild L2-L3 and L4-L5) and foraminal narrowing (mild bilateral L4-L5), as detailed above. Please see same day MRI thoracic spine with and without contrast for further evaluation. The study was marked in EPIC for immediate notification. Workstation performed: [id number]     MRI thoracic spine w wo contrast  Result Date: 3/17/2025  Narrative: MRI THORACIC SPINE WITH AND WITHOUT CONTRAST INDICATION: C34.00: Malignant neoplasm of unspecified main bronchus.   extended stage SCLC now with new bone mets T11 and L5  assess r/o compression COMPARISON: Same day MRI lumbar spine with and without contrast. NM PET CT skull base to MID thigh on 2/7/2025. MRI brain with and without contrast 2/5/2025. TECHNIQUE:  Multiplanar, multisequence imaging of the thoracic spine was performed before and after gadolinium administration. . IV Contrast:  9 mL of Gadobutrol injection IMAGE QUALITY:  Diagnostic. FINDINGS: ALIGNMENT:  Normal alignment of the thoracic spine.  No compression fracture.  No subluxation.  No evidence of scoliosis. MARROW SIGNAL: Small right T4 transverse process and L1 vertebral body mildly enhancing pathologic marrow replacing lesions, compatible with osseous metastasis. Mildly heterogeneous bone marrow  signal. Type II Modic endplate change C5-6, T6-T7, T7-T8. THORACIC CORD:  Normal signal within the thoracic cord. PREVERTEBRAL AND PARASPINAL SOFT TISSUES:   Normal. THORACIC DEGENERATIVE CHANGE: Mild multilevel degenerative changes consisting of endplate osteophytes and mild disc height loss. No significant canal stenosis or foraminal narrowing. POSTCONTRAST: Please see above discussion. OTHER FINDINGS: Partially imaged known right upper lobe malignancy, mediastinal and hilar beata metastasis, left renal cyst.     Impression: Osseous metastasis in right T4 transverse process and L1 vertebral body. No pathologic compression fracture, as clinically questioned. Partially imaged known right upper lobe malignancy, mediastinal and hilar beata metastasis. Mild degenerative changes of thoracic spine, as detailed above. No significant canal stenosis or foraminal narrowing. Please see same day MRI lumbar spine with and without contrast for further evaluation. The study was marked in EPIC for immediate notification. Workstation performed: [id number]       I have personally reviewed labs, imaging studies, and pertinent reports.      This note has been generated by voice recognition software system.  Therefore, there may be spelling, grammar, and or syntax errors. Please contact if questions arise.

## 2025-03-18 NOTE — UTILIZATION REVIEW
Initial Clinical Review    Admission: Date/Time/Statement:   Admission Orders (From admission, onward)       Ordered        03/17/25 0820  INPATIENT ADMISSION  Once                          Orders Placed This Encounter   Procedures    INPATIENT ADMISSION     Standing Status:   Standing     Number of Occurrences:   1     Level of Care:   Level 2 Stepdown / HOT [14]     Estimated length of stay:   More than 2 Midnights     Certification:   I certify that inpatient services are medically necessary for this patient for a duration of greater than two midnights. See H&P and MD Progress Notes for additional information about the patient's course of treatment.     Initial Presentation: 59 y.o. male with PMHx of large R lung mass and brain masses, COPD Admitted to [facility] Unit for initiation of Tarlatamab as third line treatment   Originally diagnosed 3/2024 when he presented to ED with dyspnea and confusion, found to have large R lung mass and brain masses - follow with heme/onc.  Continued progression of disease on multiple chemotherapeutic agents, now admitted to start 3rd line therapy with tarlatamab  Recent PET 2/7 with increasing RUL and R perihilar masses, interval development of osseous metastases  Recently underwent MRI T/L spine for new FDG uptake T11 and T5 given mid back pain without neurologic deficits, read pending    Will need IP monitoring for cytokine release syndrome, neurotoxocity/ICANS.  Premedication with decadron. Continue daily prednisone. Holding Anoro Ellipta, breathing is good and patient denies using this med.   Heme/Onc consulted for chemo.      Anticipated Length of Stay/Certification Statement: Patient will be admitted on an inpatient basis with an anticipated length of stay of greater than 2 midnights secondary to Chemo.     Heme/Onc consult --   Plan:   -Initiate C1D1 Tarlatamab inpatient   -Admitted for CNS observation as concern for CRS, neurotoxicity from immune effector cell neurotoxicity  syndrome or ICANS   -Monitor q2h for first 12 hours and q4h after  -Pre-treat with Decadron 8 mg x 1 dose  -Continue prednisone 5 mg daily   -Continue  mL/hr, receiving 1 L bolus   -Check thyroid labs   -Will also need admission for C1D8 of treatment   -Rest of care per primary team       Date: 3/18   Day 2: Pt endorses some mild nausea w/ dry heaves, headache, and fatigue from lack of sleep. He also endorses chills. Otherwise, no additional concerns. Continue treatment plan as initially noted. Monitor symptoms q 4 hr. Supportive care.      Scheduled Medications:  Cholecalciferol, 2,000 Units, Oral, Daily  enoxaparin, 40 mg, Subcutaneous, Daily  predniSONE, 5 mg, Oral, Daily    Continuous IV Infusions:  multi-electrolyte, 100 mL/hr, Intravenous, Continuous    PRN Meds:  acetaminophen, 650 mg, Oral, Q6H PRN  alteplase, 2 mg, Intracatheter, Q1MIN PRN  calcium carbonate, 1,000 mg, Oral, Daily PRN  ondansetron, 4 mg, Intravenous, Q6H PRN  ondansetron, 4 mg, Intravenous, Q6H PRN  sodium chloride, 20 mL/hr, Intravenous, Once PRN      Weight (last 2 days)       Date/Time Weight    03/17/25 08:38:23 89 (196.21)            Vital Signs (last 3 days)       Date/Time Temp Pulse Resp BP MAP (mmHg) SpO2 O2 Device Patient Position - Orthostatic VS Pain    03/18/25 08:21:36 98.3 °F (36.8 °C) 66 14 126/77 93 92 % -- -- --    03/18/25 0749 -- -- -- -- -- -- -- -- No Pain    03/18/25 07:47:34 98.2 °F (36.8 °C) 73 18 133/84 100 93 % -- -- --    03/18/25 0734 -- -- -- -- -- 93 % -- -- No Pain    03/18/25 02:41:13 97.6 °F (36.4 °C) 71 17 133/85 101 92 % -- -- --    03/17/25 22:55:32 98.2 °F (36.8 °C) 62 16 121/75 90 94 % -- Lying --    03/17/25 20:15:42 98 °F (36.7 °C) 68 16 122/73 89 92 % None (Room air) Lying --    03/17/25 2000 -- -- -- -- -- -- -- -- No Pain    03/17/25 16:56:53 98 °F (36.7 °C) 60 14 116/73 87 95 % -- -- --    03/17/25 1600 -- -- -- -- -- 93 % None (Room air) -- No Pain    03/17/25 15:12:20 97.9 °F (36.6 °C)  60 18 116/73 87 93 % -- -- --    03/17/25 1100 -- -- -- -- -- 93 % None (Room air) -- No Pain    03/17/25 08:38:23 98.3 °F (36.8 °C) 71 16 88/59 69 94 % -- -- --    03/17/25 08:35:57 98.3 °F (36.8 °C) 60 16 88/59 69 93 % -- -- --              Pertinent Labs/Diagnostic Test Results:     Results from last 7 days   Lab Units 03/18/25 0624 03/17/25  0844 03/14/25  1025   WBC Thousand/uL 15.98* 13.56* 8.20   HEMOGLOBIN g/dL 11.9* 12.8 13.2   HEMATOCRIT % 36.1* 38.6 40.4   PLATELETS Thousands/uL 262 287 292   TOTAL NEUT ABS Thousands/µL 14.12* 11.46* 5.88     Results from last 7 days   Lab Units 03/18/25 0624 03/17/25  0844 03/14/25  1025   SODIUM mmol/L 138 139 138   POTASSIUM mmol/L 3.7 3.8 4.2   CHLORIDE mmol/L 103 102 101   CO2 mmol/L 25 28 29   ANION GAP mmol/L 10 9 8   BUN mg/dL 17 26* 20   CREATININE mg/dL 0.73 0.84 0.91   EGFR ml/min/1.73sq m 100 95 91   CALCIUM mg/dL 8.9 9.1 9.5   MAGNESIUM mg/dL 1.8*  --  2.1     Results from last 7 days   Lab Units 03/18/25 0624 03/17/25  0844 03/14/25  1025   AST U/L 18 17 20   ALT U/L 14 14 19   ALK PHOS U/L 48 52 54   TOTAL PROTEIN g/dL 7.0 7.5 7.6   ALBUMIN g/dL 3.9 4.1 4.5   TOTAL BILIRUBIN mg/dL 0.51 0.50 0.65       Results from last 7 days   Lab Units 03/18/25  0624 03/17/25  0844 03/14/25  1025   GLUCOSE RANDOM mg/dL 111 128 99     Results from last 7 days   Lab Units 03/17/25  0844   TSH 3RD GENERATON uIU/mL 0.997           No past medical history on file.  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 male    Network Utilization Review Department  ATTENTION: Please call with any questions or concerns to [phone] and carefully listen to the prompts so that you are directed to the right person. All voicemails are confidential.   For Discharge  needs, contact Care Management DC Support Team at [phone] opt. 2  Send all requests for admission clinical reviews, approved or denied determinations and any other requests to dedicated fax number below belonging to the campus where the patient is receiving treatment. List of dedicated fax numbers for the Facilities:  FACILITY NAME UR FAX NUMBER   ADMISSION DENIALS (Administrative/Medical Necessity) [phone]   DISCHARGE SUPPORT TEAM (NETWORK) [phone]   PARENT CHILD HEALTH (Maternity/NICU/Pediatrics) [phone]   [facility] [phone]   [facility] [phone]   [facility] [phone]   [facility] [phone]   [facility] [phone]   [facility] [phone]   [facility] [phone]   [facility] [phone]   [facility] [phone]   [facility] [phone]   [facility] [phone]   [facility] [phone]

## 2025-03-19 ENCOUNTER — TELEPHONE (OUTPATIENT)
Age: 60
End: 2025-03-19

## 2025-03-19 VITALS
OXYGEN SATURATION: 94 % | TEMPERATURE: 98.3 F | WEIGHT: 196.21 LBS | RESPIRATION RATE: 16 BRPM | BODY MASS INDEX: 28.09 KG/M2 | HEIGHT: 70 IN | HEART RATE: 68 BPM | DIASTOLIC BLOOD PRESSURE: 75 MMHG | SYSTOLIC BLOOD PRESSURE: 117 MMHG

## 2025-03-19 DIAGNOSIS — C34.00 SMALL CELL CARCINOMA OF HILUM OF LUNG, UNSPECIFIED LATERALITY (HCC): Primary | ICD-10-CM

## 2025-03-19 PROCEDURE — 99239 HOSP IP/OBS DSCHRG MGMT >30: CPT

## 2025-03-19 PROCEDURE — NC001 PR NO CHARGE: Performed by: INTERNAL MEDICINE

## 2025-03-19 RX ADMIN — Medication 2000 UNITS: at 08:58

## 2025-03-19 RX ADMIN — ENOXAPARIN SODIUM 40 MG: 40 INJECTION SUBCUTANEOUS at 08:57

## 2025-03-19 RX ADMIN — PREDNISONE 5 MG: 5 TABLET ORAL at 08:58

## 2025-03-19 NOTE — PROGRESS NOTES
Progress Note - Oncology-Medical   Name: Nino Silva 59 y.o. 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  Patient is status post whole brain radiation, completed 10 fractions in March 2024.  Patient was subsequently started on carboplatin plus etoposide plus Durvalumab x 4 cycles (April to June 2024) and remained on durvalumab maintenance till October 2024.  PET scan from October was consistent with disease progression.  S/p second line treatment with lurbinectedin from November 2024 to January 2025.  PET scan from 2/7/2025 with significant progression of disease in brain as well as chest mediastinum.  Patient admitted to hospital for initiation of third line treatment with Tarlatamab, for close monitoring of CRS and I Cance.    Patient was pretreated with Decadron 8 mg x 1, s/p tarlatamab on 3/18/2025.  Had mild shivering and nausea about 16 hours post infusion but no major side effects reported.  Vitals and labs reviewed, patient is stable for DC from oncology standpoint.  Continue prednisone 5 mg daily at home dosage.  He has follow-up appointment with Dr. Her steiner on 3/21 and planned admission for 3/24 for C1, D8 Tarlatamab.     COPD (chronic obstructive pulmonary disease) (HCC)  Not in exacerbation, continue home regimen per primary team.  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 Her shock on 3/21 and has C1 D8 scheduled on 3/24 at [facility].    Communication with patient/family:  I did update the patient, as well as his wife at bedside    Communication with team:  Did  communicate with  primary team.      I did review this patient with Dr. Bhatti and they are in agreement with this plan.      Subjective:  Patient seen at bedside, denies any active complaints.  Had not required any Zofran in last 24 hours.  No fever recorded.    Review of Systems   Constitutional:  Negative for activity change, chills and fever.   Respiratory:  Negative for chest tightness and shortness of breath.    Cardiovascular:  Negative for chest pain and palpitations.   Gastrointestinal:  Negative for abdominal pain and constipation.   Skin:  Negative for color change.   All other systems reviewed and are negative.         Objective:     Medication Administration - last 24 hours from 03/18/2025 1346 to 03/19/2025 1346         Date/Time Order Dose Route Action Action by     03/19/2025 0858 EDT Cholecalciferol (VITAMIN D3) tablet 2,000 Units 2,000 Units Oral Given Sarahmegan Huerta     03/19/2025 0857 EDT enoxaparin (LOVENOX) subcutaneous injection 40 mg 40 mg Subcutaneous Given Sarah Gorak     03/18/2025 1659 EDT multi-electrolyte (Plasmalyte-A/Isolyte-S PH 7.4/Normosol-R) IV solution 100 mL/hr Intravenous New Vidya Gaytan, FRAN     03/19/2025 1144 EDT alteplase (CATHFLO) injection 2 mg -- Intracatheter MAR Unhold Automatic Discharge Provider     03/19/2025 0944 EDT alteplase (CATHFLO) injection 2 mg -- Intracatheter MAR Hold Background, Lucas     03/19/2025 1144 EDT sodium chloride 0.9 % infusion -- Intravenous MAR Unhold Automatic Discharge Provider     03/19/2025 0944 EDT sodium chloride 0.9 % infusion -- Intravenous MAR Hold Background, Lucas     03/19/2025 1144 EDT ondansetron (ZOFRAN) injection 4 mg -- Intravenous MAR Unhold Automatic Discharge Provider     03/19/2025 0944 EDT ondansetron (ZOFRAN) injection 4 mg -- Intravenous MAR Hold Background, Lucas     03/19/2025 0858 EDT predniSONE tablet 5 mg 5 mg Oral Given Sarahmegan Huerta     03/18/2025 1650 EDT magnesium sulfate 2 g/50 mL IVPB (premix) 2 g 2 g  "Intravenous New Bag Maday Gaytan, RN            /75   Pulse 68   Temp 98.3 °F (36.8 °C)   Resp 16   Ht 5' 10\" (1.778 m)   Wt 89 kg (196 lb 3.4 oz)   SpO2 94%   BMI 28.15 kg/m²       Physical Exam  Constitutional:       Appearance: Normal appearance.   HENT:      Head: Normocephalic and atraumatic.      Mouth/Throat:      Mouth: Mucous membranes are moist.      Pharynx: Oropharynx is clear.   Cardiovascular:      Rate and Rhythm: Normal rate and regular rhythm.   Pulmonary:      Effort: Pulmonary effort is normal.      Breath sounds: Normal breath sounds.   Abdominal:      General: Abdomen is flat. Bowel sounds are normal.   Skin:     General: Skin is warm and dry.   Neurological:      General: No focal deficit present.      Mental Status: He is alert and oriented to person, place, and time.         Recent Results (from the past 48 hours)   Comprehensive metabolic panel    Collection Time: 03/18/25  6:24 AM   Result Value Ref Range    Sodium 138 135 - 147 mmol/L    Potassium 3.7 3.5 - 5.3 mmol/L    Chloride 103 96 - 108 mmol/L    CO2 25 21 - 32 mmol/L    ANION GAP 10 4 - 13 mmol/L    BUN 17 5 - 25 mg/dL    Creatinine 0.73 0.60 - 1.30 mg/dL    Glucose 111 65 - 140 mg/dL    Calcium 8.9 8.4 - 10.2 mg/dL    AST 18 13 - 39 U/L    ALT 14 7 - 52 U/L    Alkaline Phosphatase 48 34 - 104 U/L    Total Protein 7.0 6.4 - 8.4 g/dL    Albumin 3.9 3.5 - 5.0 g/dL    Total Bilirubin 0.51 0.20 - 1.00 mg/dL    eGFR 100 ml/min/1.73sq m   Magnesium    Collection Time: 03/18/25  6:24 AM   Result Value Ref Range    Magnesium 1.8 (L) 1.9 - 2.7 mg/dL   CBC and differential    Collection Time: 03/18/25  6:24 AM   Result Value Ref Range    WBC 15.98 (H) 4.31 - 10.16 Thousand/uL    RBC 3.81 (L) 3.88 - 5.62 Million/uL    Hemoglobin 11.9 (L) 12.0 - 17.0 g/dL    Hematocrit 36.1 (L) 36.5 - 49.3 %    MCV 95 82 - 98 fL    MCH 31.2 26.8 - 34.3 pg    MCHC 33.0 31.4 - 37.4 g/dL    RDW 13.2 11.6 - 15.1 %    MPV 10.1 8.9 - 12.7 fL    Platelets " 262 149 - 390 Thousands/uL    nRBC 0 /100 WBCs    Segmented % 88 (H) 43 - 75 %    Immature Grans % 1 0 - 2 %    Lymphocytes % 5 (L) 14 - 44 %    Monocytes % 6 4 - 12 %    Eosinophils Relative 0 0 - 6 %    Basophils Relative 0 0 - 1 %    Absolute Neutrophils 14.12 (H) 1.85 - 7.62 Thousands/µL    Absolute Immature Grans 0.09 0.00 - 0.20 Thousand/uL    Absolute Lymphocytes 0.77 0.60 - 4.47 Thousands/µL    Absolute Monocytes 0.95 0.17 - 1.22 Thousand/µL    Eosinophils Absolute 0.02 0.00 - 0.61 Thousand/µL    Basophils Absolute 0.03 0.00 - 0.10 Thousands/µL   Wheeled Walker    Collection Time: 03/18/25 12:45 PM   Result Value Ref Range    Supplier Name [employer]     Supplier Phone Number [phone]     Order Status Completed     Delivery Note      Delivery Request Date 03/18/2025     Item Description Wheeled Walker, Adult        MRI lumbar spine w wo contrast  Result Date: 3/17/2025  Narrative: MRI LUMBAR SPINE WITH AND WITHOUT CONTRAST INDICATION: C34.00: Malignant neoplasm of unspecified main bronchus.   extended stage SCLC now with new bone mets T11 and L5  assess r/o compression COMPARISON: Same day MRI thoracic spine with and without contrast NM PET CT skull base to MID thigh 2/7/2025. MRI brain with and without contrast 2/5/2021. TECHNIQUE:  Multiplanar, multisequence imaging of the lumbar spine was performed before and after gadolinium administration. . IV Contrast:  9 mL of Gadobutrol injection IMAGE QUALITY:  Diagnostic FINDINGS: VERTEBRAL BODIES:  There are 5 lumbar type vertebral bodies.  Normal alignment of the lumbar spine.  No spondylolysis or spondylolisthesis. No scoliosis.  No compression fracture. Small pathologic marrow replacing lesions in L1 and L2 vertebral body with associated enhancement, compatible with osseous metastasis. Mildly heterogeneous bone marrow signal. SACRUM: The heterogeneous bone marrow signal in visualized sacrum. No evidence of insufficiency or stress  fracture. DISTAL CORD AND CONUS:  Normal size and signal within the distal cord and conus. PARASPINAL SOFT TISSUES:  Paraspinal soft tissues are unremarkable. LOWER THORACIC DISC SPACES: Please see same day MRI thoracic spine with and without contrast for further evaluation. LUMBAR DISC SPACES: Multilevel endplate osteophytes, mild disc height loss, and facet arthropathy. L1-L2: Mild disc bulge. No significant canal stenosis or foraminal narrowing. L2-L3: Mild disc bulge, small central disc protrusion. Mild canal stenosis. No significant foraminal narrowing. L3-L4: Mild disc bulge, small left posterior annular fissure. No significant canal stenosis or foraminal narrowing. L4-L5: Mild disc bulge. Facet arthropathy. Mild canal stenosis. Mild bilateral foraminal narrowing. L5-S1: Mild disc bulge, small posterior annular fissure. Small synovial cyst adjacent to the right L5 pars interarticularis. No significant canal stenosis or foraminal narrowing. POSTCONTRAST IMAGING: Please see above discussion. OTHER FINDINGS: Partially imaged left renal cyst.     Impression: Osseous metastasis in L1 and L2 vertebral bodies. No pathologic compression fracture, as clinically questioned. Multilevel degenerative changes of lumbar spine with varying degrees of canal stenosis (mild L2-L3 and L4-L5) and foraminal narrowing (mild bilateral L4-L5), as detailed above. Please see same day MRI thoracic spine with and without contrast for further evaluation. The study was marked in EPIC for immediate notification. Workstation performed: [id number]     MRI thoracic spine w wo contrast  Result Date: 3/17/2025  Narrative: MRI THORACIC SPINE WITH AND WITHOUT CONTRAST INDICATION: C34.00: Malignant neoplasm of unspecified main bronchus.   extended stage SCLC now with new bone mets T11 and L5  assess r/o compression COMPARISON: Same day MRI lumbar spine with and without contrast. NM PET CT skull base to MID thigh on 2/7/2025. MRI brain with and  without contrast 2/5/2025. TECHNIQUE:  Multiplanar, multisequence imaging of the thoracic spine was performed before and after gadolinium administration. . IV Contrast:  9 mL of Gadobutrol injection IMAGE QUALITY:  Diagnostic. FINDINGS: ALIGNMENT:  Normal alignment of the thoracic spine.  No compression fracture.  No subluxation.  No evidence of scoliosis. MARROW SIGNAL: Small right T4 transverse process and L1 vertebral body mildly enhancing pathologic marrow replacing lesions, compatible with osseous metastasis. Mildly heterogeneous bone marrow signal. Type II Modic endplate change C5-6, T6-T7, T7-T8. THORACIC CORD:  Normal signal within the thoracic cord. PREVERTEBRAL AND PARASPINAL SOFT TISSUES:   Normal. THORACIC DEGENERATIVE CHANGE: Mild multilevel degenerative changes consisting of endplate osteophytes and mild disc height loss. No significant canal stenosis or foraminal narrowing. POSTCONTRAST: Please see above discussion. OTHER FINDINGS: Partially imaged known right upper lobe malignancy, mediastinal and hilar beata metastasis, left renal cyst.     Impression: Osseous metastasis in right T4 transverse process and L1 vertebral body. No pathologic compression fracture, as clinically questioned. Partially imaged known right upper lobe malignancy, mediastinal and hilar beata metastasis. Mild degenerative changes of thoracic spine, as detailed above. No significant canal stenosis or foraminal narrowing. Please see same day MRI lumbar spine with and without contrast for further evaluation. The study was marked in EPIC for immediate notification. Workstation performed: [id number]       I have personally reviewed labs, imaging studies, and pertinent reports.      This note has been generated by voice recognition software system.  Therefore, there may be spelling, grammar, and or syntax errors. Please contact if questions arise.

## 2025-03-19 NOTE — ASSESSMENT & PLAN NOTE
Diagnosed with stage IV small cell lung cancer in March 2024, with multiple cortical brain lesion.  Patient is status post whole brain radiation, completed 10 fractions in March 2024.  Patient was subsequently started on carboplatin plus etoposide plus Durvalumab x 4 cycles (April to June 2024) and remained on durvalumab maintenance till October 2024.  PET scan from October was consistent with disease progression.  S/p second line treatment with lurbinectedin from November 2024 to January 2025.  PET scan from 2/7/2025 with significant progression of disease in brain as well as chest mediastinum.  Patient admitted to hospital for initiation of third line treatment with Tarlatamab, for close monitoring of CRS and I Cance.    Patient was pretreated with Decadron 8 mg x 1, s/p tarlatamab on 3/18/2025.  Had mild shivering and nausea about 16 hours post infusion but no major side effects reported.  Vitals and labs reviewed, patient is stable for DC from oncology standpoint.  Continue prednisone 5 mg daily at home dosage.  He has follow-up appointment with Dr. Her steiner on 3/21 and planned admission for 3/24 for C1, D8 Tarlatamab.

## 2025-03-19 NOTE — PROGRESS NOTES
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 median time to progression was 13 versus 12 weeks, and the median survival was 25 versus 24.7 weeks, differences that were not statistically significant. Control of several symptoms including dyspnea, anorexia, hoarseness, and fatigue was improved with t???t??a?. Severe neutropenia was less common with t???te?an (38 versus 51 percent), but grade 3 or 4 thrombocytopenia and anemia occurred more frequently (9.8 versus 1.4 percent and 17.7 versus 7.2 percent, respectively). The improvement in symptom control led to its FDA approval.\         Oral and IV formulations of topotecan have been extensively evaluated for the second-line treatment of relapsed ?CLC and are found to be equally effective     In a phase III noninferiority trial, 304 patients with chemosensitive relapse (>90 days) of ???C were randomly assigned to oral (2.3 mg/m2 daily for five days) or IV (1.5 mg/m2 daily for five days) topotecan every three weeks. Response rates were similar (18 versus 22 percent), as were median and one-year survival rates (33 versus 35 weeks and 33 versus 29 percent, respectively) [9]. The toxicity profiles of the two regimens were similar as well.        Topotecan daily times five every three weeks rather than a once-weekly schedule, given better efficacy in cross-trial comparisons. In the  phase II trial, 192  "patients with previously treated ??L? were randomly assigned to weekly t???te?an with or without aflibercept after progression on a ?l?ti??m-based regimen. Only two partial responses (1 percent) were observed in the entire study population (both in patients who also received ?flib?r?e?t), and the median OS was approximately five months. Response rates of the daily times five every three weeks schedule were higher in other studies, on the order of 20 percent  These data are discussed above.         As an alternative to topotecan, three small clinical trials have evaluated irinotecan as second-line therapy, with objective response rates ranging from 16 to 47 percent . As an example, in one study, 44 patients (17 with sensitive relapse and 27 with resistant or refractory disease) were treated with iri??t?c?n 125 mg/m2 weekly times four with a two-week break. The overall objective response rate was 16 percent. The response rate in patients with resistant or refractory disease was 4 percent. The response rate was 35 percent in the patients with sensitive relapse, with a median survival of 6.8 months. Primary toxicities of iri??te??n included diarrhea and neutropenia. Results for those with sensitive relapse are discussed below.         Tarlatamab, is a bispecific T-cell engager immunotherapy that directs the patient's T cells to cancer cells expressing delta-like ligand 3 (overexpressed in approximately 90 percent of SCLCs). It is approved by the [place] Food and  Drug Administration (FDA) at a dose of 10 mg intravenously every two weeks (after an initial \"step-up\" dosing schedule) . The approval is based on response rates and is contingent upon results of a confirmatory trial.     Tarlatamab has shown promising activity in a phase II trial among patients with disease that had relapsed after, or was refractory to, one platin?m-based treatment regimen and at least one other line of therapy. At a dose of 10 mg intravenously " every two weeks, the response rate was 40 percent, median progression-free survival was 4.9 months, and overall survival was 14.3 months  With longer follow-up (median 12.1 months), the response rate was 35 percent and median overall survival was 20 months.     The most common adverse events in these patients were cytokine-release syndrome (CRS; in 51 percent), decreased appetite (29 percent), and pyrexia (35 percent). Grade 3 CRS occurred in 1 percent.     In a pooled safety analysis reported in the FDA label         ?CRS occurred in 55 percent, mostly occurring during treatment cycle 1. The median time to onset of any grade CRS from last exposure to tarlatamab was 13.5 hours. CRS can manifest as pyrexia, hypotension, fatigue, tachycardia, headache, hypoxia, nausea, and vomiting. Potentially life-threatening complications of CRS include cardiac dysfunction, acute respiratory distress syndrome, neurologic toxicity, kidney and/or hepatic failure, and disseminated intravascular coagulation. Management of CRS is discussed elsewhere        ?Neurologic toxicity occurred in 47 percent, including 10 percent with grade 3 toxicity. The most frequent neurologic toxicities included headache (14 percent), peripheral neuropathy (7 percent), dizziness (7 percent), and insomnia (6 percent). Immune effector cell-associated neurotoxicity syndrome (ICANS), which can present as confusion, lethargy, or disorientation (among other symptoms) occurred in 9 percent. It most frequently occurred following cycle 2 day 1 and had a median time of onset from first dose of 29.5 days. Further discussion of clinical features and management of ICANS is found elsewhere           Review of Systems   Constitutional:  Positive for fatigue.   HENT: Negative.     Eyes: Negative.    Respiratory: Negative.     Cardiovascular: Negative.    Gastrointestinal: Negative.    Endocrine: Negative.    Genitourinary: Negative.    Musculoskeletal: Negative.     Neurological:  Positive for headaches.   Hematological: Negative.    Psychiatric/Behavioral: Negative.               03/19/25:      Review of Systems   Constitutional:  Positive for fatigue.   HENT: Negative.     Respiratory: Negative.     Cardiovascular: Negative.    Gastrointestinal: Negative.    Genitourinary: Negative.    Musculoskeletal: Negative.    Skin: Negative.    Neurological:  Positive for weakness.   Hematological: Negative.    Psychiatric/Behavioral: Negative.             Objective   There were no vitals taken for this visit.    Pain Screening:     ECOG   3  Physical Exam  Vitals reviewed.   Constitutional:       Appearance: Normal appearance.   HENT:      Head: Normocephalic.      Nose: Nose normal.      Mouth/Throat:      Pharynx: Oropharynx is clear.   Eyes:      Conjunctiva/sclera: Conjunctivae normal.   Cardiovascular:      Rate and Rhythm: Normal rate.      Pulses: Normal pulses.   Pulmonary:      Effort: Pulmonary effort is normal.   Abdominal:      General: Bowel sounds are normal.   Musculoskeletal:         General: Normal range of motion.   Skin:     General: Skin is warm.   Neurological:      Mental Status: He is alert.      Comments: 5/5 UE and LE strength   Psychiatric:         Mood and Affect: Mood normal.         Labs: I have reviewed the following labs:  Lab Results   Component Value Date/Time    WBC 15.98 (H) 03/18/2025 06:24 AM    RBC 3.81 (L) 03/18/2025 06:24 AM    Hemoglobin 11.9 (L) 03/18/2025 06:24 AM    Hematocrit 36.1 (L) 03/18/2025 06:24 AM    MCV 95 03/18/2025 06:24 AM    MCH 31.2 03/18/2025 06:24 AM    RDW 13.2 03/18/2025 06:24 AM    Platelets 262 03/18/2025 06:24 AM    Segmented % 88 (H) 03/18/2025 06:24 AM    Lymphocytes % 5 (L) 03/18/2025 06:24 AM    Monocytes % 6 03/18/2025 06:24 AM    Eosinophils Relative 0 03/18/2025 06:24 AM    Basophils Relative 0 03/18/2025 06:24 AM    Immature Grans % 1 03/18/2025 06:24 AM    Absolute Neutrophils 14.12 (H) 03/18/2025 06:24 AM      Lab Results   Component Value Date/Time    Potassium 3.7 03/18/2025 06:24 AM    Chloride 103 03/18/2025 06:24 AM    CO2 25 03/18/2025 06:24 AM    BUN 17 03/18/2025 06:24 AM    Creatinine 0.73 03/18/2025 06:24 AM    Glucose, Fasting 99 02/07/2025 07:16 AM    Calcium 8.9 03/18/2025 06:24 AM    AST 18 03/18/2025 06:24 AM    ALT 14 03/18/2025 06:24 AM    Alkaline Phosphatase 48 03/18/2025 06:24 AM    Total Protein 7.0 03/18/2025 06:24 AM    Albumin 3.9 03/18/2025 06:24 AM    Total Bilirubin 0.51 03/18/2025 06:24 AM    eGFR 100 03/18/2025 06:24 AM           Administrative Statements   I have spent a total time of 40 minutes in caring for this patient on the day of the visit/encounter including Impressions, Counseling / Coordination of care, Documenting in the medical record, Reviewing/placing orders in the medical record (including tests, medications, and/or procedures), and Obtaining or reviewing history  .

## 2025-03-19 NOTE — TELEPHONE ENCOUNTER
Called and spoke to Nino. He was discharged from the hospital today after his first round of his new chemo. He reports feeling pretty well. I have placed orders for him to be admitted again on Monday, 3/24, for his day 8. He is also scheduled for his day 15 at the  infusion center, with appointment start time at 7 am. Reviewed that he will need labs completed prior to these treatments. Dr. Morrell will see him in office on Friday, 3/21. Nino verbalized understanding.

## 2025-03-19 NOTE — DISCHARGE SUMMARY
Discharge Summary - Hospitalist   Name: Nino Silva 59 y.o. 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 Morrell  Continued progression of disease on multiple chemotherapeutic agents, now admitted to start 3rd line therapy with tarlatamab  Recent PET 2/7 with increasing RUL and R perihilar masses, interval development of osseous metastases  Recently underwent MRI T/L spine for new FDG uptake T11 and T5 given mid back pain without neurologic deficits, read pending  Status post cycle.  Tolerated well  Discussed with hematology oncology and they want to monitor for 1 more day  Will be a readmission next week for another cycle.  Discussed with hematology oncology today and patient is good to go    Brain mass  Ongoing occasional balance issues and headaches but no other neurologic symptoms per oncology notes  Per oncology as he has significant brain lesions supratentorial and infratentorial, concern with potential leptomeningeal disease so if headaches worsen/persist may need LP  Also for consideration for gamma knife if any lesions become symptomatic  On daily prednisone 5 mg for vasogenic edema per oncology to see if headaches/balance issues improve   Hypotension due to hypovolemia  No signs and symptoms of infection at this time.  Will give a liter bolus and give maintenance fluids 100 mL/h  Continue to monitor    COPD (chronic obstructive pulmonary disease) (HCC)  Follows with pulm for COPD Gold stage 0, also with suspected IPF  Holding Anoro Ellipta, breathing is good and patient denies using this medication.    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    No Chest XR results available for this patient.       Incidental Findings:   None       Test Results Pending at Discharge (will require follow up):   none     Outpatient Tests Requested:  none    Complications:  none    Reason for Admission: None    Hospital Course:   Nino Silva is a 59 y.o. male patient who originally presented to the hospital on 3/17/2025 due to infusion of Tarlatamam .  Tolerated it well.  Had no episodes or chills diarrhea, shortness of breath, had temporary chills however that resolved on its own.  Today deemed fit for discharge and plan for readmission next week for another round of chemotherapy.        Please see above list of diagnoses and related plan for additional information.     Condition at Discharge: good    Discharge Day Visit / Exam:   Subjective:  Patient does not report and headaches, shortness of breath, chest pain, abdominal pain, nausea vomiting, lower leg edema. Also reports good sleep    Vitals: Blood Pressure: 117/75 (03/19/25 0823)  Pulse: 68 (03/19/25 0823)  Temperature: 98.3 °F (36.8 °C) (03/19/25 0823)  Temp Source: Oral (03/17/25 2255)  Respirations: 16 (03/19/25 0823)  Height: 5' 10\" (177.8 cm) (03/17/25 0838)  Weight - Scale: 89 kg (196 lb 3.4 oz) (03/17/25 0838)  SpO2: 94 % (03/19/25 0823)  Physical Exam  Vitals and nursing note reviewed.   Constitutional:       Appearance: He is well-developed and normal weight.   HENT:      Head: Normocephalic and atraumatic.      " Nose: Nose normal.      Mouth/Throat:      Mouth: Mucous membranes are moist.   Eyes:      Conjunctiva/sclera: Conjunctivae normal.   Cardiovascular:      Rate and Rhythm: Normal rate and regular rhythm.      Heart sounds: Normal heart sounds. No murmur heard.  Pulmonary:      Effort: Pulmonary effort is normal. No respiratory distress.      Breath sounds: Normal breath sounds.   Abdominal:      General: Abdomen is flat.      Palpations: Abdomen is soft.      Tenderness: There is no abdominal tenderness.   Musculoskeletal:         General: No swelling.      Cervical back: Neck supple.      Right lower leg: No edema.      Left lower leg: No edema.   Skin:     General: Skin is warm and dry.      Capillary Refill: Capillary refill takes less than 2 seconds.   Neurological:      General: No focal deficit present.      Mental Status: He is alert and oriented to person, place, and time. Mental status is at baseline.   Psychiatric:         Mood and Affect: Mood normal.          Discussion with Family: Updated  (wife) via phone.    Discharge instructions/Information to patient and family:   See after visit summary for information provided to patient and family.      Provisions for Follow-Up Care:  See after visit summary for information related to follow-up care and any pertinent home health orders.      Mobility at time of Discharge:   Basic Mobility Inpatient Raw Score: 16  JH-HLM Goal: 5: Stand one or more mins  JH-HLM Achieved: 6: Walk 10 steps or more  HLM Goal achieved. Continue to encourage appropriate mobility.     Disposition:   Home    Planned Readmission: None    Discharge Medications:  See after visit summary for reconciled discharge medications provided to patient and/or family.      Administrative Statements   Discharge Statement:  I have spent a total time of 40 minutes in caring for this patient on the day of the visit/encounter. >30 minutes of time was spent on: Diagnostic results, Prognosis,  Risks and benefits of tx options, Instructions for management, Patient and family education, Importance of tx compliance, Risk factor reductions, Impressions, Counseling / Coordination of care, Documenting in the medical record, Reviewing / ordering tests, medicine, procedures  , and Communicating with other healthcare professionals .    **Please Note: This note may have been constructed using a voice recognition system**

## 2025-03-19 NOTE — ASSESSMENT & PLAN NOTE
Originally diagnosed 3/2024 when he presented to ED with dyspnea and confusion, found to have large R lung mass and brain masses, follows with Dr. Morrell  Continued progression of disease on multiple chemotherapeutic agents, now admitted to start 3rd line therapy with tarlatamab  Recent PET 2/7 with increasing RUL and R perihilar masses, interval development of osseous metastases  Recently underwent MRI T/L spine for new FDG uptake T11 and T5 given mid back pain without neurologic deficits, read pending  Status post cycle.  Tolerated well  Discussed with hematology oncology and they want to monitor for 1 more day  Will be a readmission next week for another cycle.  Discussed with hematology oncology today and patient is good to go

## 2025-03-20 ENCOUNTER — TRANSITIONAL CARE MANAGEMENT (OUTPATIENT)
Dept: FAMILY MEDICINE CLINIC | Facility: HOSPITAL | Age: 60
End: 2025-03-20

## 2025-03-20 ENCOUNTER — PATIENT OUTREACH (OUTPATIENT)
Dept: HEMATOLOGY ONCOLOGY | Facility: CLINIC | Age: 60
End: 2025-03-20

## 2025-03-20 ENCOUNTER — PATIENT OUTREACH (OUTPATIENT)
Dept: CASE MANAGEMENT | Facility: HOSPITAL | Age: 60
End: 2025-03-20

## 2025-03-20 DIAGNOSIS — G93.89 BRAIN MASS: Primary | ICD-10-CM

## 2025-03-20 NOTE — UTILIZATION REVIEW
NOTIFICATION OF ADMISSION DISCHARGE   This is a Notification of Discharge from [facility]. Please be advised that this patient has been discharge from our facility. Below you will find the admission and discharge date and time including the patient’s disposition.   UTILIZATION REVIEW CONTACT:  Xiomara Cavazos  Utilization   Network Utilization Review Department  Phone: [phone] x carefully listen to the prompts. All voicemails are confidential.  Email: [email or web address]     ADMISSION INFORMATION  PRESENTATION DATE: 3/17/2025  7:55 AM  OBERVATION ADMISSION DATE: N/A  INPATIENT ADMISSION DATE: 3/17/25  7:55 AM   DISCHARGE DATE: 3/19/2025  9:43 AM   DISPOSITION:PRA - Home    Network Utilization Review Department  ATTENTION: Please call with any questions or concerns to [phone] and carefully listen to the prompts so that you are directed to the right person. All voicemails are confidential.   For Discharge needs, contact Care Management DC Support Team at [phone] opt. 2  Send all requests for admission clinical reviews, approved or denied determinations and any other requests to dedicated fax number below belonging to the campus where the patient is receiving treatment. List of dedicated fax numbers for the Facilities:  FACILITY NAME UR FAX NUMBER   ADMISSION DENIALS (Administrative/Medical Necessity) [phone]   DISCHARGE SUPPORT TEAM ([facility]) [phone]   PARENT CHILD HEALTH (Maternity/NICU/Pediatrics) [phone]   [facility] [phone]   [facility] [phone]   [facility] [phone]   [facility] [phone]   [facility] [phone]   [facility] [phone]   [facility] [phone]   [facility] [phone]    [facility] [phone]   [facility] [phone]   [facility] [phone]   [facility] [phone]

## 2025-03-20 NOTE — PROGRESS NOTES
Received message from Nino:    Sina Huntley, this is Nino. Samson, I'm calling to request some assistance. Having a hard time walking and getting around to my wife asked me to call and if you can get back to me at your convenience, I'd appreciate it. Thank you and have a nice day [phone] 6/4 thank you. Bye now, how do I get to hang up?    Called Nino back, and he asked for help inside the house. His wife has to work and he has trouble walking. I told him someone from social work will be calling him with resources. He was appreciative for the call back.

## 2025-03-20 NOTE — PROGRESS NOTES
Biopsychosocial and Barriers Assessment    Type of Cancer: Small cell carcinoma of hilum of lung   Treatment plan: Pt has been in chemo for some time  Noted barriers to care: Pt is getting weaker  Cultural/Spiritism concerns: None  Hair Loss/ Wig resources needed: N/A    DT completed: Yes  DT score: 5/10  Issues noted: fear, changes in appearance, feeling like a burden, taking care of self, transportation    Marital status/Lives with: Spouse and 14 year old daughter  Pt's support system: spouse daughter, in laws and friends  Mental Health history: None reported  Substance Abuse: none reported    Employment/income source: SSDI  Concerns with bills (treatment vs household): no  Noted issues with home: Pt in a 2nd floor apt with no elevator    Narrative note:     LSW received referral for pt form navigation. Outreach was made this day and the pt was open and receptive of this call.  The role of the  was described and explained and contact information was provided.  The pt has been dealing with cancer for over a year and he reports that more recently he has been getting weaker. Over the past few days, he has needed increased assistance with his ADL's and he is unable to walk unassisted.  The pt's wife works second shift and is out of the house from approximately 11:00 AM to 10:00 PM.  The pt was seeking assistance in the home.  They do not have funds to hire private duty and LSW offered information on the waiver program.  The pt was accepting of a referral being placed on his behalf.  LSW did explain to the pt that this process takes time and asked if they had any family that could help him in the meantime.  Pt states that they do have family and friends and they have been using them currently.  The pt is pleasant and verbalized understanding of the process.  He is going in pt for his next chemo and then will follow up in the infusion center.  LSW will plan to meet pt when her resumes in the infusion  [place].  LSW inquired about a BSC or any other DME and the pt declined this at this time.  He was encouraged to call if his needs should change.     Referral placed to waiver program.

## 2025-03-21 ENCOUNTER — OFFICE VISIT (OUTPATIENT)
Age: 60
End: 2025-03-21
Payer: COMMERCIAL

## 2025-03-21 ENCOUNTER — TELEPHONE (OUTPATIENT)
Age: 60
End: 2025-03-21

## 2025-03-21 VITALS
RESPIRATION RATE: 18 BRPM | DIASTOLIC BLOOD PRESSURE: 64 MMHG | WEIGHT: 199 LBS | BODY MASS INDEX: 28.49 KG/M2 | HEART RATE: 63 BPM | SYSTOLIC BLOOD PRESSURE: 112 MMHG | OXYGEN SATURATION: 95 % | TEMPERATURE: 98.1 F | HEIGHT: 70 IN

## 2025-03-21 DIAGNOSIS — C34.90 LUNG CANCER METASTATIC TO BRAIN (HCC): ICD-10-CM

## 2025-03-21 DIAGNOSIS — C34.00 SMALL CELL CARCINOMA OF HILUM OF LUNG, UNSPECIFIED LATERALITY (HCC): Primary | ICD-10-CM

## 2025-03-21 DIAGNOSIS — G93.89 BRAIN MASS: ICD-10-CM

## 2025-03-21 DIAGNOSIS — C79.31 LUNG CANCER METASTATIC TO BRAIN (HCC): ICD-10-CM

## 2025-03-21 LAB
DME PARACHUTE DELIVERY DATE ACTUAL: NORMAL
DME PARACHUTE DELIVERY DATE REQUESTED: NORMAL
DME PARACHUTE ITEM DESCRIPTION: NORMAL
DME PARACHUTE ORDER STATUS: NORMAL
DME PARACHUTE SUPPLIER NAME: NORMAL
DME PARACHUTE SUPPLIER PHONE: NORMAL

## 2025-03-21 PROCEDURE — 99215 OFFICE O/P EST HI 40 MIN: CPT | Performed by: INTERNAL MEDICINE

## 2025-03-21 NOTE — PATIENT INSTRUCTIONS
MRI brain/C spine as soon as can be ordered    Will discuss seeing Rad Onc based on brain scan    Continue treatment    Follow up 1 April, C1Day 15 of treatment

## 2025-03-21 NOTE — TELEPHONE ENCOUNTER
Called Nino. He is scheduled for inpatinet chemo admission on Monday 3/24. Labs were completed on 3/18. Per Mary, our inpatient nurse navigator, he should come to P9 at 0800. Patient voiced understanding. No needs at this time.

## 2025-03-21 NOTE — ASSESSMENT & PLAN NOTE
MRI T/L spine      3/15/2025    Osseous metastasis in right T4 transverse process and L1 vertebral body. No pathologic compression fracture, as clinically questioned.     Partially imaged known right upper lobe malignancy, mediastinal and hilar beata metastasis.     Mild degenerative changes of thoracic spine, as detailed above. No significant canal stenosis or foraminal narrowing.     Osseous metastasis in L1 and L2 vertebral bodies. No pathologic compression fracture, as clinically questioned.     Multilevel degenerative changes of lumbar spine with varying degrees of canal stenosis (mild L2-L3 and L4-L5) and foraminal narrowing (mild bilateral L4-L5), as detailed above.     Please see same day MRI thoracic spine with and without contrast for further evaluation.    No pain at these sites/no evidence cord compression    Issue is his lack balance/instability walking    Will repeat MRI brain and C spine    Last MRI brain 2/5/2025 with multiple, new small lesions    However did not have symptoms is s/p WBRT and Rad Onc appropriately wanted to wait to treat discrete lesions if symptomatic    Concern with lack of balance which predates tarlatamab as occurred on cruises he was on    Consider again zometa for bone mets

## 2025-03-24 ENCOUNTER — HOSPITAL ENCOUNTER (INPATIENT)
Facility: HOSPITAL | Age: 60
LOS: 1 days | Discharge: HOME | DRG: 696 | End: 2025-03-25
Attending: STUDENT IN AN ORGANIZED HEALTH CARE EDUCATION/TRAINING PROGRAM | Admitting: STUDENT IN AN ORGANIZED HEALTH CARE EDUCATION/TRAINING PROGRAM
Payer: COMMERCIAL

## 2025-03-24 DIAGNOSIS — C34.00 SMALL CELL CARCINOMA OF HILUM OF LUNG, UNSPECIFIED LATERALITY (HCC): Primary | ICD-10-CM

## 2025-03-24 PROBLEM — R26.2 AMBULATORY DYSFUNCTION: Status: ACTIVE | Noted: 2025-03-24

## 2025-03-24 LAB
ALBUMIN SERPL BCG-MCNC: 3.9 G/DL (ref 3.5–5)
ALP SERPL-CCNC: 45 U/L (ref 34–104)
ALT SERPL W P-5'-P-CCNC: 19 U/L (ref 7–52)
ANION GAP SERPL CALCULATED.3IONS-SCNC: 8 MMOL/L (ref 4–13)
AST SERPL W P-5'-P-CCNC: 17 U/L (ref 13–39)
BASOPHILS # BLD AUTO: 0.04 THOUSANDS/ÂΜL (ref 0–0.1)
BASOPHILS NFR BLD AUTO: 0 % (ref 0–1)
BILIRUB SERPL-MCNC: 0.46 MG/DL (ref 0.2–1)
BUN SERPL-MCNC: 23 MG/DL (ref 5–25)
CALCIUM SERPL-MCNC: 9 MG/DL (ref 8.4–10.2)
CHLORIDE SERPL-SCNC: 102 MMOL/L (ref 96–108)
CO2 SERPL-SCNC: 25 MMOL/L (ref 21–32)
CREAT SERPL-MCNC: 0.98 MG/DL (ref 0.6–1.3)
EOSINOPHIL # BLD AUTO: 0.14 THOUSAND/ÂΜL (ref 0–0.61)
EOSINOPHIL NFR BLD AUTO: 1 % (ref 0–6)
ERYTHROCYTE [DISTWIDTH] IN BLOOD BY AUTOMATED COUNT: 13.2 % (ref 11.6–15.1)
GFR SERPL CREATININE-BSD FRML MDRD: 83 ML/MIN/1.73SQ M
GLUCOSE SERPL-MCNC: 112 MG/DL (ref 65–140)
HCT VFR BLD AUTO: 38 % (ref 36.5–49.3)
HGB BLD-MCNC: 13 G/DL (ref 12–17)
IMM GRANULOCYTES # BLD AUTO: 0.05 THOUSAND/UL (ref 0–0.2)
IMM GRANULOCYTES NFR BLD AUTO: 1 % (ref 0–2)
LYMPHOCYTES # BLD AUTO: 0.87 THOUSANDS/ÂΜL (ref 0.6–4.47)
LYMPHOCYTES NFR BLD AUTO: 8 % (ref 14–44)
MAGNESIUM SERPL-MCNC: 1.9 MG/DL (ref 1.9–2.7)
MCH RBC QN AUTO: 31.8 PG (ref 26.8–34.3)
MCHC RBC AUTO-ENTMCNC: 34.2 G/DL (ref 31.4–37.4)
MCV RBC AUTO: 93 FL (ref 82–98)
MONOCYTES # BLD AUTO: 0.81 THOUSAND/ÂΜL (ref 0.17–1.22)
MONOCYTES NFR BLD AUTO: 8 % (ref 4–12)
NEUTROPHILS # BLD AUTO: 8.5 THOUSANDS/ÂΜL (ref 1.85–7.62)
NEUTS SEG NFR BLD AUTO: 82 % (ref 43–75)
NRBC BLD AUTO-RTO: 0 /100 WBCS
PLATELET # BLD AUTO: 272 THOUSANDS/UL (ref 149–390)
PMV BLD AUTO: 10.1 FL (ref 8.9–12.7)
POTASSIUM SERPL-SCNC: 4.3 MMOL/L (ref 3.5–5.3)
PROT SERPL-MCNC: 7.4 G/DL (ref 6.4–8.4)
RBC # BLD AUTO: 4.09 MILLION/UL (ref 3.88–5.62)
SODIUM SERPL-SCNC: 135 MMOL/L (ref 135–147)
WBC # BLD AUTO: 10.41 THOUSAND/UL (ref 4.31–10.16)

## 2025-03-24 PROCEDURE — 3E0330M INTRODUCTION OF MONOCLONAL ANTIBODY INTO PERIPHERAL VEIN, PERCUTANEOUS APPROACH: ICD-10-PCS

## 2025-03-24 PROCEDURE — 99255 IP/OBS CONSLTJ NEW/EST HI 80: CPT | Performed by: INTERNAL MEDICINE

## 2025-03-24 PROCEDURE — 97163 PT EVAL HIGH COMPLEX 45 MIN: CPT

## 2025-03-24 PROCEDURE — 97167 OT EVAL HIGH COMPLEX 60 MIN: CPT

## 2025-03-24 PROCEDURE — 85025 COMPLETE CBC W/AUTO DIFF WBC: CPT | Performed by: INTERNAL MEDICINE

## 2025-03-24 PROCEDURE — 83735 ASSAY OF MAGNESIUM: CPT | Performed by: STUDENT IN AN ORGANIZED HEALTH CARE EDUCATION/TRAINING PROGRAM

## 2025-03-24 PROCEDURE — 80053 COMPREHEN METABOLIC PANEL: CPT | Performed by: INTERNAL MEDICINE

## 2025-03-24 PROCEDURE — 99223 1ST HOSP IP/OBS HIGH 75: CPT | Performed by: INTERNAL MEDICINE

## 2025-03-24 RX ORDER — MAGNESIUM HYDROXIDE/ALUMINUM HYDROXICE/SIMETHICONE 120; 1200; 1200 MG/30ML; MG/30ML; MG/30ML
30 SUSPENSION ORAL EVERY 4 HOURS PRN
Status: DISCONTINUED | OUTPATIENT
Start: 2025-03-24 | End: 2025-03-25 | Stop reason: HOSPADM

## 2025-03-24 RX ORDER — ENOXAPARIN SODIUM 100 MG/ML
40 INJECTION SUBCUTANEOUS DAILY
Status: DISCONTINUED | OUTPATIENT
Start: 2025-03-25 | End: 2025-03-25 | Stop reason: HOSPADM

## 2025-03-24 RX ORDER — SODIUM CHLORIDE 9 MG/ML
20 INJECTION, SOLUTION INTRAVENOUS ONCE AS NEEDED
Status: DISCONTINUED | OUTPATIENT
Start: 2025-03-24 | End: 2025-03-25 | Stop reason: HOSPADM

## 2025-03-24 RX ORDER — POLYETHYLENE GLYCOL 3350 17 G/17G
17 POWDER, FOR SOLUTION ORAL DAILY PRN
Status: DISCONTINUED | OUTPATIENT
Start: 2025-03-24 | End: 2025-03-25 | Stop reason: HOSPADM

## 2025-03-24 RX ORDER — ONDANSETRON 2 MG/ML
4 INJECTION INTRAMUSCULAR; INTRAVENOUS EVERY 6 HOURS PRN
Status: DISCONTINUED | OUTPATIENT
Start: 2025-03-24 | End: 2025-03-25 | Stop reason: HOSPADM

## 2025-03-24 RX ORDER — ALBUTEROL SULFATE 90 UG/1
2 INHALANT RESPIRATORY (INHALATION) EVERY 4 HOURS PRN
Status: DISCONTINUED | OUTPATIENT
Start: 2025-03-24 | End: 2025-03-25 | Stop reason: HOSPADM

## 2025-03-24 RX ORDER — ASCORBIC ACID 500 MG
500 TABLET ORAL DAILY
Status: DISCONTINUED | OUTPATIENT
Start: 2025-03-25 | End: 2025-03-25 | Stop reason: HOSPADM

## 2025-03-24 RX ORDER — ACETAMINOPHEN 325 MG/1
650 TABLET ORAL EVERY 6 HOURS PRN
Status: DISCONTINUED | OUTPATIENT
Start: 2025-03-24 | End: 2025-03-25 | Stop reason: HOSPADM

## 2025-03-24 RX ORDER — PREDNISONE 5 MG/1
5 TABLET ORAL DAILY
Status: DISCONTINUED | OUTPATIENT
Start: 2025-03-25 | End: 2025-03-25 | Stop reason: HOSPADM

## 2025-03-24 RX ADMIN — SODIUM CHLORIDE 20 ML/HR: 0.9 INJECTION, SOLUTION INTRAVENOUS at 15:10

## 2025-03-24 RX ADMIN — DEXAMETHASONE SODIUM PHOSPHATE 8 MG: 10 INJECTION, SOLUTION INTRAMUSCULAR; INTRAVENOUS at 14:37

## 2025-03-24 RX ADMIN — SODIUM CHLORIDE 1000 ML: 0.9 INJECTION, SOLUTION INTRAVENOUS at 16:59

## 2025-03-24 RX ADMIN — TARLATAMAB-DLLE 10 MG: KIT at 15:40

## 2025-03-24 NOTE — LETTER
Thank you for allowing us to participate in the care of your patient, Nino Silva, who was hospitalized for immunotherapy.  He handled it very well.  He will get the rest of his infusions as outpatient.  Advised to follow-up with the clinic as well oncologist for the rest of his treatments.  No complications.  No changes in medications.  Pavel Rico MD  [facility] Internal Medicine, Hospitalist  [phone]

## 2025-03-24 NOTE — OCCUPATIONAL THERAPY NOTE
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  Past Surgical History  Past Surgical History:   Procedure Laterality Date    HERNIA REPAIR      IR BIOPSY LUNG  03/05/2024    ORCHIECTOMY Left         03/24/25 1249   OT Last Visit   OT Visit Date 03/24/25   Note Type   Note type Evaluation   Pain Assessment   Pain Assessment Tool 0-10   Pain Score No Pain   Restrictions/Precautions   Weight Bearing Precautions Per Order No   Home Living   Type of Home Apartment   Home Layout One level;Performs ADLs on one level;Able to live on main level with bedroom/bathroom;Stairs to enter with rails  (8 SHARON)   Bathroom Shower/Tub Tub/shower unit   Bathroom Toilet Standard   Bathroom Equipment Shower chair   Home Equipment Walker  (using at baseline)   Prior Function   Level of [place] Independent with ADLs;Independent with functional mobility  (Pt has been requiring increased physical assistance for ADLs, IADLs, and fxnl mobility for the past two-three weeks.)   Lives With Spouse;Daughter   Receives Help From Family   IADLs Family/Friend/Other provides transportation;Family/Friend/Other provides meals;Family/Friend/Other provides medication management  (Pt's spouse has been manages IADLs for past two-three weeks.)   Falls in the last 6 months 1 to 4  (Pt reports 2 falls.)   Lifestyle   Autonomy Pt reports I w/ ADLs and fxnl mobility at baseline; - driving. Pt has been requiring increased physical assistance w/ ADLs, IADLs, and fxnl mobility for the past two-three weeks; assistance provided from his spouse and daughter.   Reciprocal Relationships Pt lives w/ his spouse and daughter. He is only  "home alone for ~30 minutes during the day.   Service to Others Pt is a greer   Intrinsic Gratification Pt is currently working to establish new hobbies; will continue to assess.   General   Family/Caregiver Present No  (spouse present)   Subjective   Subjective \"This is actually better than before.\" - re: fxnl mobility   ADL   Where Assessed Edge of bed   Eating Assistance 7  Independent   Grooming Assistance 5  Supervision/Setup   UB Bathing Assistance 4  Minimal Assistance   LB Bathing Assistance 3  Moderate Assistance   UB Dressing Assistance 4  Minimal Assistance   LB Dressing Assistance 3  Moderate Assistance   Toileting Assistance  3  Moderate Assistance   Bed Mobility   Supine to Sit 5  Supervision   Additional items HOB elevated;Bedrails;Increased time required   Sit to Supine 4  Minimal assistance   Additional items HOB elevated;Increased time required;Verbal cues;LE management   Additional Comments Pt supine in bed at end of OT evaluation w/ alarm activated and all needs within reach.   Transfers   Sit to Stand 3  Moderate assistance   Additional items Assist x 1;Increased time required;Verbal cues   Stand to Sit 3  Moderate assistance   Additional items Assist x 1;Increased time required;Verbal cues   Additional Comments w/ HHA for support   Functional Mobility   Functional Mobility 3  Moderate assistance   Additional Comments Pt ambulated short household distance w/ mod Ax1 (+ SBA for safety) w/ HHA.   Additional items Hand hold assistance   Balance   Static Sitting Fair   Dynamic Sitting Fair -   Static Standing Poor +   Dynamic Standing Poor   Ambulatory Poor   Activity Tolerance   Activity Tolerance Patient limited by fatigue   Medical Staff Made Aware PTIvonne, due to pt's medical complexity and multiple comorbidities   Nurse Made Aware RN clearance prior to session   RUE Assessment   RUE Assessment WFL   LUE Assessment   LUE Assessment WFL   Hand Function   Gross Motor Coordination Functional "   Fine Motor Coordination Functional   Cognition   Overall Cognitive Status WFL   Arousal/Participation Alert;Responsive;Cooperative   Attention Within functional limits   Orientation Level Oriented X4   Memory Within functional limits   Following Commands Follows one step commands without difficulty   Comments Pt was identified by name and . Pt was pleasant, cooperative, and willing to participate in OT evaluation.   Assessment   Limitation Decreased ADL status;Decreased endurance;Decreased self-care trans;Decreased high-level ADLs   Assessment Pt is a 59 y.o. male seen for OT evaluation s/p admission to [facility] on 3/24/2025. 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 Pt admitted from inpatient chemotherapy. Pt has a significant PMH impacting occupational performance including: ambulatory dysfunction and COPD. Pt with active OT evaluation and treatment orders and activity orders. PTA, pt living with his spouse and daughter in an apartment w/ 8 SHARON. Pt reports I w/ ADLs and fxnl mobility at baseline; however, he has been requiring increased physical assistance w/ ADLs, IADLs, and fxnl mobility w/ RW for the past two-three weeks. Pt agreeable and willing to participate in OT evaluation. During evaluation, pt was I for eating, S for grooming, min A for UB ADLs, and mod A for LB ADLs and toileting. Pt also required S-min Ax1 for bed mobility and mod Ax1 for transfers and fxnl mobility w/ HHA for support. Performance deficits that affect the pt’s occupational performance during the initial evaluation include decreased ADL status, decreased activity tolerance, decreased endurance, decreased sitting tolerance, decreased sitting balance, decreased standing tolerance, decreased standing balance, decreased transfer skills, decreased fxnl mobility, and generalized weakness . Based on pt’s functional performance and deficits the following occupations will be addressed in OT treatments in order to  maximize pt’s independence and overall occupational performance: grooming, bathing/showering, toileting and toilet hygiene, dressing, and functional mobility. Goals are listed below.  From OT perspective, recommend Level I (Maximum Resource Intensity) upon d/c when pt medically stable to d/c from acute care. Will continue to follow.   Goals   Patient Goals to start his immunotherapy   LTG Time Frame 10-14   Plan   Treatment Interventions ADL retraining;Functional transfer training;Endurance training;Patient/family training;Equipment evaluation/education;Compensatory technique education;Continued evaluation;Energy conservation;Activityengagement   Goal Expiration Date 04/07/25   OT Treatment Day 0   OT Frequency 2-3x/wk   Discharge Recommendation   Rehab Resource Intensity Level, OT I (Maximum Resource Intensity)   AM-PAC Daily Activity Inpatient   Lower Body Dressing 2   Bathing 3   Toileting 2   Upper Body Dressing 3   Grooming 3   Eating 4   Daily Activity Raw Score 17   Daily Activity Standardized Score (Calc for Raw Score >=11) 37.26   AM-PAC Applied Cognition Inpatient   Following a Speech/Presentation 4   Understanding Ordinary Conversation 4   Taking Medications 4   Remembering Where Things Are Placed or Put Away 4   Remembering List of 4-5 Errands 4   Taking Care of Complicated Tasks 4   Applied Cognition Raw Score 24   Applied Cognition Standardized Score 62.21       The patient's raw score on the AM-PAC Daily Activity Inpatient Short Form is 17. A raw score of less than 19 suggests the patient may benefit from discharge to post-acute rehabilitation services. Please refer to the recommendation of the Occupational Therapist for safe discharge planning.     Goals:      - Pt will complete UB ADLs w/ mod I to maximize independence and return home.    - Pt will complete LB ADLs w/ S to maximize independence and return home.     - Pt will complete toileting routine (transfers, hygiene, and clothing management) w/  S to maximize independence and return to prior level of function.    - Pt will complete bed mobility supine >< sit w/ mod I to maximize independence and return home.    - Pt will transfer to bed, chair, and toilet w/ S using AD / DME as needed to maximize independence and reduce burden of care.     - Pt will ambulate household distances w/ S using least restrictive device to maximize independence and return home.     - Pt will increase activity tolerance (and sitting tolerance) by eating all meals OOB in the chair.     - Pt will increase standing tolerance to 1-2 minutes to maximize independence w/ ADLs and/or leisure activities.      - Pt will tolerate therapeutic activities for greater than 30 minutes in order to increase tolerance for functional activities.       AMIRAH Roque, OTR/L

## 2025-03-24 NOTE — PLAN OF CARE
Problem: PHYSICAL THERAPY ADULT  Goal: Performs mobility at highest level of function for planned discharge setting.  See evaluation for individualized goals.  Description: Treatment/Interventions: ADL retraining, Functional transfer training, LE strengthening/ROM, Elevations, Therapeutic exercise, Endurance training, Patient/family training, Equipment eval/education, Bed mobility, Gait training, Spoke to nursing, Spoke to case management, OT  Equipment Recommended: Walker       See flowsheet documentation for full assessment, interventions and recommendations.  3/24/2025 1637 by Ivonne Zapata PT  Note: Prognosis: Good  Problem List: Decreased strength, Decreased endurance, Impaired balance, Decreased mobility  Assessment: Pt is a 59 y.o. male seen for PT evaluation s/p admit to [facility] on 3/24/2025. Pt was admitted with a primary dx of: small cell lung cancer metastatic to brain and bone admitted for inpatient C1D8 T[place]at[place]ab therapy on 3/24/25.  PT now consulted for assessment of mobility and d/c needs. Pt with OOB to chair orders.  Pts current comorbidities effecting treatment include: brain mass, COPD, hx of testicular cancer, ambulatory dysfunction. Pt  has no past medical history on file. Pts current clinical presentation is Unstable/ Unpredictable (high complexity) due to Ongoing medical management for primary dx, Increased reliance on more restrictive AD compared to baseline, Decreased activity tolerance compared to baseline, Fall risk, Increased assistance needed from caregiver at current time, Trending lab values, Continuous pulse oximetry monitoring . Prior to admission, pt was residing with spouse in 1 level apartment with 8 SHARON and was requiring increased assistance for ADLs/ mobility for past 2-3 weeks. Upon evaluation, pt currently is requiring S level for bed mobility; mod A for transfers; mod A for ambulation. Pt presents at PT eval functioning below baseline and currently w/ overall  mobility deficits 2* to: BLE weakness, impaired balance, decreased endurance, gait deviations, decreased activity tolerance compared to baseline, decreased functional mobility tolerance compared to baseline, fall risk. Pt currently at a fall risk 2* to impairments listed above.  Pt will continue to benefit from skilled acute PT interventions to address stated impairments; to maximize functional mobility; for ongoing pt/ family training; and DME needs. At conclusion of PT session pt returned BTB and bed alarm engaged with phone and call bell within reach. Pt denies any further questions at this time. The patient's AM-PAC Basic Mobility Inpatient Short Form Raw Score is 13. A Raw score of less than or equal to 16 suggests the patient may benefit from discharge to post-acute rehabilitation services. Please also refer to the recommendation of the Physical Therapist for safe discharge planning. PT to continue to follow pt t/o hospital stay, recommend level 1 resource intensity upon hospital D/C.  Barriers to Discharge: Inaccessible home environment     Rehab Resource Intensity Level, PT: I (Maximum Resource Intensity)    See flowsheet documentation for full assessment.

## 2025-03-24 NOTE — ASSESSMENT & PLAN NOTE
Thought 2/2 Metastatic SCLC and S/p WBRT  Recently had occasional balance issues and headaches but no other neurologic symptoms per oncology notes  Per oncology as he has significant brain lesions supratentorial and infratentorial, concern with potential leptomeningeal disease so if headaches worsen/persist may need LP  Does appear that Oncology team ordered outpatient MRI Brain w/ and w/out contrast as well as MRI C-Spine which is currently scheduled outpatient next week.    On daily prednisone 5 mg for vasogenic edema per oncology to see if headaches/balance issues improve

## 2025-03-24 NOTE — ASSESSMENT & PLAN NOTE
Follows with pulm for COPD Gold stage 0, also with suspected IPF   Patient denies that he uses anoro at home   Will put on prn nebulizers

## 2025-03-24 NOTE — PHYSICAL THERAPY NOTE
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    Past Surgical History  Past Surgical History:   Procedure Laterality Date    HERNIA REPAIR      IR BIOPSY LUNG  03/05/2024    ORCHIECTOMY Left         03/24/25 1248   PT Last Visit   PT Visit Date 03/24/25   Note Type   Note type Evaluation   Pain Assessment   Pain Assessment Tool 0-10   Pain Score No Pain   Restrictions/Precautions   Weight Bearing Precautions Per Order No   Other Precautions Multiple lines;Fall Risk   Home Living   Type of Home Apartment   Home Layout One level;Performs ADLs on one level;Able to live on main level with bedroom/bathroom;Stairs to enter with rails  (8 SHARON)   Bathroom Shower/Tub Tub/shower unit   Bathroom Toilet Standard   Bathroom Equipment Shower chair   Home Equipment Walker  (has been using past ~2-3 weeks)   Prior Function   Level of [place] Independent with ADLs;Independent with functional mobility;Needs assistance with IADLS  (pt has been requiring increased physical assistance for ADLs, IADLs and functional mobility for past ~2-3 weeks. Typically independent @ baseline)   Lives With Spouse;Daughter   Receives Help From Family   IADLs Family/Friend/Other provides transportation;Family/Friend/Other provides meals;Family/Friend/Other provides medication management   Falls in the last 6 months 1 to 4  (2 per pt report)   General   Family/Caregiver Present Yes  (spouse)   Cognition   Overall Cognitive Status WFL   Arousal/Participation Cooperative    Attention Within functional limits   Orientation Level Oriented X4   Memory Within functional limits   Following Commands Follows one step commands without difficulty   Comments pleasant and cooperative   RLE Assessment   RLE Assessment   (functionally 3+/5)   LLE Assessment   LLE Assessment   (functionally 3+/5)   Bed Mobility   Supine to Sit 5  Supervision   Additional items HOB elevated;Bedrails;Increased time required   Sit to Supine 4  Minimal assistance   Additional items Assist x 1;HOB elevated;Bedrails;Increased time required;Verbal cues;LE management   Additional Comments pt found/ left supine in bed with all needs within reach - alarm on post session   Transfers   Sit to Stand 3  Moderate assistance   Additional items Assist x 1;Increased time required;Verbal cues   Stand to Sit 3  Moderate assistance   Additional items Assist x 1;Increased time required;Verbal cues   Additional Comments transfers with HHA   Ambulation/Elevation   Gait pattern Short stride;Foward flexed;Inconsistent angel;Decreased foot clearance;Improper Weight shift   Gait Assistance 3  Moderate assist   Additional items Assist x 1;Verbal cues;Tactile cues   Assistive Device Other (Comment)  (HHA)   Distance 2 x 20'   Stair Management Assistance Not tested   Balance   Static Sitting Fair   Dynamic Sitting Fair -   Static Standing Poor +   Dynamic Standing Poor   Ambulatory Poor   Endurance Deficit   Endurance Deficit Yes   Endurance Deficit Description generalized weakness, fatigue, deconditioning   Activity Tolerance   Activity Tolerance Patient limited by fatigue   Medical Staff Made Aware OT terra; co-session completed this date 2* increased medical complexity and multiple co-morbdities   Nurse Made Aware RN cleared   Assessment   Prognosis Good   Problem List Decreased strength;Decreased endurance;Impaired balance;Decreased mobility   Assessment Pt is a 59 y.o. male seen for PT evaluation s/p admit to [facility] on  3/24/2025. Pt was admitted with a primary dx of: small cell lung cancer metastatic to brain and bone admitted for inpatient C1D8 [place] therapy on 3/24/25.  PT now consulted for assessment of mobility and d/c needs. Pt with OOB to chair orders.  Pts current comorbidities effecting treatment include: brain mass, COPD, hx of testicular cancer, ambulatory dysfunction. Pt  has no past medical history on file. Pts current clinical presentation is Unstable/ Unpredictable (high complexity) due to Ongoing medical management for primary dx, Increased reliance on more restrictive AD compared to baseline, Decreased activity tolerance compared to baseline, Fall risk, Increased assistance needed from caregiver at current time, Trending lab values, Continuous pulse oximetry monitoring . Prior to admission, pt was residing with spouse in 1 level apartment with 8 SHARON and was requiring increased assistance for ADLs/ mobility for past 2-3 weeks. Upon evaluation, pt currently is requiring S level for bed mobility; mod A for transfers; mod A for ambulation. Pt presents at PT eval functioning below baseline and currently w/ overall mobility deficits 2* to: BLE weakness, impaired balance, decreased endurance, gait deviations, decreased activity tolerance compared to baseline, decreased functional mobility tolerance compared to baseline, fall risk. Pt currently at a fall risk 2* to impairments listed above.  Pt will continue to benefit from skilled acute PT interventions to address stated impairments; to maximize functional mobility; for ongoing pt/ family training; and DME needs. At conclusion of PT session pt returned BTB and bed alarm engaged with phone and call bell within reach. Pt denies any further questions at this time. The patient's AM-PAC Basic Mobility Inpatient Short Form Raw Score is 13. A Raw score of less than or equal to 16 suggests the patient may benefit from discharge to post-acute rehabilitation services. Please  also refer to the recommendation of the Physical Therapist for safe discharge planning. PT to continue to follow pt t/o hospital stay, recommend level 1 resource intensity upon hospital D/C.   Barriers to Discharge Inaccessible home environment   Goals   Patient Goals to start his immunotherapy   STG Expiration Date 04/07/25   Short Term Goal #1 STG 1. Pt will be able to perform bed mobility tasks with mod I level in order to improve overall functional mobility and assist in safe d/c. STG 2. Pt with sit EOB for at least 25 minutes at mod I level in order to strengthen abdominal musculature and assist in future transfers/ ambulation. STG 3. Pt will be able to perform functional transfer with mod I level in order to improve overall functional mobility and assist in safe d/c. STG 4. Pt will be able to ambulate at least 150 feet with least restrictive device with mod I level in order to improve overall functional mobility and assist in safe d/c. STG 5. Pt will improve sitting/standing static/dynamic balance 1/2 grade in order to improve functional mobility and assist in safe d/c. STG 6. Pt will improve LE strength by 1/2 grade in order to improve functional mobility and assist in safe d/c. STG 7. Pt will be able to negotiate at least 8 stairs with least restrictive device with S level A in order to improve overall functional mobility and assist in safe d/c.   PT Treatment Day 0   Plan   Treatment/Interventions ADL retraining;Functional transfer training;LE strengthening/ROM;Elevations;Therapeutic exercise;Endurance training;Patient/family training;Equipment eval/education;Bed mobility;Gait training;Spoke to nursing;Spoke to case management;OT   PT Frequency 3-5x/wk   Discharge Recommendation   Rehab Resource Intensity Level, PT I (Maximum Resource Intensity)   Equipment Recommended Walker   AM-PAC Basic Mobility Inpatient   Turning in Flat Bed Without Bedrails 3   Lying on Back to Sitting on Edge of Flat Bed Without  Bedrails 3   Moving Bed to Chair 2   Standing Up From Chair Using Arms 2   Walk in Room 2   Climb 3-5 Stairs With Railing 1   Basic Mobility Inpatient Raw Score 13   Basic Mobility Standardized Score 33.99   [facility] Highest Level Of Mobility   -[facility] Goal 4: Move to chair/commode   -HL Achieved 7: Walk 25 feet or more   Modified Nerissa Scale   Modified Nerissa Scale 4           Ivonne Zapata, PT DPT

## 2025-03-24 NOTE — CONSULTS
Medical Oncology/Hematology Consult Note  Nino Silva, male, 59 y.o., 1965,  [facility], [id number]     Assessment and Plan  1. 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  Patient is status post whole brain radiation, completed 10 fractions in March 2024.  Patient was subsequently started on carboplatin plus etoposide plus Durvalumab x 4 cycles (April to June 2024) and remained on durvalumab maintenance till October 2024.  PET scan from October was consistent with disease progression.  S/p second line treatment with lurbinectedin from November 2024 to January 2025.  PET scan from 2/7/2025 with significant progression of disease in brain as well as chest mediastinum.  Patient admitted to hospital for initiation of third line treatment with Tarlatamab, for close monitoring of CRS and ICAN     Plan:  Labs reviewed, okay to proceed with C1 D8 tarlatamab.  Okay to receive pretreatment with Decadron and patient will continue his usual dose of prednisone 5 mg daily.  Will need close monitoring for CRS and ICANs, every 2 hours for first 12 hours and can space out to every 4 hours thereafter.  He will follow-up with Dr. Morrell post discharge.      2. 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    3. COPD:  Not in exacerbation, continue home regimen per primary team.    Outpatient follow up plan: Patient has outpatient appointment scheduled with Dr. Morrell on 4/1/2025.    Communication with patient/family:  I did update the patient, as well as his wife.      Communication with team:  Did communicate  primary team.    I did review this patient with Dr. Peterson and he is in agreement with this plan.        Reason for consultation: Small cell lung cancer, here for relatlimab infusion.    History of present illness:  Nino Silva is a 59 y.o. male past medical  history significant for COPD, history of testicular cancer, extensive stage small cell lung cancer diagnosed in March 2024 with metastatic disease involving mediastinal and hilar adenopathy, multiple supra and infratentorial brain lesion.  Right upper lobe lung biopsy was consistent with poorly differentiated carcinoma with neuroendocrine feature, favoring large cell neuroendocrine carcinoma.  Patient underwent brain radiation and subsequently was started on systemic therapy with carboplatin, etoposide and Durvalumab that he completed 4 cycles from April to June 2024 and was continued on maintenance Durvalumab since July 2024.  PET scan in October 2024 was concerning for progression of disease in mediastinum, right perihilar region and new FDG avid periaortic lymph node hence patient was started on second line treatment with lurbinectedin from November 2024 to January 2025.    Repeat imaging in February more specifically on 2/7/2025 PET scan with significant progression of disease in brain as well as chest mediastinum.  His case was discussed with radiation oncology, given patient was asymptomatic from his brain mets and he had already received whole brain radiation in the past, radiation therapy was not recommended.  Patient started on third line treatment with tarlatamab, s/p C1 D1 on 3/18/2025, tolerated well with minimum flushing and nausea.  No concerns for CRS or ICANs on last administration.    Post discharge from hospital the patient was evaluated by Dr. Stewart where he had complained experiencing balance issues that started prior to tarlatamab administration, MRI brain and cervical neck is ordered and is scheduled for 3/27/2025.  Patient is admitted to hospital for C1 D8 of tarlatamab.      Review of Systems:    Review of Systems   HENT:  Negative for congestion.    Respiratory:  Negative for chest tightness and shortness of breath.    Cardiovascular:  Negative for chest pain and palpitations.    Gastrointestinal:  Negative for abdominal pain, constipation and diarrhea.   Genitourinary:  Negative for decreased urine volume, hematuria and penile pain.   Musculoskeletal:  Negative for back pain and myalgias.   Skin:  Negative for color change.   Neurological:  Negative for dizziness, light-headedness and headaches.        Reported balance issues.   Hematological:  Negative for adenopathy. Does not bruise/bleed easily.       Oncology History:   Cancer Staging   Small cell lung cancer (HCC)  Staging form: Lung, AJCC 8th Edition  - Clinical stage from 3/5/2024: Stage IV (cT2, cN3, cM1) - Signed by Lizbeth López MD on 4/18/2024    Oncology History   Small cell lung cancer (HCC)   3/5/2024 Initial Diagnosis    Small cell lung cancer (HCC)     3/5/2024 Biopsy    Final Diagnosis  A. Lung, Right Upper Lobe, biopsy:  - Poorly differentiated carcinoma with neuroendocrine features, favor large cell neuroendocrine carcinoma.  - See note.     3/5/2024 -  Cancer Staged    Staging form: Lung, AJCC 8th Edition  - Clinical stage from 3/5/2024: Stage IV (cT2, cN3, cM1) - Signed by Lizbeth López MD on 4/18/2024  Histopathologic type: Small cell carcinoma, NOS  Stage prefix: Initial diagnosis  Histologic grade (G): G3  Histologic grading system: 4 grade system       3/6/2024 - 3/19/2024 Radiation      Plan ID Energy Fractions Dose per Fraction (cGy) Dose Correction (cGy) Total Dose Delivered (cGy) Elapsed Days   Whole Brain 6X 10 / 10 300 0 3,000 13        4/8/2024 - 10/7/2024 Chemotherapy    alteplase (CATHFLO), 2 mg, Intracatheter, Every 1 Minute as needed, 8 of 10 cycles  palonosetron (ALOXI), 0.25 mg, Intravenous, Once, 3 of 3 cycles  Administration: 0.25 mg (4/29/2024), 0.25 mg (5/20/2024), 0.25 mg (6/17/2024)  fosaprepitant (EMEND) IVPB, 150 mg, Intravenous, Once, 4 of 4 cycles  Administration: 150 mg (4/8/2024), 150 mg (4/29/2024), 150 mg (5/20/2024), 150 mg (6/17/2024)  etoposide (TOPOSAR), 80 mg/m2 = 164 mg,  Intravenous, Once, 4 of 4 cycles  Dose modification: 100 mg/m2 (original dose 80 mg/m2, Cycle 1, Reason: Anticipated Tolerance), 80 mg/m2 (original dose 80 mg/m2, Cycle 1, Reason: Anticipated Tolerance), 100 mg/m2 (original dose 80 mg/m2, Cycle 2, Reason: Anticipated Tolerance)  Administration: 164 mg (4/8/2024), 164 mg (4/9/2024), 164 mg (4/10/2024), 205 mg (4/29/2024), 205 mg (4/30/2024), 205 mg (5/1/2024), 200 mg (5/20/2024), 200 mg (5/21/2024), 200 mg (5/22/2024), 200 mg (6/17/2024), 200 mg (6/18/2024), 200 mg (6/19/2024)  CARBOplatin (PARAPLATIN) IVPB (GO AUC DOSING), 750 mg (574.7 % of original dose 130.5 mg), Intravenous, Once, 4 of 4 cycles  Dose modification: 130.5 mg (original dose 130.5 mg, Cycle 1, Reason: Anticipated Tolerance),   (original dose 130.5 mg, Cycle 1, Reason: Other (Must fill in a comment))  Administration: 750 mg (4/8/2024), 701.5 mg (4/29/2024), 664 mg (5/20/2024), 633 mg (6/17/2024)  durvalumab (IMFINZI) IVPB, 1,500 mg, Intravenous, Once, 8 of 10 cycles  Administration: 1,500 mg (4/8/2024), 1,500 mg (4/29/2024), 1,500 mg (5/20/2024), 1,500 mg (6/17/2024), 1,500 mg (7/10/2024), 1,500 mg (8/13/2024), 1,500 mg (9/9/2024), 1,500 mg (10/7/2024)     11/19/2024 - 1/21/2025 Chemotherapy    alteplase (CATHFLO), 2 mg, Intracatheter, Every 1 Minute as needed, 4 of 6 cycles  Lurbinectedin (ZEPZELCA) IVPB, 3.2 mg/m2 = 6.4 mg, Intravenous, Once, 4 of 6 cycles  Administration: 6.4 mg (11/19/2024), 6.4 mg (12/10/2024), 6.4 mg (12/31/2024), 6.4 mg (1/21/2025)     3/17/2025 -  Chemotherapy    alteplase (CATHFLO), 2 mg, Intracatheter, Every 1 Minute as needed, 1 of 10 cycles  sodium chloride, 1,000 mL, Intravenous, Once, 1 of 1 cycle  Administration: 1,000 mL (3/17/2025)  tarlatamab (Imdelltra) 10 mg IVPB, 10 mg, Intravenous, Once, 1 of 10 cycles  tarlatamab (Imdelltra) 1 mg IVPB, 1 mg, Intravenous, Once, 1 of 1 cycle  Administration: 1 mg (3/17/2025)         Past Medical History:   No past medical  history on file.    Past Surgical History:   Procedure Laterality Date    HERNIA REPAIR      IR BIOPSY LUNG  03/05/2024    ORCHIECTOMY Left        Family History   Problem Relation Age of Onset    Breast cancer additional onset Mother     No Known Problems Father     Stroke Brother        Social History     Socioeconomic History    Marital status: /Civil Union     Spouse name: Not on file    Number of children: Not on file    Years of education: Not on file    Highest education level: Not on file   Occupational History    Not on file   Tobacco Use    Smoking status: Never    Smokeless tobacco: Never   Vaping Use    Vaping status: Never Used   Substance and Sexual Activity    Alcohol use: Not Currently    Drug use: Not Currently    Sexual activity: Not on file   Other Topics Concern    Not on file   Social History Narrative    Not on file     Social Drivers of Health     Financial Resource Strain: Not on file   Food Insecurity: No Food Insecurity (3/17/2025)    Nursing - Inadequate Food Risk Classification     Worried About Running Out of Food in the Last Year: Not on file     Ran Out of Food in the Last Year: Not on file     Ran Out of Food in the Last Year: Never true   Transportation Needs: No Transportation Needs (3/17/2025)    Nursing - Transportation Risk Classification     Lack of Transportation: Not on file     Lack of Transportation: No   Physical Activity: Not on file   Stress: Not on file   Social Connections: Not on file   Intimate Partner Violence: Unknown (3/17/2025)    Nursing IPS     Feels Physically and Emotionally Safe: Not on file     Physically Hurt by Someone: Not on file     Humiliated or Emotionally Abused by Someone: Not on file     Physically Hurt by Someone: No     Hurt or Threatened by Someone: No   Housing Stability: Unknown (3/17/2025)    Nursing: Inadequate Housing Risk Classification     Has Housing: Not on file     Worried About Losing Housing: Not on file     Unable to Get  "Utilities: Not on file     Unable to Pay for Housing in the Last Year: No     Has Housin         Current Facility-Administered Medications:     acetaminophen (TYLENOL) tablet 650 mg, 650 mg, Oral, Q6H PRN, Reji Kelly MD    albuterol (PROVENTIL HFA,VENTOLIN HFA) inhaler 2 puff, 2 puff, Inhalation, Q4H PRN, Reji Kelly MD    aluminum-magnesium hydroxide-simethicone (MAALOX) oral suspension 30 mL, 30 mL, Oral, Q4H PRN, Reji Kelly MD    [START ON 3/25/2025] ascorbic acid (VITAMIN C) tablet 500 mg, 500 mg, Oral, Daily, Reji Kelly MD    [START ON 3/25/2025] Cholecalciferol (VITAMIN D3) tablet 5,000 Units, 5,000 Units, Oral, Daily, Reji Kelly MD    dexamethasone (DECADRON) 8 mg in sodium chloride 0.9 % 50 mL IVPB, 8 mg, Intravenous, Once, Cortney Morrell MD    [START ON 3/25/2025] enoxaparin (LOVENOX) subcutaneous injection 40 mg, 40 mg, Subcutaneous, Daily, Reji Kelly MD    ondansetron (ZOFRAN) injection 4 mg, 4 mg, Intravenous, Q6H PRN, Reji Kelly MD    polyethylene glycol (MIRALAX) packet 17 g, 17 g, Oral, Daily PRN, Reji Kelly MD    [START ON 3/25/2025] predniSONE tablet 5 mg, 5 mg, Oral, Daily, Reji Kelly MD    sodium chloride 0.9 % bolus 1,000 mL, 1,000 mL, Intravenous, Once, Cortney Morrell MD    sodium chloride 0.9 % infusion, 20 mL/hr, Intravenous, Once PRN, Cortney Morrell MD    Tarlatamab-dlle (Imdelltra) 10 mg in 233 mL 0.9% Sodium Chloride IVPB, 10 mg, Intravenous, Once, Cortney Morrell MD      Medications Prior to Admission:     ascorbic acid (VITAMIN C) 250 mg tablet    Cholecalciferol (Vitamin D) 50 MCG ( UT) tablet    predniSONE 5 mg tablet    No Known Allergies      Physical Exam:     /77   Pulse (!) 53   Temp 97.5 °F (36.4 °C)   Resp 15   Ht 5' 10\" (1.778 m)   Wt 88.6 kg (195 lb 5.2 oz)   SpO2 95%   BMI 28.03 kg/m²     Physical Exam  Constitutional:       Appearance: Normal appearance.   HENT:      Head: Normocephalic and atraumatic.   Eyes:      " Conjunctiva/sclera: Conjunctivae normal.      Pupils: Pupils are equal, round, and reactive to light.   Cardiovascular:      Rate and Rhythm: Normal rate and regular rhythm.      Pulses: Normal pulses.      Heart sounds: Normal heart sounds.   Pulmonary:      Effort: Pulmonary effort is normal.      Breath sounds: Normal breath sounds.   Abdominal:      General: Bowel sounds are normal.      Palpations: Abdomen is soft.   Neurological:      Mental Status: He is alert and oriented to person, place, and time.      Comments: Power 4/7 and left lower extremity, 5/5 in right lower extremity.           Recent Results (from the past 48 hours)   CBC and differential    Collection Time: 03/24/25 10:04 AM   Result Value Ref Range    WBC 10.41 (H) 4.31 - 10.16 Thousand/uL    RBC 4.09 3.88 - 5.62 Million/uL    Hemoglobin 13.0 12.0 - 17.0 g/dL    Hematocrit 38.0 36.5 - 49.3 %    MCV 93 82 - 98 fL    MCH 31.8 26.8 - 34.3 pg    MCHC 34.2 31.4 - 37.4 g/dL    RDW 13.2 11.6 - 15.1 %    MPV 10.1 8.9 - 12.7 fL    Platelets 272 149 - 390 Thousands/uL    nRBC 0 /100 WBCs    Segmented % 82 (H) 43 - 75 %    Immature Grans % 1 0 - 2 %    Lymphocytes % 8 (L) 14 - 44 %    Monocytes % 8 4 - 12 %    Eosinophils Relative 1 0 - 6 %    Basophils Relative 0 0 - 1 %    Absolute Neutrophils 8.50 (H) 1.85 - 7.62 Thousands/µL    Absolute Immature Grans 0.05 0.00 - 0.20 Thousand/uL    Absolute Lymphocytes 0.87 0.60 - 4.47 Thousands/µL    Absolute Monocytes 0.81 0.17 - 1.22 Thousand/µL    Eosinophils Absolute 0.14 0.00 - 0.61 Thousand/µL    Basophils Absolute 0.04 0.00 - 0.10 Thousands/µL   Comprehensive metabolic panel    Collection Time: 03/24/25 10:04 AM   Result Value Ref Range    Sodium 135 135 - 147 mmol/L    Potassium 4.3 3.5 - 5.3 mmol/L    Chloride 102 96 - 108 mmol/L    CO2 25 21 - 32 mmol/L    ANION GAP 8 4 - 13 mmol/L    BUN 23 5 - 25 mg/dL    Creatinine 0.98 0.60 - 1.30 mg/dL    Glucose 112 65 - 140 mg/dL    Calcium 9.0 8.4 - 10.2 mg/dL     AST 17 13 - 39 U/L    ALT 19 7 - 52 U/L    Alkaline Phosphatase 45 34 - 104 U/L    Total Protein 7.4 6.4 - 8.4 g/dL    Albumin 3.9 3.5 - 5.0 g/dL    Total Bilirubin 0.46 0.20 - 1.00 mg/dL    eGFR 83 ml/min/1.73sq m   Magnesium    Collection Time: 03/24/25 10:04 AM   Result Value Ref Range    Magnesium 1.9 1.9 - 2.7 mg/dL       MRI lumbar spine w wo contrast  Result Date: 3/17/2025  Narrative: MRI LUMBAR SPINE WITH AND WITHOUT CONTRAST INDICATION: C34.00: Malignant neoplasm of unspecified main bronchus.   extended stage SCLC now with new bone mets T11 and L5  assess r/o compression COMPARISON: Same day MRI thoracic spine with and without contrast NM PET CT skull base to MID thigh 2/7/2025. MRI brain with and without contrast 2/5/2021. TECHNIQUE:  Multiplanar, multisequence imaging of the lumbar spine was performed before and after gadolinium administration. . IV Contrast:  9 mL of Gadobutrol injection IMAGE QUALITY:  Diagnostic FINDINGS: VERTEBRAL BODIES:  There are 5 lumbar type vertebral bodies.  Normal alignment of the lumbar spine.  No spondylolysis or spondylolisthesis. No scoliosis.  No compression fracture. Small pathologic marrow replacing lesions in L1 and L2 vertebral body with associated enhancement, compatible with osseous metastasis. Mildly heterogeneous bone marrow signal. SACRUM: The heterogeneous bone marrow signal in visualized sacrum. No evidence of insufficiency or stress fracture. DISTAL CORD AND CONUS:  Normal size and signal within the distal cord and conus. PARASPINAL SOFT TISSUES:  Paraspinal soft tissues are unremarkable. LOWER THORACIC DISC SPACES: Please see same day MRI thoracic spine with and without contrast for further evaluation. LUMBAR DISC SPACES: Multilevel endplate osteophytes, mild disc height loss, and facet arthropathy. L1-L2: Mild disc bulge. No significant canal stenosis or foraminal narrowing. L2-L3: Mild disc bulge, small central disc protrusion. Mild canal stenosis. No  significant foraminal narrowing. L3-L4: Mild disc bulge, small left posterior annular fissure. No significant canal stenosis or foraminal narrowing. L4-L5: Mild disc bulge. Facet arthropathy. Mild canal stenosis. Mild bilateral foraminal narrowing. L5-S1: Mild disc bulge, small posterior annular fissure. Small synovial cyst adjacent to the right L5 pars interarticularis. No significant canal stenosis or foraminal narrowing. POSTCONTRAST IMAGING: Please see above discussion. OTHER FINDINGS: Partially imaged left renal cyst.     Impression: Osseous metastasis in L1 and L2 vertebral bodies. No pathologic compression fracture, as clinically questioned. Multilevel degenerative changes of lumbar spine with varying degrees of canal stenosis (mild L2-L3 and L4-L5) and foraminal narrowing (mild bilateral L4-L5), as detailed above. Please see same day MRI thoracic spine with and without contrast for further evaluation. The study was marked in EPIC for immediate notification. Workstation performed: [id number]     MRI thoracic spine w wo contrast  Result Date: 3/17/2025  Narrative: MRI THORACIC SPINE WITH AND WITHOUT CONTRAST INDICATION: C34.00: Malignant neoplasm of unspecified main bronchus.   extended stage SCLC now with new bone mets T11 and L5  assess r/o compression COMPARISON: Same day MRI lumbar spine with and without contrast. NM PET CT skull base to MID thigh on 2/7/2025. MRI brain with and without contrast 2/5/2025. TECHNIQUE:  Multiplanar, multisequence imaging of the thoracic spine was performed before and after gadolinium administration. . IV Contrast:  9 mL of Gadobutrol injection IMAGE QUALITY:  Diagnostic. FINDINGS: ALIGNMENT:  Normal alignment of the thoracic spine.  No compression fracture.  No subluxation.  No evidence of scoliosis. MARROW SIGNAL: Small right T4 transverse process and L1 vertebral body mildly enhancing pathologic marrow replacing lesions, compatible with osseous metastasis. Mildly  heterogeneous bone marrow signal. Type II Modic endplate change C5-6, T6-T7, T7-T8. THORACIC CORD:  Normal signal within the thoracic cord. PREVERTEBRAL AND PARASPINAL SOFT TISSUES:   Normal. THORACIC DEGENERATIVE CHANGE: Mild multilevel degenerative changes consisting of endplate osteophytes and mild disc height loss. No significant canal stenosis or foraminal narrowing. POSTCONTRAST: Please see above discussion. OTHER FINDINGS: Partially imaged known right upper lobe malignancy, mediastinal and hilar beata metastasis, left renal cyst.     Impression: Osseous metastasis in right T4 transverse process and L1 vertebral body. No pathologic compression fracture, as clinically questioned. Partially imaged known right upper lobe malignancy, mediastinal and hilar beata metastasis. Mild degenerative changes of thoracic spine, as detailed above. No significant canal stenosis or foraminal narrowing. Please see same day MRI lumbar spine with and without contrast for further evaluation. The study was marked in EPIC for immediate notification. Workstation performed: [id number]       Labs and pertinent reports reviewed.      This note has been generated by voice recognition software system.  Therefore, there may be spelling, grammar, and or syntax errors. Please contact if questions arise.

## 2025-03-24 NOTE — PLAN OF CARE
Problem: NEUROSENSORY - ADULT  Goal: Achieves stable or improved neurological status  Description: INTERVENTIONS- Monitor and report changes in neurological status- Monitor vital signs such as temperature, blood pressure, glucose, and any other labs ordered - Initiate measures to prevent increased intracranial pressure- Monitor for seizure activity and implement precautions if appropriate    Outcome: Progressing     Problem: CARDIOVASCULAR - ADULT  Goal: Maintains optimal cardiac output and hemodynamic stability  Description: INTERVENTIONS:- Monitor I/O, vital signs and rhythm- Monitor for S/S and trends of decreased cardiac output- Administer and titrate ordered vasoactive medications to optimize hemodynamic stability- Assess quality of pulses, skin color and temperature- Assess for signs of decreased coronary artery perfusion- Instruct patient to report change in severity of symptoms  Outcome: Progressing     Problem: RESPIRATORY - ADULT  Goal: Achieves optimal ventilation and oxygenation  Description: INTERVENTIONS:- Assess for changes in respiratory status- Assess for changes in mentation and behavior- Position to facilitate oxygenation and minimize respiratory effort- Oxygen administered by appropriate delivery if ordered- Initiate smoking cessation education as indicated- Encourage broncho-pulmonary hygiene including cough, deep breathe, Incentive Spirometry- Assess the need for suctioning and aspirate as needed- Assess and instruct to report SOB or any respiratory difficulty- Respiratory Therapy support as indicated  Outcome: Progressing     Problem: HEMATOLOGIC - ADULT  Goal: Maintains hematologic stability  Description: INTERVENTIONS- Assess for signs and symptoms of bleeding or hemorrhage- Monitor labs- Administer supportive blood products/factors as ordered and appropriate  Outcome: Progressing

## 2025-03-24 NOTE — ASSESSMENT & PLAN NOTE
Originally diagnosed 3/2024 when he presented to ED with dyspnea and confusion, found to have large R lung mass and brain masses, follows with Dr. Morrell  Continued progression of disease on multiple chemotherapeutic agents, recently started 3rd line therapy with tarlatamab  Recent PET 2/7 with increasing RUL and R perihilar masses, interval development of osseous metastases  Recently underwent MRI T/L spine for new FDG uptake T11 and T5 given mid back pain without neurologic deficits with no reported pathologic compression fractures  C1D1 of Third Line Therapy with Tarlatamab on 3/17/25 during prior hospitalization  Presents as Direct Admit for C1D8 Tarlatamab therapy on 3/24/25  As patient has been having ongoing balance issues and headaches, does appear that Oncology team ordered outpatient MRI Brain w/ and w/out contrast as well as MRI C-Spine which is currently scheduled as outpatient   oncology Consultation, Appreciate assistance

## 2025-03-24 NOTE — PLAN OF CARE
Problem: OCCUPATIONAL THERAPY ADULT  Goal: Performs self-care activities at highest level of function for planned discharge setting.  See evaluation for individualized goals.  Description: Treatment Interventions: ADL retraining, Functional transfer training, Endurance training, Patient/family training, Equipment evaluation/education, Compensatory technique education, Continued evaluation, Energy conservation, Activityengagement          See flowsheet documentation for full assessment, interventions and recommendations.   Note: Limitation: Decreased ADL status, Decreased endurance, Decreased self-care trans, Decreased high-level ADLs     Assessment: Pt is a 59 y.o. male seen for OT evaluation s/p admission to [facility] on 3/24/2025. 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 Pt admitted from inpatient chemotherapy. Pt has a significant PMH impacting occupational performance including: ambulatory dysfunction and COPD. Pt with active OT evaluation and treatment orders and activity orders. PTA, pt living with his spouse and daughter in an apartment w/ 8 SHARON. Pt reports I w/ ADLs and fxnl mobility at baseline; however, he has been requiring increased physical assistance w/ ADLs, IADLs, and fxnl mobility w/ RW for the past two-three weeks. Pt agreeable and willing to participate in OT evaluation. During evaluation, pt was I for eating, S for grooming, min A for UB ADLs, and mod A for LB ADLs and toileting. Pt also required S-min Ax1 for bed mobility and mod Ax1 for transfers and fxnl mobility w/ HHA for support. Performance deficits that affect the pt’s occupational performance during the initial evaluation include decreased ADL status, decreased activity tolerance, decreased endurance, decreased sitting tolerance, decreased sitting balance, decreased standing tolerance, decreased standing balance, decreased transfer skills, decreased fxnl mobility, and generalized weakness . Based on pt’s  functional performance and deficits the following occupations will be addressed in OT treatments in order to maximize pt’s independence and overall occupational performance: grooming, bathing/showering, toileting and toilet hygiene, dressing, and functional mobility. Goals are listed below.  From OT perspective, recommend Level I (Maximum Resource Intensity) upon d/c when pt medically stable to d/c from acute care. Will continue to follow.     Rehab Resource Intensity Level, OT: I (Maximum Resource Intensity)

## 2025-03-24 NOTE — CASE MANAGEMENT
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) 2024     Small cell lung cancer (HCC) 2024     Brain mass 2024     COPD (chronic obstructive pulmonary disease) (HCC) 2024     Hx of testicular cancer 2024       LOS (days): 0  Geometric Mean LOS (GMLOS) (days):   Days to GMLOS:     OBJECTIVE:  PATIENT READMITTED TO HOSPITAL  Risk of Unplanned Readmission Score: 10.76         Current admission status: Inpatient       Preferred Pharmacy:   [id number] [place] - [address]  [address] [place] [address]  Phone: [phone] Fax: [phone]    [facility]  [address] [place] [address]  Phone: [phone] Fax: [phone]    Primary Care Provider: Obed Marinelli MD    Primary Insurance: [employer]  Secondary Insurance:     ASSESSMENT:  Active Health Care Proxies    There are no active Health Care Proxies on file.                 Readmission Root Cause  30 Day Readmission: Yes  During your hospital stay, did someone (provider, nurse, ) explain your care to you in a way you could understand?: Yes  Did you feel medically stable to leave the hospital?: Yes  Were you able to pay for your medication at the pharmacy?: Yes  Did you have reliable transportation to take you to your appointments?: Yes  During previous admission, was a post-acute recommendation made?: No  Patient was readmitted due to: inpatient  chemotherapy  Action Plan: inpatient chemotherapy, oncology consult    Patient Information  Admitted from:: Home  Mental Status: Alert  During Assessment patient was accompanied by: Spouse  Assessment information provided by:: Patient, Spouse                                   DISCHARGE DETAILS:    Discharge planning discussed with:: Pt and spouse at bedside  Freedom of Choice: Yes  Comments - Freedom of Choice: Pt agreeable to blanket C referrals  CM contacted family/caregiver?: Yes  Were Treatment Team discharge recommendations reviewed with patient/caregiver?: Yes  Did patient/caregiver verbalize understanding of patient care needs?: Yes  Were patient/caregiver advised of the risks associated with not following Treatment Team discharge recommendations?: Yes    Contacts  Patient Contacts: Leslie Vides (Spouse)  [phone]  Relationship to Patient:: Family  Contact Method: In Person  Reason/Outcome: Continuity of Care, Emergency Contact, Discharge Planning    Requested Home Health Care         Is the patient interested in [facility] at discharge?: Yes  Home Health Discipline requested:: Occupational Therapy, Physical Therapy  Home Health Follow-Up Provider:: PCP  Home Health Services Needed:: Strengthening/Theraputic Exercises to Improve Function, Gait/ADL Training, Evaluate Functional Status and Safety  Homebound Criteria Met:: Immunosuppressed  Supporting Clincal Findings:: Limited Endurance         Other Referral/Resources/Interventions Provided:  Interventions: HHC, Acute Rehab  Referral Comments: CM spoke with pt regarding rec for acute rehab, pt prefers to return home and follow-up with C on discharge.         Treatment Team Recommendation: Acute Rehab  Discharge Destination Plan:: Home with Home Health Care             CM met with pt to complete assessment and explain CM role.  Pt's wife at bedside.  Pt is a 30 day readmit, please refer to CM assessment completed on 3/18.  Pt resides with his wife in an  apartment, has 8 SHARON.  Pt has a walker for DME and has been using it.  Pt reports requiring assistance for ADL's prior to admission.  Pt reports previous outpatient PT, denies STR or HHC.  Pt requesting HHC on discharge for PT/OT, declining rehab at this time.  CM will continue to follow for discharge planning needs.

## 2025-03-24 NOTE — H&P
H&P - Hospitalist   Name: Nino Silva 59 y.o. 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 Morrell  Continued progression of disease on multiple chemotherapeutic agents, recently started 3rd line therapy with tarlatamab  Recent PET 2/7 with increasing RUL and R perihilar masses, interval development of osseous metastases  Recently underwent MRI T/L spine for new FDG uptake T11 and T5 given mid back pain without neurologic deficits with no reported pathologic compression fractures  C1D1 of Third Line Therapy with Tarlatamab on 3/17/25 during prior hospitalization  Presents as Direct Admit for C1D8 Tarlatamab therapy on 3/24/25  As patient has been having ongoing balance issues and headaches, does appear that Oncology team ordered outpatient MRI Brain w/ and w/out contrast as well as MRI C-Spine which is currently scheduled as outpatient   oncology Consultation, Appreciate assistance   Brain mass  Thought 2/2 Metastatic SCLC and S/p WBRT  Recently had occasional balance issues and headaches but no other neurologic symptoms per oncology notes  Per oncology as he has significant brain lesions supratentorial and infratentorial, concern with potential leptomeningeal disease so if headaches worsen/persist may need LP  Does appear that Oncology team ordered outpatient MRI Brain w/ and w/out contrast as well as MRI C-Spine which is currently scheduled outpatient next week.    On daily prednisone 5 mg for vasogenic edema per oncology to see if headaches/balance issues improve     Ambulatory dysfunction  In the setting of above.  PT OT evaluation  COPD (chronic obstructive pulmonary disease) (HCC)  Follows with pulm for  COPD Gold stage 0, also with suspected IPF   Patient denies that he uses anoro at home   Will put on prn nebulizers  Hx of testicular cancer  Hx of L testicular cancer in early 90s s/p resection and chemotherapy         Anticipated Length of Stay: Patient will be admitted on an inpatient basis with an anticipated length of stay of greater than 2 midnights secondary to chemo.    History of Present Illness   Chief Complaint: Direct admit for chemotherapy    Nino Silva is a 59 y.o. male with a PMH of small cell lung cancer with metastasis to the brain who presents as direct admission for inpatient chemotherapy.  Patient with no acute complaints today.  Oncology consulted    Review of Systems   Constitutional:  Negative for chills and fever.   HENT:  Negative for ear pain and sore throat.    Eyes:  Negative for pain and visual disturbance.   Respiratory:  Negative for cough and shortness of breath.    Cardiovascular:  Negative for chest pain and palpitations.   Gastrointestinal:  Negative for abdominal pain and vomiting.   Genitourinary:  Negative for dysuria and hematuria.   Musculoskeletal:  Positive for gait problem. Negative for arthralgias and back pain.   Skin:  Negative for color change and rash.   Neurological:  Negative for seizures and syncope.   All other systems reviewed and are negative.      Historical Information   No past medical history on file.  Past Surgical History:   Procedure Laterality Date    HERNIA REPAIR      IR BIOPSY LUNG  03/05/2024    ORCHIECTOMY Left      Social History     Tobacco Use    Smoking status: Never    Smokeless tobacco: Never   Vaping Use    Vaping status: Never Used   Substance and Sexual Activity    Alcohol use: Not Currently    Drug use: Not Currently    Sexual activity: Not on file     E-Cigarette/Vaping    E-Cigarette Use Never User      E-Cigarette/Vaping Substances     Family history non-contributory  Social History:  Marital Status: /Civil Union  "      Meds/Allergies   I have reviewed home medications with patient personally.  Prior to Admission medications    Medication Sig Start Date End Date Taking? Authorizing Provider   ascorbic acid (VITAMIN C) 250 mg tablet Take 500 mg by mouth daily    Historical Provider, MD   Cholecalciferol (Vitamin D) 50 MCG (2000 UT) tablet Take 2,000 Units by mouth daily    Historical Provider, MD   predniSONE 5 mg tablet Take 1 tablet (5 mg total) by mouth daily 2/12/25   Cortney Morrell MD     No Known Allergies    Objective :       Physical Exam  Vitals reviewed.   Cardiovascular:      Rate and Rhythm: Normal rate and regular rhythm.   Pulmonary:      Effort: Pulmonary effort is normal.      Breath sounds: Normal breath sounds.   Abdominal:      General: Bowel sounds are normal.      Palpations: Abdomen is soft.   Neurological:      Mental Status: He is alert.          Lines/Drains:            Lab Results: I have reviewed the following results:  Results from last 7 days   Lab Units 03/18/25  0624   WBC Thousand/uL 15.98*   HEMOGLOBIN g/dL 11.9*   HEMATOCRIT % 36.1*   PLATELETS Thousands/uL 262   SEGS PCT % 88*   LYMPHO PCT % 5*   MONO PCT % 6   EOS PCT % 0     Results from last 7 days   Lab Units 03/18/25  0624   SODIUM mmol/L 138   POTASSIUM mmol/L 3.7   CHLORIDE mmol/L 103   CO2 mmol/L 25   BUN mg/dL 17   CREATININE mg/dL 0.73   ANION GAP mmol/L 10   CALCIUM mg/dL 8.9   ALBUMIN g/dL 3.9   TOTAL BILIRUBIN mg/dL 0.51   ALK PHOS U/L 48   ALT U/L 14   AST U/L 18   GLUCOSE RANDOM mg/dL 111             No results found for: \"HGBA1C\"        Imaging Results Review: No pertinent imaging studies reviewed.  Other Study Results Review: No additional pertinent studies reviewed.    Administrative Statements   I have spent a total time of 33 minutes in caring for this patient on the day of the visit/encounter including Diagnostic results, Reviewing/placing orders in the medical record (including tests, medications, and/or procedures), " Obtaining or reviewing history  , and Communicating with other healthcare professionals .    ** Please Note: This note has been constructed using a voice recognition system. **

## 2025-03-25 ENCOUNTER — TELEPHONE (OUTPATIENT)
Age: 60
End: 2025-03-25

## 2025-03-25 VITALS
BODY MASS INDEX: 27.96 KG/M2 | OXYGEN SATURATION: 92 % | RESPIRATION RATE: 18 BRPM | SYSTOLIC BLOOD PRESSURE: 113 MMHG | HEART RATE: 84 BPM | TEMPERATURE: 97.6 F | DIASTOLIC BLOOD PRESSURE: 81 MMHG | WEIGHT: 195.33 LBS | HEIGHT: 70 IN

## 2025-03-25 LAB
ALBUMIN SERPL BCG-MCNC: 3.9 G/DL (ref 3.5–5)
ALP SERPL-CCNC: 47 U/L (ref 34–104)
ALT SERPL W P-5'-P-CCNC: 17 U/L (ref 7–52)
ANION GAP SERPL CALCULATED.3IONS-SCNC: 9 MMOL/L (ref 4–13)
AST SERPL W P-5'-P-CCNC: 14 U/L (ref 13–39)
BILIRUB SERPL-MCNC: 0.53 MG/DL (ref 0.2–1)
BUN SERPL-MCNC: 17 MG/DL (ref 5–25)
CALCIUM SERPL-MCNC: 9 MG/DL (ref 8.4–10.2)
CHLORIDE SERPL-SCNC: 103 MMOL/L (ref 96–108)
CO2 SERPL-SCNC: 25 MMOL/L (ref 21–32)
CREAT SERPL-MCNC: 0.72 MG/DL (ref 0.6–1.3)
ERYTHROCYTE [DISTWIDTH] IN BLOOD BY AUTOMATED COUNT: 12.9 % (ref 11.6–15.1)
GFR SERPL CREATININE-BSD FRML MDRD: 101 ML/MIN/1.73SQ M
GLUCOSE SERPL-MCNC: 104 MG/DL (ref 65–140)
HCT VFR BLD AUTO: 36.4 % (ref 36.5–49.3)
HGB BLD-MCNC: 12.5 G/DL (ref 12–17)
MCH RBC QN AUTO: 31.9 PG (ref 26.8–34.3)
MCHC RBC AUTO-ENTMCNC: 34.3 G/DL (ref 31.4–37.4)
MCV RBC AUTO: 93 FL (ref 82–98)
PLATELET # BLD AUTO: 276 THOUSANDS/UL (ref 149–390)
PMV BLD AUTO: 10 FL (ref 8.9–12.7)
POTASSIUM SERPL-SCNC: 3.6 MMOL/L (ref 3.5–5.3)
PROT SERPL-MCNC: 7.6 G/DL (ref 6.4–8.4)
RBC # BLD AUTO: 3.92 MILLION/UL (ref 3.88–5.62)
SODIUM SERPL-SCNC: 137 MMOL/L (ref 135–147)
WBC # BLD AUTO: 11.89 THOUSAND/UL (ref 4.31–10.16)

## 2025-03-25 PROCEDURE — 99233 SBSQ HOSP IP/OBS HIGH 50: CPT | Performed by: INTERNAL MEDICINE

## 2025-03-25 PROCEDURE — 99239 HOSP IP/OBS DSCHRG MGMT >30: CPT

## 2025-03-25 PROCEDURE — 80053 COMPREHEN METABOLIC PANEL: CPT | Performed by: INTERNAL MEDICINE

## 2025-03-25 PROCEDURE — 85027 COMPLETE CBC AUTOMATED: CPT | Performed by: INTERNAL MEDICINE

## 2025-03-25 RX ORDER — POLYETHYLENE GLYCOL 3350 17 G/17G
17 POWDER, FOR SOLUTION ORAL DAILY PRN
Qty: 20 EACH | Refills: 0 | Status: CANCELLED | OUTPATIENT
Start: 2025-03-25

## 2025-03-25 RX ADMIN — OXYCODONE HYDROCHLORIDE AND ACETAMINOPHEN 500 MG: 500 TABLET ORAL at 08:36

## 2025-03-25 RX ADMIN — PREDNISONE 5 MG: 5 TABLET ORAL at 08:36

## 2025-03-25 RX ADMIN — Medication 5000 UNITS: at 08:36

## 2025-03-25 RX ADMIN — ENOXAPARIN SODIUM 40 MG: 40 INJECTION SUBCUTANEOUS at 08:37

## 2025-03-25 NOTE — CASE MANAGEMENT
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) 2024     Small cell lung cancer (HCC) 2024     Brain mass 2024     COPD (chronic obstructive pulmonary disease) (HCC) 2024     Hx of testicular cancer 2024       LOS (days): 1  Geometric Mean LOS (GMLOS) (days):   Days to GMLOS:     OBJECTIVE:  Risk of Unplanned Readmission Score: 11.4         Current admission status: Inpatient   Preferred Pharmacy:   [id number] [place] - [address]  [address] [place] [address]  Phone: [phone] Fax: [phone]    [facility]  [id number]  [address] [place] [address]  Phone: [phone] Fax: [phone]    Primary Care Provider: Obed Marinelli MD    Primary Insurance: [employer]  Secondary Insurance:     DISCHARGE DETAILS:    Discharge planning discussed with:: Pt  Freedom of Choice: Yes  Comments - Freedom of Choice: Discussed FOC  CM contacted family/caregiver?: Yes  Were Treatment Team discharge recommendations reviewed with patient/caregiver?: Yes  Did patient/caregiver verbalize understanding of patient care needs?: Yes  Were patient/caregiver advised of the risks associated with not following Treatment Team discharge recommendations?: Yes    Contacts  Patient Contacts: Leslie Vides (Spouse)  [phone]  Relationship to Patient:: Family  Contact Method: In Person  Reason/Outcome: Continuity of Care, Emergency Contact, Discharge  Planning    Requested Home Health Care         Is the patient interested in HHC at discharge?: No    DME Referral Provided  Referral made for DME?: Yes  DME referral completed for the following items:: [employer]  DME Supplier Name:: [employer]    Other Referral/Resources/Interventions Provided:  Interventions: HHC, DME  Referral Comments: Pt requested to be discharged prior to HHC being arranged, reports he will follow-up outpatient.  CM ordered bedside commode via parachute to [employer], will coordinate delivery to pt's home address.         Treatment Team Recommendation: Home with Home Health Care  Discharge Destination Plan:: Home  Transport at Discharge : Family

## 2025-03-25 NOTE — ASSESSMENT & PLAN NOTE
Diagnosed with stage IV small cell lung cancer in March 2024, with multiple cortical brain lesion.  Patient is status post whole brain radiation, completed 10 fractions in March 2024.  Patient was subsequently started on carboplatin plus etoposide plus Durvalumab x 4 cycles (April to June 2024) and remained on durvalumab maintenance till October 2024.  PET scan from October was consistent with disease progression.  S/p second line treatment with lurbinectedin from November 2024 to January 2025.  PET scan from 2/7/2025 with significant progression of disease in brain as well as chest mediastinum.  Patient admitted to hospital for initiation of third line treatment with Tarlatamab, for close monitoring of CRS and ICAN     Plan:  Labs reviewed, pt received  Tarlatmab and is doing well   Next dose will be in the infusion center   Okay to receive pretreatment with Decadron and patient will continue his usual dose of prednisone 5 mg daily.  Will need close monitoring for CRS and ICANs, every 2 hours for first 12 hours and can space out to every 4 hours thereafter.  On the day of discharge pt is doing well, slightly up in wbc count which is likely due to Tarlatamab  He will follow-up with Dr. Morrell post discharge.

## 2025-03-25 NOTE — UTILIZATION REVIEW
Initial Clinical Review    Admission: Date/Time/Statement:   Admission Orders (From admission, onward)       Ordered        03/24/25 0838  INPATIENT ADMISSION  Once                          Orders Placed This Encounter   Procedures    INPATIENT ADMISSION     Standing Status:   Standing     Number of Occurrences:   1     Level of Care:   Med Surg [16]     Estimated length of stay:   More than 2 Midnights     Certification:   I certify that inpatient services are medically necessary for this patient for a duration of greater than two midnights. See H&P and MD Progress Notes for additional information about the patient's course of treatment.     Initial Presentation: 59 y.o. male with small cell lung cancer with mets to the brain presents to hospital as direct Inpatient Admission for inpatient chemotherapy.   Pt denies complaints on presentation today.   PMHx: stage IV small cell lung cancer in March 2024, with multiple cortical brain lesion, h/o testicular cancer, COPD, ambulatory dysfunction  Continue PTA po meds. Prn nebs. Reg diet. Heme/Onc consulted.    Heme/Onc consult --   1.  Extensive stage small cell lung cancer  With known brain metastases status post whole brain therapy in early March 2024.  Has received 2 lines of treatment previously now on tarlatamab.  Admitted for cycle 1 day 8 as he is a by specific molecule and has significant potential for side effects including CRS and ICANs.  Monitor closely for symptoms.   Labs reviewed, okay to proceed with C1 D8 tarlatamab.  Okay to receive pretreatment with Decadron and pt will continue his usual dose of prednisone 5 mg daily.  Will need close monitoring for CRS and ICANs, every 2 hours for first 12 hours and can space out to every 4 hours thereafter.  Previous admission patient experienced some nausea and flushing.   Supportive care as needed.       Date: 3/25   Day 2: Patient had Tarlatamab infusion. Tolerated well. Refused PT OT home health. He needed to  leave as soon as possible. Ok to d/c home. F/u with oncology in the clinic     Scheduled Medications:  Medications 03/24 03/25   ascorbic acid (VITAMIN C) tablet 500 mg  Dose: 500 mg  Freq: Daily Route: PO  Start: 03/25/25 0900     0836        Cholecalciferol (VITAMIN D3) tablet 2,000 Units  Dose: 2,000 Units  Freq: Daily Route: PO  Start: 03/25/25 0900 End: 03/24/25 0912 0912-D/C'd       Cholecalciferol (VITAMIN D3) tablet 5,000 Units  Dose: 5,000 Units  Freq: Daily Route: PO  Start: 03/25/25 0900     0836        dexamethasone (DECADRON) 8 mg in sodium chloride 0.9 % 50 mL IVPB  Dose: 8 mg  Freq: Once Route: IV  Last Dose: 8 mg (03/24/25 1437)  Start: 03/24/25 1400 End: 03/24/25 1457    1437         enoxaparin (LOVENOX) subcutaneous injection 40 mg  Dose: 40 mg  Freq: Daily Route: SC  Start: 03/25/25 0900     0837        predniSONE tablet 5 mg  Dose: 5 mg  Freq: Daily Route: PO  Start: 03/25/25 0900     0836        sodium chloride 0.9 % bolus 1,000 mL  Dose: 1,000 mL  Freq: Once Route: IV  Last Dose: Stopped (03/24/25 2201)  Start: 03/24/25 1600 End: 03/24/25 2201    1659     2201         Tarlatamab-dlle (Imdelltra) 10 mg in 233 mL 0.9% Sodium Chloride IVPB  Dose: 10 mg  Freq: Once Route: IV  Last Dose: 10 mg (03/24/25 1540)  Start: 03/24/25 1500 End: 03/24/25 1640    1540           PRN Meds Sorted by Name  Medications 03/24 03/25   sodium chloride 0.9 % infusion  Dose: 20 mL/hr  Freq: Once as needed Route: IV  PRN Comment: as needed to keep vein open if no other compatible fluid infusing  Indications Comment: KVO  Start: 03/24/25 1400    1510             Weight (last 2 days)       Date/Time Weight    03/24/25 1242 88.6 (195.33)    03/24/25 09:20:48 88.6 (195.33)         Vital Signs (last 3 days)       Date/Time Temp Pulse Resp BP MAP (mmHg) SpO2 O2 Device Patient Position - Orthostatic VS Pain    03/25/25 1000 -- 84 18 113/81 92 -- -- -- --    03/25/25 07:43:23 97.6 °F (36.4 °C) 71 18 132/88 103 92 % -- -- --     03/25/25 03:57:26 97.7 °F (36.5 °C) 54 -- 117/76 90 96 % -- -- --    03/25/25 02:06:53 98.1 °F (36.7 °C) 57 16 117/76 90 94 % None (Room air) Lying --    03/25/25 0100 -- -- -- -- -- 93 % None (Room air) -- No Pain    03/25/25 00:00:32 97.7 °F (36.5 °C) 57 18 116/76 89 95 % -- -- --    03/24/25 21:57:36 98.1 °F (36.7 °C) 54 18 108/71 83 93 % -- -- --    03/24/25 20:00:42 98.4 °F (36.9 °C) 64 16 102/69 80 96 % -- -- --    03/24/25 18:05:30 97.5 °F (36.4 °C) 58 16 104/62 76 95 % -- -- --    03/24/25 15:27:47 97.6 °F (36.4 °C) 61 18 104/61 75 95 % -- Lying --    03/24/25 1249 -- -- -- -- -- -- -- -- No Pain    03/24/25 1248 -- -- -- -- -- -- -- -- No Pain    03/24/25 12:07:37 97.5 °F (36.4 °C) 53 15 139/77 98 95 % -- -- --    03/24/25 1000 -- -- -- -- -- -- None (Room air) -- No Pain    03/24/25 09:20:48 97.4 °F (36.3 °C) 62 17 112/70 84 95 % None (Room air) -- --            Pertinent Labs/Diagnostic Test Results:     Results from last 7 days   Lab Units 03/25/25  0601 03/24/25  1004   WBC Thousand/uL 11.89* 10.41*   HEMOGLOBIN g/dL 12.5 13.0   HEMATOCRIT % 36.4* 38.0   PLATELETS Thousands/uL 276 272   TOTAL NEUT ABS Thousands/µL  --  8.50*     Results from last 7 days   Lab Units 03/25/25  0601 03/24/25  1004   SODIUM mmol/L 137 135   POTASSIUM mmol/L 3.6 4.3   CHLORIDE mmol/L 103 102   CO2 mmol/L 25 25   ANION GAP mmol/L 9 8   BUN mg/dL 17 23   CREATININE mg/dL 0.72 0.98   EGFR ml/min/1.73sq m 101 83   CALCIUM mg/dL 9.0 9.0   MAGNESIUM mg/dL  --  1.9     Results from last 7 days   Lab Units 03/25/25  0601 03/24/25  1004   AST U/L 14 17   ALT U/L 17 19   ALK PHOS U/L 47 45   TOTAL PROTEIN g/dL 7.6 7.4   ALBUMIN g/dL 3.9 3.9   TOTAL BILIRUBIN mg/dL 0.53 0.46       Results from last 7 days   Lab Units 03/25/25  0601 03/24/25  1004   GLUCOSE RANDOM mg/dL 104 112       No past medical history on file.  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 male    Network Utilization Review Department  ATTENTION: Please call with any questions or concerns to [phone] and carefully listen to the prompts so that you are directed to the right person. All voicemails are confidential.   For Discharge needs, contact Care Management DC Support Team at [phone] opt. 2  Send all requests for admission clinical reviews, approved or denied determinations and any other requests to dedicated fax number below belonging to the campus where the patient is receiving treatment. List of dedicated fax numbers for the Facilities:  FACILITY NAME UR FAX NUMBER   ADMISSION DENIALS (Administrative/Medical Necessity) [phone]   DISCHARGE SUPPORT TEAM (NETWORK) [phone]   PARENT CHILD HEALTH (Maternity/NICU/Pediatrics) [phone]   [facility] [phone]   [facility] [phone]   [facility] [phone]   [facility] [phone]   Critical access hospital [phone]   [facility] [phone]   [facility] [phone]   [facility] [phone]   [facility] [phone]   [facility] [phone]   [facility] [phone]   [facility] [phone]

## 2025-03-25 NOTE — UTILIZATION REVIEW
"NOTIFICATION OF INPATIENT ADMISSION   AUTHORIZATION REQUEST   SERVICING FACILITY:   [facility]  Address: [address] [place] [address]  Tax ID: [id number]  NPI: [id number] ATTENDING PROVIDER:  Attending Name and NPI#: Pavel Rico Md [[id number]]  Address: [address] [place] [address]  Phone: [phone]   ADMISSION INFORMATION:  Place of Service: Inpatient [facility]  Place of Service Code: 21  Inpatient Admission Date/Time: 3/24/25  8:01 AM  Discharge Date/Time: No discharge date for patient encounter.  Admitting Diagnosis Code/Description:  Small cell carcinoma of hilum of lung, unspecified laterality (HCC)     UTILIZATION REVIEW CONTACT:  Viviana Ceja"Li\"Juan José Utilization   Network Utilization Review Department  Phone: [phone]  Fax: [phone]  Email: [email or web address]  Contact for approvals/pending authorizations, clinical reviews, and discharge.     PHYSICIAN ADVISORY SERVICES:  Medical Necessity Denial & Yjql-dt-Jdyt Review  Phone: [phone]  Fax: [phone]  Email: [email or web address]     DISCHARGE SUPPORT TEAM:  For Patients Discharge Needs & Updates  Phone: [phone] opt. 2 Fax: [phone]  Email: [email or web address]      "

## 2025-03-25 NOTE — ASSESSMENT & PLAN NOTE
Originally diagnosed 3/2024 when he presented to ED with dyspnea and confusion, found to have large R lung mass and brain masses, follows with Dr. Morrell  Continued progression of disease on multiple chemotherapeutic agents, recently started 3rd line therapy with tarlatamab  Recent PET 2/7 with increasing RUL and R perihilar masses, interval development of osseous metastases  Recently underwent MRI T/L spine for new FDG uptake T11 and T5 given mid back pain without neurologic deficits with no reported pathologic compression fractures  C1D1 of Third Line Therapy with Tarlatamab on 3/17/25 during prior hospitalization  Presents as Direct Admit for C1D8 Tarlatamab therapy on 3/24/25  As patient has been having ongoing balance issues and headaches, does appear that Oncology team ordered outpatient MRI Brain w/ and w/out contrast as well as MRI C-Spine which is currently scheduled as outpatient   Follow-up with oncology in the clinic  Medically clear for discharge  Tolerated infusion very well.  Patient refused PT OT home health. He needed to leave as soon as possible

## 2025-03-25 NOTE — TELEPHONE ENCOUNTER
[employer] FMLA forms were completed for [employer] and faxed out to [phone].  Received faxed confirmation.    Informed [employer] forms were faxed.

## 2025-03-25 NOTE — PROGRESS NOTES
Progress Note - Oncology-Medical   Name: Nino Silva 59 y.o. 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  Patient is status post whole brain radiation, completed 10 fractions in March 2024.  Patient was subsequently started on carboplatin plus etoposide plus Durvalumab x 4 cycles (April to June 2024) and remained on durvalumab maintenance till October 2024.  PET scan from October was consistent with disease progression.  S/p second line treatment with lurbinectedin from November 2024 to January 2025.  PET scan from 2/7/2025 with significant progression of disease in brain as well as chest mediastinum.  Patient admitted to hospital for initiation of third line treatment with Tarlatamab, for close monitoring of CRS and ICAN     Plan:  Labs reviewed, pt received  Tarlatmab and is doing well   Next dose will be in the infusion center   Ok to receive pretreatment with Decadron and patient will continue his usual dose of prednisone 5 mg daily.  Will need close monitoring for CRS and ICANs, every 2 hours for first 12 hours and can space out to every 4 hours thereafter.  On the day of discharge pt is doing well, slightly up in wbc count which is likely due to Tarlatamab  He will follow-up with Dr. Morrell post discharge.     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 male past medical history significant for COPD, history of testicular cancer, extensive stage small cell lung cancer diagnosed in March 2024 with metastatic disease involving mediastinal and hilar  adenopathy, multiple supra and infratentorial brain lesion.  Right upper lobe lung biopsy was consistent with poorly differentiated carcinoma with neuroendocrine feature, favoring large cell neuroendocrine carcinoma.  Patient underwent brain radiation and subsequently was started on systemic therapy with carboplatin, etoposide and Durvalumab that he completed 4 cycles from April to June 2024 and was continued on maintenance Durvalumab since July 2024.  PET scan in October 2024 was concerning for progression of disease in mediastinum, right perihilar region and new FDG avid periaortic lymph node hence patient was started on second line treatment with lurbinectedin from November 2024 to January 2025.     Repeat imaging in February more specifically on 2/7/2025 PET scan with significant progression of disease in brain as well as chest mediastinum.  His case was discussed with radiation oncology, given patient was asymptomatic from his brain mets and he had already received whole brain radiation in the past, radiation therapy was not recommended.  Patient started on third line treatment with tarlatamab, s/p C1 D1 on 3/18/2025, tolerated well with minimum flushing and nausea.  No concerns for CRS or ICANs on last administration.     Post discharge from hospital the patient was evaluated by Dr. Stewart where he had complained experiencing balance issues that started prior to tarlatamab administration, MRI brain and cervical neck is ordered and is scheduled for 3/27/2025.  Patient is admitted to hospital for C1 D8 of tarlatamab.        Review of Systems:    Review of Systems   HENT:  Negative for congestion.    Respiratory:  Negative for chest tightness and shortness of breath.    Cardiovascular:  Negative for chest pain and palpitations.   Gastrointestinal:  Negative for abdominal pain, constipation and diarrhea.   Genitourinary:  Negative for decreased urine volume, hematuria and penile pain.   Musculoskeletal:   Negative for back pain and myalgias.   Skin:  Negative for color change.   Neurological:  Negative for dizziness, light-headedness and headaches.        Reported balance issues.   Hematological:  Negative for adenopathy. Does not bruise/bleed easily.         Oncology History:   Cancer Staging   Small cell lung cancer (HCC)  Staging form: Lung, AJCC 8th Edition  - Clinical stage from 3/5/2024: Stage IV (cT2, cN3, cM1) - Signed by Lizbeth López MD on 4/18/2024         Oncology History   Small cell lung cancer (HCC)   3/5/2024 Initial Diagnosis     Small cell lung cancer (HCC)      3/5/2024 Biopsy     Final Diagnosis  A. Lung, Right Upper Lobe, biopsy:  - Poorly differentiated carcinoma with neuroendocrine features, favor large cell neuroendocrine carcinoma.  - See note.      3/5/2024 -  Cancer Staged     Staging form: Lung, AJCC 8th Edition  - Clinical stage from 3/5/2024: Stage IV (cT2, cN3, cM1) - Signed by Lizbeth López MD on 4/18/2024  Histopathologic type: Small cell carcinoma, NOS  Stage prefix: Initial diagnosis  Histologic grade (G): G3  Histologic grading system: 4 grade system         3/6/2024 - 3/19/2024 Radiation        Plan ID Energy Fractions Dose per Fraction (cGy) Dose Correction (cGy) Total Dose Delivered (cGy) Elapsed Days   Whole Brain 6X 10 / 10 300 0 3,000 13          4/8/2024 - 10/7/2024 Chemotherapy     alteplase (CATHFLO), 2 mg, Intracatheter, Every 1 Minute as needed, 8 of 10 cycles  palonosetron (ALOXI), 0.25 mg, Intravenous, Once, 3 of 3 cycles  Administration: 0.25 mg (4/29/2024), 0.25 mg (5/20/2024), 0.25 mg (6/17/2024)  fosaprepitant (EMEND) IVPB, 150 mg, Intravenous, Once, 4 of 4 cycles  Administration: 150 mg (4/8/2024), 150 mg (4/29/2024), 150 mg (5/20/2024), 150 mg (6/17/2024)  etoposide (TOPOSAR), 80 mg/m2 = 164 mg, Intravenous, Once, 4 of 4 cycles  Dose modification: 100 mg/m2 (original dose 80 mg/m2, Cycle 1, Reason: Anticipated Tolerance), 80 mg/m2 (original dose 80 mg/m2,  Cycle 1, Reason: Anticipated Tolerance), 100 mg/m2 (original dose 80 mg/m2, Cycle 2, Reason: Anticipated Tolerance)  Administration: 164 mg (4/8/2024), 164 mg (4/9/2024), 164 mg (4/10/2024), 205 mg (4/29/2024), 205 mg (4/30/2024), 205 mg (5/1/2024), 200 mg (5/20/2024), 200 mg (5/21/2024), 200 mg (5/22/2024), 200 mg (6/17/2024), 200 mg (6/18/2024), 200 mg (6/19/2024)  CARBOplatin (PARAPLATIN) IVPB ([facility] AUC DOSING), 750 mg (574.7 % of original dose 130.5 mg), Intravenous, Once, 4 of 4 cycles  Dose modification: 130.5 mg (original dose 130.5 mg, Cycle 1, Reason: Anticipated Tolerance),   (original dose 130.5 mg, Cycle 1, Reason: Other (Must fill in a comment))  Administration: 750 mg (4/8/2024), 701.5 mg (4/29/2024), 664 mg (5/20/2024), 633 mg (6/17/2024)  durvalumab (IMFINZI) IVPB, 1,500 mg, Intravenous, Once, 8 of 10 cycles  Administration: 1,500 mg (4/8/2024), 1,500 mg (4/29/2024), 1,500 mg (5/20/2024), 1,500 mg (6/17/2024), 1,500 mg (7/10/2024), 1,500 mg (8/13/2024), 1,500 mg (9/9/2024), 1,500 mg (10/7/2024)      11/19/2024 - 1/21/2025 Chemotherapy     alteplase (CATHFLO), 2 mg, Intracatheter, Every 1 Minute as needed, 4 of 6 cycles  Lurbinectedin (ZEPZELCA) IVPB, 3.2 mg/m2 = 6.4 mg, Intravenous, Once, 4 of 6 cycles  Administration: 6.4 mg (11/19/2024), 6.4 mg (12/10/2024), 6.4 mg (12/31/2024), 6.4 mg (1/21/2025)      3/17/2025 -  Chemotherapy     alteplase (CATHFLO), 2 mg, Intracatheter, Every 1 Minute as needed, 1 of 10 cycles  sodium chloride, 1,000 mL, Intravenous, Once, 1 of 1 cycle  Administration: 1,000 mL (3/17/2025)  tarlatamab (Imdelltra) 10 mg IVPB, 10 mg, Intravenous, Once, 1 of 10 cycles  tarlatamab (Imdelltra) 1 mg IVPB, 1 mg, Intravenous, Once, 1 of 1 cycle  Administration: 1 mg (3/17/2025)            Past Medical History:   Medical History   No past medical history on file.        Surgical History         Past Surgical History:   Procedure Laterality Date    HERNIA REPAIR        IR BIOPSY LUNG    03/05/2024    ORCHIECTOMY Left              Family History         Family History   Problem Relation Age of Onset    Breast cancer additional onset Mother      No Known Problems Father      Stroke Brother              Social History   Social History            Socioeconomic History    Marital status: /Civil Union       Spouse name: Not on file    Number of children: Not on file    Years of education: Not on file    Highest education level: Not on file   Occupational History    Not on file   Tobacco Use    Smoking status: Never    Smokeless tobacco: Never   Vaping Use    Vaping status: Never Used   Substance and Sexual Activity    Alcohol use: Not Currently    Drug use: Not Currently    Sexual activity: Not on file   Other Topics Concern    Not on file   Social History Narrative    Not on file      Social Drivers of Health           Financial Resource Strain: Not on file   Food Insecurity: No Food Insecurity (3/17/2025)     Nursing - Inadequate Food Risk Classification      Worried About Running Out of Food in the Last Year: Not on file      Ran Out of Food in the Last Year: Not on file      Ran Out of Food in the Last Year: Never true   Transportation Needs: No Transportation Needs (3/17/2025)     Nursing - Transportation Risk Classification      Lack of Transportation: Not on file      Lack of Transportation: No   Physical Activity: Not on file   Stress: Not on file   Social Connections: Not on file   Intimate Partner Violence: Unknown (3/17/2025)     Nursing IPS      Feels Physically and Emotionally Safe: Not on file      Physically Hurt by Someone: Not on file      Humiliated or Emotionally Abused by Someone: Not on file      Physically Hurt by Someone: No      Hurt or Threatened by Someone: No   Housing Stability: Unknown (3/17/2025)     Nursing: Inadequate Housing Risk Classification      Has Housing: Not on file      Worried About Losing Housing: Not on file      Unable to Get Utilities: Not on file      " Unable to Pay for Housing in the Last Year: No      Has Housin              Current Medications      Current Facility-Administered Medications:     acetaminophen (TYLENOL) tablet 650 mg, 650 mg, Oral, Q6H PRN, Reji Kelly MD    albuterol (PROVENTIL HFA,VENTOLIN HFA) inhaler 2 puff, 2 puff, Inhalation, Q4H PRN, Reji Kelly MD    aluminum-magnesium hydroxide-simethicone (MAALOX) oral suspension 30 mL, 30 mL, Oral, Q4H PRN, Reji Kelly MD    [START ON 3/25/2025] ascorbic acid (VITAMIN C) tablet 500 mg, 500 mg, Oral, Daily, Reji Kelly MD    [START ON 3/25/2025] Cholecalciferol (VITAMIN D3) tablet 5,000 Units, 5,000 Units, Oral, Daily, Reji Kelly MD    dexamethasone (DECADRON) 8 mg in sodium chloride 0.9 % 50 mL IVPB, 8 mg, Intravenous, Once, Cortney Morrell MD    [START ON 3/25/2025] enoxaparin (LOVENOX) subcutaneous injection 40 mg, 40 mg, Subcutaneous, Daily, Reji Kelly MD    ondansetron (ZOFRAN) injection 4 mg, 4 mg, Intravenous, Q6H PRN, Reji Kelly MD    polyethylene glycol (MIRALAX) packet 17 g, 17 g, Oral, Daily PRN, Reji Kelly MD    [START ON 3/25/2025] predniSONE tablet 5 mg, 5 mg, Oral, Daily, Reji Kelly MD    sodium chloride 0.9 % bolus 1,000 mL, 1,000 mL, Intravenous, Once, Cortney Morrell MD    sodium chloride 0.9 % infusion, 20 mL/hr, Intravenous, Once PRN, Cortney Morrell MD    Tarlatamab-dlle (Imdelltra) 10 mg in 233 mL 0.9% Sodium Chloride IVPB, 10 mg, Intravenous, Once, Cortney Morrell MD          Prescriptions Prior to Admission      Medications Prior to Admission:     ascorbic acid (VITAMIN C) 250 mg tablet    Cholecalciferol (Vitamin D) 50 MCG ( UT) tablet    predniSONE 5 mg tablet        Allergies   No Known Allergies           Physical Exam:      /77   Pulse (!) 53   Temp 97.5 °F (36.4 °C)   Resp 15   Ht 5' 10\" (1.778 m)   Wt 88.6 kg (195 lb 5.2 oz)   SpO2 95%   BMI 28.03 kg/m²      Physical Exam  Constitutional:       Appearance: Normal appearance. "   HENT:      Head: Normocephalic and atraumatic.   Eyes:      Conjunctiva/sclera: Conjunctivae normal.      Pupils: Pupils are equal, round, and reactive to light.   Cardiovascular:      Rate and Rhythm: Normal rate and regular rhythm.      Pulses: Normal pulses.      Heart sounds: Normal heart sounds.   Pulmonary:      Effort: Pulmonary effort is normal.      Breath sounds: Normal breath sounds.   Abdominal:      General: Bowel sounds are normal.      Palpations: Abdomen is soft.   Neurological:      Mental Status: He is alert and oriented to person, place, and time.      Comments: Power 4/7 and left lower extremity, 5/5 in right lower extremity.               Recent Results         Recent Results (from the past 48 hours)   CBC and differential     Collection Time: 03/24/25 10:04 AM   Result Value Ref Range     WBC 10.41 (H) 4.31 - 10.16 Thousand/uL     RBC 4.09 3.88 - 5.62 Million/uL     Hemoglobin 13.0 12.0 - 17.0 g/dL     Hematocrit 38.0 36.5 - 49.3 %     MCV 93 82 - 98 fL     MCH 31.8 26.8 - 34.3 pg     MCHC 34.2 31.4 - 37.4 g/dL     RDW 13.2 11.6 - 15.1 %     MPV 10.1 8.9 - 12.7 fL     Platelets 272 149 - 390 Thousands/uL     nRBC 0 /100 WBCs     Segmented % 82 (H) 43 - 75 %     Immature Grans % 1 0 - 2 %     Lymphocytes % 8 (L) 14 - 44 %     Monocytes % 8 4 - 12 %     Eosinophils Relative 1 0 - 6 %     Basophils Relative 0 0 - 1 %     Absolute Neutrophils 8.50 (H) 1.85 - 7.62 Thousands/µL     Absolute Immature Grans 0.05 0.00 - 0.20 Thousand/uL     Absolute Lymphocytes 0.87 0.60 - 4.47 Thousands/µL     Absolute Monocytes 0.81 0.17 - 1.22 Thousand/µL     Eosinophils Absolute 0.14 0.00 - 0.61 Thousand/µL     Basophils Absolute 0.04 0.00 - 0.10 Thousands/µL   Comprehensive metabolic panel     Collection Time: 03/24/25 10:04 AM   Result Value Ref Range     Sodium 135 135 - 147 mmol/L     Potassium 4.3 3.5 - 5.3 mmol/L     Chloride 102 96 - 108 mmol/L     CO2 25 21 - 32 mmol/L     ANION GAP 8 4 - 13 mmol/L     BUN  23 5 - 25 mg/dL     Creatinine 0.98 0.60 - 1.30 mg/dL     Glucose 112 65 - 140 mg/dL     Calcium 9.0 8.4 - 10.2 mg/dL     AST 17 13 - 39 U/L     ALT 19 7 - 52 U/L     Alkaline Phosphatase 45 34 - 104 U/L     Total Protein 7.4 6.4 - 8.4 g/dL     Albumin 3.9 3.5 - 5.0 g/dL     Total Bilirubin 0.46 0.20 - 1.00 mg/dL     eGFR 83 ml/min/1.73sq m   Magnesium     Collection Time: 03/24/25 10:04 AM   Result Value Ref Range     Magnesium 1.9 1.9 - 2.7 mg/dL            MRI lumbar spine w wo contrast  Result Date: 3/17/2025  Narrative: MRI LUMBAR SPINE WITH AND WITHOUT CONTRAST INDICATION: C34.00: Malignant neoplasm of unspecified main bronchus.   extended stage SCLC now with new bone mets T11 and L5  assess r/o compression COMPARISON: Same day MRI thoracic spine with and without contrast NM PET CT skull base to MID thigh 2/7/2025. MRI brain with and without contrast 2/5/2021. TECHNIQUE:  Multiplanar, multisequence imaging of the lumbar spine was performed before and after gadolinium administration. . IV Contrast:  9 mL of Gadobutrol injection IMAGE QUALITY:  Diagnostic FINDINGS: VERTEBRAL BODIES:  There are 5 lumbar type vertebral bodies.  Normal alignment of the lumbar spine.  No spondylolysis or spondylolisthesis. No scoliosis.  No compression fracture. Small pathologic marrow replacing lesions in L1 and L2 vertebral body with associated enhancement, compatible with osseous metastasis. Mildly heterogeneous bone marrow signal. SACRUM: The heterogeneous bone marrow signal in visualized sacrum. No evidence of insufficiency or stress fracture. DISTAL CORD AND CONUS:  Normal size and signal within the distal cord and conus. PARASPINAL SOFT TISSUES:  Paraspinal soft tissues are unremarkable. LOWER THORACIC DISC SPACES: Please see same day MRI thoracic spine with and without contrast for further evaluation. LUMBAR DISC SPACES: Multilevel endplate osteophytes, mild disc height loss, and facet arthropathy. L1-L2: Mild disc bulge. No  significant canal stenosis or foraminal narrowing. L2-L3: Mild disc bulge, small central disc protrusion. Mild canal stenosis. No significant foraminal narrowing. L3-L4: Mild disc bulge, small left posterior annular fissure. No significant canal stenosis or foraminal narrowing. L4-L5: Mild disc bulge. Facet arthropathy. Mild canal stenosis. Mild bilateral foraminal narrowing. L5-S1: Mild disc bulge, small posterior annular fissure. Small synovial cyst adjacent to the right L5 pars interarticularis. No significant canal stenosis or foraminal narrowing. POSTCONTRAST IMAGING: Please see above discussion. OTHER FINDINGS: Partially imaged left renal cyst.      Impression: Osseous metastasis in L1 and L2 vertebral bodies. No pathologic compression fracture, as clinically questioned. Multilevel degenerative changes of lumbar spine with varying degrees of canal stenosis (mild L2-L3 and L4-L5) and foraminal narrowing (mild bilateral L4-L5), as detailed above. Please see same day MRI thoracic spine with and without contrast for further evaluation. The study was marked in EPIC for immediate notification. Workstation performed: [id number]      MRI thoracic spine w wo contrast  Result Date: 3/17/2025  Narrative: MRI THORACIC SPINE WITH AND WITHOUT CONTRAST INDICATION: C34.00: Malignant neoplasm of unspecified main bronchus.   extended stage SCLC now with new bone mets T11 and L5  assess r/o compression COMPARISON: Same day MRI lumbar spine with and without contrast. NM PET CT skull base to MID thigh on 2/7/2025. MRI brain with and without contrast 2/5/2025. TECHNIQUE:  Multiplanar, multisequence imaging of the thoracic spine was performed before and after gadolinium administration. . IV Contrast:  9 mL of Gadobutrol injection IMAGE QUALITY:  Diagnostic. FINDINGS: ALIGNMENT:  Normal alignment of the thoracic spine.  No compression fracture.  No subluxation.  No evidence of scoliosis. MARROW SIGNAL: Small right T4 transverse  process and L1 vertebral body mildly enhancing pathologic marrow replacing lesions, compatible with osseous metastasis. Mildly heterogeneous bone marrow signal. Type II Modic endplate change C5-6, T6-T7, T7-T8. THORACIC CORD:  Normal signal within the thoracic cord. PREVERTEBRAL AND PARASPINAL SOFT TISSUES:   Normal. THORACIC DEGENERATIVE CHANGE: Mild multilevel degenerative changes consisting of endplate osteophytes and mild disc height loss. No significant canal stenosis or foraminal narrowing. POSTCONTRAST: Please see above discussion. OTHER FINDINGS: Partially imaged known right upper lobe malignancy, mediastinal and hilar beata metastasis, left renal cyst.      Impression: Osseous metastasis in right T4 transverse process and L1 vertebral body. No pathologic compression fracture, as clinically questioned. Partially imaged known right upper lobe malignancy, mediastinal and hilar beata metastasis. Mild degenerative changes of thoracic spine, as detailed above. No significant canal stenosis or foraminal narrowing. Please see same day MRI lumbar spine with and without contrast for further evaluation. The study was marked in EPIC for immediate notification. Workstation performed: [id number]         Labs and pertinent reports reviewed.                 This note has been generated by voice recognition software system.  Therefore, there may be spelling, grammar, and or syntax errors. Please contact if questions arise.                  Objective :  Temp:  [97.5 °F (36.4 °C)-98.4 °F (36.9 °C)] 97.6 °F (36.4 °C)  HR:  [53-71] 71  BP: (102-139)/(61-88) 132/88  Resp:  [15-18] 18  SpO2:  [92 %-96 %] 92 %  O2 Device: None (Room air)          Lab Results: I have reviewed the following results:  Lab Results   Component Value Date    K 3.6 03/25/2025     03/25/2025    CO2 25 03/25/2025    BUN 17 03/25/2025    CREATININE 0.72 03/25/2025    GLUCOSE 116 03/04/2024    GLUF 99 02/07/2025    CALCIUM 9.0 03/25/2025    AST 14  03/25/2025    ALT 17 03/25/2025    ALKPHOS 47 03/25/2025    EGFR 101 03/25/2025     Lab Results   Component Value Date    WBC 11.89 (H) 03/25/2025    HGB 12.5 03/25/2025    HCT 36.4 (L) 03/25/2025    MCV 93 03/25/2025     03/25/2025     Lab Results   Component Value Date    NEUTROABS 8.50 (H) 03/24/2025

## 2025-03-25 NOTE — DISCHARGE SUMMARY
Discharge Summary - Hospitalist   Name: Nino Silva 59 y.o. 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 Morrell  Continued progression of disease on multiple chemotherapeutic agents, recently started 3rd line therapy with tarlatamab  Recent PET 2/7 with increasing RUL and R perihilar masses, interval development of osseous metastases  Recently underwent MRI T/L spine for new FDG uptake T11 and T5 given mid back pain without neurologic deficits with no reported pathologic compression fractures  C1D1 of Third Line Therapy with Tarlatamab on 3/17/25 during prior hospitalization  Presents as Direct Admit for C1D8 Tarlatamab therapy on 3/24/25  As patient has been having ongoing balance issues and headaches, does appear that Oncology team ordered outpatient MRI Brain w/ and w/out contrast as well as MRI C-Spine which is currently scheduled as outpatient   Follow-up with oncology in the clinic  Medically clear for discharge  Tolerated infusion very well.  Patient refused PT OT home health. He needed to leave as soon as possible  Brain mass  Thought 2/2 Metastatic SCLC and S/p WBRT  Recently had occasional balance issues and headaches but no other neurologic symptoms per oncology notes  Per oncology as he has significant brain lesions supratentorial and infratentorial, concern with potential leptomeningeal disease so if headaches worsen/persist may need LP  Does appear that Oncology team ordered outpatient MRI Brain w/ and w/out contrast as well as MRI C-Spine which is currently scheduled outpatient next week.    On daily prednisone 5 mg for vasogenic edema per oncology to see if headaches/balance issues improve  "    Ambulatory dysfunction  In the setting of above.  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    No Chest XR results available for this patient.       Significant Findings / Test Results:   None    Incidental Findings:   none       Test Results Pending at Discharge (will require follow up):   none     Outpatient Tests Requested:  none    Complications:  none    Reason for Admission: cancer treatment    Hospital Course:   Nino Silva is a 59 y.o. male patient who originally presented to the hospital on 3/24/2025.  Patient had Tarlatamab infusion.  Heme-onc was consulted.  Tolerated very well.  Deemed fit for discharge today.  Advised to follow-up with heme-onc in the outpatient setting.    Please see above list of diagnoses and related plan for additional information.     Condition at Discharge: good    Discharge Day Visit / Exam:   Subjective:  Patient does not report and headaches, shortness of breath, chest pain, abdominal pain, nausea vomiting, lower leg edema. Also reports good sleep      Vitals: Blood Pressure: 132/88 (03/25/25 0743)  Pulse: 71 (03/25/25 0743)  Temperature: 97.6 °F (36.4 °C) (03/25/25 0743)  Temp Source: Oral (03/25/25 0206)  Respirations: 18 (03/25/25 0743)  Height: 5' 10\" (177.8 cm) (03/24/25 " 1242)  Weight - Scale: 88.6 kg (195 lb 5.2 oz) (03/24/25 1242)  SpO2: 92 % (03/25/25 0743)  Physical Exam  Vitals and nursing note reviewed.   Constitutional:       General: He is not in acute distress.     Appearance: He is well-developed.   HENT:      Head: Normocephalic and atraumatic.   Eyes:      Conjunctiva/sclera: Conjunctivae normal.   Cardiovascular:      Rate and Rhythm: Normal rate and regular rhythm.      Heart sounds: No murmur heard.  Pulmonary:      Effort: Pulmonary effort is normal. No respiratory distress.      Breath sounds: Normal breath sounds.   Abdominal:      Palpations: Abdomen is soft.      Tenderness: There is no abdominal tenderness.   Musculoskeletal:         General: No swelling.      Cervical back: Neck supple.   Skin:     General: Skin is warm and dry.      Capillary Refill: Capillary refill takes less than 2 seconds.   Neurological:      Mental Status: He is alert.   Psychiatric:         Mood and Affect: Mood normal.          Discussion with Family: Updated  (wife) at bedside.    Discharge instructions/Information to patient and family:   See after visit summary for information provided to patient and family.      Provisions for Follow-Up Care:  See after visit summary for information related to follow-up care and any pertinent home health orders.      Mobility at time of Discharge:   Basic Mobility Inpatient Raw Score: 13  JH-HLM Goal: 4: Move to chair/commode  JH-HLM Achieved: 7: Walk 25 feet or more  HLM Goal achieved. Continue to encourage appropriate mobility.     Disposition:   Home    Planned Readmission: None    Discharge Medications:  See after visit summary for reconciled discharge medications provided to patient and/or family.      Administrative Statements   Discharge Statement:  I have spent a total time of 40 minutes in caring for this patient on the day of the visit/encounter. >30 minutes of time was spent on: Diagnostic results, Prognosis, Risks and  benefits of tx options, Instructions for management, Patient and family education, Importance of tx compliance, Risk factor reductions, Impressions, Counseling / Coordination of care, Documenting in the medical record, Reviewing / ordering tests, medicine, procedures  , and Communicating with other healthcare professionals .    **Please Note: This note may have been constructed using a voice recognition system**

## 2025-03-25 NOTE — PLAN OF CARE
Problem: NEUROSENSORY - ADULT  Goal: Achieves stable or improved neurological status  Description: INTERVENTIONS- Monitor and report changes in neurological status- Monitor vital signs such as temperature, blood pressure, glucose, and any other labs ordered - Initiate measures to prevent increased intracranial pressure- Monitor for seizure activity and implement precautions if appropriate    Outcome: Progressing

## 2025-03-26 ENCOUNTER — TRANSITIONAL CARE MANAGEMENT (OUTPATIENT)
Dept: FAMILY MEDICINE CLINIC | Facility: HOSPITAL | Age: 60
End: 2025-03-26

## 2025-03-28 ENCOUNTER — APPOINTMENT (OUTPATIENT)
Dept: LAB | Facility: HOSPITAL | Age: 60
End: 2025-03-28
Payer: COMMERCIAL

## 2025-03-28 DIAGNOSIS — C34.00 SMALL CELL CARCINOMA OF HILUM OF LUNG, UNSPECIFIED LATERALITY (HCC): ICD-10-CM

## 2025-03-28 LAB
ALBUMIN SERPL BCG-MCNC: 4.3 G/DL (ref 3.5–5)
ALP SERPL-CCNC: 52 U/L (ref 34–104)
ALT SERPL W P-5'-P-CCNC: 17 U/L (ref 7–52)
ANION GAP SERPL CALCULATED.3IONS-SCNC: 8 MMOL/L (ref 4–13)
AST SERPL W P-5'-P-CCNC: 15 U/L (ref 13–39)
BASOPHILS # BLD AUTO: 0.05 THOUSANDS/ÂΜL (ref 0–0.1)
BASOPHILS NFR BLD AUTO: 1 % (ref 0–1)
BILIRUB SERPL-MCNC: 0.77 MG/DL (ref 0.2–1)
BUN SERPL-MCNC: 24 MG/DL (ref 5–25)
CALCIUM SERPL-MCNC: 9.3 MG/DL (ref 8.4–10.2)
CHLORIDE SERPL-SCNC: 101 MMOL/L (ref 96–108)
CO2 SERPL-SCNC: 27 MMOL/L (ref 21–32)
CREAT SERPL-MCNC: 0.92 MG/DL (ref 0.6–1.3)
EOSINOPHIL # BLD AUTO: 0.25 THOUSAND/ÂΜL (ref 0–0.61)
EOSINOPHIL NFR BLD AUTO: 3 % (ref 0–6)
ERYTHROCYTE [DISTWIDTH] IN BLOOD BY AUTOMATED COUNT: 13.1 % (ref 11.6–15.1)
GFR SERPL CREATININE-BSD FRML MDRD: 90 ML/MIN/1.73SQ M
GLUCOSE P FAST SERPL-MCNC: 106 MG/DL (ref 65–99)
HCT VFR BLD AUTO: 39.6 % (ref 36.5–49.3)
HGB BLD-MCNC: 13.4 G/DL (ref 12–17)
IMM GRANULOCYTES # BLD AUTO: 0.04 THOUSAND/UL (ref 0–0.2)
IMM GRANULOCYTES NFR BLD AUTO: 1 % (ref 0–2)
LYMPHOCYTES # BLD AUTO: 1.17 THOUSANDS/ÂΜL (ref 0.6–4.47)
LYMPHOCYTES NFR BLD AUTO: 13 % (ref 14–44)
MCH RBC QN AUTO: 31.7 PG (ref 26.8–34.3)
MCHC RBC AUTO-ENTMCNC: 33.8 G/DL (ref 31.4–37.4)
MCV RBC AUTO: 94 FL (ref 82–98)
MONOCYTES # BLD AUTO: 0.83 THOUSAND/ÂΜL (ref 0.17–1.22)
MONOCYTES NFR BLD AUTO: 10 % (ref 4–12)
NEUTROPHILS # BLD AUTO: 6.36 THOUSANDS/ÂΜL (ref 1.85–7.62)
NEUTS SEG NFR BLD AUTO: 72 % (ref 43–75)
NRBC BLD AUTO-RTO: 0 /100 WBCS
PLATELET # BLD AUTO: 314 THOUSANDS/UL (ref 149–390)
PMV BLD AUTO: 9.7 FL (ref 8.9–12.7)
POTASSIUM SERPL-SCNC: 4 MMOL/L (ref 3.5–5.3)
PROT SERPL-MCNC: 7.9 G/DL (ref 6.4–8.4)
RBC # BLD AUTO: 4.23 MILLION/UL (ref 3.88–5.62)
SODIUM SERPL-SCNC: 136 MMOL/L (ref 135–147)
WBC # BLD AUTO: 8.7 THOUSAND/UL (ref 4.31–10.16)

## 2025-03-28 PROCEDURE — 36415 COLL VENOUS BLD VENIPUNCTURE: CPT

## 2025-03-28 PROCEDURE — 80053 COMPREHEN METABOLIC PANEL: CPT

## 2025-03-28 PROCEDURE — 85025 COMPLETE CBC W/AUTO DIFF WBC: CPT

## 2025-03-31 ENCOUNTER — PATIENT OUTREACH (OUTPATIENT)
Dept: CASE MANAGEMENT | Facility: HOSPITAL | Age: 60
End: 2025-03-31

## 2025-03-31 ENCOUNTER — HOSPITAL ENCOUNTER (OUTPATIENT)
Dept: INFUSION CENTER | Facility: HOSPITAL | Age: 60
Discharge: HOME | End: 2025-03-31
Attending: INTERNAL MEDICINE
Payer: COMMERCIAL

## 2025-03-31 VITALS
SYSTOLIC BLOOD PRESSURE: 103 MMHG | DIASTOLIC BLOOD PRESSURE: 73 MMHG | OXYGEN SATURATION: 98 % | HEART RATE: 58 BPM | RESPIRATION RATE: 18 BRPM | TEMPERATURE: 97.6 F

## 2025-03-31 DIAGNOSIS — C34.00 SMALL CELL CARCINOMA OF HILUM OF LUNG, UNSPECIFIED LATERALITY (HCC): Primary | ICD-10-CM

## 2025-03-31 RX ORDER — SODIUM CHLORIDE 9 MG/ML
20 INJECTION, SOLUTION INTRAVENOUS ONCE
Status: COMPLETED | OUTPATIENT
Start: 2025-03-31 | End: 2025-03-31

## 2025-03-31 RX ADMIN — TARLATAMAB-DLLE 10 MG: KIT at 08:00

## 2025-03-31 RX ADMIN — SODIUM CHLORIDE 20 ML/HR: 9 INJECTION, SOLUTION INTRAVENOUS at 07:56

## 2025-03-31 RX ADMIN — SODIUM CHLORIDE 1000 ML: 0.9 INJECTION, SOLUTION INTRAVENOUS at 09:11

## 2025-03-31 NOTE — ASSESSMENT & PLAN NOTE
Nino Silva is a 59 y.o. 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    Treatment/Radiation:     Patient received urgent whole brain irradiation to a dose of 3000 cGy in 10 fractions between March 6 and March 19, 2024.       Oncology     First line:  Carboplatin + Etoposide + Durvalumab  C1-C4: 4/8/2024 through 6/19/2024    Maintenance Durvalumab every 4 weeks after scans/depending on outcome      C1 3/20/2024  C2 4/29/2024  C3 5/20/2024  C4 6/10/2024       Durvalumab only maintenance     C1 7/10/2024  C2 8/10/2024  C3 9/10/2024  C4 10/7/2024     PD on first line      Second Line Lurbinectedin      C1 11/19/2024  C2 12/10/2024   C3 12/31/2024  C4 1/21/2025      PD on second line     Third Line     Tarlatamab; load for C1; then Days 1/15 every 28 days until PD     C1D1 3/17/2025 (inpatient)  C1D8 3/24/2025 (inpatient)  C1D15 4/1/2025 (outpatient)    C2D1 4/14/2025  C2D15 4/28/2025 2/12/2025     Now s/p 4 cycles lurbinectedin  with clear PD     PET 2/7/2025       IMPRESSION:  1.  The right upper lobe and right perihilar masses have both mildly increased in size.  2.  Multiple FDG avid mediastinal lymph nodes persist. New FDG avid right inferior hilar activity. Suspected developing right sided subcarinal adenopathy  3.  Interval improvement of previously seen FDG avid left para-aortic lymph node  4.  Interval development of several FDG avid osseous metastases  5.  FDG avid left cerebellar hemisphere lesion. Please refer to prior MRI        MRI brain 2/5/2025     New left supratentorial reference lesions:     7 mm left centrum semiovale white matter lesion on series 10 image 217 with surrounding edema.     3 mm  juxtacortical anterior left frontal lesion seen on series 10 image 199.     4 mm lateral left parietal cortical lesion seen on series 10 image 174     4 mm left frontal opercular lesion seen on series 10 image 174     7 mm x 6 mm ring-enhancing lateral left temporal lesion with surrounding vasogenic edema on series 10 image 125.     5 mm left parafalcine occipital lesion seen on series 10 image 150.     New right supratentorial reference lesions:     3 mm juxtacortical anterior right frontal lesion seen on series 10 image 226     8 mm parafalcine right parietal lesion seen on series 10 image 2021 with surrounding edema.     3 mm periventricular right parietal white matter lesion seen on series 10 image 170     3 mm right parietal periventricular white matter lesion seen on series 10 image 185     4 mm right parafalcine occipital lesion seen on series 10 image 150.     Multiple additional small lesions are noted involving the right basal ganglia, right thalamus, involving the body of the corpus callosum centered to the right of midline, and involving the juxtacortical right frontal lobe.     New infratentorial reference lesions:     Dominant 1.2 cm x 1.1 cm predominately solidly enhancing lesion lateral left cerebellar hemisphere on series 10 image 76.     9 mm x 7 mm enhancing lesion left parietal lobe posterior to the vermis seen on series 10 image 75.     6 mm right cerebellar enhancing lesion posteriorly seen on series 10 image 94.     4 mm right lateral cerebellar lesion seen on series 10 image 94.     7 mm right ventrolateral pontine enhancing lesion on series 10 image 112 with surrounding edema.     7 mm left posterior lateral pontine enhancing lesion seen on series 10 image 112.     Additional smaller enhancing lesions noted involving the central and posterior mitchel, as well as the left cerebellar hemisphere.     Previous lesions:     Previous partially cystic, treated metastatic lesions with peripheral  enhancement appear grossly unchanged involving the bilateral cerebral hemispheres, and the posterior right cerebellar hemisphere.        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      Is treated with decadron 10 mg IV q 6 or solumedrol 1 mg/kg q 12 depending on grade     Premed with decadron     Other symptoms besides CRS or ICANS may be hypersensitivity reactions, fevers, myalgias, LFT elevations     Patient would prefer to start after 2 week vacation with family in early March; thus plan after 15th March     No symptoms except occasional balance issues, headaches     As has significant brain lesions supratentorial as well as infratentorial, concern with potential leptomeningeal disease; if headaches worsen, persist, may consider LP/paraneoplastic issues     May consider gamma knife if any lesions become symptomatic neurologically     Has some vasogenic edema; will try trial of low dose steroids to see if headaches/balance improve at 5 mg daily prednisone     Has new FDG uptake T11 and 5 and is having mid back pain-no neurological issues but will get MRI T/L spine     If 3rd line therapy with tarlatamab is ineffective or insurance does not pay, then will attempt Topotecan        MRI T/L spine   3/15/2025    Osseous metastasis in right T4 transverse process and L1 vertebral body. No pathologic compression fracture, as clinically questioned.     Partially imaged known right upper lobe malignancy, mediastinal and hilar beata metastasis.     Mild degenerative changes of thoracic spine, as detailed above. No significant canal stenosis or foraminal narrowing.     Osseous metastasis in L1 and  L2 vertebral bodies. No pathologic compression fracture, as clinically questioned.     Multilevel degenerative changes of lumbar spine with varying degrees of canal stenosis (mild L2-L3 and L4-L5) and foraminal narrowing (mild bilateral L4-L5), as detailed above.     Please see same day MRI thoracic spine with and without contrast for further evaluation.     No pain at these sites/no evidence cord compression     Issue is his lack balance/instability walking     Will repeat MRI brain and C spine     Last MRI brain 2/5/2025 with multiple, new small lesions     However did not have symptoms is s/p WBRT and Rad Onc appropriately wanted to wait to treat discrete lesions if symptomatic     Concern with lack of balance which predates tarlatamab as occurred on cruises he was on     Consider again zometa for bone mets      3/21/2025     No issues with C1D1 tarlatamab     However as above issues with balance-T/L spine MRI with no evidence of cord compression.  Concern with possible leptomeningeal.. Neuro exam though with 5/5 strength.  UE not affected.  Occurred on cruise so pre treatment so not from neurotoxicity due to tarlatamab.    Follow for now; consider RT if any changes     Add zometa for bone mets     Admission 3/17-3/19/2025-C1D1 Tarlatamab      Continued progression of disease on multiple chemotherapeutic agents, now admitted to start 3rd line therapy with tarlatamab  Recent PET 2/7 with increasing RUL and R perihilar masses, interval development of osseous metastases  Recently underwent MRI T/L spine for new FDG uptake T11 and T5 given mid back pain without neurologic deficits, read pending  Status post cycle.  Tolerated well  Discussed with hematology oncology and they want to monitor for 1 more day  Will be a readmission next week for C1D8 as per protocol [facility] at [facility]     C1D15 can be as OP       Summary/Recommendations    Continue tarlatamab (outpatient) with plans for C2D1 4/14/2025 and C2D2 4/28/2025,  tolerated C1 well with some weakness and dysphagia to thin liquids    MRI Brain and C-spine [facility] are scheduled for 4/19/2025.  Can consider SBRT if change in brain lesions.  Can consider gamma knife if brain lesions become symptomatic    Low dose prednisone for vasogenic edema/headaches; if any worsening neurological symptoms consider LP     Ordered barium swallow study and SLP referral for dysphagia to thin liquids    Ordered referral to outpatient PT    Follow up on morning of 4/22/2025  Orders:    Ambulatory Referral to Physical Therapy; Future
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
## 2025-03-31 NOTE — PLAN OF CARE
Problem: Potential for Falls  Goal: Patient will remain free of falls  Description: INTERVENTIONS:- Educate patient/family on patient safety including physical limitations- Instruct patient to call for assistance with activity - Consult OT/PT to assist with strengthening/mobility - Keep Call bell within reach- Keep bed low and locked with side rails adjusted as appropriate- Keep care items and personal belongings within reach- Initiate and maintain comfort rounds  Outcome: Progressing     Problem: Knowledge Deficit  Goal: Patient/family/caregiver demonstrates understanding of disease process, treatment plan, medications, and discharge instructions  Description: Complete learning assessment and assess knowledge base.Interventions:- Provide teaching at level of understanding- Provide teaching via preferred learning methods  Outcome: Progressing

## 2025-03-31 NOTE — PROGRESS NOTES
Nino Silva  tolerated treatment well with no complications. 7 hr obs completed.     Nino Silva is aware of future appt on 4/14 at 0700.     AVS printed and given to Nino Silva:  Yes

## 2025-03-31 NOTE — PROGRESS NOTES
"MONICAW met with the pt in the [facility] center this day.  Pt was pleasant and open to conversation.  He spoke of the treatment he received while in pt and how he is now on his third line of treatment.  Pt states that he did well on the first round and he learned that his tumors shrunk considerable.  He reports that he was of treatment for some time and when he was scanned again, his tumors grew significantly.  Pt shared that he had been off of treatment and struggles with where he would be today, had he stayed on treatment.  He states that he \"never fully gets answers\" when he tries to learn the reason.  Pt then states that he may not be asking the appropriate questions to the oncologist.  He does present as if he struggling with coming to terms with where he is physically, with his diagnosis.  He spoke of the second round of chemo and after he was scanned, it was determined that the treatment had not done anything.  He is hopeful to see \"improvement\" with this treatment.  The pt is able to recognize that he now needs additional help as he is not able to walk alone and he is no longer driving.  Pt states that he \"hopes this treatment will help me get stronger.\"  Pt spoke about his wife and daughter and how they came  from the [facility] 7 years prior.  The pt spoke of his wife's home country and the challenges she encountered when she came to the [place].  He was able to adopt her daughter and then he spoke, in great detail, about their [employer] demetra.  The pt acknowledged a significant age gap between himself and his wife and shared that despite all they have been through, she is well loved in his family, with their friends and in their Jewish community.  Pt did express concerns for his daughter and perhaps seeking counseling at some point.  LSW offered resources for [employer] and the Cancer Support Community, encouraging the pt and his wife to consider a support group.  LSW also provided information on private duty " caregivers as the pt is alone a great deal when his wife is working.  They were assessed for the waiver program and they are over income.  Pt is aware that this is an out of pocket expense.  Time was spent talking with the pt as he processed his thoughts and feelings.  Significant support provided.

## 2025-04-01 ENCOUNTER — OFFICE VISIT (OUTPATIENT)
Age: 60
End: 2025-04-01
Payer: COMMERCIAL

## 2025-04-01 VITALS
DIASTOLIC BLOOD PRESSURE: 68 MMHG | BODY MASS INDEX: 27.92 KG/M2 | TEMPERATURE: 95.3 F | OXYGEN SATURATION: 95 % | HEIGHT: 70 IN | WEIGHT: 195 LBS | SYSTOLIC BLOOD PRESSURE: 102 MMHG | RESPIRATION RATE: 18 BRPM | HEART RATE: 68 BPM

## 2025-04-01 DIAGNOSIS — C34.90 LUNG CANCER METASTATIC TO BRAIN (HCC): Primary | ICD-10-CM

## 2025-04-01 DIAGNOSIS — R13.10 DYSPHAGIA, UNSPECIFIED TYPE: ICD-10-CM

## 2025-04-01 DIAGNOSIS — C79.31 LUNG CANCER METASTATIC TO BRAIN (HCC): Primary | ICD-10-CM

## 2025-04-01 DIAGNOSIS — Z99.89 DEPENDENT ON WALKER FOR AMBULATION: ICD-10-CM

## 2025-04-01 PROCEDURE — 99215 OFFICE O/P EST HI 40 MIN: CPT | Performed by: INTERNAL MEDICINE

## 2025-04-02 ENCOUNTER — TELEPHONE (OUTPATIENT)
Age: 60
End: 2025-04-02

## 2025-04-02 ENCOUNTER — HOSPITAL ENCOUNTER (OUTPATIENT)
Dept: INFUSION CENTER | Facility: HOSPITAL | Age: 60
Discharge: HOME | End: 2025-04-02
Attending: INTERNAL MEDICINE
Payer: COMMERCIAL

## 2025-04-02 VITALS
OXYGEN SATURATION: 94 % | SYSTOLIC BLOOD PRESSURE: 113 MMHG | TEMPERATURE: 97.7 F | RESPIRATION RATE: 18 BRPM | DIASTOLIC BLOOD PRESSURE: 73 MMHG | HEART RATE: 65 BPM

## 2025-04-02 DIAGNOSIS — C34.11 SMALL CELL CARCINOMA OF UPPER LOBE OF RIGHT LUNG (HCC): Primary | ICD-10-CM

## 2025-04-02 DIAGNOSIS — E86.0 DEHYDRATION: Primary | ICD-10-CM

## 2025-04-02 DIAGNOSIS — C34.11 SMALL CELL CARCINOMA OF UPPER LOBE OF RIGHT LUNG (HCC): ICD-10-CM

## 2025-04-02 DIAGNOSIS — C79.31 LUNG CANCER METASTATIC TO BRAIN (HCC): Primary | ICD-10-CM

## 2025-04-02 DIAGNOSIS — C34.90 LUNG CANCER METASTATIC TO BRAIN (HCC): Primary | ICD-10-CM

## 2025-04-02 DIAGNOSIS — E86.0 DEHYDRATION: ICD-10-CM

## 2025-04-02 PROCEDURE — 96361 HYDRATE IV INFUSION ADD-ON: CPT

## 2025-04-02 PROCEDURE — 96360 HYDRATION IV INFUSION INIT: CPT

## 2025-04-02 RX ADMIN — SODIUM CHLORIDE 2000 ML: 0.9 INJECTION, SOLUTION INTRAVENOUS at 12:14

## 2025-04-02 NOTE — TELEPHONE ENCOUNTER
Called and spoke to Nino. We received his My Chart message about being dehydrated and not urinating much. Dr. Morrell would like to give him 2L of fluid at infusion as soon as possible, check a urinalysis, and try and get his MRIs scheduled sooner. Dr. Morrell is concerned for cord compression. Nino states he is also have difficulty with bowel movements and has not had one since Sunday. He has been taking senokot since Monday. I will call infusion and let him know when they are able to schedule him for the fluids. Nino appreciated the call.     Called and spoke to  infusion. They are able to get him in at noon today. Called Nino and made him aware.

## 2025-04-02 NOTE — PROGRESS NOTES
Nino Silva  tolerated treatment well with no complications.      Nino Silva is aware of future appt on 4/14/2025 at 1100.     AVS printed and given to Nino Silva:    No (Declined by Nino Silva)

## 2025-04-04 ENCOUNTER — NURSE TRIAGE (OUTPATIENT)
Dept: OTHER | Facility: OTHER | Age: 60
End: 2025-04-04

## 2025-04-04 ENCOUNTER — APPOINTMENT (INPATIENT)
Dept: MRI IMAGING | Facility: HOSPITAL | Age: 60
DRG: 694 | End: 2025-04-04
Payer: COMMERCIAL

## 2025-04-04 ENCOUNTER — APPOINTMENT (EMERGENCY)
Dept: CT IMAGING | Facility: HOSPITAL | Age: 60
DRG: 694 | End: 2025-04-04
Payer: COMMERCIAL

## 2025-04-04 ENCOUNTER — HOSPITAL ENCOUNTER (INPATIENT)
Facility: HOSPITAL | Age: 60
LOS: 5 days | Discharge: HOME HEALTH | DRG: 694 | End: 2025-04-09
Attending: EMERGENCY MEDICINE | Admitting: INTERNAL MEDICINE
Payer: COMMERCIAL

## 2025-04-04 ENCOUNTER — TELEPHONE (OUTPATIENT)
Age: 60
End: 2025-04-04

## 2025-04-04 ENCOUNTER — TELEPHONE (OUTPATIENT)
Dept: HEMATOLOGY ONCOLOGY | Facility: CLINIC | Age: 60
End: 2025-04-04

## 2025-04-04 DIAGNOSIS — C79.31 METASTASIS TO BRAIN (HCC): Primary | ICD-10-CM

## 2025-04-04 DIAGNOSIS — G93.89 BRAIN MASS: ICD-10-CM

## 2025-04-04 DIAGNOSIS — R26.2 AMBULATORY DYSFUNCTION: ICD-10-CM

## 2025-04-04 DIAGNOSIS — R51.9 ACUTE HEADACHE: ICD-10-CM

## 2025-04-04 DIAGNOSIS — C79.31 LUNG CANCER METASTATIC TO BRAIN (HCC): ICD-10-CM

## 2025-04-04 DIAGNOSIS — C34.11 SMALL CELL CARCINOMA OF UPPER LOBE OF RIGHT LUNG (HCC): ICD-10-CM

## 2025-04-04 DIAGNOSIS — C34.90 LUNG CANCER METASTATIC TO BRAIN (HCC): ICD-10-CM

## 2025-04-04 LAB
2HR DELTA HS TROPONIN: 0 NG/L
ALBUMIN SERPL BCG-MCNC: 4.1 G/DL (ref 3.5–5)
ALP SERPL-CCNC: 43 U/L (ref 34–104)
ALT SERPL W P-5'-P-CCNC: 19 U/L (ref 7–52)
ANION GAP SERPL CALCULATED.3IONS-SCNC: 9 MMOL/L (ref 4–13)
APTT PPP: 39 SECONDS (ref 23–34)
AST SERPL W P-5'-P-CCNC: 16 U/L (ref 13–39)
ATRIAL RATE: 62 BPM
BASOPHILS # BLD AUTO: 0.05 THOUSANDS/ÂΜL (ref 0–0.1)
BASOPHILS NFR BLD AUTO: 1 % (ref 0–1)
BILIRUB SERPL-MCNC: 0.57 MG/DL (ref 0.2–1)
BUN SERPL-MCNC: 17 MG/DL (ref 5–25)
CALCIUM SERPL-MCNC: 9.2 MG/DL (ref 8.4–10.2)
CARDIAC TROPONIN I PNL SERPL HS: 9 NG/L (ref ?–50)
CARDIAC TROPONIN I PNL SERPL HS: 9 NG/L (ref ?–50)
CHLORIDE SERPL-SCNC: 103 MMOL/L (ref 96–108)
CO2 SERPL-SCNC: 24 MMOL/L (ref 21–32)
CREAT SERPL-MCNC: 0.76 MG/DL (ref 0.6–1.3)
EOSINOPHIL # BLD AUTO: 0.13 THOUSAND/ÂΜL (ref 0–0.61)
EOSINOPHIL NFR BLD AUTO: 2 % (ref 0–6)
ERYTHROCYTE [DISTWIDTH] IN BLOOD BY AUTOMATED COUNT: 12.8 % (ref 11.6–15.1)
GFR SERPL CREATININE-BSD FRML MDRD: 99 ML/MIN/1.73SQ M
GLUCOSE SERPL-MCNC: 110 MG/DL (ref 65–140)
GLUCOSE SERPL-MCNC: 145 MG/DL (ref 65–140)
GLUCOSE SERPL-MCNC: 178 MG/DL (ref 65–140)
HCT VFR BLD AUTO: 36.5 % (ref 36.5–49.3)
HGB BLD-MCNC: 12.5 G/DL (ref 12–17)
IMM GRANULOCYTES # BLD AUTO: 0.03 THOUSAND/UL (ref 0–0.2)
IMM GRANULOCYTES NFR BLD AUTO: 0 % (ref 0–2)
INR PPP: 1.06 (ref 0.85–1.19)
LYMPHOCYTES # BLD AUTO: 0.87 THOUSANDS/ÂΜL (ref 0.6–4.47)
LYMPHOCYTES NFR BLD AUTO: 10 % (ref 14–44)
MAGNESIUM SERPL-MCNC: 2 MG/DL (ref 1.9–2.7)
MCH RBC QN AUTO: 31.6 PG (ref 26.8–34.3)
MCHC RBC AUTO-ENTMCNC: 34.2 G/DL (ref 31.4–37.4)
MCV RBC AUTO: 92 FL (ref 82–98)
MONOCYTES # BLD AUTO: 0.7 THOUSAND/ÂΜL (ref 0.17–1.22)
MONOCYTES NFR BLD AUTO: 8 % (ref 4–12)
NEUTROPHILS # BLD AUTO: 6.81 THOUSANDS/ÂΜL (ref 1.85–7.62)
NEUTS SEG NFR BLD AUTO: 79 % (ref 43–75)
NRBC BLD AUTO-RTO: 0 /100 WBCS
P AXIS: 45 DEGREES
PLATELET # BLD AUTO: 276 THOUSANDS/UL (ref 149–390)
PMV BLD AUTO: 9.8 FL (ref 8.9–12.7)
POTASSIUM SERPL-SCNC: 3.8 MMOL/L (ref 3.5–5.3)
PR INTERVAL: 154 MS
PROT SERPL-MCNC: 7.5 G/DL (ref 6.4–8.4)
PROTHROMBIN TIME: 14.3 SECONDS (ref 12.3–15)
QRS AXIS: 24 DEGREES
QRSD INTERVAL: 88 MS
QT INTERVAL: 420 MS
QTC INTERVAL: 427 MS
RBC # BLD AUTO: 3.95 MILLION/UL (ref 3.88–5.62)
SODIUM SERPL-SCNC: 136 MMOL/L (ref 135–147)
T WAVE AXIS: -33 DEGREES
T4 FREE SERPL-MCNC: 0.96 NG/DL (ref 0.61–1.12)
TSH SERPL DL<=0.05 MIU/L-ACNC: 0.14 UIU/ML (ref 0.45–4.5)
TSH SERPL DL<=0.05 MIU/L-ACNC: 0.22 UIU/ML (ref 0.45–4.5)
VENTRICULAR RATE: 62 BPM
WBC # BLD AUTO: 8.59 THOUSAND/UL (ref 4.31–10.16)

## 2025-04-04 PROCEDURE — A9585 GADOBUTROL INJECTION: HCPCS | Performed by: INTERNAL MEDICINE

## 2025-04-04 PROCEDURE — 85730 THROMBOPLASTIN TIME PARTIAL: CPT

## 2025-04-04 PROCEDURE — 93005 ELECTROCARDIOGRAM TRACING: CPT

## 2025-04-04 PROCEDURE — 84439 ASSAY OF FREE THYROXINE: CPT

## 2025-04-04 PROCEDURE — 99284 EMERGENCY DEPT VISIT MOD MDM: CPT

## 2025-04-04 PROCEDURE — 36415 COLL VENOUS BLD VENIPUNCTURE: CPT

## 2025-04-04 PROCEDURE — 84443 ASSAY THYROID STIM HORMONE: CPT | Performed by: INTERNAL MEDICINE

## 2025-04-04 PROCEDURE — 96375 TX/PRO/DX INJ NEW DRUG ADDON: CPT

## 2025-04-04 PROCEDURE — 70496 CT ANGIOGRAPHY HEAD: CPT

## 2025-04-04 PROCEDURE — 82948 REAGENT STRIP/BLOOD GLUCOSE: CPT

## 2025-04-04 PROCEDURE — 85025 COMPLETE CBC W/AUTO DIFF WBC: CPT

## 2025-04-04 PROCEDURE — 92610 EVALUATE SWALLOWING FUNCTION: CPT

## 2025-04-04 PROCEDURE — 96374 THER/PROPH/DIAG INJ IV PUSH: CPT

## 2025-04-04 PROCEDURE — 84443 ASSAY THYROID STIM HORMONE: CPT

## 2025-04-04 PROCEDURE — 96361 HYDRATE IV INFUSION ADD-ON: CPT

## 2025-04-04 PROCEDURE — 80053 COMPREHEN METABOLIC PANEL: CPT

## 2025-04-04 PROCEDURE — 99254 IP/OBS CNSLTJ NEW/EST MOD 60: CPT | Performed by: RADIOLOGY

## 2025-04-04 PROCEDURE — 84484 ASSAY OF TROPONIN QUANT: CPT

## 2025-04-04 PROCEDURE — 93010 ELECTROCARDIOGRAM REPORT: CPT | Performed by: INTERNAL MEDICINE

## 2025-04-04 PROCEDURE — 83735 ASSAY OF MAGNESIUM: CPT

## 2025-04-04 PROCEDURE — 85610 PROTHROMBIN TIME: CPT

## 2025-04-04 PROCEDURE — 70553 MRI BRAIN STEM W/O & W/DYE: CPT

## 2025-04-04 PROCEDURE — 70498 CT ANGIOGRAPHY NECK: CPT

## 2025-04-04 PROCEDURE — 99222 1ST HOSP IP/OBS MODERATE 55: CPT | Performed by: INTERNAL MEDICINE

## 2025-04-04 PROCEDURE — 99255 IP/OBS CONSLTJ NEW/EST HI 80: CPT | Performed by: NURSE PRACTITIONER

## 2025-04-04 RX ORDER — GADOBUTROL 604.72 MG/ML
8 INJECTION INTRAVENOUS
Status: COMPLETED | OUTPATIENT
Start: 2025-04-04 | End: 2025-04-04

## 2025-04-04 RX ORDER — ACETAMINOPHEN 325 MG/1
650 TABLET ORAL EVERY 6 HOURS PRN
Status: DISCONTINUED | OUTPATIENT
Start: 2025-04-04 | End: 2025-04-09 | Stop reason: HOSPADM

## 2025-04-04 RX ORDER — DOCUSATE SODIUM 100 MG/1
100 CAPSULE, LIQUID FILLED ORAL 2 TIMES DAILY
Status: DISCONTINUED | OUTPATIENT
Start: 2025-04-04 | End: 2025-04-09 | Stop reason: HOSPADM

## 2025-04-04 RX ORDER — POLYETHYLENE GLYCOL 3350 17 G/17G
17 POWDER, FOR SOLUTION ORAL DAILY
Status: DISCONTINUED | OUTPATIENT
Start: 2025-04-04 | End: 2025-04-09 | Stop reason: HOSPADM

## 2025-04-04 RX ORDER — ACETAMINOPHEN 10 MG/ML
1000 INJECTION, SOLUTION INTRAVENOUS ONCE
Status: COMPLETED | OUTPATIENT
Start: 2025-04-04 | End: 2025-04-04

## 2025-04-04 RX ORDER — PANTOPRAZOLE SODIUM 40 MG/1
40 TABLET, DELAYED RELEASE ORAL
Status: DISCONTINUED | OUTPATIENT
Start: 2025-04-04 | End: 2025-04-09 | Stop reason: HOSPADM

## 2025-04-04 RX ORDER — ONDANSETRON 2 MG/ML
4 INJECTION INTRAMUSCULAR; INTRAVENOUS EVERY 6 HOURS PRN
Status: DISCONTINUED | OUTPATIENT
Start: 2025-04-04 | End: 2025-04-09 | Stop reason: HOSPADM

## 2025-04-04 RX ORDER — DEXAMETHASONE SODIUM PHOSPHATE 10 MG/ML
10 INJECTION, SOLUTION INTRAMUSCULAR; INTRAVENOUS ONCE
Status: COMPLETED | OUTPATIENT
Start: 2025-04-04 | End: 2025-04-04

## 2025-04-04 RX ORDER — INSULIN LISPRO 100 [IU]/ML
1-5 INJECTION, SOLUTION INTRAVENOUS; SUBCUTANEOUS
Status: DISCONTINUED | OUTPATIENT
Start: 2025-04-04 | End: 2025-04-09 | Stop reason: HOSPADM

## 2025-04-04 RX ORDER — DEXAMETHASONE SODIUM PHOSPHATE 4 MG/ML
4 INJECTION, SOLUTION INTRA-ARTICULAR; INTRALESIONAL; INTRAMUSCULAR; INTRAVENOUS; SOFT TISSUE EVERY 6 HOURS SCHEDULED
Status: DISCONTINUED | OUTPATIENT
Start: 2025-04-04 | End: 2025-04-09 | Stop reason: HOSPADM

## 2025-04-04 RX ORDER — SIMETHICONE 80 MG
80 TABLET,CHEWABLE ORAL 4 TIMES DAILY PRN
Status: DISCONTINUED | OUTPATIENT
Start: 2025-04-04 | End: 2025-04-09 | Stop reason: HOSPADM

## 2025-04-04 RX ADMIN — IOHEXOL 85 ML: 350 INJECTION, SOLUTION INTRAVENOUS at 10:17

## 2025-04-04 RX ADMIN — ACETAMINOPHEN 1000 MG: 10 INJECTION INTRAVENOUS at 09:58

## 2025-04-04 RX ADMIN — DOCUSATE SODIUM 100 MG: 100 CAPSULE, LIQUID FILLED ORAL at 18:14

## 2025-04-04 RX ADMIN — POLYETHYLENE GLYCOL 3350 17 G: 17 POWDER, FOR SOLUTION ORAL at 16:31

## 2025-04-04 RX ADMIN — DEXAMETHASONE SODIUM PHOSPHATE 4 MG: 4 INJECTION, SOLUTION INTRAMUSCULAR; INTRAVENOUS at 18:14

## 2025-04-04 RX ADMIN — SODIUM CHLORIDE 1000 ML: 0.9 INJECTION, SOLUTION INTRAVENOUS at 09:56

## 2025-04-04 RX ADMIN — PANTOPRAZOLE SODIUM 40 MG: 40 TABLET, DELAYED RELEASE ORAL at 16:31

## 2025-04-04 RX ADMIN — GADOBUTROL 8 ML: 604.72 INJECTION INTRAVENOUS at 15:50

## 2025-04-04 RX ADMIN — DEXAMETHASONE SODIUM PHOSPHATE 10 MG: 10 INJECTION INTRAMUSCULAR; INTRAVENOUS at 09:57

## 2025-04-04 NOTE — ASSESSMENT & PLAN NOTE
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  Diffuse severe white matter changes in bilateral cerebral hemispheres, brainstem and cerebellum related to treatment-related radiation changes.  Negative CTA for large vessel occlusion, dissection, aneurysm or high-grade stenosis.    Continue with IV Decadron 4 mg Q6  Continue with pantoprazole and  Continue with fall/safety precautions  MRI brain  MRI cervical/thoracic/lumbar spine  Discussed with oncology, no need for neurosurgery involvement  No need for seizure precautions currently  Palliative involved, poor prognosis, however patient currently treatment focussed

## 2025-04-04 NOTE — TELEPHONE ENCOUNTER
Phone call placed to the patient in response to [employer] ([employer]) message reporting symptoms.  I spoke with Nino, he reports is at the emergency department presenting for evaluation at present.  I explained the reason for my call and that I will send message to his care team with update that he is being cared for at the ED.  He thanked me.

## 2025-04-04 NOTE — ED PROVIDER NOTES
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 Newton and the patient's admission status was agreed to be Admission Status: inpatient status to the service of Dr. Newton .               Assessment & Plan       Medical Decision Making  DDx including but not limited to: Intracranial lesion, cerebral edema, TIA, CVA, ICH    The patient presents with more persistent worsening headaches as well as concerning symptoms of blurred vision, paresthesias to 2nd and 3rd digits of right hand, dizziness, feeling off balance when walking around, as well as transient dysarthria and expressive aphasia per his wife.  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  He is neurologic exam is currently nonfocal, however given concerning history and HPI, will obtain CTA head and neck to further evaluate for stenosis, CVA, and worsening lesions versus cerebral edema.  Will obtain EKG to further evaluate for cardiac arrhythmia given dizziness.  Will obtain labs to further evaluate for electrolyte derangement, MAGDALENE, organ function, and anemia.  Will medicate with IV fluids, Tylenol, Decadron.    See ED course for further MDM and disposition discussion.      Problems  Addressed:  Acute headache: acute illness or injury  Metastasis to brain (HCC): acute illness or injury    Amount and/or Complexity of Data Reviewed  Independent Historian: spouse  Labs: ordered.  Radiology: ordered. Decision-making details documented in ED Course.  ECG/medicine tests: ordered and independent interpretation performed.    Risk  OTC drugs.  Prescription drug management.  Decision regarding hospitalization.        ED Course as of 04/04/25 1245   Fri Apr 04, 2025   1044 Blood Pressure: 109/67  Negative orthostatics   1123 CTA head and neck with and without contrast  IMPRESSION:     CT Brain:  - Scattered hyperdense hemorrhagic metastatic lesions in left frontal lobe and brainstem have increased in size since MRI brain 2/5/2024 - the number of metastatic lesions is underestimated by CT when compared to MRI brain. Recommend follow-up MRI brain   with and without contrast for further evaluation.  - Diffuse severe white matter changes in bilateral cerebral hemispheres, brainstem, and cerebellum likely related to treatment-related radiation changes with scattered areas of vasogenic edema.     CT Angiography:  - Negative CTA head and neck for large vessel occlusion, dissection, aneurysm, or high-grade stenosis.     Known large right upper lobe lung mass, compatible with primary lung malignancy. Probable metastatic mediastinal and right hilar lymphadenopathy. Recommend follow-up CT chest with contrast for further evaluation.     Additional chronic/incidental findings as detailed above.     1140 I spoke with neurocritical care at [facility] [facility], no need for surgical intervention at this time, they recommend to milligrams Decadron followed by 4 mg Decadron every 6 hours.  They recommend admission to the hospital for repeat MRI    1225 Case discussed with heme-onc, radiation oncology, and critical care regarding patient disposition.  Radiation oncology Dr. Funes reports he will come see the patient.  Heme-onc  "recommends LP.  Critical care will come see the patient   1235 Critical care came to see the patient, they report he is stable for telemetry with every 4 hours neurochecks   1237 MRI inpatient order  MRI reports availability at 1515, will need to do screening for MRI I   1240 [facility] notified of plan for admission and MRI brain inpatient, possible LP by IR, with scheduled Decadron dosing   1244 Patient is wife updated on results and plan for admission.  They are in agreement plan of no further questions at this time.       Medications   sodium chloride 0.9 % bolus 1,000 mL (0 mL Intravenous Stopped 4/4/25 1056)   acetaminophen (Ofirmev) injection 1,000 mg (0 mg Intravenous Stopped 4/4/25 1013)   dexamethasone (PF) (DECADRON) injection 10 mg (10 mg Intravenous Given 4/4/25 0957)   iohexol (OMNIPAQUE) 350 MG/ML injection (MULTI-DOSE) 100 mL (85 mL Intravenous Given 4/4/25 1017)       ED Risk Strat Scores   HEART Risk Score      Flowsheet Row Most Recent Value   Heart Score Risk Calculator    History 0 Filed at: 04/04/2025 1235   ECG 1 Filed at: 04/04/2025 1235   Age 1 Filed at: 04/04/2025 1235   Risk Factors 2 Filed at: 04/04/2025 1235   Troponin 0 Filed at: 04/04/2025 1235   HEART Score 4 Filed at: 04/04/2025 1235          HEART Risk Score      Flowsheet Row Most Recent Value   Heart Score Risk Calculator    History 0 Filed at: 04/04/2025 1235   ECG 1 Filed at: 04/04/2025 1235   Age 1 Filed at: 04/04/2025 1235   Risk Factors 2 Filed at: 04/04/2025 1235   Troponin 0 Filed at: 04/04/2025 1235   HEART Score 4 Filed at: 04/04/2025 1235                                                  History of Present Illness       Chief Complaint   Patient presents with    Headache     Pt arrives with wife with complains of difficulty with swallowing and \"I came on Wednesday for hydration but they aren't open in the weekend\". I'm still having problems swallowing since three weeks ago. Pt reports having a headache since Wednesday after " infusion.        History reviewed. No pertinent past medical history.   Past Surgical History:   Procedure Laterality Date    HERNIA REPAIR      IR BIOPSY LUNG  03/05/2024    ORCHIECTOMY Left       Family History   Problem Relation Age of Onset    Breast cancer additional onset Mother     No Known Problems Father     Stroke Brother       Social History     Tobacco Use    Smoking status: Never    Smokeless tobacco: Never   Vaping Use    Vaping status: Never Used   Substance Use Topics    Alcohol use: Not Currently    Drug use: Not Currently      E-Cigarette/Vaping    E-Cigarette Use Never User       E-Cigarette/Vaping Substances      I have reviewed and agree with the history as documented.     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  The patient reports over the last 6 months having intermittent headaches.  He reports over the last month they have been more persistent and more severe.  He describes them as the right side of the head, pounding, worsened with sitting or walking/exercise, and improved with rest and laying down. He endorses associated blurred vision, tinnitus of both ears, transient dysarthria and expressive aphasia per his wife, feeling off balance when up and moving around, paresthesias to the 2nd and 3rd digits of the right hand, and dysphagia over the past 2-3 weeks.  He reports he is able to take very small sips of water but when drinking more fluids or trying to eat he feels like he is getting food and fluid stuck in his upper throat and he is having difficulty initiating swallowing.  He does sometimes spit out food and fluids but denies vomiting.  He denies chest pain, shortness of breath, abdominal pain, N/V/D, flank pain, and urinary complaints.  He does report chronic low  back pain.  He reports tripping and falling 3 weeks ago with head strike but was not seen in did not have any imaging done.      History provided by:  Patient and significant other   used: No        Review of Systems   Constitutional:  Negative for chills, fatigue and fever.   HENT:  Negative for congestion, sore throat and trouble swallowing.         Difficulty swallowing x 3 weeks   Eyes:  Positive for visual disturbance (blurred vision). Negative for photophobia and pain.   Respiratory:  Negative for cough and shortness of breath.    Cardiovascular:  Negative for chest pain, palpitations and leg swelling.   Gastrointestinal:  Negative for abdominal pain, diarrhea, nausea and vomiting.   Genitourinary: Negative.    Musculoskeletal:  Positive for back pain (Chronic low back pain) and neck pain. Negative for arthralgias, gait problem, joint swelling and neck stiffness.   Skin:  Negative for rash and wound.   Neurological:  Positive for dizziness, speech difficulty, weakness (Generalized), light-headedness and headaches. Negative for tremors, seizures, syncope, facial asymmetry and numbness.   All other systems reviewed and are negative.          Objective       ED Triage Vitals [04/04/25 0903]   Temperature Pulse Blood Pressure Respirations SpO2 Patient Position - Orthostatic VS   98.1 °F (36.7 °C) 63 98/72 20 96 % Sitting      Temp Source Heart Rate Source BP Location FiO2 (%) Pain Score    Temporal Monitor Left arm -- 5      Vitals      Date and Time Temp Pulse SpO2 Resp BP Pain Score FACES Pain Rating User   04/04/25 1148 -- 50 96 % 15 114/81 -- -- RN   04/04/25 1114 -- -- -- -- -- 4 -- BM   04/04/25 1033 -- 59 -- -- 109/67 -- -- RN   04/04/25 1030 -- 56 -- -- 108/74 -- -- RN   04/04/25 1029 -- 51 96 % 16 102/72 -- -- RN   04/04/25 0903 98.1 °F (36.7 °C) 63 96 % 20 98/72 5 --             Physical Exam  Vitals and nursing note reviewed.   Constitutional:       General: He is not in acute  distress.     Appearance: Normal appearance. He is well-developed. He is not ill-appearing, toxic-appearing or diaphoretic.   HENT:      Head: Normocephalic and atraumatic.      Jaw: There is normal jaw occlusion. No trismus, swelling or pain on movement.      Comments: No temporal tenderness     Nose: Nose normal.      Mouth/Throat:      Lips: Pink.      Mouth: Mucous membranes are dry.      Pharynx: Oropharynx is clear. Uvula midline.   Eyes:      General: Lids are normal. Vision grossly intact. Gaze aligned appropriately. No visual field deficit.     Extraocular Movements: Extraocular movements intact.      Right eye: No nystagmus.      Left eye: No nystagmus.      Conjunctiva/sclera: Conjunctivae normal.      Pupils: Pupils are equal, round, and reactive to light.   Neck:      Trachea: Trachea and phonation normal. No abnormal tracheal secretions.   Cardiovascular:      Rate and Rhythm: Normal rate and regular rhythm.      Pulses:           Radial pulses are 2+ on the right side and 2+ on the left side.        Dorsalis pedis pulses are 2+ on the right side and 2+ on the left side.      Heart sounds: Normal heart sounds, S1 normal and S2 normal.   Pulmonary:      Effort: Pulmonary effort is normal. No respiratory distress.      Breath sounds: Normal breath sounds and air entry.   Abdominal:      General: There is no distension.      Palpations: Abdomen is soft.      Tenderness: There is no abdominal tenderness. There is no guarding or rebound.   Musculoskeletal:         General: Normal range of motion.      Cervical back: Full passive range of motion without pain, normal range of motion and neck supple.      Right lower leg: No edema.      Left lower leg: No edema.      Comments: ARITA, 5/5 strength throughout, sensation intact, no focal joint swelling or tenderness    Skin:     General: Skin is warm and dry.      Capillary Refill: Capillary refill takes less than 2 seconds.      Findings: No rash or wound.    Neurological:      General: No focal deficit present.      Mental Status: He is alert and oriented to person, place, and time. Mental status is at baseline.      GCS: GCS eye subscore is 4. GCS verbal subscore is 5. GCS motor subscore is 6.      Cranial Nerves: Cranial nerves 2-12 are intact. No dysarthria or facial asymmetry.      Sensory: Sensation is intact. No sensory deficit.      Motor: Motor function is intact. No weakness, tremor, abnormal muscle tone or pronator drift.      Coordination: Coordination is intact. Finger-Nose-Finger Test and Heel to Shin Test normal.      Gait: Gait abnormal (slightly ataxic gait).         Results Reviewed       Procedure Component Value Units Date/Time    HS Troponin I 2hr [[id number]]  (Normal) Collected: 04/04/25 1150    Lab Status: Final result Specimen: Blood from Arm, Right Updated: 04/04/25 1222     hs TnI 2hr 9 ng/L      Delta 2hr hsTnI 0 ng/L     Protime-INR [[id number]]  (Normal) Collected: 04/04/25 0946    Lab Status: Final result Specimen: Blood from Arm, Right Updated: 04/04/25 1033     Protime 14.3 seconds      INR 1.06    Narrative:      INR Therapeutic Range    Indication                                             INR Range      Atrial Fibrillation                                               2.0-3.0  Hypercoagulable State                                    2.0.2.3  Left Ventricular Asist Device                            2.0-3.0  Mechanical Heart Valve                                  -    Aortic(with afib, MI, embolism, HF, LA enlargement,    and/or coagulopathy)                                     2.0-3.0 (2.5-3.5)     Mitral                                                             2.5-3.5  Prosthetic/Bioprosthetic Heart Valve               2.0-3.0  Venous thromboembolism (VTE: VT, PE        2.0-3.0    APTT [[id number]]  (Abnormal) Collected: 04/04/25 0946    Lab Status: Final result Specimen: Blood from Arm, Right Updated: 04/04/25 1033     PTT 39  seconds     TSH, 3rd generation with Free T4 reflex [[id number]]  (Abnormal) Collected: 04/04/25 0946    Lab Status: Final result Specimen: Blood from Arm, Right Updated: 04/04/25 1029     TSH 3RD GENERATON 0.219 uIU/mL     T4, free [[id number]] Collected: 04/04/25 0946    Lab Status: In process Specimen: Blood from Arm, Right Updated: 04/04/25 1028    HS Troponin 0hr (reflex protocol) [[id number]]  (Normal) Collected: 04/04/25 0946    Lab Status: Final result Specimen: Blood from Arm, Right Updated: 04/04/25 1019     hs TnI 0hr 9 ng/L     Comprehensive metabolic panel [[id number]] Collected: 04/04/25 0946    Lab Status: Final result Specimen: Blood from Arm, Right Updated: 04/04/25 1011     Sodium 136 mmol/L      Potassium 3.8 mmol/L      Chloride 103 mmol/L      CO2 24 mmol/L      ANION GAP 9 mmol/L      BUN 17 mg/dL      Creatinine 0.76 mg/dL      Glucose 110 mg/dL      Calcium 9.2 mg/dL      AST 16 U/L      ALT 19 U/L      Alkaline Phosphatase 43 U/L      Total Protein 7.5 g/dL      Albumin 4.1 g/dL      Total Bilirubin 0.57 mg/dL      eGFR 99 ml/min/1.73sq m     Narrative:      National Kid[employer] Foundation guidelines for Chronic Kidney Disease (CKD):     Stage 1 with normal or high GFR (GFR > 90 mL/min/1.73 square meters)    Stage 2 Mild CKD (GFR = 60-89 mL/min/1.73 square meters)    Stage 3A Moderate CKD (GFR = 45-59 mL/min/1.73 square meters)    Stage 3B Moderate CKD (GFR = 30-44 mL/min/1.73 square meters)    Stage 4 Severe CKD (GFR = 15-29 mL/min/1.73 square meters)    Stage 5 End Stage CKD (GFR <15 mL/min/1.73 square meters)  Note: GFR calculation is accurate only with a steady state creatinine    Magnesium [[id number]]  (Normal) Collected: 04/04/25 0946    Lab Status: Final result Specimen: Blood from Arm, Right Updated: 04/04/25 1011     Magnesium 2.0 mg/dL     CBC and differential [[id number]]  (Abnormal) Collected: 04/04/25 0946    Lab Status: Final result Specimen: Blood from Arm, Right Updated:  04/04/25 0957     WBC 8.59 Thousand/uL      RBC 3.95 Million/uL      Hemoglobin 12.5 g/dL      Hematocrit 36.5 %      MCV 92 fL      MCH 31.6 pg      MCHC 34.2 g/dL      RDW 12.8 %      MPV 9.8 fL      Platelets 276 Thousands/uL      nRBC 0 /100 WBCs      Segmented % 79 %      Immature Grans % 0 %      Lymphocytes % 10 %      Monocytes % 8 %      Eosinophils Relative 2 %      Basophils Relative 1 %      Absolute Neutrophils 6.81 Thousands/µL      Absolute Immature Grans 0.03 Thousand/uL      Absolute Lymphocytes 0.87 Thousands/µL      Absolute Monocytes 0.70 Thousand/µL      Eosinophils Absolute 0.13 Thousand/µL      Basophils Absolute 0.05 Thousands/µL             CTA head and neck with and without contrast   Final Interpretation by Gian Wade MD (04/04 1101)      CT Brain:   - Scattered hyperdense hemorrhagic metastatic lesions in left frontal lobe and brainstem have increased in size since MRI brain 2/5/2024 - the number of metastatic lesions is underestimated by CT when compared to MRI brain. Recommend follow-up MRI brain    with and without contrast for further evaluation.   - Diffuse severe white matter changes in bilateral cerebral hemispheres, brainstem, and cerebellum likely related to treatment-related radiation changes with scattered areas of vasogenic edema.      CT Angiography:   - Negative CTA head and neck for large vessel occlusion, dissection, aneurysm, or high-grade stenosis.      Known large right upper lobe lung mass, compatible with primary lung malignancy. Probable metastatic mediastinal and right hilar lymphadenopathy. Recommend follow-up CT chest with contrast for further evaluation.      Additional chronic/incidental findings as detailed above.      The study was marked in EPIC for immediate notification.                        Workstation performed: [id number]         [place] IN-patient lumbar puncture    (Results Pending)   MRI inpatient order    (Results Pending)       ECG 12  Lead Documentation Only    Date/Time: 4/4/2025 9:22 AM    Performed by: MARCOS Levy  Authorized by: MARCOS Levy    Indications / Diagnosis:  Headache  ECG reviewed by me, the ED Provider: yes    Patient location:  ED  Previous ECG:     Comparison to cardiac monitor: Yes    Interpretation:     Interpretation: non-specific    Rate:     ECG rate:  62    ECG rate assessment: normal    Rhythm:     Rhythm: sinus rhythm    Ectopy:     Ectopy: none    QRS:     QRS axis:  Normal    QRS intervals:  Normal  Conduction:     Conduction: abnormal      Abnormal conduction: non-specific intraventricular conduction delay    ST segments:     ST segments:  Non-specific  T waves:     T waves: non-specific and inverted      Inverted:  II, III, aVF, V6, V5 and V4  Comments:      Normal sinus rhythm, normal axis, normal intervals, nonspecific T and ST abnormalities      ED Medication and Procedure Management   Prior to Admission Medications   Prescriptions Last Dose Informant Patient Reported? Taking?   Cholecalciferol (Vitamin D) 50 MCG (2000 UT) tablet   Yes No   Sig: Take 2,000 Units by mouth daily   ascorbic acid (VITAMIN C) 250 mg tablet   Yes No   Sig: Take 500 mg by mouth daily   predniSONE 5 mg tablet   No No   Sig: Take 1 tablet (5 mg total) by mouth daily      Facility-Administered Medications: None     Patient's Medications   Discharge Prescriptions    No medications on file     No discharge procedures on file.  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

## 2025-04-04 NOTE — SPEECH THERAPY NOTE
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 No pertinent past medical history.    Past Surgical History  Past Surgical History:   Procedure Laterality Date    HERNIA REPAIR      IR BIOPSY LUNG  03/05/2024    ORCHIECTOMY Left        Summary   Pt presents w/ s/s suggestive of functional oral skills, suspected at least min pharyngeal dysphagia.     Complete labial seal for retrieval and containment of all materials, no anterior bolus loss present. Timely rotary mastication of regular textures w/ complete bolus breakdown and adequate bolus transfer. Min diffuse lingual residue, cleared w/ liquid wash. Suspected delayed swallow initiation and fair hyolaryngeal elevation to palpation. Immediate cough response x2 w/ thin liquids via straw sip, no overt s/s of aspiration w/ thin liquids via cup sip or other materials administered. Of note, cannot r/o sensory deficit at bedside.     Education initiated w/ pt regarding strategies to optimize swallow safety including frequent/thorough oral care and to notify medical staff if s/s of aspiration arise. Pt verbalized understanding and agreement, denies questions or concerns at this time.     Pt w/ increased risk of aspiration 2/2 immunocompromised, extensive stage small cell lung cancer with increased metastatic lesions in L frontal lobe and brainstem, overt s/s of aspiration, pt report of difficulty swallowing, and multiple medical co-morbidities. SLP to f/u for diet tolerance as able and appropriate, will monitor need for VFSS completion if medically indicated.       Recommended Diet: regular diet and thin liquids   Recommended Form of Meds: as tolerated    Aspiration precautions and  swallowing strategies: upright posture, only feed when fully alert, slow rate of feeding, small bites/sips, and alternating bites and sips  Other Recommendations: Continue frequent oral care, single sips, NO STRAWS         Current Medical Status  Nino Silva is a 59 y.o. male well-known to medical oncology, follows with Dr. Morrell for history of extensive stage small cell lung cancer with multiple disease sites on third line therapy with Tarlatamab who called with symptoms of persistent headache, gait instability, dysphagia.  He was scheduled for outpatient MRI of the brain and MRI C-spine for 4/8/2025.  However due to worsening symptoms was recommended to present to ED for more urgent evaluation.   Patient's wife reports patient has been having persistent headache, symptoms of confusion.  Patient reports he has felt off balance.  Denies falls.  Denies double vision.  Does have blurry vision at times and some dizziness.  He has had a dose of dexamethasone since admission and this has improved his headache.  He appears dehydrated.  He states he has had difficulty swallowing water over the past week.  Denies nausea/vomiting    Current Precautions:  Fall  Aspiration      Allergies:  No known food allergies    Past medical history:  Please see H&P for details    Special Studies:  4/4/25 CTA head and neck with and without contrast:  CT Brain:  - Scattered hyperdense hemorrhagic metastatic lesions in left frontal lobe and brainstem have increased in size since MRI brain 2/5/2024 - the number of metastatic lesions is underestimated by CT when compared to MRI brain. Recommend follow-up MRI brain   with and without contrast for further evaluation.  - Diffuse severe white matter changes in bilateral cerebral hemispheres, brainstem, and cerebellum likely related to treatment-related radiation changes with scattered areas of vasogenic edema.     CT Angiography:  - Negative CTA head and neck for large vessel occlusion,  dissection, aneurysm, or high-grade stenosis.     Known large right upper lobe lung mass, compatible with primary lung malignancy. Probable metastatic mediastinal and right hilar lymphadenopathy. Recommend follow-up CT chest with contrast for further evaluation.      History of VFSS:  N/A     Social/Education/Vocational Hx:  Pt lives w/ family     Swallow Information   Current Diet: NPO   Baseline Diet: regular diet and thin liquids pre pt report       Baseline Assessment   Behavior/Cognition: alert  Speech/Language Status: able to participate in conversation and able to follow commands  Patient Positioning: upright in bed  Pain Status/Interventions/Response to Interventions: No report of or nonverbal indications of pain.       Oral Mechanism Exam  Facial: symmetrical  Labial: WFL  Lingual: WFL  Velum: symmetrical  Mandible: adequate ROM  Dentition: adequate  Vocal quality:clear/adequate   Volitional Cough: strong/productive   Respiratory Status: on RA       Consistencies Assessed and Performance   Consistencies Administered: thin liquids and hard solids    Oral Stage:   Complete labial seal for retrieval and containment of all materials, no anterior bolus loss present. Timely rotary mastication of regular textures w/ complete bolus breakdown and adequate bolus transfer. Min diffuse lingual residue, cleared w/ liquid wash.     Pharyngeal Stage:   Suspected delayed swallow initiation and fair hyolaryngeal elevation to palpation. Immediate cough response x2 w/ thin liquids via straw sip, no overt s/s of aspiration w/ thin liquids via cup sip or other materials administered.     Esophageal Concerns: none reported      Summary and Recommendations (see above)    Results Reviewed with: patient, RN, and MD     Treatment Recommended: as able and appropriate     Dysphagia LTG  -Patient will demonstrate safe and effective oral intake (without overt s/s significant oral/pharyngeal dysphagia including s/s penetration or  aspiration) for the highest appropriate diet level.     Short Term Goals:  -Pt will tolerate regular textures and thin liquid with no significant s/s oral or pharyngeal dysphagia across 1-3 diagnostic session/s    -Patient will comply with a Video/Modified Barium Swallow study for more complete assessment of swallowing anatomy/physiology/aspiration risk and to assess efficacy of treatment techniques so as to best guide treatment plan if medically indicated     Speech Therapy Prognosis   Prognosis: fair    Prognosis Considerations: age, medical status, prior medical history, and progressive disease process

## 2025-04-04 NOTE — ASSESSMENT & PLAN NOTE
Patient presents with symptoms of worsening headaches, blurred vision, paresthesias of right hand, dizziness, gait disturbance, transient dysarthria, expressive dysphagia.  Symptoms have gotten progressively worse.    CTA head and neck completed in the ER suggestive of progressive disease:  CT Brain:  - Scattered hyperdense hemorrhagic metastatic lesions in left frontal lobe and brainstem have increased in size since MRI brain 2/5/2024 - the number of metastatic lesions is underestimated by CT when compared to MRI brain. Recommend follow-up MRI brain   with and without contrast for further evaluation.  - Diffuse severe white matter changes in bilateral cerebral hemispheres, brainstem, and cerebellum likely related to treatment-related radiation changes with scattered areas of vasogenic edema.     CT Angiography:  - Negative CTA head and neck for large vessel occlusion, dissection, aneurysm, or high-grade stenosis.     Known large right upper lobe lung mass, compatible with primary lung malignancy. Probable metastatic mediastinal and right hilar lymphadenopathy    Concern for worsening brain metastasis.   Appreciate radiation oncology consultation.  Due to number and distribution of lesions, he is likely not a candidate for stereotactic treatment.  Since he has already had whole brain radiation therapy reirradiation is associated with severe toxicity and neurocognitive decline.  Brain MRI has been ordered and pending  MRI of C-spine is also recommended  Agree with dexamethasone 4 mg every 6 hours with PPI  Concern for possible leptomeningeal spread.  IR is consulted for spinal tap and requested lab orders are placed    Patient has a very aggressive malignancy and overall prognosis is poor.  I talked with patient and his spouse today and encouraged them to hope for the best but plan for the worst given this aggressive malignancy.  We will do our best to workup and treat what we can but our options may be  limited.  Medical oncology will follow along.

## 2025-04-04 NOTE — ASSESSMENT & PLAN NOTE
Secondary to metastatic disease progression  Pending evaluation/imaging  For more details, see under small cell lung cancer

## 2025-04-04 NOTE — TELEPHONE ENCOUNTER
Telephone Encounter: Medical Oncology:  Nino Silva 59 y.o. male MRN: [id number]  Unit/Bed#:  Encounter: [id number]    Telephone Encounter:  Telephone Query: Patient spouse concern.  Description: In brief, this is a 59-year-old male, with an established history of heavily pretreated extensive-stage small cell lung cancer (Presently on third-line Tarlatamab-dlle - Cycle 1 Day 15 last administered on March 31st, 2025). Patient's spouse contacted the on-call service regarding the following concerns: Headache, and dysphagia. Attempted to contact patient on April 4th, 2025, at approximately 07:30, and 07:45, without answer. Left a HIPAA-complaint voicemail message. Will relay this message to patient's primary Medical Oncology team for further follow-up, and recommendations.    Note: Per most recent follow-up note on April 1st, 2025, patient reported headache, gait-instability (e.g. Balance-issues), and dysphagia. Given the above-mentioned, MRI Brain and MRI C-Spine was ordered and scheduled for April 8th, 2025. Patient was also referred to Physical Therapy, and Speech Therapy (e.g. Barium Swallow). His next follow-up appointment with Medical Oncology is on April 22nd, 2025.    Meme Elizabeth D.O.  Hematology-Oncology Fellow (PGY-5)

## 2025-04-04 NOTE — ASSESSMENT & PLAN NOTE
Diagnosed 3/2024 with extensive stage small cell lung cancer with brain metastasis status post WBRT completed in 3/2024.  Follows with Dr Morrell, Medical oncology   Has progressed through multiple lines of therapy  Currently on third line with Tarlatamab; C1D15 3/31/2025

## 2025-04-04 NOTE — CONSULTS
Consultation - Radiation Oncology   Nino Silva 59 y.o. male MRN: [id number]  Unit/Bed#: Z1 H2 Encounter: [id number]        History of Present Illness   Physician Requesting Consult: Brian Diaz DO  Reason for Consult / Principal Problem: Non-small cell lung cancer, brain metastases    HPI: Nino Silva is a 59 y.o. 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 He received whole brain radiation, treatment completion on 3/19/2024.       To briefly review, he presented in 3/2024 with altered mental status.     CT head without contrast on 3/4/2024 demonstrated multiple cortical brain lesions with associated vasogenic edema most worrisome for metastatic disease.  The largest was a left parietal lobe lesion measuring 2.7 x 4.3 cm.     CTA chest/abdomen and pelvis on 3/4/2024 demonstrated a prominent right upper lobe perihilar pulmonary mass identified measuring up to 4.3 x 5.8 cm concerning for primary carcinoma.  There was considerable mass effect on adjacent pulmonary arteries and bronchi.  There was partial cut off of the right middle lobe pulmonary artery branch and additional right upper lobe nodular mass versus bilobed configuration measuring 3.8 x 3 cm.  There was a prevascular node measuring 1.5 x 2 cm and additional mediastinal and hilar adenopathy.  There was para-aortic retroperitoneal and iliac chain lymphadneopathy.      MRI brain on 3/4/2024 demonstrated multiple centrally necrotic, rim-enhancing  lesions demonstrating peripheral nodularity within the supra and infratentorial parenchyma.       IR biopsy of the lung was performed on 3/5/2024 and poorly differentiated carcinoma with neuroendocrine features, favoring large cell neuroendocrine carcinoma.       PET/CT on 3/15/2024 demonstrated a 3.6 x 5.4 cm hypermetabolic right upper lobe mass and adjacent 2.6 cm  hypermetabolic satellite nodule, compatible with malignant involvement.  There was hypermetabolic bilateral hilar, mediastinal, right lower cervical, retroperitoneal and bilateral iliac chain lymphadenopathy, compatible with multiple malignant involvement.       He completed whole brain radiation on 3/19/2024.     He began carboplatin, etoposide and durvalumab on 4/8/2024.     MRI brain on 6/29/24 demonstrated decreased size of enhancing hemorrhagic metastatic lesions in the left parietal, right frontal periventricular, left temporal and right cerebellar lobes.  The largest in the left parietal lobe had mild perilesional vasogenic edema, improved.  There was resolution of enhancing metastatic lesions in the right thalamus, right medial occipital lobe and right middle cerebellar peduncle.       PET/CT on 7/2/2024 demonstrated no residual FDG avidity in right cervical lymph nodes.  The previously 3.6 x 5.4 cm right upper lobe perihilar mass now measured 2.5 x 2.7 cm with SUV 12.  The anterior right upper lobe mass previously measuring 2.6 x 2 cm now was unchanged in size with SUV 19, previously 11.  A previously seen right hilar lymph node measuring 2.4 x 1.9 cm with SUV 9.8 now measuring 1.6 x 1.6 cm with SUV 7.  There was no residual activity in a previously seen left infrahilar lymph node.  There was previously a conglomerate of right lower paratracheal lymph nodes measuring 1.6 x 1.9 cm with SUV 8.7, now 1.5 x 1.2 cm with SUV 5.  There was a previous right upper paratracheal lymph node measuring 1.9 cm now 1.9 x 1.5 cm with SUV 14.  An AP window lymph node measured 2.2 x 1.8 cm previously, now measured 1.4 x 0.8 cm.  Of note, there was marked interval improvement in previously demonstrated retroperitoneal lymphadenopathy and the majority of the lymph nodes were no longer FDG avid.  An example is a left para-aortic lymph node which was previously 2.7 x 4.1 cm, now 0.9 x 0.7 cm and prior right external iliac lymph  node measuring 2.6 x 3.1 cm, now 1.2 x 0.7 cm.      He continued adjuvant durvalumab.    In 10/2024, he developed progressive disease, appreciated on PET/CT.  He was transitioned to lurbinectidin in 11/2024.    He was more recently transitioned to tarlatamab, cycle 1 day 15 on 3/31/2025.  Cycle 2 day 1 is scheduled for 4/14/2025.    PET/CT on 2/7/2025 demonstrated an FDG avid lesion in the posterior cerebellar hemisphere, likely corresponding to a prior lesion appreciated on MRI brain measuring 1.2 cm.  There was an FDG avid portion of the right upper lobe anterior mass measuring 3.3 cm, previously 2.9 x 2.5 cm.  There was continued FDG-avid lymphadenopathy/perihilar mass and new FDG activity in the right inferior hilum.  There was interval improvement of a left periaortic lymph node with SUV 4.8 and a size of 0.6 cm.  There was a new FDG avid focus within the T11 vertebra and several new tiny foci of increased activity in L5.  There as also a new right supra-acetabular subcentimeter focus of increased activity with SUV 5.2.      MRI of the thoracic/lumbar spine on 3/15/2025 demonstrated osseous metastasis in right T4 transverse process and L1 vertebral body.  There was no significant canal stensois or foraminal narrowing.  There was also osseous metastasis in L1 and L2 vertebral bodies, no pathologic compression fracture.  There were degenerative changes resulting in varying degrees of canal stenosis and foraminal narrowing.    Most recent brain MRI from 2/5/2025 demonstrated interval overall progression of disease with numerous new solidly enhancing intracranial metastastic lesions, both supratentorial and infratentorial, approximately 25-30 in total including the brainstem and cerebellar hemispheres with surrounding vasogenic edema.  The majority of the lesions were subcentimeter in size.  The previously treated partially cystic metastatic lesions were grossly stable in size.      Upon interview, he has had three  weeks of worsening balance and headaches.  He denies incontinence of bowel and bladder.  He is without additional acute concerns.    Inpatient consult to Radiation Oncology  Consult performed by: Prince Funes MD  Consult ordered by: MARCOS Levy        Review of Systems See above.    Historical Information   Previous Oncology History:   Oncology History   Small cell lung cancer (HCC)   3/5/2024 Initial Diagnosis    Small cell lung cancer (HCC)     3/5/2024 Biopsy    Final Diagnosis  A. Lung, Right Upper Lobe, biopsy:  - Poorly differentiated carcinoma with neuroendocrine features, favor large cell neuroendocrine carcinoma.  - See note.     3/5/2024 -  Cancer Staged    Staging form: Lung, AJCC 8th Edition  - Clinical stage from 3/5/2024: Stage IV (cT2, cN3, cM1) - Signed by Lizbeth López MD on 4/18/2024  Histopathologic type: Small cell carcinoma, NOS  Stage prefix: Initial diagnosis  Histologic grade (G): G3  Histologic grading system: 4 grade system       3/6/2024 - 3/19/2024 Radiation      Plan ID Energy Fractions Dose per Fraction (cGy) Dose Correction (cGy) Total Dose Delivered (cGy) Elapsed Days   Whole Brain 6X 10 / 10 300 0 3,000 13        4/8/2024 - 10/7/2024 Chemotherapy    alteplase (CATHFLO), 2 mg, Intracatheter, Every 1 Minute as needed, 8 of 10 cycles  palonosetron (ALOXI), 0.25 mg, Intravenous, Once, 3 of 3 cycles  Administration: 0.25 mg (4/29/2024), 0.25 mg (5/20/2024), 0.25 mg (6/17/2024)  fosaprepitant (EMEND) IVPB, 150 mg, Intravenous, Once, 4 of 4 cycles  Administration: 150 mg (4/8/2024), 150 mg (4/29/2024), 150 mg (5/20/2024), 150 mg (6/17/2024)  etoposide (TOPOSAR), 80 mg/m2 = 164 mg, Intravenous, Once, 4 of 4 cycles  Dose modification: 100 mg/m2 (original dose 80 mg/m2, Cycle 1, Reason: Anticipated Tolerance), 80 mg/m2 (original dose 80 mg/m2, Cycle 1, Reason: Anticipated Tolerance), 100 mg/m2 (original dose 80 mg/m2, Cycle 2, Reason: Anticipated Tolerance)  Administration: 164  mg (4/8/2024), 164 mg (4/9/2024), 164 mg (4/10/2024), 205 mg (4/29/2024), 205 mg (4/30/2024), 205 mg (5/1/2024), 200 mg (5/20/2024), 200 mg (5/21/2024), 200 mg (5/22/2024), 200 mg (6/17/2024), 200 mg (6/18/2024), 200 mg (6/19/2024)  CARBOplatin (PARAPLATIN) IVPB (GO AUC DOSING), 750 mg (574.7 % of original dose 130.5 mg), Intravenous, Once, 4 of 4 cycles  Dose modification: 130.5 mg (original dose 130.5 mg, Cycle 1, Reason: Anticipated Tolerance),   (original dose 130.5 mg, Cycle 1, Reason: Other (Must fill in a comment))  Administration: 750 mg (4/8/2024), 701.5 mg (4/29/2024), 664 mg (5/20/2024), 633 mg (6/17/2024)  durvalumab (IMFINZI) IVPB, 1,500 mg, Intravenous, Once, 8 of 10 cycles  Administration: 1,500 mg (4/8/2024), 1,500 mg (4/29/2024), 1,500 mg (5/20/2024), 1,500 mg (6/17/2024), 1,500 mg (7/10/2024), 1,500 mg (8/13/2024), 1,500 mg (9/9/2024), 1,500 mg (10/7/2024)     11/19/2024 - 1/21/2025 Chemotherapy    alteplase (CATHFLO), 2 mg, Intracatheter, Every 1 Minute as needed, 4 of 6 cycles  Lurbinectedin (ZEPZELCA) IVPB, 3.2 mg/m2 = 6.4 mg, Intravenous, Once, 4 of 6 cycles  Administration: 6.4 mg (11/19/2024), 6.4 mg (12/10/2024), 6.4 mg (12/31/2024), 6.4 mg (1/21/2025)     3/17/2025 -  Chemotherapy    tarlatamab (Imdelltra) 10 mg IVPB, 10 mg, 1 of 10 cycles  Administration: 10 mg (3/24/2025), 10 mg (3/31/2025)  tarlatamab (Imdelltra) 1 mg IVPB, 1 mg, 1 of 1 cycle  Administration: 1 mg (3/17/2025)         History reviewed. No pertinent past medical history.  Past Surgical History:   Procedure Laterality Date    HERNIA REPAIR      IR BIOPSY LUNG  03/05/2024    ORCHIECTOMY Left      Family History   Problem Relation Age of Onset    Breast cancer additional onset Mother     No Known Problems Father     Stroke Brother      Social History   Social History     Substance and Sexual Activity   Alcohol Use Not Currently     Social History     Substance and Sexual Activity   Drug Use Not Currently     Social History      Tobacco Use   Smoking Status Never   Smokeless Tobacco Never       Meds/Allergies   No current facility-administered medications for this encounter.    Current Outpatient Medications:     ascorbic acid (VITAMIN C) 250 mg tablet, Take 500 mg by mouth daily, Disp: , Rfl:     Cholecalciferol (Vitamin D) 50 MCG (2000 UT) tablet, Take 2,000 Units by mouth daily, Disp: , Rfl:     predniSONE 5 mg tablet, Take 1 tablet (5 mg total) by mouth daily, Disp: 100 tablet, Rfl: 1    Facility-Administered Medications Ordered in Other Encounters:     [MAR Hold] alteplase (CATHFLO) injection 2 mg, 2 mg, Intracatheter, Q1MIN PRN, Cortney Morrell MD      No Known Allergies    Objective     Intake/Output Summary (Last 24 hours) at 4/4/2025 1157  Last data filed at 4/4/2025 1056  Gross per 24 hour   Intake 1100 ml   Output --   Net 1100 ml     Invasive Devices       Peripheral Intravenous Line  Duration             Peripheral IV 04/04/25 Right Antecubital <1 day                  Physical Exam  HENT:      Head: Normocephalic and atraumatic.   Eyes:      General: No scleral icterus.     Extraocular Movements: Extraocular movements intact.   Pulmonary:      Effort: Pulmonary effort is normal.   Abdominal:      General: Abdomen is flat. There is no distension.   Musculoskeletal:         General: Normal range of motion.      Cervical back: Normal range of motion.   Skin:     General: Skin is warm and dry.   Neurological:      General: No focal deficit present.      Mental Status: He is alert.      Comments: Difficulty with balance/coordination   Psychiatric:         Mood and Affect: Mood normal.          Imaging Studies: As above.  EKG, Pathology, and Other Studies:     Assessment & Plan     Assessment and Plan:  Mr. Silva is a 59 year old man with known metastatic non-small cell (large cell neuroendocrine) lung cancer.  He has undergone whole brain radiation in 3/2024 and neuroimaging is now suggestive of progressive disease. MRI of the  thoracic and lumbar spine in 3/2025 did not demonstrate evidence of bulky or epidural disease.  Repeat brain MRI is pending.  He is receiving tarlatamab.      I reviewed the diagnosis of metastatic lung cancer and treatment options for intracranial metastatic disease.  Due to the number and distribution of lesions, he is likely not a candidate for stereotactic treatment.  Repeat whole brain radiation (20 Gy in 10 fractions) can be considered, although in the setting of reirradiation the frequency and severity of toxicity is enhanced.  Neurocognitive decline would also likely be more pronounced.  Repeat brain MRI should be obtained to assist with prognostication and treatment decisions.  Radiotherapy in this instance is palliative and hospice/comfort  measures can also be considered.  Please also obtain MRI C-spine to complete imaging of the neuraxis.     I agree with continuing dexamethasone 4 mg every 6 hrs with PPI and close glucose monitoring.  Please reach out to radiation oncology with updates as they become available.  I agree with palliative care, neurosurgery and medical oncology consultations.      Thank you for involving me in Mr. Silva's care.    Code Status: Prior  Advance Directive and Living Will:      Power of :    POLST:      Counseling / Coordination of Care  Total floor / unit time spent today 55 minutes. Greater than 50% of total time was spent with the patient and / or family counseling and / or coordination of care.

## 2025-04-04 NOTE — TELEPHONE ENCOUNTER
Nino Silva to [facility] On[facility] Clinical (supporting Cortney Morrell MD)         4/4/25  8:25 AM  Hi,  I have been trying to reach Dr Morrell's office because my  did not sleep at all last night because of pounding headache, he still have the headache until now, it not go away, he still cant swallow that is why he os dehydrated. Is this something we can go to emergency?  Thank you

## 2025-04-04 NOTE — CONSULTS
Consultation - Oncology-Medical   Name: Nino Silva 59 y.o. 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  Follows with Dr Morrell, Medical oncology   Has progressed through multiple lines of therapy  Currently on third line with Tarlatamab; C1D15 3/31/2025    Acute headache    Ambulatory dysfunction  Patient presents with symptoms of worsening headaches, blurred vision, paresthesias of right hand, dizziness, gait disturbance, transient dysarthria, expressive dysphagia.  Symptoms have gotten progressively worse.    CTA head and neck completed in the ER suggestive of progressive disease:  CT Brain:  - Scattered hyperdense hemorrhagic metastatic lesions in left frontal lobe and brainstem have increased in size since MRI brain 2/5/2024 - the number of metastatic lesions is underestimated by CT when compared to MRI brain. Recommend follow-up MRI brain   with and without contrast for further evaluation.  - Diffuse severe white matter changes in bilateral cerebral hemispheres, brainstem, and cerebellum likely related to treatment-related radiation changes with scattered areas of vasogenic edema.     CT Angiography:  - Negative CTA head and neck for large vessel occlusion, dissection, aneurysm, or high-grade stenosis.     Known large right upper lobe lung mass, compatible with primary lung malignancy. Probable metastatic mediastinal and right hilar lymphadenopathy    Concern for worsening brain metastasis.    Appreciate radiation oncology consultation.  Due to number and distribution of lesions, he is likely not a candidate for stereotactic treatment.  Since he has already had whole brain radiation therapy reirradiation is associated with severe toxicity and neurocognitive decline.  Brain MRI has been ordered and pending  MRI of C-spine is also recommended  Agree with dexamethasone 4 mg every 6 hours with PPI  Concern for possible leptomeningeal spread.  IR is consulted for spinal tap and requested lab orders are placed    Patient has a very aggressive malignancy and overall prognosis is poor.  I talked with patient and his spouse today and encouraged them to hope for the best but plan for the worst given this aggressive malignancy.  We will do our best to workup and treat what we can but our options may be limited.  Medical oncology will follow along.      I have discussed the above management plan in detail with the primary service.     History of Present Illness   Nino Silva is a 59 y.o. male well-known to medical oncology, follows with Dr. Morrell for history of extensive stage small cell lung cancer with multiple disease sites on third line therapy with Tarlatamab who called with symptoms of persistent headache, gait instability, dysphagia.  He was scheduled for outpatient MRI of the brain and MRI C-spine for 4/8/2025.  However due to worsening symptoms was recommended to present to ED for more urgent evaluation.     CTA of head and neck done in ER shows scattered hyperdense hemorrhagic metastatic lesions in left frontal lobe and brainstem which have increased in size since his last MRI of the brain completed on 2/5/2024.  The number of metastatic lesions is underestimated by CT when compared to MRI of brain.  He has noted to have diffuse severe white matter changes in bilateral cerebral hemispheres, brainstem, and cerebellum likely related to treatment related radiation changes with scattered areas of  vasogenic edema.  CT angiography: Negative CTA head and neck for large vessel occlusion, dissection, aneurysm or high-grade stenosis  Known large right upper lobe mass, compatible with primary lung malignancy.  Probable metastatic mediastinal and right hilar lymphadenopathy    Patient's wife reports patient has been having persistent headache, symptoms of confusion.  Patient reports he has felt off balance.  Denies falls.  Denies double vision.  Does have blurry vision at times and some dizziness.  He has had a dose of dexamethasone since admission and this has improved his headache.  He appears dehydrated.  He states he has had difficulty swallowing water over the past week.  Denies nausea/vomiting      Review of Systems   Constitutional:  Positive for activity change and fatigue.   HENT:  Positive for trouble swallowing.    Eyes:  Positive for visual disturbance.   Neurological:  Positive for dizziness, weakness, numbness and headaches.   Psychiatric/Behavioral:  Positive for confusion.    All other systems reviewed and are negative.    Historical Information   Medical History Review: I have reviewed the patient's PMH, PSH, Social History, Family History, Meds, and Allergies     Oncology History:   eSCLC with multiple  sites disease now on third line Tarlatamab      Previous Treatment:    WBRT x 10 fractions in March 2024  Carboplatin plus etoposide plus durvalumab x 4 cycles (April - June 2024)   Durvalumab maintenance (July - October 2024). Disease progression noted on Oct  2024 PET scan  Lurbinectidin started in November 2024  Tarlatamab C1D1 3/17/2025 ; C1D8  3/24/2025; C1D15 3/31/2025          C2D1 4/14/2025     Cancer Staging   Small cell lung cancer (HCC)  Staging form: Lung, AJCC 8th Edition  - Clinical stage from 3/5/2024: Stage IV (cT2, cN3, cM1) - Signed by Lizbeth López MD on 4/18/2024    Oncology History   Small cell lung cancer (HCC)   3/5/2024 Initial Diagnosis    Small cell lung cancer (HCC)      3/5/2024 Biopsy    Final Diagnosis  A. Lung, Right Upper Lobe, biopsy:  - Poorly differentiated carcinoma with neuroendocrine features, favor large cell neuroendocrine carcinoma.  - See note.     3/5/2024 -  Cancer Staged    Staging form: Lung, AJCC 8th Edition  - Clinical stage from 3/5/2024: Stage IV (cT2, cN3, cM1) - Signed by Lizbeth López MD on 4/18/2024  Histopathologic type: Small cell carcinoma, NOS  Stage prefix: Initial diagnosis  Histologic grade (G): G3  Histologic grading system: 4 grade system       3/6/2024 - 3/19/2024 Radiation      Plan ID Energy Fractions Dose per Fraction (cGy) Dose Correction (cGy) Total Dose Delivered (cGy) Elapsed Days   Whole Brain 6X 10 / 10 300 0 3,000 13        4/8/2024 - 10/7/2024 Chemotherapy    alteplase (CATHFLO), 2 mg, Intracatheter, Every 1 Minute as needed, 8 of 10 cycles  palonosetron (ALOXI), 0.25 mg, Intravenous, Once, 3 of 3 cycles  Administration: 0.25 mg (4/29/2024), 0.25 mg (5/20/2024), 0.25 mg (6/17/2024)  fosaprepitant (EMEND) IVPB, 150 mg, Intravenous, Once, 4 of 4 cycles  Administration: 150 mg (4/8/2024), 150 mg (4/29/2024), 150 mg (5/20/2024), 150 mg (6/17/2024)  etoposide (TOPOSAR), 80 mg/m2 = 164 mg, Intravenous, Once, 4 of 4 cycles  Dose modification: 100 mg/m2 (original dose 80 mg/m2, Cycle 1, Reason: Anticipated Tolerance), 80 mg/m2 (original dose 80 mg/m2, Cycle 1, Reason: Anticipated Tolerance), 100 mg/m2 (original dose 80 mg/m2, Cycle 2, Reason: Anticipated Tolerance)  Administration: 164 mg (4/8/2024), 164 mg (4/9/2024), 164 mg (4/10/2024), 205 mg (4/29/2024), 205 mg (4/30/2024), 205 mg (5/1/2024), 200 mg (5/20/2024), 200 mg (5/21/2024), 200 mg (5/22/2024), 200 mg (6/17/2024), 200 mg (6/18/2024), 200 mg (6/19/2024)  CARBOplatin (PARAPLATIN) IVPB ([facility] AUC DOSING), 750 mg (574.7 % of original dose 130.5 mg), Intravenous, Once, 4 of 4 cycles  Dose modification: 130.5 mg (original dose 130.5 mg, Cycle 1, Reason: Anticipated Tolerance),    (original dose 130.5 mg, Cycle 1, Reason: Other (Must fill in a comment))  Administration: 750 mg (4/8/2024), 701.5 mg (4/29/2024), 664 mg (5/20/2024), 633 mg (6/17/2024)  durvalumab (IMFINZI) IVPB, 1,500 mg, Intravenous, Once, 8 of 10 cycles  Administration: 1,500 mg (4/8/2024), 1,500 mg (4/29/2024), 1,500 mg (5/20/2024), 1,500 mg (6/17/2024), 1,500 mg (7/10/2024), 1,500 mg (8/13/2024), 1,500 mg (9/9/2024), 1,500 mg (10/7/2024)     11/19/2024 - 1/21/2025 Chemotherapy    alteplase (CATHFLO), 2 mg, Intracatheter, Every 1 Minute as needed, 4 of 6 cycles  Lurbinectedin (ZEPZELCA) IVPB, 3.2 mg/m2 = 6.4 mg, Intravenous, Once, 4 of 6 cycles  Administration: 6.4 mg (11/19/2024), 6.4 mg (12/10/2024), 6.4 mg (12/31/2024), 6.4 mg (1/21/2025)     3/17/2025 -  Chemotherapy    tarlatamab (Imdelltra) 10 mg IVPB, 10 mg, 1 of 10 cycles  Administration: 10 mg (3/24/2025), 10 mg (3/31/2025)  tarlatamab (Imdelltra) 1 mg IVPB, 1 mg, 1 of 1 cycle  Administration: 1 mg (3/17/2025)       No current facility-administered medications for this encounter.     Current Outpatient Medications   Medication Sig Dispense Refill    ascorbic acid (VITAMIN C) 250 mg tablet Take 500 mg by mouth daily      Cholecalciferol (Vitamin D) 50 MCG (2000 UT) tablet Take 2,000 Units by mouth daily      predniSONE 5 mg tablet Take 1 tablet (5 mg total) by mouth daily 100 tablet 1     Facility-Administered Medications Ordered in Other Encounters   Medication Dose Route Frequency Provider Last Rate Last Admin    [MAR Hold] alteplase (CATHFLO) injection 2 mg  2 mg Intracatheter Q1MIN PRN Cortney Morrell MD           Objective :  Temp:  [98.1 °F (36.7 °C)] 98.1 °F (36.7 °C)  HR:  [50-63] 53  BP: ()/(67-83) 114/83  Resp:  [15-20] 17  SpO2:  [96 %-98 %] 98 %  O2 Device: None (Room air)    Physical Exam  HENT:      Head: Normocephalic and atraumatic.   Neurological:      General: No focal deficit present.      Mental Status: He is alert and oriented to person,  place, and time.           Lab Results: I have reviewed the following results:  Lab Results   Component Value Date    K 3.8 04/04/2025     04/04/2025    CO2 24 04/04/2025    BUN 17 04/04/2025    CREATININE 0.76 04/04/2025    GLUCOSE 116 03/04/2024    GLUF 106 (H) 03/28/2025    CALCIUM 9.2 04/04/2025    AST 16 04/04/2025    ALT 19 04/04/2025    ALKPHOS 43 04/04/2025    EGFR 99 04/04/2025     Lab Results   Component Value Date    WBC 8.59 04/04/2025    HGB 12.5 04/04/2025    HCT 36.5 04/04/2025    MCV 92 04/04/2025     04/04/2025     Lab Results   Component Value Date    NEUTROABS 6.81 04/04/2025       Imaging Results Review: I reviewed radiology reports from this admission including: CT head.      Administrative Statements   I have spent a total time of 60 minutes in caring for this patient on the day of the visit/encounter including Diagnostic results, Prognosis, Patient and family education, Impressions, Counseling / Coordination of care, Documenting in the medical record, Reviewing/placing orders in the medical record (including tests, medications, and/or procedures), Obtaining or reviewing history  , and Communicating with other healthcare professionals .

## 2025-04-04 NOTE — H&P
H&P - Hospitalist   Name: Nino Silva 59 y.o. 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  Diffuse severe white matter changes in bilateral cerebral hemispheres, brainstem and cerebellum related to treatment-related radiation changes.  Negative CTA for large vessel occlusion, dissection, aneurysm or high-grade stenosis.    Continue with IV Decadron 4 mg Q6  Continue with pantoprazole and  Continue with fall/safety precautions  MRI brain  MRI cervical/thoracic/lumbar spine  Discussed with oncology, no need for neurosurgery involvement  No need for seizure precautions currently  Palliative involved, poor prognosis, however patient currently treatment focussed         Brain mass  See above   Ambulatory dysfunction  In the setting of progressive metastatic disease  Safe ambulation  Fall precautions    Acute headache  Secondary to metastatic disease progression  Pending evaluation/imaging  For more details, see under small cell lung cancer      VTE Pharmacologic Prophylaxis:   Moderate Risk (Score 3-4) - Pharmacological DVT Prophylaxis Contraindicated. Sequential Compression Devices Ordered.  Code Status: Level 1 - Full Code patient  Discussion with family: Updated  (wife) at bedside.    Anticipated Length of Stay: Patient will be admitted on an inpatient basis  "with an anticipated length of stay of greater than 2 midnights secondary to ambulatory dysfunction, headache, visual disturbance, metastatic lung carcinoma.    History of Present Illness   Chief Complaint:   Chief Complaint   Patient presents with    Headache     Pt arrives with wife with complains of difficulty with swallowing and \"I came on Wednesday for hydration but they aren't open in the weekend\". I'm still having problems swallowing since three weeks ago. Pt reports having a headache since Wednesday after infusion.          Nino Silva is a 59 y.o. male with a PMH of metastatic non-small cell/neuroendocrine right-sided lung carcinoma status post whole brain radiation, on third line chemotherapy presented with neurological symptoms concerning for disease progression versus leptomeningeal spread versus side effects of chemotherapy.  Currently hemodynamically stable.  CT head and CTA head/neck-with scattered hyperdense hemorrhagic metastatic lesions in left frontal lobe and brainstem, which have increased in size as compared to prior imaging.  Case was discussed with oncology/radiation oncology and patient is admitted for further management and care.  Patient confirmed level 1 full CODE STATUS.    Review of Systems   Constitutional:  Positive for activity change, appetite change and fatigue.   HENT:  Negative for congestion.    Respiratory:  Negative for cough and shortness of breath.    Cardiovascular:  Negative for chest pain, palpitations and leg swelling.   Gastrointestinal:  Negative for abdominal pain.   Genitourinary:  Negative for dysuria and urgency.   Musculoskeletal:  Positive for arthralgias. Negative for back pain.   Neurological:  Positive for dizziness, weakness, numbness and headaches.   Psychiatric/Behavioral:  Negative for agitation and confusion.        Historical Information   History reviewed. No pertinent past medical history.  Past Surgical History:   Procedure Laterality Date    " HERNIA REPAIR      IR BIOPSY LUNG  03/05/2024    ORCHIECTOMY Left      Social History     Tobacco Use    Smoking status: Never    Smokeless tobacco: Never   Vaping Use    Vaping status: Never Used   Substance and Sexual Activity    Alcohol use: Not Currently    Drug use: Not Currently    Sexual activity: Not on file     E-Cigarette/Vaping    E-Cigarette Use Never User      E-Cigarette/Vaping Substances     Family history non-contributory  Social History:  Marital Status: /Civil Union     Meds/Allergies   I have reviewed home medications with patient personally.  Prior to Admission medications    Medication Sig Start Date End Date Taking? Authorizing Provider   ascorbic acid (VITAMIN C) 250 mg tablet Take 500 mg by mouth daily    Historical Provider, MD   Cholecalciferol (Vitamin D) 50 MCG (2000 UT) tablet Take 2,000 Units by mouth daily    Historical Provider, MD   predniSONE 5 mg tablet Take 1 tablet (5 mg total) by mouth daily 2/12/25   Cortney Morrell MD     No Known Allergies    Objective :  Temp:  [98.1 °F (36.7 °C)] 98.1 °F (36.7 °C)  HR:  [50-63] 53  BP: ()/(67-83) 114/83  Resp:  [15-20] 17  SpO2:  [96 %-98 %] 98 %  O2 Device: None (Room air)    Physical Exam  Vitals reviewed.   Constitutional:       Appearance: Normal appearance.   HENT:      Head: Atraumatic.      Mouth/Throat:      Mouth: Mucous membranes are dry.   Eyes:      General: No scleral icterus.  Cardiovascular:      Rate and Rhythm: Normal rate and regular rhythm.      Heart sounds: Normal heart sounds.   Pulmonary:      Effort: No respiratory distress.   Abdominal:      General: Bowel sounds are normal. There is no distension.   Musculoskeletal:      Right lower leg: No edema.      Left lower leg: No edema.   Skin:     General: Skin is dry.      Capillary Refill: Capillary refill takes less than 2 seconds.      Findings: Bruising present.   Neurological:      Mental Status: He is alert and oriented to person, place, and time.       "Motor: Weakness present.          Lines/Drains:            Lab Results: I have reviewed the following results:  Results from last 7 days   Lab Units 04/04/25  0946   WBC Thousand/uL 8.59   HEMOGLOBIN g/dL 12.5   HEMATOCRIT % 36.5   PLATELETS Thousands/uL 276   SEGS PCT % 79*   LYMPHO PCT % 10*   MONO PCT % 8   EOS PCT % 2     Results from last 7 days   Lab Units 04/04/25  0946   SODIUM mmol/L 136   POTASSIUM mmol/L 3.8   CHLORIDE mmol/L 103   CO2 mmol/L 24   BUN mg/dL 17   CREATININE mg/dL 0.76   ANION GAP mmol/L 9   CALCIUM mg/dL 9.2   ALBUMIN g/dL 4.1   TOTAL BILIRUBIN mg/dL 0.57   ALK PHOS U/L 43   ALT U/L 19   AST U/L 16   GLUCOSE RANDOM mg/dL 110     Results from last 7 days   Lab Units 04/04/25  0946   INR  1.06         No results found for: \"HGBA1C\"        Imaging Results Review: I reviewed radiology reports from this admission including: CT head.  Other Study Results Review: EKG was reviewed.     Administrative Statements   I have spent a total time of 65 minutes in caring for this patient on the day of the visit/encounter including Patient and family education, Importance of tx compliance, Counseling / Coordination of care, Documenting in the medical record, Reviewing/placing orders in the medical record (including tests, medications, and/or procedures), Obtaining or reviewing history  , and Communicating with other healthcare professionals .    ** Please Note: This note has been constructed using a voice recognition system. **    "

## 2025-04-05 ENCOUNTER — APPOINTMENT (INPATIENT)
Dept: CT IMAGING | Facility: HOSPITAL | Age: 60
DRG: 694 | End: 2025-04-05
Payer: COMMERCIAL

## 2025-04-05 LAB
ANION GAP SERPL CALCULATED.3IONS-SCNC: 12 MMOL/L (ref 4–13)
BASOPHILS # BLD AUTO: 0 THOUSANDS/ÂΜL (ref 0–0.1)
BASOPHILS NFR BLD AUTO: 0 % (ref 0–1)
BUN SERPL-MCNC: 17 MG/DL (ref 5–25)
CALCIUM SERPL-MCNC: 9.5 MG/DL (ref 8.4–10.2)
CHLORIDE SERPL-SCNC: 104 MMOL/L (ref 96–108)
CO2 SERPL-SCNC: 23 MMOL/L (ref 21–32)
CREAT SERPL-MCNC: 0.77 MG/DL (ref 0.6–1.3)
EOSINOPHIL # BLD AUTO: 0 THOUSAND/ÂΜL (ref 0–0.61)
EOSINOPHIL NFR BLD AUTO: 0 % (ref 0–6)
ERYTHROCYTE [DISTWIDTH] IN BLOOD BY AUTOMATED COUNT: 12.8 % (ref 11.6–15.1)
GFR SERPL CREATININE-BSD FRML MDRD: 98 ML/MIN/1.73SQ M
GLUCOSE SERPL-MCNC: 123 MG/DL (ref 65–140)
GLUCOSE SERPL-MCNC: 126 MG/DL (ref 65–140)
GLUCOSE SERPL-MCNC: 128 MG/DL (ref 65–140)
GLUCOSE SERPL-MCNC: 129 MG/DL (ref 65–140)
GLUCOSE SERPL-MCNC: 135 MG/DL (ref 65–140)
HCT VFR BLD AUTO: 37.3 % (ref 36.5–49.3)
HGB BLD-MCNC: 12.5 G/DL (ref 12–17)
IMM GRANULOCYTES # BLD AUTO: 0.07 THOUSAND/UL (ref 0–0.2)
IMM GRANULOCYTES NFR BLD AUTO: 1 % (ref 0–2)
LYMPHOCYTES # BLD AUTO: 0.78 THOUSANDS/ÂΜL (ref 0.6–4.47)
LYMPHOCYTES NFR BLD AUTO: 10 % (ref 14–44)
MAGNESIUM SERPL-MCNC: 2.1 MG/DL (ref 1.9–2.7)
MCH RBC QN AUTO: 31.3 PG (ref 26.8–34.3)
MCHC RBC AUTO-ENTMCNC: 33.5 G/DL (ref 31.4–37.4)
MCV RBC AUTO: 94 FL (ref 82–98)
MONOCYTES # BLD AUTO: 0.25 THOUSAND/ÂΜL (ref 0.17–1.22)
MONOCYTES NFR BLD AUTO: 3 % (ref 4–12)
NEUTROPHILS # BLD AUTO: 6.75 THOUSANDS/ÂΜL (ref 1.85–7.62)
NEUTS SEG NFR BLD AUTO: 86 % (ref 43–75)
NRBC BLD AUTO-RTO: 0 /100 WBCS
PLATELET # BLD AUTO: 302 THOUSANDS/UL (ref 149–390)
PMV BLD AUTO: 9.8 FL (ref 8.9–12.7)
POTASSIUM SERPL-SCNC: 3.9 MMOL/L (ref 3.5–5.3)
RBC # BLD AUTO: 3.99 MILLION/UL (ref 3.88–5.62)
SODIUM SERPL-SCNC: 139 MMOL/L (ref 135–147)
WBC # BLD AUTO: 7.85 THOUSAND/UL (ref 4.31–10.16)

## 2025-04-05 PROCEDURE — 82948 REAGENT STRIP/BLOOD GLUCOSE: CPT

## 2025-04-05 PROCEDURE — 85025 COMPLETE CBC W/AUTO DIFF WBC: CPT | Performed by: INTERNAL MEDICINE

## 2025-04-05 PROCEDURE — 83735 ASSAY OF MAGNESIUM: CPT | Performed by: INTERNAL MEDICINE

## 2025-04-05 PROCEDURE — 80048 BASIC METABOLIC PNL TOTAL CA: CPT | Performed by: INTERNAL MEDICINE

## 2025-04-05 PROCEDURE — 70450 CT HEAD/BRAIN W/O DYE: CPT

## 2025-04-05 PROCEDURE — 99232 SBSQ HOSP IP/OBS MODERATE 35: CPT | Performed by: INTERNAL MEDICINE

## 2025-04-05 RX ORDER — SODIUM CHLORIDE 9 MG/ML
75 INJECTION, SOLUTION INTRAVENOUS CONTINUOUS
Status: DISPENSED | OUTPATIENT
Start: 2025-04-05 | End: 2025-04-05

## 2025-04-05 RX ADMIN — PANTOPRAZOLE SODIUM 40 MG: 40 TABLET, DELAYED RELEASE ORAL at 06:00

## 2025-04-05 RX ADMIN — DEXAMETHASONE SODIUM PHOSPHATE 4 MG: 4 INJECTION, SOLUTION INTRAMUSCULAR; INTRAVENOUS at 17:20

## 2025-04-05 RX ADMIN — PANTOPRAZOLE SODIUM 40 MG: 40 TABLET, DELAYED RELEASE ORAL at 16:17

## 2025-04-05 RX ADMIN — POLYETHYLENE GLYCOL 3350 17 G: 17 POWDER, FOR SOLUTION ORAL at 08:35

## 2025-04-05 RX ADMIN — DEXAMETHASONE SODIUM PHOSPHATE 4 MG: 4 INJECTION, SOLUTION INTRAMUSCULAR; INTRAVENOUS at 00:23

## 2025-04-05 RX ADMIN — SODIUM CHLORIDE 50 ML/HR: 0.9 INJECTION, SOLUTION INTRAVENOUS at 10:54

## 2025-04-05 RX ADMIN — DEXAMETHASONE SODIUM PHOSPHATE 4 MG: 4 INJECTION, SOLUTION INTRAMUSCULAR; INTRAVENOUS at 12:32

## 2025-04-05 RX ADMIN — DEXAMETHASONE SODIUM PHOSPHATE 4 MG: 4 INJECTION, SOLUTION INTRAMUSCULAR; INTRAVENOUS at 06:01

## 2025-04-05 RX ADMIN — DOCUSATE SODIUM 100 MG: 100 CAPSULE, LIQUID FILLED ORAL at 17:20

## 2025-04-05 RX ADMIN — DOCUSATE SODIUM 100 MG: 100 CAPSULE, LIQUID FILLED ORAL at 08:36

## 2025-04-05 NOTE — CASE MANAGEMENT
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) 2024     Small cell lung cancer (HCC) 2024     Brain mass 2024     COPD (chronic obstructive pulmonary disease) (HCC) 2024     Hx of testicular cancer 2024       LOS (days): 1  Geometric Mean LOS (GMLOS) (days):   Days to GMLOS:     OBJECTIVE:  PATIENT READMITTED TO HOSPITAL  Risk of Unplanned Readmission Score: 14.43         Current admission status: Inpatient       Preferred Pharmacy:   [id number] [place] - [address]  [address] [place] [address]  Phone: [phone] Fax: [phone]    [facility]  [address]  [address] [place] [address]  Phone: [phone] Fax: [phone]    Primary Care Provider: Obed Marinelli MD    Primary Insurance: [employer]  Secondary Insurance:     ASSESSMENT:  Active Health Care Proxies       Leslie Vides Health Care Representative - Spouse   Primary Phone: [phone] (Mobile)                 Advance Directives  Does patient have a Health Care POA?: Yes  Does patient have Advance Directives?: Yes  Advance Directives: Power of  for health care  Primary Contact: Cahrlotte, margi.         Readmission Root Cause  30 Day Readmission: Yes  During your hospital stay, did someone (provider, nurse, ) explain your care to you in a way you could understand?: Yes  Did you feel  medically stable to leave the hospital?: Yes  Were you able to pay for your medication at the pharmacy?: Yes  Did you have reliable transportation to take you to your appointments?: Yes  During previous admission, was a post-acute recommendation made?: No  Patient was readmitted due to: Small cell lung cancer metastatic to brain.  Action Plan: SLIM management, Onocology following.    Patient Information  Admitted from:: Home  Mental Status: Other (Comment) (Pt not present at time of assessment.)  During Assessment patient was accompanied by: Spouse  Assessment information provided by:: Spouse, Daughter  Primary Caregiver: Family  Caregiver's Name:: Charlotte, spouse.  Caregiver's Relationship to Patient:: Family Member  Caregiver's Telephone Number:: [phone]  Support Systems: Spouse/significant other, Self, Family members, Daughter  County of Residence: [place]  What city do you live in?: [place]  Home entry access options. Select all that apply.: Stairs  Number of steps to enter home.: One Flight  Do the steps have railings?: Yes  Type of Current Residence: Apartment  Floor Level: 2  Upon entering residence, is there a bedroom on the main floor (no further steps)?: Yes  Upon entering residence, is there a bathroom on the main floor (no further steps)?: Yes  Living Arrangements: Lives w/ Spouse/significant other, Lives w/ Daughter  Is patient a ?: No    Activities of Daily Living Prior to Admission  Functional Status: Assistance  Completes ADLs independently?: No  Level of ADL dependence: Assistance  Ambulates independently?: No  Level of ambulatory dependence: Assistance  Does patient use assisted devices?: Yes  Assisted Devices (DME) used: Portable Oxygen concentrator, Home Oxygen concentrator, Wheelchair, Walker, Straight Cane  DME Company Name (respiratory supplies): [employer]  O2 Rate(s): 2-3L per spouse  Does patient currently own DME?: Yes  What DME does the patient currently own?: Portable  Oxygen concentrator, Home Oxygen concentrator, Walker, Wheelchair, Straight Cane  Does patient have a history of Outpatient Therapy (PT/OT)?: No  Does the patient have a history of Short-Term Rehab?: No  Does patient have a history of HHC?: No  Does patient currently have HHC?: No         Patient Information Continued  Income Source: SSI/SSD  Does patient have prescription coverage?: Yes  Can the patient afford their medications and any related supplies (such as glucometers or test strips)?: Yes  Does patient receive dialysis treatments?: No  Does patient have a history of substance abuse?: No  Does patient have a history of Mental Health Diagnosis?: No         Means of Transportation  [facility]          DISCHARGE DETAILS:    Discharge planning discussed with:: Pt's spouse, daughter, and niece.  Freedom of Choice: Yes     CM contacted family/caregiver?: Yes  Were Treatment Team discharge recommendations reviewed with patient/caregiver?: Yes  Did patient/caregiver verbalize understanding of patient care needs?: Yes  Were patient/caregiver advised of the risks associated with not following Treatment Team discharge recommendations?: Yes    Contacts  Patient Contacts: Leslie Vides (Spouse)  [phone]  Relationship to Patient:: Family  Contact Method: In Person  Reason/Outcome: Continuity of Care, Emergency Contact, Discharge Planning         DME Referral Provided  Referral made for DME?: No                                                           Additional Comments: CM met with spouse, daughter, and niece to discern discharge needs. Pt was off floor. Pt lives with spouse and daughter in a 2nd fl apt, 12ste, no steps in apt. Spouse reports that pt has been having difficulty walking and completing ADLs independently since last admissions. Spouse needs to assist for showering and pt cannot maintain balance to walk with walker. Cane, walker, and home 02 with portable in home.  Confirmed [facility] is 02 provider, script written for 2L. No STR, HHC, OPPT, Inpatient psych, or D&A treatment. Spouse reports being medical POA. [facility] [place] [facility]. Spouse can be transport home.

## 2025-04-05 NOTE — NURSING NOTE
VSS.  Pt assisted of the floor with assist of patient care associate and use of walker.  Pt tolerated well.  Provider aware and stat CT of head ordered.  Pt placed in bed with bed alarm on and explained to pt and wife he has to stay in bed and wait for staff assistance before getting oob.  Bed alarm armed and audible.

## 2025-04-05 NOTE — PROGRESS NOTES
Progress Note - Hospitalist   Name: Nino Silva 59 y.o. 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  Diffuse severe white matter changes in bilateral cerebral hemispheres, brainstem and cerebellum related to treatment-related radiation changes.  Negative CTA for large vessel occlusion, dissection, aneurysm or high-grade stenosis.  MRI brain-Too many to count intracranial hemorrhagic metastatic lesions involving supratentorial and infratentorial brain and brainstem, majority of which have enlarged from prior exam with worsening perilesional edema. No midline shift.     Continue with IV Decadron 4 mg Q6  Continue with pantoprazole and  Continue with fall/safety precautions  MRI cervical/lumbar spine  Discussed with oncology, no need for neurosurgery involvement  No need for seizure precautions currently  Palliative involved, poor prognosis, however patient currently treatment focussed         Brain mass  See above   Ambulatory dysfunction  In the setting of progressive metastatic disease  Safe ambulation  Fall precautions    Acute headache  Secondary to metastatic disease progression  Pending evaluation/imaging  For more details, see under small cell lung cancer    VTE Pharmacologic Prophylaxis:   Moderate Risk (Score 3-4) - Pharmacological DVT Prophylaxis  Contraindicated. Sequential Compression Devices Ordered.    Mobility:   Basic Mobility Inpatient Raw Score: 16  JH-HLM Goal: 5: Stand one or more mins  JH-HLM Achieved: 6: Walk 10 steps or more  JH-HLM Goal achieved. Continue to encourage appropriate mobility.    Patient Centered Rounds: I performed bedside rounds with nursing staff today.   Discussions with Specialists or Other Care Team Provider: yes    Education and Discussions with Family / Patient: Updated  (wife) at bedside.    Current Length of Stay: 1 day(s)  Current Patient Status: Inpatient   Certification Statement: The patient will continue to require additional inpatient hospital stay due to pending evaluation  Discharge Plan:  until evaluation is completed     Code Status: Level 1 - Full Code    Subjective   Seen and examined at bedside  Awake and alert  Generally doing well  No focal deficits  Had an episode of headache and morning next    Objective :  Temp:  [97 °F (36.1 °C)-97.2 °F (36.2 °C)] 97 °F (36.1 °C)  HR:  [53-71] 55  BP: (109-119)/(67-83) 119/78  Resp:  [15-17] 17  SpO2:  [92 %-98 %] 93 %  O2 Device: None (Room air)    Body mass index is 27.32 kg/m².     Input and Output Summary (last 24 hours):     Intake/Output Summary (Last 24 hours) at 4/5/2025 1204  Last data filed at 4/5/2025 0840  Gross per 24 hour   Intake 120 ml   Output --   Net 120 ml       Physical Exam  Vitals reviewed.   Constitutional:       Appearance: Normal appearance.   HENT:      Head: Atraumatic.      Mouth/Throat:      Mouth: Mucous membranes are dry.   Eyes:      General: No scleral icterus.  Cardiovascular:      Rate and Rhythm: Normal rate and regular rhythm.      Heart sounds: Normal heart sounds.   Pulmonary:      Effort: No respiratory distress.   Abdominal:      General: Bowel sounds are normal. There is no distension.   Musculoskeletal:      Right lower leg: No edema.      Left lower leg: No edema.   Skin:     General: Skin is dry.      Capillary  Refill: Capillary refill takes less than 2 seconds.   Neurological:      Mental Status: He is alert. Mental status is at baseline.   Psychiatric:         Mood and Affect: Mood normal.           Lines/Drains:              Lab Results: I have reviewed the following results:   Results from last 7 days   Lab Units 04/05/25  0405   WBC Thousand/uL 7.85   HEMOGLOBIN g/dL 12.5   HEMATOCRIT % 37.3   PLATELETS Thousands/uL 302   SEGS PCT % 86*   LYMPHO PCT % 10*   MONO PCT % 3*   EOS PCT % 0     Results from last 7 days   Lab Units 04/05/25  0405 04/04/25  0946   SODIUM mmol/L 139 136   POTASSIUM mmol/L 3.9 3.8   CHLORIDE mmol/L 104 103   CO2 mmol/L 23 24   BUN mg/dL 17 17   CREATININE mg/dL 0.77 0.76   ANION GAP mmol/L 12 9   CALCIUM mg/dL 9.5 9.2   ALBUMIN g/dL  --  4.1   TOTAL BILIRUBIN mg/dL  --  0.57   ALK PHOS U/L  --  43   ALT U/L  --  19   AST U/L  --  16   GLUCOSE RANDOM mg/dL 123 110     Results from last 7 days   Lab Units 04/04/25  0946   INR  1.06     Results from last 7 days   Lab Units 04/05/25  1111 04/05/25  0729 04/04/25  2038 04/04/25  1713   POC GLUCOSE mg/dl 135 126 145* 178*               Recent Cultures (last 7 days):         Imaging Results Review: I reviewed radiology reports from this admission including: MRI brain.  Other Study Results Review: EKG was reviewed.     Last 24 Hours Medication List:     Current Facility-Administered Medications:     acetaminophen (TYLENOL) tablet 650 mg, Q6H PRN    dexamethasone (DECADRON) injection 4 mg, Q6H JESUS    docusate sodium (COLACE) capsule 100 mg, BID    insulin lispro (HumALOG/ADMELOG) 100 units/mL subcutaneous injection 1-5 Units, TID AC **AND** Fingerstick Glucose (POCT), TID AC    insulin lispro (HumALOG/ADMELOG) 100 units/mL subcutaneous injection 1-5 Units, HS    ondansetron (ZOFRAN) injection 4 mg, Q6H PRN    pantoprazole (PROTONIX) EC tablet 40 mg, BID AC    polyethylene glycol (MIRALAX) packet 17 g, Daily    simethicone (MYLICON) chewable tablet 80  mg, 4x Daily PRN    sodium chloride 0.9 % infusion, Continuous, Last Rate: 50 mL/hr (04/05/25 1051)    Administrative Statements   Today, Patient Was Seen By: Deirdre Newton MD  I have spent a total time of 39 minutes in caring for this patient on the day of the visit/encounter including Patient and family education, Importance of tx compliance, Reviewing/placing orders in the medical record (including tests, medications, and/or procedures), Obtaining or reviewing history  , and Communicating with other healthcare professionals .    **Please Note: This note may have been constructed using a voice recognition system.**

## 2025-04-05 NOTE — PLAN OF CARE
Problem: Potential for Falls  Goal: Patient will remain free of falls  Description: INTERVENTIONS:- Educate patient/family on patient safety including physical limitations- Instruct patient to call for assistance with activity - Consult OT/PT to assist with strengthening/mobility - Keep Call bell within reach- Keep bed low and locked with side rails adjusted as appropriate- Keep care items and personal belongings within reach- Initiate and maintain comfort rounds- Make Fall Risk Sign visible to staff- Offer Toileting every 2 Hours, in advance of need- Initiate/Maintain alarm- Obtain necessary fall risk management equipment: - Apply yellow socks and bracelet for high fall risk patients- Consider moving patient to room near nurses station  INTERVENTIONS:- Educate patient/family on patient safety including physical limitations- Instruct patient to call for assistance with activity - Consult OT/PT to assist with strengthening/mobility - Keep Call bell within reach- Keep bed low and locked with side rails adjusted as appropriate- Keep care items and personal belongings within reach- Initiate and maintain comfort rounds- Make Fall Risk Sign visible to staff- Offer Toileting every 2 Hours, in advance of need- Initiate/Maintain alarm- Obtain necessary fall risk management equipment: - Apply yellow socks and bracelet for high fall risk patients- Consider moving patient to room near nurses station  Outcome: Progressing     Problem: PAIN - ADULT  Goal: Verbalizes/displays adequate comfort level or baseline comfort level  Description: Interventions:- Encourage patient to monitor pain and request assistance- Assess pain using appropriate pain scale- Administer analgesics based on type and severity of pain and evaluate response- Implement non-pharmacological measures as appropriate and evaluate response- Consider cultural and social influences on pain and pain management- Notify physician/advanced practitioner if interventions  unsuccessful or patient reports new pain  Outcome: Progressing     Problem: INFECTION - ADULT  Goal: Absence or prevention of progression during hospitalization  Description: INTERVENTIONS:- Assess and monitor for signs and symptoms of infection- Monitor lab/diagnostic results- Monitor all insertion sites, i.e. indwelling lines, tubes, and drains- Monitor endotracheal if appropriate and nasal secretions for changes in amount and color- [place] appropriate cooling/warming therapies per order- Administer medications as ordered- Instruct and encourage patient and family to use good hand hygiene technique- Identify and instruct in appropriate isolation precautions for identified infection/condition  Outcome: Progressing  Goal: Absence of fever/infection during neutropenic period  Description: INTERVENTIONS:- Monitor WBC  Outcome: Progressing     Problem: SAFETY ADULT  Goal: Patient will remain free of falls  Description: INTERVENTIONS:- Educate patient/family on patient safety including physical limitations- Instruct patient to call for assistance with activity - Consult OT/PT to assist with strengthening/mobility - Keep Call bell within reach- Keep bed low and locked with side rails adjusted as appropriate- Keep care items and personal belongings within reach- Initiate and maintain comfort rounds- Make Fall Risk Sign visible to staff- Offer Toileting every 2 Hours, in advance of need- Initiate/Maintain alarm- Obtain necessary fall risk management equipment: - Apply yellow socks and bracelet for high fall risk patients- Consider moving patient to room near nurses station  INTERVENTIONS:- Educate patient/family on patient safety including physical limitations- Instruct patient to call for assistance with activity - Consult OT/PT to assist with strengthening/mobility - Keep Call bell within reach- Keep bed low and locked with side rails adjusted as appropriate- Keep care items and personal belongings within reach-  Initiate and maintain comfort rounds- Make Fall Risk Sign visible to staff- Offer Toileting every 2 Hours, in advance of need- Initiate/Maintain alarm- Obtain necessary fall risk management equipment: - Apply yellow socks and bracelet for high fall risk patients- Consider moving patient to room near nurses station  Outcome: Progressing  Goal: Maintain or return to baseline ADL function  Description: INTERVENTIONS:-  Assess patient's ability to carry out ADLs; assess patient's baseline for ADL function and identify physical deficits which impact ability to perform ADLs (bathing, care of mouth/teeth, toileting, grooming, dressing, etc.)- Assess/evaluate cause of self-care deficits - Assess range of motion- Assess patient's mobility; develop plan if impaired- Assess patient's need for assistive devices and provide as appropriate- Encourage maximum independence but intervene and supervise when necessary- Involve family in performance of ADLs- Assess for home care needs following discharge - Consider OT consult to assist with ADL evaluation and planning for discharge- Provide patient education as appropriate  Outcome: Progressing  Goal: Maintains/Returns to pre admission functional level  Description: INTERVENTIONS:- Perform AM-PAC 6 Click Basic Mobility/ Daily Activity assessment daily.- Set and communicate daily mobility goal to care team and patient/family/caregiver. - Collaborate with rehabilitation services on mobility goals if consulted- Perform Range of Motion 3 times a day.- Reposition patient every 2 hours.- Dangle patient 3 times a day- Stand patient 3 times a day- Ambulate patient  times a day- Out of bed to chair 3 times a day - Out of bed for meals  times a day- Out of bed for toileting- Record patient progress and toleration of activity level   Outcome: Progressing     Problem: DISCHARGE PLANNING  Goal: Discharge to home or other facility with appropriate resources  Description: INTERVENTIONS:- Identify  barriers to discharge w/patient and caregiver- Arrange for needed discharge resources and transportation as appropriate- Identify discharge learning needs (meds, wound care, etc.)- Arrange for interpretive services to assist at discharge as needed- Refer to Case Management Department for coordinating discharge planning if the patient needs post-hospital services based on physician/advanced practitioner order or complex needs related to functional status, cognitive ability, or social support system  Outcome: Progressing     Problem: Knowledge Deficit  Goal: Patient/family/caregiver demonstrates understanding of disease process, treatment plan, medications, and discharge instructions  Description: Complete learning assessment and assess knowledge base.Interventions:- Provide teaching at level of understanding- Provide teaching via preferred learning methods  Outcome: Progressing

## 2025-04-05 NOTE — PLAN OF CARE
Problem: Potential for Falls  Goal: Patient will remain free of falls  Description: INTERVENTIONS:- Educate patient/family on patient safety including physical limitations- Instruct patient to call for assistance with activity - Consult OT/PT to assist with strengthening/mobility - Keep Call bell within reach- Keep bed low and locked with side rails adjusted as appropriate- Keep care items and personal belongings within reach- Initiate and maintain comfort rounds- Make Fall Risk Sign visible to staff- Offer Toileting every x Hours, in advance of need- Initiate/Maintain xalarm- Obtain necessary fall risk management equipment: x- Apply yellow socks and bracelet for high fall risk patients- Consider moving patient to room near nurses station  INTERVENTIONS:- Educate patient/family on patient safety including physical limitations- Instruct patient to call for assistance with activity - Consult OT/PT to assist with strengthening/mobility - Keep Call bell within reach- Keep bed low and locked with side rails adjusted as appropriate- Keep care items and personal belongings within reach- Initiate and maintain comfort rounds- Make Fall Risk Sign visible to staff- Offer Toileting every x Hours, in advance of need- Initiate/Maintain xalarm- Obtain necessary fall risk management equipment: x- Apply yellow socks and bracelet for high fall risk patients- Consider moving patient to room near nurses station  Outcome: Progressing     Problem: PAIN - ADULT  Goal: Verbalizes/displays adequate comfort level or baseline comfort level  Description: Interventions:- Encourage patient to monitor pain and request assistance- Assess pain using appropriate pain scale- Administer analgesics based on type and severity of pain and evaluate response- Implement non-pharmacological measures as appropriate and evaluate response- Consider cultural and social influences on pain and pain management- Notify physician/advanced practitioner if  interventions unsuccessful or patient reports new pain  Outcome: Progressing     Problem: INFECTION - ADULT  Goal: Absence or prevention of progression during hospitalization  Description: INTERVENTIONS:- Assess and monitor for signs and symptoms of infection- Monitor lab/diagnostic results- Monitor all insertion sites, i.e. indwelling lines, tubes, and drains- Monitor endotracheal if appropriate and nasal secretions for changes in amount and color- [place] appropriate cooling/warming therapies per order- Administer medications as ordered- Instruct and encourage patient and family to use good hand hygiene technique- Identify and instruct in appropriate isolation precautions for identified infection/condition  Outcome: Progressing  Goal: Absence of fever/infection during neutropenic period  Description: INTERVENTIONS:- Monitor WBC  Outcome: Progressing     Problem: SAFETY ADULT  Goal: Patient will remain free of falls  Description: INTERVENTIONS:- Educate patient/family on patient safety including physical limitations- Instruct patient to call for assistance with activity - Consult OT/PT to assist with strengthening/mobility - Keep Call bell within reach- Keep bed low and locked with side rails adjusted as appropriate- Keep care items and personal belongings within reach- Initiate and maintain comfort rounds- Make Fall Risk Sign visible to staff- Offer Toileting every x Hours, in advance of need- Initiate/Maintain xalarm- Obtain necessary fall risk management equipment: x- Apply yellow socks and bracelet for high fall risk patients- Consider moving patient to room near nurses station  INTERVENTIONS:- Educate patient/family on patient safety including physical limitations- Instruct patient to call for assistance with activity - Consult OT/PT to assist with strengthening/mobility - Keep Call bell within reach- Keep bed low and locked with side rails adjusted as appropriate- Keep care items and personal belongings  within reach- Initiate and maintain comfort rounds- Make Fall Risk Sign visible to staff- Offer Toileting every x Hours, in advance of need- Initiate/Maintain xalarm- Obtain necessary fall risk management equipment: x- Apply yellow socks and bracelet for high fall risk patients- Consider moving patient to room near nurses station  Outcome: Progressing  Goal: Maintain or return to baseline ADL function  Description: INTERVENTIONS:-  Assess patient's ability to carry out ADLs; assess patient's baseline for ADL function and identify physical deficits which impact ability to perform ADLs (bathing, care of mouth/teeth, toileting, grooming, dressing, etc.)- Assess/evaluate cause of self-care deficits - Assess range of motion- Assess patient's mobility; develop plan if impaired- Assess patient's need for assistive devices and provide as appropriate- Encourage maximum independence but intervene and supervise when necessary- Involve family in performance of ADLs- Assess for home care needs following discharge - Consider OT consult to assist with ADL evaluation and planning for discharge- Provide patient education as appropriate  Outcome: Progressing  Goal: Maintains/Returns to pre admission functional level  Description: INTERVENTIONS:- Perform AM-PAC 6 Click Basic Mobility/ Daily Activity assessment daily.- Set and communicate daily mobility goal to care team and patient/family/caregiver. - Collaborate with rehabilitation services on mobility goals if consulted- Perform Range of Motion x times a day.- Reposition patient every x hours.- Dangle patient x times a day- Stand patient x times a day- Ambulate patient x times a day- Out of bed to chair x times a day - Out of bed for meals x times a day- Out of bed for toileting- Record patient progress and toleration of activity level   Outcome: Progressing     Problem: DISCHARGE PLANNING  Goal: Discharge to home or other facility with appropriate resources  Description:  INTERVENTIONS:- Identify barriers to discharge w/patient and caregiver- Arrange for needed discharge resources and transportation as appropriate- Identify discharge learning needs (meds, wound care, etc.)- Arrange for interpretive services to assist at discharge as needed- Refer to Case Management Department for coordinating discharge planning if the patient needs post-hospital services based on physician/advanced practitioner order or complex needs related to functional status, cognitive ability, or social support system  Outcome: Progressing     Problem: Knowledge Deficit  Goal: Patient/family/caregiver demonstrates understanding of disease process, treatment plan, medications, and discharge instructions  Description: Complete learning assessment and assess knowledge base.Interventions:- Provide teaching at level of understanding- Provide teaching via preferred learning methods  Outcome: Progressing

## 2025-04-05 NOTE — NURSING NOTE
"Called to room by pt's wife with use of the emergency light.  Upon entering the room pt sitting on the floor with back facing wall going to the bathroom with wife supporting him by holding his head.  Pt IV still attached to his right AC and plugged to the right side of his bed.  Pt found at the bathroom door which is on the left side of the bed.  Asked pt wife while pt was still attached to his IV if he was going to the bathroom, pt wife responded \"he would not wait for me\".  Upon further assessment pt's wife sated she was walking pt to the bathroom and his legs \"give out\"  Pt AAOX4. No loss of consciousness, no lump, bump or bruises to the occipital aspect of his head.  Pt wife said he did hit his head on the wall.  Pt moving all extremities no distress noted.  Will assess vitals signs and notify provided.   "

## 2025-04-05 NOTE — UTILIZATION REVIEW
"Initial Clinical Review    Admission: Date/Time/Statement:   Admission Orders (From admission, onward)       Ordered        04/04/25 1225  INPATIENT ADMISSION  Once                          Orders Placed This Encounter   Procedures    INPATIENT ADMISSION     Standing Status:   Standing     Number of Occurrences:   1     Level of Care:   Med Surg [16]     Estimated length of stay:   More than 2 Midnights     Certification:   I certify that inpatient services are medically necessary for this patient for a duration of greater than two midnights. See H&P and MD Progress Notes for additional information about the patient's course of treatment.     ED Arrival Information       Expected   -    Arrival   4/4/2025 08:57    Acuity   Urgent              Means of arrival   Wheelchair    Escorted by   Spouse    Service   Hospitalist    Admission type   Emergency              Arrival complaint   headache  difficulty swallowing             Chief Complaint   Patient presents with    Headache     Pt arrives with wife with complains of difficulty with swallowing and \"I came on Wednesday for hydration but they aren't open in the weekend\". I'm still having problems swallowing since three weeks ago. Pt reports having a headache since Wednesday after infusion.        Initial Presentation: 59 y.o. male presents to ED from home with difficulty swallowing for past  3 weeks, has headache for past  3 days.  PMH  is metastatic NSCLC, on chemo.  Ct head/neck shows metastatic lesions, increased in size.  Symptoms concerning for disease progression vs side effects of chemo. Admit  Ip with SCLC  with mets to brain and bone, Brain mass, Ambulatory dysfunction and Acute headache and plan is  MRI brain/C/spine/t/spine and l/s spine, fall precautions,  IV  decadron, no need for seizure precautions at present.    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  However, concern at present is neurotoxicity related to tarlatamab/ICANS vs leptomeningeal spread     LP this afternoon         Anticipated Length of Stay/Certification Statement: Patient will be admitted on an inpatient basis with an anticipated length of stay of greater than 2 midnights secondary to ambulatory dysfunction, headache, visual disturbance, metastatic lung carcinoma.     Date:    4/5   Day 2:   Continue  IV  decadron.  Palliative care involvedd, prognosis poor, patient treatment focused.  Headache this am.   Continue all current meds at present.    ED Treatment-Medication Administration from 04/04/2025 0857 to 04/04/2025 1948         Date/Time Order Dose Route Action     04/04/2025 0956 sodium chloride 0.9 % bolus 1,000 mL 1,000 mL Intravenous New Bag     04/04/2025 0958 acetaminophen (Ofirmev) injection 1,000 mg 1,000 mg Intravenous New Bag     04/04/2025 0957 dexamethasone (PF) (DECADRON) injection 10 mg 10 mg Intravenous Given     04/04/2025 1017 iohexol (OMNIPAQUE) 350 MG/ML injection (MULTI-DOSE) 100 mL 85 mL Intravenous Given     04/04/2025 1814 docusate sodium (COLACE) capsule 100 mg 100 mg Oral Given     04/04/2025 1631 polyethylene glycol (MIRALAX) packet 17 g 17 g Oral Given     04/04/2025 1550 Gadobutrol injection (SINGLE-DOSE) SOLN 8 mL 8 mL Intravenous Given     04/04/2025 1814 dexamethasone (DECADRON) injection 4 mg 4 mg Intravenous Given     04/04/2025 1631 pantoprazole (PROTONIX) EC tablet 40 mg 40 mg Oral Given            Scheduled Medications:  dexamethasone, 4 mg, Intravenous, Q6H JESUS  docusate sodium, 100 mg, Oral, BID  insulin lispro, 1-5 Units, Subcutaneous, TID AC  insulin lispro, 1-5 Units, Subcutaneous, HS  pantoprazole, 40 mg, Oral, BID AC  polyethylene glycol, 17 g, Oral, Daily      Continuous IV Infusions:  sodium chloride, 75 mL/hr, Intravenous, Continuous      PRN Meds:  acetaminophen, 650 mg, Oral, Q6H  PRN  ondansetron, 4 mg, Intravenous, Q6H PRN  simethicone, 80 mg, Oral, 4x Daily PRN      ED Triage Vitals [04/04/25 0903]   Temperature Pulse Respirations Blood Pressure SpO2 Pain Score   98.1 °F (36.7 °C) 63 20 98/72 96 % 5     Weight (last 2 days)       Date/Time Weight    04/04/25 2311 86.4 (190.4)            Vital Signs (last 3 days)       Date/Time Temp Pulse Resp BP MAP (mmHg) SpO2 O2 Device Patient Position - Orthostatic VS Dipesh Coma Scale Score Pain    04/05/25 15:41:22 -- 49 -- 113/71 85 93 % -- -- -- --    04/05/25 1530 -- 50 -- -- -- 96 % -- -- -- --    04/05/25 1525 -- 51 -- 114/76 89 92 % -- -- -- --    04/05/25 1520 -- 50 -- -- -- 94 % -- -- -- --    04/05/25 1515 -- 51 -- 111/75 87 95 % -- -- -- --    04/05/25 15:10:17 97.2 °F (36.2 °C) 52 16 111/75 87 94 % -- Lying -- --    04/05/25 14:49:45 -- 63 -- 110/77 88 95 % -- -- -- --    04/05/25 14:43:21 -- 60 -- 124/79 94 93 % -- Sitting - Orthostatic VS -- --    04/05/25 1441 -- 54 -- 117/70 -- -- -- Lying - Orthostatic VS -- --    04/05/25 14:40:53 97 °F (36.1 °C) 57 18 129/82 98 93 % -- Other (Comment) -- --    04/05/25 1100 -- -- -- -- -- -- None (Room air) -- 15 No Pain    04/05/25 07:55:41 97 °F (36.1 °C) 55 -- 119/78 92 93 % -- -- -- --    04/04/25 2311 97.2 °F (36.2 °C) 61 17 112/78 -- -- -- -- -- --    04/04/25 2131 -- -- -- -- -- -- None (Room air) -- -- --    04/04/25 20:06:18 97.2 °F (36.2 °C) 61 -- 112/78 89 94 % -- -- -- --    04/04/25 20:06:09 -- 69 -- 112/78 89 92 % -- -- -- No Pain    04/04/25 1730 -- 65 17 109/73 87 95 % None (Room air) -- -- --    04/04/25 1711 -- 71 15 116/67 -- 96 % None (Room air) -- -- 1    04/04/25 1632 -- 60 17 113/78 -- 96 % None (Room air) -- -- 1    04/04/25 1356 -- 53 17 114/83 -- 98 % None (Room air) Lying -- --    04/04/25 1153 -- -- -- -- -- -- -- -- 15 --    04/04/25 1148 -- 50 15 114/81 -- 96 % None (Room air) Lying -- --    04/04/25 1114 -- -- -- -- -- -- -- -- -- 4    04/04/25 1033 -- 59 -- 109/67 --  -- -- Standing - Orthostatic VS -- --    04/04/25 1030 -- 56 -- 108/74 -- -- -- Sitting - Orthostatic VS -- --    04/04/25 1029 -- 51 16 102/72 -- 96 % None (Room air) Lying - Orthostatic VS -- --    04/04/25 1000 -- -- -- -- -- -- None (Room air) -- 15 --    04/04/25 0903 98.1 °F (36.7 °C) 63 20 98/72 -- 96 % None (Room air) Sitting -- 5              Pertinent Labs/Diagnostic Test Results:   Radiology:  CT head wo contrast   Final Interpretation by Mirza Crane MD (04/05 1510)      Stable interval exam. No acute hemorrhage or mass effect.                  Workstation performed: [id number]         MRI brain w wo contrast   Final Interpretation by Chau Al MD (04/04 1702)      1.  Too many to count intracranial hemorrhagic metastatic lesions involving supratentorial and infratentorial brain and brainstem, majority of which have enlarged from prior exam with worsening perilesional edema. No midline shift.   2.  Confluent white matter FLAIR hyperintensity, likely combination of worsened perilesional edema and posttreatment changes.      Workstation performed: [id number]         CTA head and neck with and without contrast   Final Interpretation by Gian Wade MD (04/04 1101)      CT Brain:   - Scattered hyperdense hemorrhagic metastatic lesions in left frontal lobe and brainstem have increased in size since MRI brain 2/5/2024 - the number of metastatic lesions is underestimated by CT when compared to MRI brain. Recommend follow-up MRI brain    with and without contrast for further evaluation.   - Diffuse severe white matter changes in bilateral cerebral hemispheres, brainstem, and cerebellum likely related to treatment-related radiation changes with scattered areas of vasogenic edema.      CT Angiography:   - Negative CTA head and neck for large vessel occlusion, dissection, aneurysm, or high-grade stenosis.      Known large right upper lobe lung mass, compatible with primary lung malignancy.  Probable metastatic mediastinal and right hilar lymphadenopathy. Recommend follow-up CT chest with contrast for further evaluation.      Additional chronic/incidental findings as detailed above.      The study was marked in EPIC for immediate notification.                        Workstation performed: [id number]         FL IN-patient lumbar puncture    (Results Pending)   MRI inpatient order    (Results Pending)   MRI inpatient order    (Results Pending)     Cardiology:  ECG 12 lead   Final Result by Shade Prater MD (04/04 1302)   Normal sinus rhythm   ST & T wave abnormality, consider inferior ischemia   ST & T wave abnormality, consider anterolateral ischemia   Abnormal ECG   When compared with ECG of 04-Mar-2024 10:39,   Inverted T waves have replaced nonspecific T wave abnormality in Lateral    leads   Confirmed by Shade Prater [id number]) on 4/4/2025 1:02:54 PM            Results from last 7 days   Lab Units 04/05/25  0405 04/04/25  0946   WBC Thousand/uL 7.85 8.59   HEMOGLOBIN g/dL 12.5 12.5   HEMATOCRIT % 37.3 36.5   PLATELETS Thousands/uL 302 276   TOTAL NEUT ABS Thousands/µL 6.75 6.81         Results from last 7 days   Lab Units 04/05/25  0405 04/04/25  0946   SODIUM mmol/L 139 136   POTASSIUM mmol/L 3.9 3.8   CHLORIDE mmol/L 104 103   CO2 mmol/L 23 24   ANION GAP mmol/L 12 9   BUN mg/dL 17 17   CREATININE mg/dL 0.77 0.76   EGFR ml/min/1.73sq m 98 99   CALCIUM mg/dL 9.5 9.2   MAGNESIUM mg/dL 2.1 2.0     Results from last 7 days   Lab Units 04/04/25  0946   AST U/L 16   ALT U/L 19   ALK PHOS U/L 43   TOTAL PROTEIN g/dL 7.5   ALBUMIN g/dL 4.1   TOTAL BILIRUBIN mg/dL 0.57     Results from last 7 days   Lab Units 04/05/25  1540 04/05/25  1111 04/05/25  0729 04/04/25  2038 04/04/25  1713   POC GLUCOSE mg/dl 129 135 126 145* 178*     Results from last 7 days   Lab Units 04/05/25  0405 04/04/25  0946   GLUCOSE RANDOM mg/dL 123 110           Results from last 7 days   Lab Units 04/04/25  1150 04/04/25  0946    HS TNI 0HR ng/L  --  9   HS TNI 2HR ng/L 9  --    HSTNI D2 ng/L 0  --          Results from last 7 days   Lab Units 04/04/25  0946   PROTIME seconds 14.3   INR  1.06   PTT seconds 39*     Results from last 7 days   Lab Units 04/04/25  1150 04/04/25  0946   TSH 3RD GENERATON uIU/mL 0.137* 0.219*           History reviewed. No pertinent past medical history.  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 male    Network Utilization Review Department  ATTENTION: Please call with any questions or concerns to [phone] and carefully listen to the prompts so that you are directed to the right person. All voicemails are confidential.   For Discharge needs, contact Care Management DC Support Team at [phone] opt. 2  Send all requests for admission clinical reviews, approved or denied determinations and any other requests to dedicated fax number below belonging to the campus where the patient is receiving treatment. List of dedicated fax numbers for the Facilities:  FACILITY NAME UR FAX NUMBER   ADMISSION DENIALS (Administrative/Medical Necessity) [phone]   DISCHARGE SUPPORT TEAM (NETWORK) [phone]   PARENT CHILD HEALTH (Maternity/NICU/Pediatrics) [phone]   [facility] [phone]   [facility] [phone]   [facility] [phone]   [facility] [phone]   [facility] [phone]   [facility] [phone]   [facility] [phone]   [facility]  [facility] [phone]   [facility] [phone]   [facility] [phone]   [facility] [phone]   [facility] [phone]

## 2025-04-05 NOTE — ASSESSMENT & PLAN NOTE
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  Diffuse severe white matter changes in bilateral cerebral hemispheres, brainstem and cerebellum related to treatment-related radiation changes.  Negative CTA for large vessel occlusion, dissection, aneurysm or high-grade stenosis.  MRI brain-Too many to count intracranial hemorrhagic metastatic lesions involving supratentorial and infratentorial brain and brainstem, majority of which have enlarged from prior exam with worsening perilesional edema. No midline shift.     Continue with IV Decadron 4 mg Q6  Continue with pantoprazole and  Continue with fall/safety precautions  MRI cervical/lumbar spine  Discussed with oncology, no need for neurosurgery involvement  No need for seizure precautions currently  Palliative involved, poor prognosis, however patient currently treatment focussed

## 2025-04-06 ENCOUNTER — APPOINTMENT (INPATIENT)
Dept: MRI IMAGING | Facility: HOSPITAL | Age: 60
DRG: 694 | End: 2025-04-06
Payer: COMMERCIAL

## 2025-04-06 ENCOUNTER — APPOINTMENT (INPATIENT)
Dept: CT IMAGING | Facility: HOSPITAL | Age: 60
DRG: 694 | End: 2025-04-06
Payer: COMMERCIAL

## 2025-04-06 LAB
ANION GAP SERPL CALCULATED.3IONS-SCNC: 7 MMOL/L (ref 4–13)
BASOPHILS # BLD AUTO: 0.01 THOUSANDS/ÂΜL (ref 0–0.1)
BASOPHILS NFR BLD AUTO: 0 % (ref 0–1)
BUN SERPL-MCNC: 25 MG/DL (ref 5–25)
CALCIUM SERPL-MCNC: 9.3 MG/DL (ref 8.4–10.2)
CHLORIDE SERPL-SCNC: 105 MMOL/L (ref 96–108)
CO2 SERPL-SCNC: 26 MMOL/L (ref 21–32)
CREAT SERPL-MCNC: 0.82 MG/DL (ref 0.6–1.3)
EOSINOPHIL # BLD AUTO: 0 THOUSAND/ÂΜL (ref 0–0.61)
EOSINOPHIL NFR BLD AUTO: 0 % (ref 0–6)
ERYTHROCYTE [DISTWIDTH] IN BLOOD BY AUTOMATED COUNT: 13.2 % (ref 11.6–15.1)
GFR SERPL CREATININE-BSD FRML MDRD: 96 ML/MIN/1.73SQ M
GLUCOSE SERPL-MCNC: 118 MG/DL (ref 65–140)
GLUCOSE SERPL-MCNC: 122 MG/DL (ref 65–140)
GLUCOSE SERPL-MCNC: 125 MG/DL (ref 65–140)
GLUCOSE SERPL-MCNC: 131 MG/DL (ref 65–140)
GLUCOSE SERPL-MCNC: 145 MG/DL (ref 65–140)
GLUCOSE SERPL-MCNC: 147 MG/DL (ref 65–140)
HCT VFR BLD AUTO: 34.5 % (ref 36.5–49.3)
HGB BLD-MCNC: 11.7 G/DL (ref 12–17)
IMM GRANULOCYTES # BLD AUTO: 0.05 THOUSAND/UL (ref 0–0.2)
IMM GRANULOCYTES NFR BLD AUTO: 1 % (ref 0–2)
LYMPHOCYTES # BLD AUTO: 0.62 THOUSANDS/ÂΜL (ref 0.6–4.47)
LYMPHOCYTES NFR BLD AUTO: 6 % (ref 14–44)
MCH RBC QN AUTO: 31.5 PG (ref 26.8–34.3)
MCHC RBC AUTO-ENTMCNC: 33.9 G/DL (ref 31.4–37.4)
MCV RBC AUTO: 93 FL (ref 82–98)
MONOCYTES # BLD AUTO: 0.36 THOUSAND/ÂΜL (ref 0.17–1.22)
MONOCYTES NFR BLD AUTO: 4 % (ref 4–12)
NEUTROPHILS # BLD AUTO: 8.76 THOUSANDS/ÂΜL (ref 1.85–7.62)
NEUTS SEG NFR BLD AUTO: 89 % (ref 43–75)
NRBC BLD AUTO-RTO: 0 /100 WBCS
PLATELET # BLD AUTO: 284 THOUSANDS/UL (ref 149–390)
PMV BLD AUTO: 10 FL (ref 8.9–12.7)
POTASSIUM SERPL-SCNC: 4.3 MMOL/L (ref 3.5–5.3)
RBC # BLD AUTO: 3.71 MILLION/UL (ref 3.88–5.62)
SODIUM SERPL-SCNC: 138 MMOL/L (ref 135–147)
WBC # BLD AUTO: 9.8 THOUSAND/UL (ref 4.31–10.16)

## 2025-04-06 PROCEDURE — 70450 CT HEAD/BRAIN W/O DYE: CPT

## 2025-04-06 PROCEDURE — 80048 BASIC METABOLIC PNL TOTAL CA: CPT | Performed by: INTERNAL MEDICINE

## 2025-04-06 PROCEDURE — 99232 SBSQ HOSP IP/OBS MODERATE 35: CPT | Performed by: INTERNAL MEDICINE

## 2025-04-06 PROCEDURE — 82948 REAGENT STRIP/BLOOD GLUCOSE: CPT

## 2025-04-06 PROCEDURE — 85025 COMPLETE CBC W/AUTO DIFF WBC: CPT | Performed by: INTERNAL MEDICINE

## 2025-04-06 PROCEDURE — 72156 MRI NECK SPINE W/O & W/DYE: CPT

## 2025-04-06 PROCEDURE — A9585 GADOBUTROL INJECTION: HCPCS | Performed by: INTERNAL MEDICINE

## 2025-04-06 RX ORDER — GADOBUTROL 604.72 MG/ML
9 INJECTION INTRAVENOUS
Status: COMPLETED | OUTPATIENT
Start: 2025-04-06 | End: 2025-04-06

## 2025-04-06 RX ADMIN — POLYETHYLENE GLYCOL 3350 17 G: 17 POWDER, FOR SOLUTION ORAL at 08:48

## 2025-04-06 RX ADMIN — PANTOPRAZOLE SODIUM 40 MG: 40 TABLET, DELAYED RELEASE ORAL at 05:48

## 2025-04-06 RX ADMIN — DEXAMETHASONE SODIUM PHOSPHATE 4 MG: 4 INJECTION, SOLUTION INTRAMUSCULAR; INTRAVENOUS at 12:29

## 2025-04-06 RX ADMIN — DEXAMETHASONE SODIUM PHOSPHATE 4 MG: 4 INJECTION, SOLUTION INTRAMUSCULAR; INTRAVENOUS at 05:48

## 2025-04-06 RX ADMIN — DEXAMETHASONE SODIUM PHOSPHATE 4 MG: 4 INJECTION, SOLUTION INTRAMUSCULAR; INTRAVENOUS at 16:53

## 2025-04-06 RX ADMIN — GADOBUTROL 9 ML: 604.72 INJECTION INTRAVENOUS at 11:40

## 2025-04-06 RX ADMIN — DEXAMETHASONE SODIUM PHOSPHATE 4 MG: 4 INJECTION, SOLUTION INTRAMUSCULAR; INTRAVENOUS at 00:32

## 2025-04-06 RX ADMIN — DEXAMETHASONE SODIUM PHOSPHATE 4 MG: 4 INJECTION, SOLUTION INTRAMUSCULAR; INTRAVENOUS at 23:54

## 2025-04-06 RX ADMIN — PANTOPRAZOLE SODIUM 40 MG: 40 TABLET, DELAYED RELEASE ORAL at 16:53

## 2025-04-06 NOTE — ASSESSMENT & PLAN NOTE
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  Diffuse severe white matter changes in bilateral cerebral hemispheres, brainstem and cerebellum related to treatment-related radiation changes.  Negative CTA for large vessel occlusion, dissection, aneurysm or high-grade stenosis.  MRI brain-Too many to count intracranial hemorrhagic metastatic lesions involving supratentorial and infratentorial brain and brainstem, majority of which have enlarged from prior exam with worsening perilesional edema. No midline shift.     Continue with IV Decadron 4 mg Q6  Continue with pantoprazole and  Continue with fall/safety precautions  MRI cervical/lumbar spine-pending  Discussed with oncology, no need for neurosurgery involvement  No need for seizure precautions currently  Palliative involved, poor prognosis, however patient currently treatment focussed

## 2025-04-06 NOTE — PLAN OF CARE
Problem: PAIN - ADULT  Goal: Verbalizes/displays adequate comfort level or baseline comfort level  Description: Interventions:- Encourage patient to monitor pain and request assistance- Assess pain using appropriate pain scale- Administer analgesics based on type and severity of pain and evaluate response- Implement non-pharmacological measures as appropriate and evaluate response- Consider cultural and social influences on pain and pain management- Notify physician/advanced practitioner if interventions unsuccessful or patient reports new pain  Outcome: Progressing     Problem: SAFETY ADULT  Goal: Patient will remain free of falls  Description: INTERVENTIONS:- Educate patient/family on patient safety including physical limitations- Instruct patient to call for assistance with activity - Consult OT/PT to assist with strengthening/mobility - Keep Call bell within reach- Keep bed low and locked with side rails adjusted as appropriate- Keep care items and personal belongings within reach- Initiate and maintain comfort rounds- Make Fall Risk Sign visible to staff- Offer Toileting every 2 Hours, in advance of need- Initiate/Maintain bed alarm- Obtain necessary fall risk management equipment: - Apply yellow socks and bracelet for high fall risk patients- Consider moving patient to room near nurses station  INTERVENTIONS:- Educate patient/family on patient safety including physical limitations- Instruct patient to call for assistance with activity - Consult OT/PT to assist with strengthening/mobility - Keep Call bell within reach- Keep bed low and locked with side rails adjusted as appropriate- Keep care items and personal belongings within reach- Initiate and maintain comfort rounds- Make Fall Risk Sign visible to staff- Offer Toileting every 2 Hours, in advance of need- Initiate/Maintain bed alarm- Obtain necessary fall risk management equipment: - Apply yellow socks and bracelet for high fall risk patients- Consider  moving patient to room near nurses station  Outcome: Progressing  Goal: Maintain or return to baseline ADL function  Description: INTERVENTIONS:-  Assess patient's ability to carry out ADLs; assess patient's baseline for ADL function and identify physical deficits which impact ability to perform ADLs (bathing, care of mouth/teeth, toileting, grooming, dressing, etc.)- Assess/evaluate cause of self-care deficits - Assess range of motion- Assess patient's mobility; develop plan if impaired- Assess patient's need for assistive devices and provide as appropriate- Encourage maximum independence but intervene and supervise when necessary- Involve family in performance of ADLs- Assess for home care needs following discharge - Consider OT consult to assist with ADL evaluation and planning for discharge- Provide patient education as appropriate  Outcome: Progressing

## 2025-04-06 NOTE — QUICK NOTE
Called to bedside as patient awoke in the [place] and did not know where he was or who he was.  He was quickly reoriented after 5 minutes.  Patient seen and examined at bedside.  He is oriented x 4 and is fully aware of the situation.  Motor is 5/5 strength in bilateral upper and lower extremities.  Sensation is equal and intact in bilateral upper and lower extremities.  No drift to B/L UE and LE.  No facial sensory deficit or facial droop.  EOMI.  PERRL.  Neuroexam is reassuring.  Discussed this with patient.  I did offer CT head if it would put his mind at ease as he does have known extensive brain metastasis.  Patient and family opting to have repeat CTh.

## 2025-04-06 NOTE — CONSULTS
Consult- nutrition assessment   Spoke with pt and family. Reports great appetite/intake at home, eating 3 meals/day. Difficulty swallowing liquids since chemotherapy treatments (no issues with solids reported). Noted SLP assessment 4/4 - rec regular diet, thin liquids. Pt states he needs to take small individual sips.   Weights reviewed, some fluctuations but no significant changes x 1 year   Pt reporting good PO intake since admission.   Discussed ways to maintain hydration and adequate PO intake. Pt/family receptive

## 2025-04-06 NOTE — PROGRESS NOTES
Progress Note - Hospitalist   Name: Nino Silva 59 y.o. 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  Diffuse severe white matter changes in bilateral cerebral hemispheres, brainstem and cerebellum related to treatment-related radiation changes.  Negative CTA for large vessel occlusion, dissection, aneurysm or high-grade stenosis.  MRI brain-Too many to count intracranial hemorrhagic metastatic lesions involving supratentorial and infratentorial brain and brainstem, majority of which have enlarged from prior exam with worsening perilesional edema. No midline shift.     Continue with IV Decadron 4 mg Q6  Continue with pantoprazole and  Continue with fall/safety precautions  MRI cervical/lumbar spine-pending  Discussed with oncology, no need for neurosurgery involvement  No need for seizure precautions currently  Palliative involved, poor prognosis, however patient currently treatment focussed         Brain mass  See above   Ambulatory dysfunction  In the setting of progressive metastatic disease  Safe ambulation  Fall precautions    Acute headache  Secondary to metastatic disease progression  Pending evaluation/imaging  For more details, see under small cell lung cancer    VTE Pharmacologic Prophylaxis:   Moderate Risk (Score 3-4) - Pharmacological DVT Prophylaxis  Contraindicated. Sequential Compression Devices Ordered.    Mobility:   Basic Mobility Inpatient Raw Score: 16  JH-HLM Goal: 5: Stand one or more mins  JH-HLM Achieved: 5: Stand (1 or more minutes)  JH-HLM Goal achieved. Continue to encourage appropriate mobility.    Patient Centered Rounds: I performed bedside rounds with nursing staff today.   Discussions with Specialists or Other Care Team Provider: yes    Education and Discussions with Family / Patient: Updated  (wife) at bedside.    Current Length of Stay: 2 day(s)  Current Patient Status: Inpatient   Certification Statement: The patient will continue to require additional inpatient hospital stay due to pending evaluation  Discharge Plan:  until evaluation is completed     Code Status: Level 1 - Full Code    Subjective   Seen and examined at bedside  Awake and alert  Generally doing well  No focal deficits  Had a fall, followed by decrease mental concentration/confusion however back-to-back repeated CT heads were unremarkable without any neurochanges    Objective :  Temp:  [97 °F (36.1 °C)-97.5 °F (36.4 °C)] 97.2 °F (36.2 °C)  HR:  [45-65] 45  BP: (104-133)/(63-82) 104/63  Resp:  [16-18] 16  SpO2:  [90 %-96 %] 90 %  O2 Device: None (Room air)    Body mass index is 27.32 kg/m².     Input and Output Summary (last 24 hours):     Intake/Output Summary (Last 24 hours) at 4/6/2025 1335  Last data filed at 4/6/2025 0651  Gross per 24 hour   Intake 1279.17 ml   Output --   Net 1279.17 ml       Physical Exam  Vitals reviewed.   Constitutional:       Appearance: Normal appearance.   HENT:      Head: Atraumatic.      Mouth/Throat:      Mouth: Mucous membranes are dry.   Eyes:      General: No scleral icterus.  Cardiovascular:      Rate and Rhythm: Normal rate and regular rhythm.      Heart sounds: Normal heart sounds.   Pulmonary:      Effort: No respiratory distress.   Abdominal:      General: Bowel sounds are normal. There is no distension.    Musculoskeletal:      Right lower leg: No edema.      Left lower leg: No edema.   Skin:     General: Skin is dry.      Capillary Refill: Capillary refill takes less than 2 seconds.   Neurological:      Mental Status: He is alert. Mental status is at baseline.   Psychiatric:         Mood and Affect: Mood normal.           Lines/Drains:              Lab Results: I have reviewed the following results:   Results from last 7 days   Lab Units 04/06/25  0346   WBC Thousand/uL 9.80   HEMOGLOBIN g/dL 11.7*   HEMATOCRIT % 34.5*   PLATELETS Thousands/uL 284   SEGS PCT % 89*   LYMPHO PCT % 6*   MONO PCT % 4   EOS PCT % 0     Results from last 7 days   Lab Units 04/06/25  0346 04/05/25  0405 04/04/25  0946   SODIUM mmol/L 138   < > 136   POTASSIUM mmol/L 4.3   < > 3.8   CHLORIDE mmol/L 105   < > 103   CO2 mmol/L 26   < > 24   BUN mg/dL 25   < > 17   CREATININE mg/dL 0.82   < > 0.76   ANION GAP mmol/L 7   < > 9   CALCIUM mg/dL 9.3   < > 9.2   ALBUMIN g/dL  --   --  4.1   TOTAL BILIRUBIN mg/dL  --   --  0.57   ALK PHOS U/L  --   --  43   ALT U/L  --   --  19   AST U/L  --   --  16   GLUCOSE RANDOM mg/dL 122   < > 110    < > = values in this interval not displayed.     Results from last 7 days   Lab Units 04/04/25  0946   INR  1.06     Results from last 7 days   Lab Units 04/06/25  1226 04/06/25  0703 04/06/25  0103 04/05/25  2017 04/05/25  1540 04/05/25  1111 04/05/25  0729 04/04/25  2038 04/04/25  1713   POC GLUCOSE mg/dl 131 125 118 128 129 135 126 145* 178*               Recent Cultures (last 7 days):         Imaging Results Review: I reviewed radiology reports from this admission including: MRI brain.  Other Study Results Review: EKG was reviewed.     Last 24 Hours Medication List:     Current Facility-Administered Medications:     acetaminophen (TYLENOL) tablet 650 mg, Q6H PRN    dexamethasone (DECADRON) injection 4 mg, Q6H JESUS    docusate sodium (COLACE) capsule 100 mg, BID    insulin lispro (HumALOG/ADMELOG) 100 units/mL  subcutaneous injection 1-5 Units, TID AC **AND** Fingerstick Glucose (POCT), TID AC    insulin lispro (HumALOG/ADMELOG) 100 units/mL subcutaneous injection 1-5 Units, HS    ondansetron (ZOFRAN) injection 4 mg, Q6H PRN    pantoprazole (PROTONIX) EC tablet 40 mg, BID AC    polyethylene glycol (MIRALAX) packet 17 g, Daily    simethicone (MYLICON) chewable tablet 80 mg, 4x Daily PRN    Administrative Statements   Today, Patient Was Seen By: Deirdre Newton MD  I have spent a total time of 39 minutes in caring for this patient on the day of the visit/encounter including Patient and family education, Importance of tx compliance, Reviewing/placing orders in the medical record (including tests, medications, and/or procedures), Obtaining or reviewing history  , and Communicating with other healthcare professionals .    **Please Note: This note may have been constructed using a voice recognition system.**

## 2025-04-06 NOTE — PLAN OF CARE
Problem: INFECTION - ADULT  Goal: Absence or prevention of progression during hospitalization  Description: INTERVENTIONS:- Assess and monitor for signs and symptoms of infection- Monitor lab/diagnostic results- Monitor all insertion sites, i.e. indwelling lines, tubes, and drains- Monitor endotracheal if appropriate and nasal secretions for changes in amount and color- Gilbert appropriate cooling/warming therapies per order- Administer medications as ordered- Instruct and encourage patient and family to use good hand hygiene technique- Identify and instruct in appropriate isolation precautions for identified infection/condition  Outcome: Progressing  Goal: Absence of fever/infection during neutropenic period  Description: INTERVENTIONS:- Monitor WBC  Outcome: Progressing     Problem: Knowledge Deficit  Goal: Patient/family/caregiver demonstrates understanding of disease process, treatment plan, medications, and discharge instructions  Description: Complete learning assessment and assess knowledge base.Interventions:- Provide teaching at level of understanding- Provide teaching via preferred learning methods  Outcome: Progressing

## 2025-04-06 NOTE — UTILIZATION REVIEW
NOTIFICATION OF INPATIENT ADMISSION   AUTHORIZATION REQUEST   SERVICING FACILITY:   [place] [address]  Tax ID: [id number]  NPI: [id number] ATTENDING PROVIDER:  Attending Name and NPI#: Deirdre Newton Md [[id number]]  Address: [address] [place] [address]  Phone: [phone]   ADMISSION INFORMATION:  Place of Service: Inpatient [facility]  Place of Service Code: 21  Inpatient Admission Date/Time: 4/4/25 12:25 PM  Discharge Date/Time: No discharge date for patient encounter.  Admitting Diagnosis Code/Description:  Metastasis to brain (HCC) [C79.31]  Brain mass [G93.89]  Acute headache [R51.9]  Headache [R51.9]  Ambulatory dysfunction [R26.2]  Lung cancer metastatic to brain (HCC) [C34.90, C79.31]  Small cell carcinoma of upper lobe of right lung (HCC) [C34.11]     UTILIZATION REVIEW CONTACT:  Laura Rodriguez, Utilization   Network Utilization Review Department  Phone: [phone]  Fax: [phone]  Email: [email or web address]  Contact for approvals/pending authorizations, clinical reviews, and discharge.     PHYSICIAN ADVISORY SERVICES:  Medical Necessity Denial & Hwco-mp-Krwa Review  Phone: [phone]  Fax: [phone]  Email: [email or web address]     DISCHARGE SUPPORT TEAM:  For Patients Discharge Needs & Updates  Phone: [phone] opt. 2 Fax: [phone]  Email: [email or web address]

## 2025-04-07 ENCOUNTER — APPOINTMENT (INPATIENT)
Dept: INTERVENTIONAL RADIOLOGY/VASCULAR | Facility: HOSPITAL | Age: 60
DRG: 694 | End: 2025-04-07
Payer: COMMERCIAL

## 2025-04-07 ENCOUNTER — APPOINTMENT (INPATIENT)
Dept: MRI IMAGING | Facility: HOSPITAL | Age: 60
DRG: 694 | End: 2025-04-07
Payer: COMMERCIAL

## 2025-04-07 PROBLEM — Z51.5 PALLIATIVE CARE ENCOUNTER: Status: ACTIVE | Noted: 2025-04-07

## 2025-04-07 PROBLEM — Z71.89 GOALS OF CARE, COUNSELING/DISCUSSION: Status: ACTIVE | Noted: 2025-04-07

## 2025-04-07 LAB
ANION GAP SERPL CALCULATED.3IONS-SCNC: 9 MMOL/L (ref 4–13)
APPEARANCE CSF: CLEAR
BASOPHILS # BLD AUTO: 0 THOUSANDS/ÂΜL (ref 0–0.1)
BASOPHILS NFR BLD AUTO: 0 % (ref 0–1)
BUN SERPL-MCNC: 25 MG/DL (ref 5–25)
CALCIUM SERPL-MCNC: 9 MG/DL (ref 8.4–10.2)
CHLORIDE SERPL-SCNC: 105 MMOL/L (ref 96–108)
CO2 SERPL-SCNC: 24 MMOL/L (ref 21–32)
CREAT SERPL-MCNC: 0.7 MG/DL (ref 0.6–1.3)
EOSINOPHIL # BLD AUTO: 0 THOUSAND/ÂΜL (ref 0–0.61)
EOSINOPHIL NFR BLD AUTO: 0 % (ref 0–6)
ERYTHROCYTE [DISTWIDTH] IN BLOOD BY AUTOMATED COUNT: 13.2 % (ref 11.6–15.1)
GFR SERPL CREATININE-BSD FRML MDRD: 102 ML/MIN/1.73SQ M
GLUCOSE CSF-MCNC: 81 MG/DL (ref 40–70)
GLUCOSE SERPL-MCNC: 109 MG/DL (ref 65–140)
GLUCOSE SERPL-MCNC: 118 MG/DL (ref 65–140)
GLUCOSE SERPL-MCNC: 120 MG/DL (ref 65–140)
GLUCOSE SERPL-MCNC: 154 MG/DL (ref 65–140)
GLUCOSE SERPL-MCNC: 191 MG/DL (ref 65–140)
HCT VFR BLD AUTO: 36 % (ref 36.5–49.3)
HGB BLD-MCNC: 11.9 G/DL (ref 12–17)
IMM GRANULOCYTES # BLD AUTO: 0.12 THOUSAND/UL (ref 0–0.2)
IMM GRANULOCYTES NFR BLD AUTO: 1 % (ref 0–2)
INR PPP: 0.99 (ref 0.85–1.19)
LYMPHOCYTES # BLD AUTO: 0.91 THOUSANDS/ÂΜL (ref 0.6–4.47)
LYMPHOCYTES NFR BLD AUTO: 9 % (ref 14–44)
LYMPHOCYTES NFR CSF MANUAL: 59 %
MCH RBC QN AUTO: 31.2 PG (ref 26.8–34.3)
MCHC RBC AUTO-ENTMCNC: 33.1 G/DL (ref 31.4–37.4)
MCV RBC AUTO: 95 FL (ref 82–98)
MONOCYTES # BLD AUTO: 0.46 THOUSAND/ÂΜL (ref 0.17–1.22)
MONOCYTES NFR BLD AUTO: 5 % (ref 4–12)
MONONUC CELLS NFR CSF MANUAL: 24 %
NEUTROPHILS # BLD AUTO: 8.24 THOUSANDS/ÂΜL (ref 1.85–7.62)
NEUTROPHILS NFR CSF MANUAL: 17 %
NEUTS SEG NFR BLD AUTO: 85 % (ref 43–75)
NRBC BLD AUTO-RTO: 0 /100 WBCS
PATH REV: NO
PLATELET # BLD AUTO: 272 THOUSANDS/UL (ref 149–390)
PLATELET # BLD AUTO: 289 THOUSANDS/UL (ref 149–390)
PMV BLD AUTO: 10.1 FL (ref 8.9–12.7)
PMV BLD AUTO: 9.9 FL (ref 8.9–12.7)
POTASSIUM SERPL-SCNC: 4 MMOL/L (ref 3.5–5.3)
PROT CSF-MCNC: 101 MG/DL (ref 15–45)
PROTHROMBIN TIME: 13.6 SECONDS (ref 12.3–15)
RBC # BLD AUTO: 3.81 MILLION/UL (ref 3.88–5.62)
RBC # CSF MANUAL: 12 UL (ref 0–10)
SODIUM SERPL-SCNC: 138 MMOL/L (ref 135–147)
TOTAL CELLS COUNTED BLD: NO
TOTAL CELLS COUNTED SPEC: 41
TUBE # CSF: 4
WBC # BLD AUTO: 9.73 THOUSAND/UL (ref 4.31–10.16)
WBC # CSF AUTO: 6 /UL (ref 0–5)

## 2025-04-07 PROCEDURE — A9585 GADOBUTROL INJECTION: HCPCS | Performed by: INTERNAL MEDICINE

## 2025-04-07 PROCEDURE — 009U3ZX DRAINAGE OF SPINAL CANAL, PERCUTANEOUS APPROACH, DIAGNOSTIC: ICD-10-PCS | Performed by: RADIOLOGY

## 2025-04-07 PROCEDURE — 72158 MRI LUMBAR SPINE W/O & W/DYE: CPT

## 2025-04-07 PROCEDURE — 82948 REAGENT STRIP/BLOOD GLUCOSE: CPT

## 2025-04-07 PROCEDURE — 85610 PROTHROMBIN TIME: CPT | Performed by: NURSE PRACTITIONER

## 2025-04-07 PROCEDURE — 99255 IP/OBS CONSLTJ NEW/EST HI 80: CPT

## 2025-04-07 PROCEDURE — 80048 BASIC METABOLIC PNL TOTAL CA: CPT | Performed by: INTERNAL MEDICINE

## 2025-04-07 PROCEDURE — 85025 COMPLETE CBC W/AUTO DIFF WBC: CPT | Performed by: INTERNAL MEDICINE

## 2025-04-07 PROCEDURE — 89051 BODY FLUID CELL COUNT: CPT | Performed by: NURSE PRACTITIONER

## 2025-04-07 PROCEDURE — 88108 CYTOPATH CONCENTRATE TECH: CPT | Performed by: PATHOLOGY

## 2025-04-07 PROCEDURE — 92526 ORAL FUNCTION THERAPY: CPT

## 2025-04-07 PROCEDURE — 89050 BODY FLUID CELL COUNT: CPT | Performed by: NURSE PRACTITIONER

## 2025-04-07 PROCEDURE — 82945 GLUCOSE OTHER FLUID: CPT | Performed by: NURSE PRACTITIONER

## 2025-04-07 PROCEDURE — 22511 PERQ LUMBOSACRAL INJECTION: CPT

## 2025-04-07 PROCEDURE — 84157 ASSAY OF PROTEIN OTHER: CPT | Performed by: NURSE PRACTITIONER

## 2025-04-07 PROCEDURE — 62328 DX LMBR SPI PNXR W/FLUOR/CT: CPT

## 2025-04-07 PROCEDURE — 85049 AUTOMATED PLATELET COUNT: CPT | Performed by: NURSE PRACTITIONER

## 2025-04-07 PROCEDURE — 99232 SBSQ HOSP IP/OBS MODERATE 35: CPT | Performed by: INTERNAL MEDICINE

## 2025-04-07 PROCEDURE — 99233 SBSQ HOSP IP/OBS HIGH 50: CPT | Performed by: NURSE PRACTITIONER

## 2025-04-07 RX ORDER — GADOBUTROL 604.72 MG/ML
9 INJECTION INTRAVENOUS
Status: COMPLETED | OUTPATIENT
Start: 2025-04-07 | End: 2025-04-07

## 2025-04-07 RX ADMIN — POLYETHYLENE GLYCOL 3350 17 G: 17 POWDER, FOR SOLUTION ORAL at 07:45

## 2025-04-07 RX ADMIN — GADOBUTROL 9 ML: 604.72 INJECTION INTRAVENOUS at 21:42

## 2025-04-07 RX ADMIN — DEXAMETHASONE SODIUM PHOSPHATE 4 MG: 4 INJECTION, SOLUTION INTRAMUSCULAR; INTRAVENOUS at 22:32

## 2025-04-07 RX ADMIN — DEXAMETHASONE SODIUM PHOSPHATE 4 MG: 4 INJECTION, SOLUTION INTRAMUSCULAR; INTRAVENOUS at 05:17

## 2025-04-07 RX ADMIN — PANTOPRAZOLE SODIUM 40 MG: 40 TABLET, DELAYED RELEASE ORAL at 16:35

## 2025-04-07 RX ADMIN — DEXAMETHASONE SODIUM PHOSPHATE 4 MG: 4 INJECTION, SOLUTION INTRAMUSCULAR; INTRAVENOUS at 17:03

## 2025-04-07 RX ADMIN — DEXAMETHASONE SODIUM PHOSPHATE 4 MG: 4 INJECTION, SOLUTION INTRAMUSCULAR; INTRAVENOUS at 11:07

## 2025-04-07 RX ADMIN — PANTOPRAZOLE SODIUM 40 MG: 40 TABLET, DELAYED RELEASE ORAL at 05:22

## 2025-04-07 NOTE — ASSESSMENT & PLAN NOTE
"Patient with metastatic non-small cell lung cancer(neuroendocrine), diagnosed in March 2024  Status post whole brain radiation therapy in 2024  On third line chemo therapy with tarlatamab    Presented with headache, blurry vision, paresthesias, ambulatory dysfunction  Symptoms concerning for leptomeningeal involvement versus progression of brain mets    CTA head/neck-scattered hyperdense hemorrhagic metastatic lesions in left frontal lobe and brainstem, that have increased in size from prior.  Diffuse severe white matter changes in bilateral cerebral hemispheres, brainstem and cerebellum related to treatment-related radiation changes.  Negative CTA for large vessel occlusion, dissection, aneurysm or high-grade stenosis.  MRI brain-Too many to count intracranial hemorrhagic metastatic lesions involving supratentorial and infratentorial brain and brainstem, majority of which have enlarged from prior exam with worsening perilesional edema. No midline shift.     Continue with IV Decadron 4 mg Q6  Continue with pantoprazole and  Continue with fall/safety precautions  MRI cervical-\"Subtle foci of nodular enhancement along the dorsal surface of the cord at C4 and ventral surface of the cord at C6 could represent fortuitous vascular enhancement, although leptomeningeal metastatic disease is not excluded. Recommend further   clinical assessment, consider correlation with CSF analysis.  2.  Degenerative changes as discussed. No significant canal stenosis at any level. Multilevel foraminal stenosis as outlined above.  3.  Partially imaged cerebellar metastases, seen better on MRI brain from 4/4/2025.\"    Discussed with oncology, no need for neurosurgery involvement  No need for seizure precautions currently  Palliative involved, poor prognosis, however patient currently treatment focussed   MRI lumbar spine pending  IR consult for lumbar puncture  "

## 2025-04-07 NOTE — PLAN OF CARE

## 2025-04-07 NOTE — ASSESSMENT & PLAN NOTE
Patient presents with symptoms of worsening headaches, blurred vision, paresthesias of right hand, dizziness, gait disturbance, transient dysarthria, expressive dysphagia.  Symptoms have gotten progressively worse.    CTA head and neck completed in the ER suggestive of progressive disease:  CT Brain:  - Scattered hyperdense hemorrhagic metastatic lesions in left frontal lobe and brainstem have increased in size since MRI brain 2/5/2024 - the number of metastatic lesions is underestimated by CT when compared to MRI brain. Recommend follow-up MRI brain   with and without contrast for further evaluation.  - Diffuse severe white matter changes in bilateral cerebral hemispheres, brainstem, and cerebellum likely related to treatment-related radiation changes with scattered areas of vasogenic edema.     CT Angiography:  - Negative CTA head and neck for large vessel occlusion, dissection, aneurysm, or high-grade stenosis.     Known large right upper lobe lung mass, compatible with primary lung malignancy. Probable metastatic mediastinal and right hilar lymphadenopathy    4/6 MRI C Spine:  IMPRESSION:   1.  Subtle foci of nodular enhancement along the dorsal surface of the cord at C4 and ventral surface of the cord at C6 could represent fortuitous vascular enhancement, although leptomeningeal metastatic disease is not excluded. Recommend further   clinical assessment, consider correlation with CSF analysis.  2.  Degenerative changes as discussed. No significant canal stenosis at any level. Multilevel foraminal stenosis as outlined above.  3.  Partially imaged cerebellar metastases, seen better on MRI brain from 4/4/2025.    PLAN:  MRI Lumbar spine pending  IR has been consulted for lumbar puncture, this is planned for today 4/8 to rule out leptomeningeal spread

## 2025-04-07 NOTE — ASSESSMENT & PLAN NOTE
Palliative diagnosis: metastatic SCLC    Symptom management:  No palliative symptom management today.  Patient denies shortness of breath. Maintaining good appetite and oral intake. Denies pain at this time. Will continue to monitor patient and treat accordingly.     Social support:  Lives with spouse Leslie in 2nd floor apt  Daughter Fina, age 15  Sister is also supportive  Supportive listening provided  Normalized experience of patient/family  Provided anticipatory guidance  Discussed spiritual needs: Hinduism, spiritual care consulted  Advocated for patient/family with interdisciplinary care team    Coordination of care:  Reviewed case with FRAN Graf    Follow up  Palliative Care will continue to follow and goals of care discussions will be ongoing.   Please reach out via [employer] secure chat if questions or concerns arise.    We appreciate the invitation to be involved in this patient's care.

## 2025-04-07 NOTE — ASSESSMENT & PLAN NOTE
Diagnosed 3/2024 with extensive stage small cell lung cancer with brain metastasis status post WBRT completed in 3/2024.  Follows with Dr Morrell, Medical oncology   Has progressed through multiple lines of therapy  Currently on third line with Tarlatamab; C1D15 3/31/2025    PLAN:  HOLD Tarlatamab.  Patient has disease progression.   Will arrange for follow up with Dr Morrell to discuss additional treatment options if he decides not to pursue hospice prior to hospital discharge

## 2025-04-07 NOTE — CONSULTS
Consultation - Palliative Care   Name: Nino Silva 59 y.o. male I MRN: [id number]  Unit/Bed#: -01 I Date of Admission: 4/4/2025   Date of Service: 4/7/2025 I Hospital Day: 3   Inpatient consult to Palliative Care  Consult performed by: MARCOS Andrade  Consult ordered by: Deirdre Newton MD        Physician Requesting Evaluation: Ernie Cyr MD   Reason for Evaluation / Principal Problem: goals of care    Assessment & Plan  Goals of care, counseling/discussion  Goals:  Level 1 code status  Disease focused care without limits placed  Concerns introduced today include:  Introduced palliative care services  Discussed ongoing treatment focused care vs comfort focused care and hospice today. They are understanding of overall poor prognosis.  Spouse inquired about the difference between palliative care and hospice, discussed at length today including hospice philosophy and review of hospice services  Pending MRI lumbar spine and lumbar puncture planned for today  Patient and spouse confirm they would likely not opt to proceed with repeat WBRT given risk involved for limited overall benefit  Hopeful to return home and optimize quality of life for as long as possible.  Agreeable to outpatient follow up with palliative medicine and further discussion/consideration of hospice care.  Inquired about support services for daughter - [facility] social work team to f/u  Will continue discussions regarding GOC as patient's clinical presentation evolves.    Decisional apparatus:  Patient does have capacity on exam today.  If capacity is lost, patient's substitute decision maker would default to spouse by [place].  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 Morrell  S/p WBRT March 2024  Disease progression through multiple  lines of treatment, started on 3rd line treatment with tarlatamab    Presented with headache, gait dysfunction, paresthesias, blurry vision  CTA h/n 4/4: scattered hyperdense hemorrhagic metastatic lesions in L frontal lobe and brainstem increased from prior, diffuse severe white matter changes in bilateral cerebral hemispheres, brainstem, and cerebellum likely treatment-related radiation changes with scattered areas of vasogenic edema, no evidence of large vessel occlusion, dissection, aneurysm, or high-grade stenosis  MRI brain 4/4: too many to count intracranial hemorrhagic metastatic lesions involving both supratentorial and infratentorial brain and brainstem, most enlarged from prior exam with worsening edema, no midline shift  CT H 4/5: stable exam  MRI c spine: subtle foci of nodular enhancement along the dorsal surface of the cord at C4 and ventral surface of the cord at C6 could represent fortuitous vascular enhancement, although leptomeningeal metastatic disease is not excluded, degenerative changes as discussed, no significant canal stenosis at any level, multilevel foraminal stenosis  Radiation oncology consult - likely not a candidate for SRS given number and distribution of lesions noted  Medical oncology following, MRI lumbar spine and lumbar puncture pending  Brain mass  See above  Acute headache  In the setting of the above  Ambulatory dysfunction  In the setting of the above  Fall precautions in place  Palliative care encounter  Palliative diagnosis: metastatic SCLC    Symptom management:  No palliative symptom management today.  Patient denies shortness of breath. Maintaining good appetite and oral intake. Denies pain at this time. Will continue to monitor patient and treat accordingly.     Social support:  Lives with spouse Leslie in 2nd floor apt  Daughter Fina, age 15  Sister is also supportive  Supportive listening provided  Normalized experience of patient/family  Provided anticipatory  guidance  Discussed spiritual needs: [employer], spiritual care consulted  Advocated for patient/family with interdisciplinary care team    Coordination of care:  Reviewed case with FRAN Graf    Follow up  Palliative Care will continue to follow and goals of care discussions will be ongoing.   Please reach out via [facility] secure chat if questions or concerns arise.    We appreciate the invitation to be involved in this patient's care.   Please contact the SecureChat role for the Palliative Care service with any questions/concerns.      PDMP Review: I have reviewed the patient's controlled substance dispensing history in the Prescription Drug Monitoring Program in compliance with the [place] regulations before prescribing any controlled substances.  No opioid or benzo refills in [place] over past year.    History of Present Illness   HPI: Nino Silva is a 59 y.o. year old male who presented 4/4/25 with headache, gait dysfunction, dysphagia. Pertinent hx includes extensive stage SCLC s/p WBRT on 3rd line treatment with tarlatamab, left testicular cancer s/p resection and chemo.  Imaging demonstrates progression of disease.  Radiation and medical oncology consulted.  Likely not a candidate for SRS given number and distribution of brain lesions, patient and spouse in agreement that repeat whole brain would account for more risk than potential benefit.  Further workup including MRI lumbar spine and lumbar puncture planned for today.    Patient seen in bed, spouse present at bedside. He endorses headache prior to admission which is since improved.  Occasionally with blurry vision.  Ongoing gait instability, though has been utilizing a walker since this admission.  No appetite changes, patient continues to have adequate food intake though does note difficulty maintaining his hydration status with recent dysphagia/coughing with thin liquids.  Denies pain or difficulty breathing at this time.  Remains on room air.     Goals of care  discussion as above.      Review of Systems   Constitutional:  Positive for activity change. Negative for appetite change.   HENT:  Positive for trouble swallowing (thin liquids).    Respiratory:  Negative for shortness of breath.    Gastrointestinal:  Positive for constipation. Negative for nausea and vomiting.   Neurological:  Positive for headaches (improved).   All other systems reviewed and are negative.    Medical History Review: I have reviewed the patient's PMH, PSH, Social History, Family History, Meds, and Allergies     Objective :  Temp:  [97 °F (36.1 °C)-97.3 °F (36.3 °C)] 97.2 °F (36.2 °C)  HR:  [50-53] 53  BP: (102-134)/(62-79) 134/79  Resp:  [14-16] 14  SpO2:  [94 %-95 %] 94 %  O2 Device: None (Room air)    Physical Exam  Vitals and nursing note reviewed.   Constitutional:       General: He is not in acute distress.  HENT:      Head: Normocephalic.      Mouth/Throat:      Mouth: Mucous membranes are moist.   Eyes:      Conjunctiva/sclera: Conjunctivae normal.   Cardiovascular:      Rate and Rhythm: Bradycardia present.   Pulmonary:      Effort: Pulmonary effort is normal. No respiratory distress.   Abdominal:      General: There is no distension.   Musculoskeletal:      Right lower leg: No edema.      Left lower leg: No edema.   Skin:     General: Skin is dry.   Neurological:      Mental Status: He is alert and oriented to person, place, and time.   Psychiatric:         Mood and Affect: Mood normal.         Behavior: Behavior normal.         Thought Content: Thought content normal.            Lab Results: I have reviewed the following results:  Lab Results   Component Value Date/Time    SODIUM 138 04/07/2025 05:20 AM    K 4.0 04/07/2025 05:20 AM    BUN 25 04/07/2025 05:20 AM    CREATININE 0.70 04/07/2025 05:20 AM    GLUC 109 04/07/2025 05:20 AM    CALCIUM 9.0 04/07/2025 05:20 AM    AST 16 04/04/2025 09:46 AM    ALT 19 04/04/2025 09:46 AM    ALB 4.1 04/04/2025 09:46 AM    TP 7.5 04/04/2025 09:46 AM     EGFR 102 04/07/2025 05:20 AM     Lab Results   Component Value Date/Time    HGB 11.9 (L) 04/07/2025 05:20 AM    WBC 9.73 04/07/2025 05:20 AM     04/07/2025 05:20 AM    INR 1.06 04/04/2025 09:46 AM    PTT 39 (H) 04/04/2025 09:46 AM     Lab Results   Component Value Date/Time    HJM2RMUMGJJQ 0.137 (L) 04/04/2025 11:50 AM       Imaging Results Review: I reviewed radiology reports from this admission including: CT head, MRI brain, and MRI spine.  Other Study Results Review: EKG was reviewed.     Code Status: Level 1 - Full Code  Advance Directive and Living Will:      Power of :    POLST:      Administrative Statements   Counseling / Coordination of Care  Total floor / unit time spent today 70+ minutes. Greater than 50% of total time was spent with the patient and / or family counseling and / or coordination of care. A description of the counseling / coordination of care: Reviewed chart,  provided medical updates, determined goals of care, discussed palliative care and symptom management, discussed comfort care and hospice care, provided anticipatory guidance, determined competency and POA/HCA, determined social/family support, provided psychosocial support.

## 2025-04-07 NOTE — PROGRESS NOTES
Progress Note - Oncology-Medical   Name: Nino Silva 59 y.o. 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  Follows with Dr Morrell, Medical oncology   Has progressed through multiple lines of therapy  Currently on third line with Tarlatamab; C1D15 3/31/2025    PLAN:  HOLD Tarlatamab.  Patient has disease progression.   Will arrange for follow up with Dr Morrell to discuss additional treatment options if he decides not to pursue hospice prior to hospital discharge    Acute headache  Secondary to metastatic disease progression  Improved with dexamethasone  Ambulatory dysfunction  Patient presents with symptoms of worsening headaches, blurred vision, paresthesias of right hand, dizziness, gait disturbance, transient dysarthria, expressive dysphagia.  Symptoms have gotten progressively worse.    CTA head and neck completed in the ER suggestive of progressive disease:  CT Brain:  - Scattered hyperdense hemorrhagic metastatic lesions in left frontal lobe and brainstem have increased in size since MRI brain 2/5/2024 - the number of metastatic lesions is underestimated by CT when compared to MRI brain. Recommend follow-up MRI brain   with and without contrast for further evaluation.  - Diffuse severe white matter changes in bilateral cerebral hemispheres, brainstem, and cerebellum likely related to treatment-related radiation changes with scattered areas of vasogenic edema.     CT Angiography:  - Negative CTA head and neck for large vessel occlusion, dissection, aneurysm, or high-grade stenosis.     Known large right upper lobe lung mass, compatible with primary lung malignancy. Probable metastatic mediastinal and right hilar  lymphadenopathy    4/6 MRI C Spine:  IMPRESSION:   1.  Subtle foci of nodular enhancement along the dorsal surface of the cord at C4 and ventral surface of the cord at C6 could represent fortuitous vascular enhancement, although leptomeningeal metastatic disease is not excluded. Recommend further   clinical assessment, consider correlation with CSF analysis.  2.  Degenerative changes as discussed. No significant canal stenosis at any level. Multilevel foraminal stenosis as outlined above.  3.  Partially imaged cerebellar metastases, seen better on MRI brain from 4/4/2025.    PLAN:  MRI Lumbar spine pending  IR has been consulted for lumbar puncture, this is planned for today 4/8 to rule out leptomeningeal spread    Brain mass  Appreciate radiation oncology consultation.  Due to number and distribution of lesions, he is likely not a candidate for stereotactic treatment.  Since he has already had whole brain radiation therapy reirradiation is associated with severe toxicity and neurocognitive decline.  4/4 Repeat MRI Brain shows too many to count intracranial hemorrhagic metastatic lesions involving supratentorial and infratentorial brain and brainstem, majority of which have enlarged from prior exam with worsening perilesional edema. No midline shift. Confluent white matter FLAIR hyperintensity, likely combination of worsened perilesional edema and posttreatment changes.   Continue Dexamethasone   Lumbar puncture to rule out leptomeningeal spread planned    Goals of care, counseling/discussion  Patient has a very aggressive malignancy and overall prognosis is poor.  Patient has been very treatment focused.  I did review results of imaging studies that have been completed this admission which demonstrates evidence of continued disease progression.  He is not a candidate for additional brain radiation given previous WBRT.  I explained our systemic treatment options may be limited, especially if leptomeningeal spread is  confirmed  I Introduced concept of hospice which would focus on quality of life and allow him to spend remaining time with his family and focus on symptom management.  We did spend some time discussing how hospice services are offered  Patient and his spouse were very appreciative of our conversation today.  He would like to proceed with lumbar puncture as planned and consider all options once all results are finalized  Appreciate palliative care assistance with ongoing goals of care conversations      Oncology History   Small cell lung cancer (HCC)   3/5/2024 Initial Diagnosis    Small cell lung cancer (HCC)     3/5/2024 Biopsy    Final Diagnosis  A. Lung, Right Upper Lobe, biopsy:  - Poorly differentiated carcinoma with neuroendocrine features, favor large cell neuroendocrine carcinoma.  - See note.     3/5/2024 -  Cancer Staged    Staging form: Lung, AJCC 8th Edition  - Clinical stage from 3/5/2024: Stage IV (cT2, cN3, cM1) - Signed by Lizbeth López MD on 4/18/2024  Histopathologic type: Small cell carcinoma, NOS  Stage prefix: Initial diagnosis  Histologic grade (G): G3  Histologic grading system: 4 grade system       3/6/2024 - 3/19/2024 Radiation      Plan ID Energy Fractions Dose per Fraction (cGy) Dose Correction (cGy) Total Dose Delivered (cGy) Elapsed Days   Whole Brain 6X 10 / 10 300 0 3,000 13        4/8/2024 - 10/7/2024 Chemotherapy    alteplase (CATHFLO), 2 mg, Intracatheter, Every 1 Minute as needed, 8 of 10 cycles  palonosetron (ALOXI), 0.25 mg, Intravenous, Once, 3 of 3 cycles  Administration: 0.25 mg (4/29/2024), 0.25 mg (5/20/2024), 0.25 mg (6/17/2024)  fosaprepitant (EMEND) IVPB, 150 mg, Intravenous, Once, 4 of 4 cycles  Administration: 150 mg (4/8/2024), 150 mg (4/29/2024), 150 mg (5/20/2024), 150 mg (6/17/2024)  etoposide (TOPOSAR), 80 mg/m2 = 164 mg, Intravenous, Once, 4 of 4 cycles  Dose modification: 100 mg/m2 (original dose 80 mg/m2, Cycle 1, Reason: Anticipated Tolerance), 80 mg/m2  (original dose 80 mg/m2, Cycle 1, Reason: Anticipated Tolerance), 100 mg/m2 (original dose 80 mg/m2, Cycle 2, Reason: Anticipated Tolerance)  Administration: 164 mg (4/8/2024), 164 mg (4/9/2024), 164 mg (4/10/2024), 205 mg (4/29/2024), 205 mg (4/30/2024), 205 mg (5/1/2024), 200 mg (5/20/2024), 200 mg (5/21/2024), 200 mg (5/22/2024), 200 mg (6/17/2024), 200 mg (6/18/2024), 200 mg (6/19/2024)  CARBOplatin (PARAPLATIN) IVPB ([facility] AUC DOSING), 750 mg (574.7 % of original dose 130.5 mg), Intravenous, Once, 4 of 4 cycles  Dose modification: 130.5 mg (original dose 130.5 mg, Cycle 1, Reason: Anticipated Tolerance),   (original dose 130.5 mg, Cycle 1, Reason: Other (Must fill in a comment))  Administration: 750 mg (4/8/2024), 701.5 mg (4/29/2024), 664 mg (5/20/2024), 633 mg (6/17/2024)  durvalumab (IMFINZI) IVPB, 1,500 mg, Intravenous, Once, 8 of 10 cycles  Administration: 1,500 mg (4/8/2024), 1,500 mg (4/29/2024), 1,500 mg (5/20/2024), 1,500 mg (6/17/2024), 1,500 mg (7/10/2024), 1,500 mg (8/13/2024), 1,500 mg (9/9/2024), 1,500 mg (10/7/2024)     11/19/2024 - 1/21/2025 Chemotherapy    alteplase (CATHFLO), 2 mg, Intracatheter, Every 1 Minute as needed, 4 of 6 cycles  Lurbinectedin (ZEPZELCA) IVPB, 3.2 mg/m2 = 6.4 mg, Intravenous, Once, 4 of 6 cycles  Administration: 6.4 mg (11/19/2024), 6.4 mg (12/10/2024), 6.4 mg (12/31/2024), 6.4 mg (1/21/2025)     3/17/2025 -  Chemotherapy    tarlatamab (Imdelltra) 10 mg IVPB, 10 mg, 1 of 10 cycles  Administration: 10 mg (3/24/2025), 10 mg (3/31/2025)  tarlatamab (Imdelltra) 1 mg IVPB, 1 mg, 1 of 1 cycle  Administration: 1 mg (3/17/2025)         Objective:  Physical Exam  Constitutional:       General: He is not in acute distress.  HENT:      Head: Normocephalic and atraumatic.   Eyes:      General: No scleral icterus.  Cardiovascular:      Rate and Rhythm: Normal rate and regular rhythm.   Pulmonary:      Effort: Pulmonary effort is normal. No respiratory distress.   Musculoskeletal:     "     General: Normal range of motion.      Cervical back: Normal range of motion.   Neurological:      General: No focal deficit present.      Mental Status: He is alert and oriented to person, place, and time. Mental status is at baseline.   Psychiatric:         Mood and Affect: Mood normal.         Behavior: Behavior normal.          /79   Pulse (!) 53   Temp (!) 97.2 °F (36.2 °C)   Resp 14   Ht 5' 10\" (1.778 m)   Wt 86.4 kg (190 lb 6.4 oz)   SpO2 94%   BMI 27.32 kg/m²     Recent Results (from the past 48 hours)   Fingerstick Glucose (POCT)    Collection Time: 04/05/25  3:40 PM   Result Value Ref Range    POC Glucose 129 65 - 140 mg/dl   Fingerstick Glucose (POCT)    Collection Time: 04/05/25  8:17 PM   Result Value Ref Range    POC Glucose 128 65 - 140 mg/dl   Fingerstick Glucose (POCT)    Collection Time: 04/06/25  1:03 AM   Result Value Ref Range    POC Glucose 118 65 - 140 mg/dl   Basic metabolic panel    Collection Time: 04/06/25  3:46 AM   Result Value Ref Range    Sodium 138 135 - 147 mmol/L    Potassium 4.3 3.5 - 5.3 mmol/L    Chloride 105 96 - 108 mmol/L    CO2 26 21 - 32 mmol/L    ANION GAP 7 4 - 13 mmol/L    BUN 25 5 - 25 mg/dL    Creatinine 0.82 0.60 - 1.30 mg/dL    Glucose 122 65 - 140 mg/dL    Calcium 9.3 8.4 - 10.2 mg/dL    eGFR 96 ml/min/1.73sq m   CBC and differential    Collection Time: 04/06/25  3:46 AM   Result Value Ref Range    WBC 9.80 4.31 - 10.16 Thousand/uL    RBC 3.71 (L) 3.88 - 5.62 Million/uL    Hemoglobin 11.7 (L) 12.0 - 17.0 g/dL    Hematocrit 34.5 (L) 36.5 - 49.3 %    MCV 93 82 - 98 fL    MCH 31.5 26.8 - 34.3 pg    MCHC 33.9 31.4 - 37.4 g/dL    RDW 13.2 11.6 - 15.1 %    MPV 10.0 8.9 - 12.7 fL    Platelets 284 149 - 390 Thousands/uL    nRBC 0 /100 WBCs    Segmented % 89 (H) 43 - 75 %    Immature Grans % 1 0 - 2 %    Lymphocytes % 6 (L) 14 - 44 %    Monocytes % 4 4 - 12 %    Eosinophils Relative 0 0 - 6 %    Basophils Relative 0 0 - 1 %    Absolute Neutrophils 8.76 (H) " 1.85 - 7.62 Thousands/µL    Absolute Immature Grans 0.05 0.00 - 0.20 Thousand/uL    Absolute Lymphocytes 0.62 0.60 - 4.47 Thousands/µL    Absolute Monocytes 0.36 0.17 - 1.22 Thousand/µL    Eosinophils Absolute 0.00 0.00 - 0.61 Thousand/µL    Basophils Absolute 0.01 0.00 - 0.10 Thousands/µL   Fingerstick Glucose (POCT)    Collection Time: 04/06/25  7:03 AM   Result Value Ref Range    POC Glucose 125 65 - 140 mg/dl   Fingerstick Glucose (POCT)    Collection Time: 04/06/25 12:26 PM   Result Value Ref Range    POC Glucose 131 65 - 140 mg/dl   Fingerstick Glucose (POCT)    Collection Time: 04/06/25  4:28 PM   Result Value Ref Range    POC Glucose 145 (H) 65 - 140 mg/dl   Fingerstick Glucose (POCT)    Collection Time: 04/06/25  9:17 PM   Result Value Ref Range    POC Glucose 147 (H) 65 - 140 mg/dl   Basic metabolic panel    Collection Time: 04/07/25  5:20 AM   Result Value Ref Range    Sodium 138 135 - 147 mmol/L    Potassium 4.0 3.5 - 5.3 mmol/L    Chloride 105 96 - 108 mmol/L    CO2 24 21 - 32 mmol/L    ANION GAP 9 4 - 13 mmol/L    BUN 25 5 - 25 mg/dL    Creatinine 0.70 0.60 - 1.30 mg/dL    Glucose 109 65 - 140 mg/dL    Calcium 9.0 8.4 - 10.2 mg/dL    eGFR 102 ml/min/1.73sq m   CBC and differential    Collection Time: 04/07/25  5:20 AM   Result Value Ref Range    WBC 9.73 4.31 - 10.16 Thousand/uL    RBC 3.81 (L) 3.88 - 5.62 Million/uL    Hemoglobin 11.9 (L) 12.0 - 17.0 g/dL    Hematocrit 36.0 (L) 36.5 - 49.3 %    MCV 95 82 - 98 fL    MCH 31.2 26.8 - 34.3 pg    MCHC 33.1 31.4 - 37.4 g/dL    RDW 13.2 11.6 - 15.1 %    MPV 10.1 8.9 - 12.7 fL    Platelets 272 149 - 390 Thousands/uL    nRBC 0 /100 WBCs    Segmented % 85 (H) 43 - 75 %    Immature Grans % 1 0 - 2 %    Lymphocytes % 9 (L) 14 - 44 %    Monocytes % 5 4 - 12 %    Eosinophils Relative 0 0 - 6 %    Basophils Relative 0 0 - 1 %    Absolute Neutrophils 8.24 (H) 1.85 - 7.62 Thousands/µL    Absolute Immature Grans 0.12 0.00 - 0.20 Thousand/uL    Absolute Lymphocytes 0.91  0.60 - 4.47 Thousands/µL    Absolute Monocytes 0.46 0.17 - 1.22 Thousand/µL    Eosinophils Absolute 0.00 0.00 - 0.61 Thousand/µL    Basophils Absolute 0.00 0.00 - 0.10 Thousands/µL   Fingerstick Glucose (POCT)    Collection Time: 04/07/25  7:24 AM   Result Value Ref Range    POC Glucose 118 65 - 140 mg/dl   Fingerstick Glucose (POCT)    Collection Time: 04/07/25 11:06 AM   Result Value Ref Range    POC Glucose 154 (H) 65 - 140 mg/dl         MRI cervical spine w wo contrast  Result Date: 4/6/2025  Narrative: MRI CERVICAL SPINE WITH AND WITHOUT CONTRAST INDICATION: Metastatic disease follow-up. COMPARISON: CTA from April 4, 2025 TECHNIQUE:  Multiplanar, multisequence imaging of the cervical spine was performed before and after gadolinium administration. . IV Contrast:  9 mL of Gadobutrol injection IMAGE QUALITY:  Diagnostic. FINDINGS: ALIGNMENT: Straightening of the cervical spine. No subluxations. MARROW SIGNAL: No destructive osseous lesions. CERVICAL AND VISUALIZED UPPER THORACIC CORD:  Normal signal within the visualized cord. PREVERTEBRAL AND PARASPINAL SOFT TISSUES:  Normal. VISUALIZED POSTERIOR FOSSA: Partially imaged hemorrhagic cerebellar metastases, seen better on MRI brain from 4/4/2025. CERVICAL DISC SPACES: C2-C3: Mild bilateral facet arthrosis and shallow disc osteophyte without significant stenosis. C3-C4: Broad disc osteophyte with mild bilateral facet and uncovertebral spurring. No canal or left foraminal stenosis. Minimal right foraminal narrowing. C4-C5: Broad disc bulge. Mild bilateral facet arthrosis and right uncovertebral spurring. No canal or left foraminal stenosis. Mild right foraminal narrowing. C5-C6: Disc height loss. Broad disc osteophyte. Bilateral uncovertebral and facet arthrosis. Mild canal stenosis. Severe bilateral foraminal stenosis. C6-C7: Bilateral uncovertebral and facet spurring. Shallow disc osteophyte and disc height loss. No significant canal narrowing. Moderate to severe  bilateral foraminal stenosis. C7-T1: Disc height loss with broad disc osteophyte. Moderate bilateral facet arthrosis and uncovertebral spurring. No canal narrowing. Moderate to severe right and moderate left foraminal narrowing. UPPER THORACIC DISC SPACES:  Normal. POSTCONTRAST IMAGING: Subtle foci of nodular enhancement along the dorsal surface of the cord at C4 (series 8 image 8) and ventral surface of the cord at C6 (series 8 image 8). OTHER FINDINGS:  None.     Impression: 1.  Subtle foci of nodular enhancement along the dorsal surface of the cord at C4 and ventral surface of the cord at C6 could represent fortuitous vascular enhancement, although leptomeningeal metastatic disease is not excluded. Recommend further clinical assessment, consider correlation with CSF analysis. 2.  Degenerative changes as discussed. No significant canal stenosis at any level. Multilevel foraminal stenosis as outlined above. 3.  Partially imaged cerebellar metastases, seen better on MRI brain from 4/4/2025. Workstation performed: [id number]     CT head wo contrast  Result Date: 4/6/2025  Narrative: CT BRAIN - WITHOUT CONTRAST INDICATION:   transient confusion. COMPARISON: 4/4/2025 TECHNIQUE:  CT examination of the brain was performed.  Multiplanar 2D reformatted images were created from the source data. Radiation dose length product (DLP) for this visit:  935.15 mGy-cm .  This examination, like all CT scans performed in the [facility], was performed utilizing techniques to minimize radiation dose exposure, including the use of iterative  reconstruction and automated exposure control. IMAGE QUALITY:  Diagnostic. FINDINGS: PARENCHYMA: Scattered hemorrhagic metastatic lesions most notably throughout the left frontal region and right brainstem. Diffuse severe low-attenuation throughout the bilateral cerebral hemispheres, brainstem and cerebellum compatible with treatment related radiation changes and vasogenic edema. No  CT signs of acute infarction.  No acute parenchymal hemorrhage. VENTRICLES AND EXTRA-AXIAL SPACES:  Normal for the patient's age. VISUALIZED ORBITS: Normal visualized orbits. PARANASAL SINUSES: Normal visualized paranasal sinuses. CALVARIUM AND EXTRACRANIAL SOFT TISSUES: Normal.     Impression: Scattered hemorrhagic metastatic lesions most notably throughout the left frontal region and right brainstem similar to 4/4/2025. Extensive treatment related radiation changes and vasogenic edema throughout the bilateral cerebral hemispheres, brainstem and cerebellum Workstation performed: [id number]     CT head wo contrast  Result Date: 4/5/2025  Narrative: CT BRAIN - WITHOUT CONTRAST INDICATION:   Fall. COMPARISON: MRI and CT/CTA from 4/4/2025 TECHNIQUE:  CT examination of the brain was performed.  Multiplanar 2D reformatted images were created from the source data. Radiation dose length product (DLP) for this visit:  885 mGy-cm .  This examination, like all CT scans performed in the [facility], was performed utilizing techniques to minimize radiation dose exposure, including the use of iterative reconstruction and automated exposure control. IMAGE QUALITY:  Diagnostic. FINDINGS: PARENCHYMA: Unchanged scattered hemorrhagic metastases. No acute hemorrhage. No significant edema or mass effect. Stable severe white matter disease. No acute transcortical infarction. No midline shift. VENTRICLES AND EXTRA-AXIAL SPACES: Stable in size and configuration. VISUALIZED ORBITS: Normal visualized orbits. PARANASAL SINUSES: Normal visualized paranasal sinuses. CALVARIUM AND EXTRACRANIAL SOFT TISSUES: Normal.     Impression: Stable interval exam. No acute hemorrhage or mass effect. Workstation performed: [id number]     MRI brain w wo contrast  Result Date: 4/4/2025  Narrative: MRI BRAIN WITH AND WITHOUT CONTRAST INDICATION: mets. COMPARISON: Brain MRI 2/5/2025 TECHNIQUE: Multiplanar, multisequence imaging of the brain was  performed before and after gadolinium administration. IV Contrast:  8 mL of Gadobutrol injection IMAGE QUALITY:   Diagnostic. FINDINGS: BRAIN PARENCHYMA: There are too many to count intracranial hemorrhagic metastatic lesions involving bilateral cerebral and cerebellar hemispheres as well as brainstem, majority of which have enlarged from prior exam. - The largest right cerebral hemisphere lesion is seen in the parasagittal right parietal lobe measuring 1.5 x 1.1 cm, previously 0.8 x 0.5 cm (series 9 image 222). - The largest left cerebral hemisphere lesions are seen in the left centrum semiovale measuring 0.9 x 0.7 cm, previously 0.6 x 0.6 cm (series 9 image 233), and left temporal lobe measuring 0.8 x 0.9 cm, previously 0.5 x 0.6 cm (series 9 image 134) - The largest left cerebellar hemisphere lesion measures 2.1 x 2 cm, previously 1.1 x 1.2 cm (series 9 image 83). - The largest right cerebellar hemisphere lesion measures 0.7 x 0.9 cm, previously 0.4 x 0.4 cm (series 9 image 97). - The largest right brainstem lesion at the junction of anterior right midbrain and mitchel measures 1.7 x 1.4 cm, previously 0.6 x 0.6 cm (series 9 image 115). - The largest left brainstem lesion at the junction of posterior left midbrain and mitchel measures 1.1 x 1 cm, previously 0.6 x 0.6 cm (series 9 image 117) There is worsening of perilesional edema in supratentorial and infratentorial brain as well as brainstem, with the worsening most pronounced in the brainstem and right cerebellum. Confluent white matter FLAIR hyperintensity is most likely a combination of worsened perilesional edema and posttreatment changes. No acute infarct. No midline shift. VENTRICLES:  Normal for the patient's age. SELLA AND PITUITARY GLAND:  Normal. ORBITS:  Normal. PARANASAL SINUSES:  Normal. VASCULATURE:  Evaluation of the major intracranial vasculature demonstrates appropriate flow voids. CALVARIUM AND SKULL BASE:  Normal. Mild bilateral mastoid effusion.  EXTRACRANIAL SOFT TISSUES:  Normal.     Impression: 1.  Too many to count intracranial hemorrhagic metastatic lesions involving supratentorial and infratentorial brain and brainstem, majority of which have enlarged from prior exam with worsening perilesional edema. No midline shift. 2.  Confluent white matter FLAIR hyperintensity, likely combination of worsened perilesional edema and posttreatment changes. Workstation performed: [id number]     CTA head and neck with and without contrast  Result Date: 4/4/2025  Narrative: CTA NECK AND BRAIN WITH AND WITHOUT CONTRAST INDICATION: HA, blurred vision, dysphagia, tinnitus COMPARISON: CTA chest PE study abdomen/pelvis routine 3/4/2024. MRI brain with and without contrast 2/5/2025, 10/21/2024, 6/29/2024. CT head without contrast 3/4/2024. TECHNIQUE:  Routine CT imaging of the Brain without contrast.Post contrast imaging was performed after administration of iodinated contrast through the neck and brain. Post contrast axial 0.625 mm images timed to opacify the arterial system.  3D rendering was performed on an independent workstation.   MIP reconstructions performed. Coronal and sagittal reconstructions were performed of the non contrast portion of the brain. Radiation dose length product (DLP) for this visit:  1316.98 mGy-cm .  This examination, like all CT scans performed in the [facility], was performed utilizing techniques to minimize radiation dose exposure, including the use of iterative reconstruction and automated exposure control. IV Contrast:  85 mL of iohexol IMAGE QUALITY:   Diagnostic FINDINGS: NONCONTRAST BRAIN PARENCHYMA: Diffuse severe confluent white matter changes in bilateral cerebral hemispheres, brainstem, and cerebellum likely related to treatment-related radiation changes with scattered areas of vasogenic edema. Scattered hyperdense hemorrhagic metastatic lesions is underestimated by CT when compared to MRI brain. 3 reference  lesions: - 0.8 cm left lateral frontal hemorrhagic lesion (2:27), increased size. - 1.4 cm right upper ventral mitchel hemorrhagic lesion (2:16), increased size. - 0.9 cm left upper dorsal mitchel hemorrhagic lesion (2:16), increased size. Small calcification right anterior thalamus likely treated metastatic lesion. No CT signs of acute infarction.  No mass effect or midline shift. VENTRICLES AND EXTRA-AXIAL SPACES: Similar mild ventriculomegaly likely due to ex-vacuo dilatation. No acute intraventricular extra-axial hemorrhage. VISUALIZED ORBITS: Normal. PARANASAL SINUSES: Normal. CTA NECK ARCH AND GREAT VESSELS: Mild calcified atherosclerotic disease of aortic arch. Great vessels are normal. VERTEBRAL ARTERIES: Patent extracranial segments. RIGHT CAROTID: Patent. Mild mixed atherosclerotic disease in carotid bifurcation. No stenosis.   No dissection. Tortuous right ICA proximal cervical segment. LEFT CAROTID: Patent. Mild mixed atherosclerotic disease in carotid bifurcation. No stenosis.   No dissection. NASCET criteria was used to determine the degree of internal carotid artery diameter stenosis. CTA BRAIN: INTERNAL CAROTID ARTERIES: Mild calcified atherosclerotic disease of bilateral ICA cavernous segment. No stenosis or occlusion. ANTERIOR CEREBRAL ARTERY CIRCULATION:  No stenosis or occlusion. Mild hypoplastic left A1 segment, normal variant. MIDDLE CEREBRAL ARTERY CIRCULATION:  No stenosis or occlusion. DISTAL VERTEBRAL ARTERIES:  No stenosis or occlusion. BASILAR ARTERY:  No stenosis or occlusion. POSTERIOR CEREBRAL ARTERIES: No stenosis or occlusion. Fetal-type left PCA, normal variant VENOUS STRUCTURES:  Normal. NON VASCULAR ANATOMY BONY STRUCTURES:  No acute osseous abnormality. Straightened cervical spine. Mild-to-moderate spinal degenerative changes. SOFT TISSUES OF THE NECK: Dental amalgam with extensive beam hardening artifact limits evaluation of anterior oral cavity for which direct visualization is  recommended. Small amount of secretions in the posterior pharyngeal wall extending into the hypopharynx. No acute neck abnormality. THORACIC INLET: Known large right upper lobe lung mass, probable metastatic mediastinal and right hilar lymphadenopathy, emphysema with subpleural pulmonary fibrotic change, and coronary artery calcifications.     Impression: CT Brain: - Scattered hyperdense hemorrhagic metastatic lesions in left frontal lobe and brainstem have increased in size since MRI brain 2/5/2024 - the number of metastatic lesions is underestimated by CT when compared to MRI brain. Recommend follow-up MRI brain with and without contrast for further evaluation. - Diffuse severe white matter changes in bilateral cerebral hemispheres, brainstem, and cerebellum likely related to treatment-related radiation changes with scattered areas of vasogenic edema. CT Angiography: - Negative CTA head and neck for large vessel occlusion, dissection, aneurysm, or high-grade stenosis. Known large right upper lobe lung mass, compatible with primary lung malignancy. Probable metastatic mediastinal and right hilar lymphadenopathy. Recommend follow-up CT chest with contrast for further evaluation. Additional chronic/incidental findings as detailed above. The study was marked in EPIC for immediate notification. Workstation performed: [id number]     MRI lumbar spine w wo contrast  Result Date: 3/17/2025  Narrative: MRI LUMBAR SPINE WITH AND WITHOUT CONTRAST INDICATION: C34.00: Malignant neoplasm of unspecified main bronchus.   extended stage SCLC now with new bone mets T11 and L5  assess r/o compression COMPARISON: Same day MRI thoracic spine with and without contrast NM PET CT skull base to MID thigh 2/7/2025. MRI brain with and without contrast 2/5/2021. TECHNIQUE:  Multiplanar, multisequence imaging of the lumbar spine was performed before and after gadolinium administration. . IV Contrast:  9 mL of Gadobutrol injection IMAGE  QUALITY:  Diagnostic FINDINGS: VERTEBRAL BODIES:  There are 5 lumbar type vertebral bodies.  Normal alignment of the lumbar spine.  No spondylolysis or spondylolisthesis. No scoliosis.  No compression fracture. Small pathologic marrow replacing lesions in L1 and L2 vertebral body with associated enhancement, compatible with osseous metastasis. Mildly heterogeneous bone marrow signal. SACRUM: The heterogeneous bone marrow signal in visualized sacrum. No evidence of insufficiency or stress fracture. DISTAL CORD AND CONUS:  Normal size and signal within the distal cord and conus. PARASPINAL SOFT TISSUES:  Paraspinal soft tissues are unremarkable. LOWER THORACIC DISC SPACES: Please see same day MRI thoracic spine with and without contrast for further evaluation. LUMBAR DISC SPACES: Multilevel endplate osteophytes, mild disc height loss, and facet arthropathy. L1-L2: Mild disc bulge. No significant canal stenosis or foraminal narrowing. L2-L3: Mild disc bulge, small central disc protrusion. Mild canal stenosis. No significant foraminal narrowing. L3-L4: Mild disc bulge, small left posterior annular fissure. No significant canal stenosis or foraminal narrowing. L4-L5: Mild disc bulge. Facet arthropathy. Mild canal stenosis. Mild bilateral foraminal narrowing. L5-S1: Mild disc bulge, small posterior annular fissure. Small synovial cyst adjacent to the right L5 pars interarticularis. No significant canal stenosis or foraminal narrowing. POSTCONTRAST IMAGING: Please see above discussion. OTHER FINDINGS: Partially imaged left renal cyst.     Impression: Osseous metastasis in L1 and L2 vertebral bodies. No pathologic compression fracture, as clinically questioned. Multilevel degenerative changes of lumbar spine with varying degrees of canal stenosis (mild L2-L3 and L4-L5) and foraminal narrowing (mild bilateral L4-L5), as detailed above. Please see same day MRI thoracic spine with and without contrast for further evaluation.  The study was marked in EPIC for immediate notification. Workstation performed: [id number]     MRI thoracic spine w wo contrast  Result Date: 3/17/2025  Narrative: MRI THORACIC SPINE WITH AND WITHOUT CONTRAST INDICATION: C34.00: Malignant neoplasm of unspecified main bronchus.   extended stage SCLC now with new bone mets T11 and L5  assess r/o compression COMPARISON: Same day MRI lumbar spine with and without contrast. NM PET CT skull base to MID thigh on 2/7/2025. MRI brain with and without contrast 2/5/2025. TECHNIQUE:  Multiplanar, multisequence imaging of the thoracic spine was performed before and after gadolinium administration. . IV Contrast:  9 mL of Gadobutrol injection IMAGE QUALITY:  Diagnostic. FINDINGS: ALIGNMENT:  Normal alignment of the thoracic spine.  No compression fracture.  No subluxation.  No evidence of scoliosis. MARROW SIGNAL: Small right T4 transverse process and L1 vertebral body mildly enhancing pathologic marrow replacing lesions, compatible with osseous metastasis. Mildly heterogeneous bone marrow signal. Type II Modic endplate change C5-6, T6-T7, T7-T8. THORACIC CORD:  Normal signal within the thoracic cord. PREVERTEBRAL AND PARASPINAL SOFT TISSUES:   Normal. THORACIC DEGENERATIVE CHANGE: Mild multilevel degenerative changes consisting of endplate osteophytes and mild disc height loss. No significant canal stenosis or foraminal narrowing. POSTCONTRAST: Please see above discussion. OTHER FINDINGS: Partially imaged known right upper lobe malignancy, mediastinal and hilar beata metastasis, left renal cyst.     Impression: Osseous metastasis in right T4 transverse process and L1 vertebral body. No pathologic compression fracture, as clinically questioned. Partially imaged known right upper lobe malignancy, mediastinal and hilar beata metastasis. Mild degenerative changes of thoracic spine, as detailed above. No significant canal stenosis or foraminal narrowing. Please see same day MRI  lumbar spine with and without contrast for further evaluation. The study was marked in EPIC for immediate notification. Workstation performed: [id number]         I spent 40 minutes in chart review, face to face counseling, coordination of care and documentation.

## 2025-04-07 NOTE — DISCHARGE INSTRUCTIONS
Lumbar Puncture     WHAT YOU NEED TO KNOW:   Lumbar puncture (LP) is a procedure in which a needle is inserted in your back and into your spinal canal. This is usually done to collect cerebrospinal fluid (CSF) to check for an infection, inflammation, bleeding, or other conditions that affect the brain. CSF is a clear, protective fluid that flows around the brain and inside the spinal canal. LP may also be done to remove CSF to reduce pressure in the brain.     DISCHARGE INSTRUCTIONS:     Follow up with your healthcare provider as directed: Write down your questions so you remember to ask them during your visits.     Post-lumbar puncture headache: You may develop a headache during the first few hours after your LP that may last for several days. The headache may be mild to severe and may get worse when you sit or stand. The following may help ease a post-lumbar puncture headache:  Drink plenty of liquids: You should drink more liquid than usual after your LP. Ask how much liquid is right for you. Caffeine may be used to treat a headache. Drinks, such as coffee, tea, or some sodas, have caffeine. Ask a Do not drink alcohol.    Lie down: If you have a headache after your lumbar puncture, it may be helpful to lie down and rest.  You may have a slight soreness over the LP area. This is normal.  Remove the band aid or dressing in 24 hours.    Contact Interventional Radiology imediately  at [phone] ([facility] PATIENTS: Contact Interventional Radiology at [phone]) ([facility] PATIENTS: Contact Interventional Radiology at [phone]) if any of the following occur:  You have a severe headache that does not get better after you lie down.  Persistent nausea or vomiting   You have a fever.   You have a stiff neck or have trouble thinking clearly.   Your legs, feet, or other parts below the waist feel numb, tingly, or weak.   You have bleeding or a discharge coming from the area where the needle was put into your back.   You  have severe pain in your back or neck.

## 2025-04-07 NOTE — SPEECH THERAPY NOTE
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 No pertinent past medical history.     Past Surgical History  Past Surgical History:   Procedure Laterality Date    HERNIA REPAIR      IR BIOPSY LUNG  2024    ORCHIECTOMY Left        Updated History:  Pt last seen by SLP  recommending regular textures and thin liquids, meds as tolerated.     Updated Imagin25 MRI cervical spine w wo contrast:  1.  Subtle foci of nodular enhancement along the dorsal surface of the cord at C4 and ventral surface of the cord at C6 could represent fortuitous vascular enhancement, although leptomeningeal metastatic disease is not excluded. Recommend further   clinical assessment, consider correlation with CSF analysis.  2.  Degenerative changes as discussed. No significant canal stenosis at any level. Multilevel foraminal stenosis as outlined above.  3.  Partially imaged cerebellar metastases, seen better on MRI brain from 2025.     25 CT head wo contrast:  Scattered hemorrhagic metastatic lesions most notably throughout the left frontal region and right brainstem similar to 2025.     Extensive treatment related radiation changes and vasogenic edema throughout the bilateral cerebral hemispheres, brainstem and cerebellum     25 CT head wo contrast:  Stable interval exam. No acute hemorrhage or mass effect.     25 MRI brain w wo contrast:  1.  Too many to count intracranial hemorrhagic metastatic lesions involving supratentorial and infratentorial brain and brainstem, majority of which have enlarged from prior exam with worsening perilesional  "edema. No midline shift.  2.  Confluent white matter FLAIR hyperintensity, likely combination of worsened perilesional edema and posttreatment changes    AAO: person, place, date     Subjective:  Pt awake and alert upon arrival, agreeable to PO trials and HOB elevation. Lunch tray present of regular textures and thin liquids. Pt reports \"feeling better\" regarding his swallowing stating \"I can drink more at a time now\".     Objective:  Materials administered: regular textures and thin liquids     Complete labial seal for retrieval and containment of all materials, no anterior bolus loss present. Adequate bite strength for retrieval of and timely rotary mastication of regular textures w/ complete bolus breakdown. Adequate bolus transfer. Suspected delayed swallow initiation and fair hyolaryngeal elevation to palpation. No overt s/s of aspiration this date, no change in vocal quality, VSS. Of note, cannot r/o sensory deficit at bedside.     Assessment:  Pt presents w/ s/s suggestive of functional oral skills, suspected at least min pharyngeal dysphagia characterized by descriptions above. Discussed rationale for completion of VFSS given abn MRI results, pt report of coughing w/ liquids prior to admission, and multiple medical co-morbidities. Pt and wife present requested to discuss and let SLP know tomorrow regarding decision.     Plan/Recommendations:  Regular textures and thin liquids   Meds as tolerated   Swallow strategies: upright posture, slow rate, small bites/sips, single sips, NO STRAWS   Frequent/thorough oral care   ST to f/u for diet tolerance as able and appropriate, ?completion of VFSS pending pt/family decision   "

## 2025-04-07 NOTE — ASSESSMENT & PLAN NOTE
Patient has a very aggressive malignancy and overall prognosis is poor.  Patient has been very treatment focused.  I did review results of imaging studies that have been completed this admission which demonstrates evidence of continued disease progression.  He is not a candidate for additional brain radiation given previous WBRT.  I explained our systemic treatment options may be limited, especially if leptomeningeal spread is confirmed  I Introduced concept of hospice which would focus on quality of life and allow him to spend remaining time with his family and focus on symptom management.  We did spend some time discussing how hospice services are offered  Patient and his spouse were very appreciative of our conversation today.  He would like to proceed with lumbar puncture as planned and consider all options once all results are finalized  Appreciate palliative care assistance with ongoing goals of care conversations

## 2025-04-07 NOTE — ASSESSMENT & PLAN NOTE
Appreciate radiation oncology consultation.  Due to number and distribution of lesions, he is likely not a candidate for stereotactic treatment.  Since he has already had whole brain radiation therapy reirradiation is associated with severe toxicity and neurocognitive decline.  4/4 Repeat MRI Brain shows too many to count intracranial hemorrhagic metastatic lesions involving supratentorial and infratentorial brain and brainstem, majority of which have enlarged from prior exam with worsening perilesional edema. No midline shift. Confluent white matter FLAIR hyperintensity, likely combination of worsened perilesional edema and posttreatment changes.   Continue Dexamethasone   Lumbar puncture to rule out leptomeningeal spread planned

## 2025-04-07 NOTE — BRIEF OP NOTE (RAD/CATH)
Lumbar Puncture Procedure Note    PATIENT NAME: Nino Silva  : 1965  MRN: [id number]     Pre-op Diagnosis:   1. Metastasis to brain (HCC)    2. Acute headache    3. Brain mass    4. Small cell carcinoma of upper lobe of right lung (HCC)    5. 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 Ambulatory dysfunction      Post-op Diagnosis:   1. Metastasis to brain (HCC)    2. Acute headache    3. Brain mass    4. Small cell carcinoma of upper lobe of right lung (HCC)    5. 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 Ambulatory dysfunction        Surgeon:   Gwyn Vigil DO  Assistants:     No qualified resident was available.    Estimated Blood Loss: None  Findings: L3-4 access, 11 mL clear CSF obtained    Specimens: CSF    Complications:  none    Anesthesia: local    Gwyn Vigil DO     Date: 2025  Time: 3:30 PM

## 2025-04-07 NOTE — PLAN OF CARE
Problem: Potential for Falls  Goal: Patient will remain free of falls  Description: INTERVENTIONS:- Educate patient/family on patient safety including physical limitations- Instruct patient to call for assistance with activity - Consult OT/PT to assist with strengthening/mobility - Keep Call bell within reach- Keep bed low and locked with side rails adjusted as appropriate- Keep care items and personal belongings within reach- Initiate and maintain comfort rounds- Make Fall Risk Sign visible to staff- Offer Toileting every 2 Hours, in advance of need- Initiate/Maintain bed/chair alarm- Obtain necessary fall risk management equipment: yellow bracelet and yellow socks - Apply yellow socks and bracelet for high fall risk patients- Consider moving patient to room near nurses station  INTERVENTIONS:- Educate patient/family on patient safety including physical limitations- Instruct patient to call for assistance with activity - Consult OT/PT to assist with strengthening/mobility - Keep Call bell within reach- Keep bed low and locked with side rails adjusted as appropriate- Keep care items and personal belongings within reach- Initiate and maintain comfort rounds- Make Fall Risk Sign visible to staff- Offer Toileting every 2 Hours, in advance of need- Initiate/Maintain bed/chair alarm- Obtain necessary fall risk management equipment: yellow bracelet and yellow socks - Apply yellow socks and bracelet for high fall risk patients- Consider moving patient to room near nurses station  Outcome: Progressing     Problem: PAIN - ADULT  Goal: Verbalizes/displays adequate comfort level or baseline comfort level  Description: Interventions:- Encourage patient to monitor pain and request assistance- Assess pain using appropriate pain scale- Administer analgesics based on type and severity of pain and evaluate response- Implement non-pharmacological measures as appropriate and evaluate response- Consider cultural and social influences  on pain and pain management- Notify physician/advanced practitioner if interventions unsuccessful or patient reports new pain  Outcome: Progressing     Problem: INFECTION - ADULT  Goal: Absence or prevention of progression during hospitalization  Description: INTERVENTIONS:- Assess and monitor for signs and symptoms of infection- Monitor lab/diagnostic results- Monitor all insertion sites, i.e. indwelling lines, tubes, and drains- Monitor endotracheal if appropriate and nasal secretions for changes in amount and color- [place] appropriate cooling/warming therapies per order- Administer medications as ordered- Instruct and encourage patient and family to use good hand hygiene technique- Identify and instruct in appropriate isolation precautions for identified infection/condition  Outcome: Progressing  Goal: Absence of fever/infection during neutropenic period  Description: INTERVENTIONS:- Monitor WBC  Outcome: Progressing     Problem: SAFETY ADULT  Goal: Patient will remain free of falls  Description: INTERVENTIONS:- Educate patient/family on patient safety including physical limitations- Instruct patient to call for assistance with activity - Consult OT/PT to assist with strengthening/mobility - Keep Call bell within reach- Keep bed low and locked with side rails adjusted as appropriate- Keep care items and personal belongings within reach- Initiate and maintain comfort rounds- Make Fall Risk Sign visible to staff- Offer Toileting every 2 Hours, in advance of need- Initiate/Maintain bed/chair alarm- Obtain necessary fall risk management equipment: yellow bracelet and yellow socks - Apply yellow socks and bracelet for high fall risk patients- Consider moving patient to room near nurses station  INTERVENTIONS:- Educate patient/family on patient safety including physical limitations- Instruct patient to call for assistance with activity - Consult OT/PT to assist with strengthening/mobility - Keep Call bell within  reach- Keep bed low and locked with side rails adjusted as appropriate- Keep care items and personal belongings within reach- Initiate and maintain comfort rounds- Make Fall Risk Sign visible to staff- Offer Toileting every 2 Hours, in advance of need- Initiate/Maintain bed/chair alarm- Obtain necessary fall risk management equipment: yellow bracelet and yellow socks - Apply yellow socks and bracelet for high fall risk patients- Consider moving patient to room near nurses station  Outcome: Progressing  Goal: Maintain or return to baseline ADL function  Description: INTERVENTIONS:-  Assess patient's ability to carry out ADLs; assess patient's baseline for ADL function and identify physical deficits which impact ability to perform ADLs (bathing, care of mouth/teeth, toileting, grooming, dressing, etc.)- Assess/evaluate cause of self-care deficits - Assess range of motion- Assess patient's mobility; develop plan if impaired- Assess patient's need for assistive devices and provide as appropriate- Encourage maximum independence but intervene and supervise when necessary- Involve family in performance of ADLs- Assess for home care needs following discharge - Consider OT consult to assist with ADL evaluation and planning for discharge- Provide patient education as appropriate  Outcome: Progressing  Goal: Maintains/Returns to pre admission functional level  Description: INTERVENTIONS:- Perform AM-PAC 6 Click Basic Mobility/ Daily Activity assessment daily.- Set and communicate daily mobility goal to care team and patient/family/caregiver. - Collaborate with rehabilitation services on mobility goals if consulted- Perform Range of Motion 3 times a day.- Reposition patient every 2 hours.- Dangle patient 3 times a day- Stand patient 3 times a day- Ambulate patient 3 times a day- Out of bed to chair 3 times a day - Out of bed for meals 3 times a day- Out of bed for toileting- Record patient progress and toleration of activity  level   Outcome: Progressing     Problem: DISCHARGE PLANNING  Goal: Discharge to home or other facility with appropriate resources  Description: INTERVENTIONS:- Identify barriers to discharge w/patient and caregiver- Arrange for needed discharge resources and transportation as appropriate- Identify discharge learning needs (meds, wound care, etc.)- Arrange for interpretive services to assist at discharge as needed- Refer to Case Management Department for coordinating discharge planning if the patient needs post-hospital services based on physician/advanced practitioner order or complex needs related to functional status, cognitive ability, or social support system  Outcome: Progressing     Problem: Knowledge Deficit  Goal: Patient/family/caregiver demonstrates understanding of disease process, treatment plan, medications, and discharge instructions  Description: Complete learning assessment and assess knowledge base.Interventions:- Provide teaching at level of understanding- Provide teaching via preferred learning methods  Outcome: Progressing     Problem: Nutrition/Hydration-ADULT  Goal: Nutrient/Hydration intake appropriate for improving, restoring or maintaining nutritional needs  Description: Monitor and assess patient's nutrition/hydration status for malnutrition. Collaborate with interdisciplinary team and initiate plan and interventions as ordered.  Monitor patient's weight and dietary intake as ordered or per policy. Utilize nutrition screening tool and intervene as necessary. Determine patient's food preferences and provide high-protein, high-caloric foods as appropriate. INTERVENTIONS:- Monitor oral intake, urinary output, labs, and treatment plans- Assess nutrition and hydration status and recommend course of action- Evaluate amount of meals eaten- Assist patient with eating if necessary - Allow adequate time for meals- Recommend/ encourage appropriate diets, oral nutritional supplements, and  vitamin/mineral supplements- Order, calculate, and assess calorie counts as needed- Recommend, monitor, and adjust tube feedings and TPN/PPN based on assessed needs- Assess need for intravenous fluids- Provide specific nutrition/hydration education as appropriate- Include patient/family/caregiver in decisions related to nutrition  Outcome: Progressing

## 2025-04-07 NOTE — QUICK NOTE
I reviewed the results of MRI brain demonstrating intracranial progression.  Due to the number and distribution of lesions, he is not a candidate for stereotactic treatment.  MRI C-spine did not demonstrate bulky disease, although there was a question of possible leptomeningeal involvement.      I reviewed his overall poor prognosis and prior history of whole brain radiation.  Repeat whole brain radiation can be considered; however, the frequency and severity of toxicity would likely be increased.  The risks of re-irradiation include but are not limited to scalp/skin irritation, hair loss, parotitis, visual loss, loss of hearing, radionecrosis, brain swelling/nausea/vomiting, seizures/stroke-like symptoms, neurocognitive decline (irreversible), cataracts and secondary malignancy.     Repeat whole brain radiation would not be curative.  Disease may progress despite treatment.      Alternatives to radiation would include hospice care/symptomatic management.   Medical oncology has also been consulted to review systemic treatment options given progression of disease.    At this time, he wishes to consider treatment options and goals of care.  MRI L-spine and LP are planned.  This may provide additional prognostic information but would likely not influence management of intracranial disease.    All questions were answered to his satisfaction.

## 2025-04-07 NOTE — PROGRESS NOTES
"Progress Note - Hospitalist   Name: Nino Silva 59 y.o. 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  Diffuse severe white matter changes in bilateral cerebral hemispheres, brainstem and cerebellum related to treatment-related radiation changes.  Negative CTA for large vessel occlusion, dissection, aneurysm or high-grade stenosis.  MRI brain-Too many to count intracranial hemorrhagic metastatic lesions involving supratentorial and infratentorial brain and brainstem, majority of which have enlarged from prior exam with worsening perilesional edema. No midline shift.     Continue with IV Decadron 4 mg Q6  Continue with pantoprazole and  Continue with fall/safety precautions  MRI cervical-\"Subtle foci of nodular enhancement along the dorsal surface of the cord at C4 and ventral surface of the cord at C6 could represent fortuitous vascular enhancement, although leptomeningeal metastatic disease is not excluded. Recommend further   clinical assessment, consider correlation with CSF analysis.  2.  Degenerative changes as discussed. No significant canal stenosis at any level. Multilevel foraminal stenosis as outlined above.  3.  Partially imaged cerebellar metastases, seen better on MRI brain from 4/4/2025.\"    Discussed with oncology, no need for neurosurgery involvement  No " need for seizure precautions currently  Palliative involved, poor prognosis, however patient currently treatment focussed   MRI lumbar spine pending  IR consult for lumbar puncture  Brain mass  See above   Ambulatory dysfunction  In the setting of progressive metastatic disease  Safe ambulation  Fall precautions    Acute headache  Secondary to metastatic disease progression  Pending evaluation/imaging  For more details, see under small cell lung cancer  Palliative care encounter    Goals of care, counseling/discussion      Labs & Imaging: Results Review Statement: No pertinent imaging studies reviewed.    VTE Prophylaxis: in place.    Code Status:   Level 1 - Full Code    Patient Centered Rounds: I have performed bedside rounds with nursing staff today.    Mobility:   Basic Mobility Inpatient Raw Score: 16  JH-HLM Goal: 5: Stand one or more mins  JH-HLM Achieved: 4: Move to chair/commode  JH-HLM Goal NOT achieved. Continue with multidisciplinary rounding and encourage appropriate mobility to improve upon JH-HLM goals.    Discussions with Specialists or Other Care Team Provider: RN    Education and Discussions with Family / Patient: Wife at bedside    Total Time Spent on Date of Encounter in care of patient: 35 mins. This time was spent on one or more of the following: performing physical exam; counseling and coordination of care; obtaining or reviewing history; documenting in the medical record; reviewing/ordering tests, medications or procedures; communicating with other healthcare professionals and discussing with patient's family/caregivers.    Current Length of Stay: 3 day(s)    Current Patient Status: Inpatient   Certification Statement: The patient will continue to require additional inpatient hospital stay due to see my assessment and plan.     Subjective:   Patient is seen and examined at bedside.  No new complaints.  Afebrile  All other ROS are negative.    Objective:    Vitals: Blood pressure 134/79, pulse  "(!) 53, temperature (!) 97.2 °F (36.2 °C), resp. rate 14, height 5' 10\" (1.778 m), weight 86.4 kg (190 lb 6.4 oz), SpO2 94%.,Body mass index is 27.32 kg/m².  SPO2 RA Rest      Flowsheet Row ED to Hosp-Admission (Current) from 4/4/2025 in [facility] Med Surg Unit   SpO2 94 %   SpO2 Activity At Rest   O2 Device None (Room air)   O2 Flow Rate --          I&O:   Intake/Output Summary (Last 24 hours) at 4/7/2025 1009  Last data filed at 4/7/2025 0101  Gross per 24 hour   Intake 0 ml   Output 0 ml   Net 0 ml       Physical Exam:    General- Alert, lying comfortably in bed. Not in any acute distress.  Neck- Supple, No JVD  CVS- regular, S1 and S2 normal  Chest- Bilateral Air entry, No rhochi, crackles or wheezing present.  Abdomen- soft, nontender, not distended, no guarding or rigidity, BS+  Extremities-  No pedal edema, No calf tenderness  CNS-   Alert, awake and orientedx3. No focal deficits present.    Invasive Devices       Peripheral Intravenous Line  Duration             Peripheral IV 04/04/25 Right Antecubital 3 days                          Social History  reviewed  Family History   Problem Relation Age of Onset    Breast cancer additional onset Mother     No Known Problems Father     Stroke Brother     reviewed    Meds:  Current Facility-Administered Medications   Medication Dose Route Frequency Provider Last Rate Last Admin    acetaminophen (TYLENOL) tablet 650 mg  650 mg Oral Q6H PRN Deirdre Newton MD        dexamethasone (DECADRON) injection 4 mg  4 mg Intravenous Q6H JESUS Deirdre Newton MD   4 mg at 04/07/25 0517    docusate sodium (COLACE) capsule 100 mg  100 mg Oral BID Deirdre Newton MD   100 mg at 04/05/25 1720    insulin lispro (HumALOG/ADMELOG) 100 units/mL subcutaneous injection 1-5 Units  1-5 Units Subcutaneous TID AC Deirdre Newton MD        insulin lispro (HumALOG/ADMELOG) 100 units/mL subcutaneous injection 1-5 Units  1-5 Units Subcutaneous HS Deirdre Newton MD        ondansetron (ZOFRAN) " "injection 4 mg  4 mg Intravenous Q6H PRN Deirdre Newton MD        pantoprazole (PROTONIX) EC tablet 40 mg  40 mg Oral BID AC Deirdre Newton MD   40 mg at 04/07/25 0522    polyethylene glycol (MIRALAX) packet 17 g  17 g Oral Daily Deirdre Newton MD   17 g at 04/07/25 0745    simethicone (MYLICON) chewable tablet 80 mg  80 mg Oral 4x Daily PRN Deirdre Newton MD            Medications Prior to Admission:     ascorbic acid (VITAMIN C) 250 mg tablet    Cholecalciferol (Vitamin D) 50 MCG (2000 UT) tablet    predniSONE 5 mg tablet    Labs:  Results from last 7 days   Lab Units 04/07/25  0520 04/06/25  0346 04/05/25  0405   WBC Thousand/uL 9.73 9.80 7.85   HEMOGLOBIN g/dL 11.9* 11.7* 12.5   HEMATOCRIT % 36.0* 34.5* 37.3   PLATELETS Thousands/uL 272 284 302   SEGS PCT % 85* 89* 86*   LYMPHO PCT % 9* 6* 10*   MONO PCT % 5 4 3*   EOS PCT % 0 0 0     Results from last 7 days   Lab Units 04/07/25  0520 04/06/25  0346 04/05/25  0405 04/04/25  0946   POTASSIUM mmol/L 4.0 4.3 3.9 3.8   CHLORIDE mmol/L 105 105 104 103   CO2 mmol/L 24 26 23 24   BUN mg/dL 25 25 17 17   CREATININE mg/dL 0.70 0.82 0.77 0.76   CALCIUM mg/dL 9.0 9.3 9.5 9.2   ALK PHOS U/L  --   --   --  43   ALT U/L  --   --   --  19   AST U/L  --   --   --  16     Lab Results   Component Value Date    CKTOTAL 70 02/07/2025    CKTOTAL 99 01/17/2025    CKTOTAL 80 12/27/2024     Results from last 7 days   Lab Units 04/04/25  0946   INR  1.06     No results found for: \"BLOODCX\", \"URINECX\", \"WOUNDCULT\", \"SPUTUMCULTUR\"      Imaging:  Results for orders placed during the hospital encounter of 03/04/24    XR chest portable    Narrative  XR CHEST PORTABLE    INDICATION: follow up right PTX.    COMPARISON: 3/5/2024    FINDINGS:    Masslike density right midlung field similar to prior. Elsewhere, pulmonary opacities are also unchanged from recent prior. The right pneumothorax is no longer seen. No significant effusions are appreciated.  Stable cardiomediastinal silhouette.    Impression  The " right pneumothorax appears to have resolved. No significant change in the appearance of the lungs. Right lung mass redemonstrated        Workstation performed: [id number]    No results found for this or any previous visit.      Last 24 Hours Medication List:   Current Facility-Administered Medications   Medication Dose Route Frequency Provider Last Rate    acetaminophen  650 mg Oral Q6H PRN Deirdre Newton MD      dexamethasone  4 mg Intravenous Q6H JESUS Deirdre Newton MD      docusate sodium  100 mg Oral BID Deirdre Newton MD      insulin lispro  1-5 Units Subcutaneous TID AC Deirdre Newton MD      insulin lispro  1-5 Units Subcutaneous HS Deirdre Newton MD      ondansetron  4 mg Intravenous Q6H PRN Deirdre Newton MD      pantoprazole  40 mg Oral BID AC Deirdre Newton MD      polyethylene glycol  17 g Oral Daily Deirdre Newton MD      simethicone  80 mg Oral 4x Daily PRN Deirdre Newton MD          Today, Patient Was Seen By: Ernie Cyr MD    ** Please Note: Dictation voice to text software may have been used in the creation of this document. **

## 2025-04-07 NOTE — ASSESSMENT & PLAN NOTE
Goals:  Level 1 code status  Disease focused care without limits placed  Concerns introduced today include:  Introduced palliative care services  Discussed ongoing treatment focused care vs comfort focused care and hospice today. They are understanding of overall poor prognosis.  Spouse inquired about the difference between palliative care and hospice, discussed at length today including hospice philosophy and review of hospice services  Pending MRI lumbar spine and lumbar puncture planned for today  Patient and spouse confirm they would likely not opt to proceed with repeat WBRT given risk involved for limited overall benefit  Hopeful to return home and optimize quality of life for as long as possible.  Agreeable to outpatient follow up with palliative medicine and further discussion/consideration of hospice care.  Inquired about support services for daughter - psc social work team to f/u  Will continue discussions regarding GOC as patient's clinical presentation evolves.    Decisional apparatus:  Patient does have capacity on exam today.  If capacity is lost, patient's substitute decision maker would default to spouse by [place].  ER contacts:    Leslie Vides (Spouse)  [phone] (Mobile)     Advance Directive/Living Will/POLST: none on file

## 2025-04-07 NOTE — ASSESSMENT & PLAN NOTE
Diagnosed March 2024, metastatic to brain and bone  Patient of Dr. Morrell  S/p WBRT March 2024  Disease progression through multiple lines of treatment, started on 3rd line treatment with tarlatamab    Presented with headache, gait dysfunction, paresthesias, blurry vision  CTA h/n 4/4: scattered hyperdense hemorrhagic metastatic lesions in L frontal lobe and brainstem increased from prior, diffuse severe white matter changes in bilateral cerebral hemispheres, brainstem, and cerebellum likely treatment-related radiation changes with scattered areas of vasogenic edema, no evidence of large vessel occlusion, dissection, aneurysm, or high-grade stenosis  MRI brain 4/4: too many to count intracranial hemorrhagic metastatic lesions involving both supratentorial and infratentorial brain and brainstem, most enlarged from prior exam with worsening edema, no midline shift  CT H 4/5: stable exam  MRI c spine: subtle foci of nodular enhancement along the dorsal surface of the cord at C4 and ventral surface of the cord at C6 could represent fortuitous vascular enhancement, although leptomeningeal metastatic disease is not excluded, degenerative changes as discussed, no significant canal stenosis at any level, multilevel foraminal stenosis  Radiation oncology consult - likely not a candidate for SRS given number and distribution of lesions noted  Medical oncology following, MRI lumbar spine and lumbar puncture pending

## 2025-04-08 ENCOUNTER — APPOINTMENT (INPATIENT)
Dept: RADIOLOGY | Facility: HOSPITAL | Age: 60
DRG: 694 | End: 2025-04-08
Payer: COMMERCIAL

## 2025-04-08 LAB
ANION GAP SERPL CALCULATED.3IONS-SCNC: 8 MMOL/L (ref 4–13)
BASOPHILS # BLD AUTO: 0.01 THOUSANDS/ÂΜL (ref 0–0.1)
BASOPHILS NFR BLD AUTO: 0 % (ref 0–1)
BUN SERPL-MCNC: 28 MG/DL (ref 5–25)
CALCIUM SERPL-MCNC: 9 MG/DL (ref 8.4–10.2)
CHLORIDE SERPL-SCNC: 103 MMOL/L (ref 96–108)
CO2 SERPL-SCNC: 26 MMOL/L (ref 21–32)
CREAT SERPL-MCNC: 0.73 MG/DL (ref 0.6–1.3)
EOSINOPHIL # BLD AUTO: 0 THOUSAND/ÂΜL (ref 0–0.61)
EOSINOPHIL NFR BLD AUTO: 0 % (ref 0–6)
ERYTHROCYTE [DISTWIDTH] IN BLOOD BY AUTOMATED COUNT: 13.1 % (ref 11.6–15.1)
GFR SERPL CREATININE-BSD FRML MDRD: 100 ML/MIN/1.73SQ M
GLUCOSE SERPL-MCNC: 104 MG/DL (ref 65–140)
GLUCOSE SERPL-MCNC: 112 MG/DL (ref 65–140)
GLUCOSE SERPL-MCNC: 116 MG/DL (ref 65–140)
GLUCOSE SERPL-MCNC: 136 MG/DL (ref 65–140)
GLUCOSE SERPL-MCNC: 138 MG/DL (ref 65–140)
HCT VFR BLD AUTO: 36 % (ref 36.5–49.3)
HGB BLD-MCNC: 12 G/DL (ref 12–17)
IMM GRANULOCYTES # BLD AUTO: 0.12 THOUSAND/UL (ref 0–0.2)
IMM GRANULOCYTES NFR BLD AUTO: 1 % (ref 0–2)
LYMPHOCYTES # BLD AUTO: 1.02 THOUSANDS/ÂΜL (ref 0.6–4.47)
LYMPHOCYTES NFR BLD AUTO: 11 % (ref 14–44)
MCH RBC QN AUTO: 31.3 PG (ref 26.8–34.3)
MCHC RBC AUTO-ENTMCNC: 33.3 G/DL (ref 31.4–37.4)
MCV RBC AUTO: 94 FL (ref 82–98)
MONOCYTES # BLD AUTO: 0.49 THOUSAND/ÂΜL (ref 0.17–1.22)
MONOCYTES NFR BLD AUTO: 5 % (ref 4–12)
NEUTROPHILS # BLD AUTO: 7.91 THOUSANDS/ÂΜL (ref 1.85–7.62)
NEUTS SEG NFR BLD AUTO: 83 % (ref 43–75)
NRBC BLD AUTO-RTO: 0 /100 WBCS
PLATELET # BLD AUTO: 269 THOUSANDS/UL (ref 149–390)
PMV BLD AUTO: 10 FL (ref 8.9–12.7)
POTASSIUM SERPL-SCNC: 4 MMOL/L (ref 3.5–5.3)
RBC # BLD AUTO: 3.83 MILLION/UL (ref 3.88–5.62)
SODIUM SERPL-SCNC: 137 MMOL/L (ref 135–147)
WBC # BLD AUTO: 9.55 THOUSAND/UL (ref 4.31–10.16)

## 2025-04-08 PROCEDURE — 88108 CYTOPATH CONCENTRATE TECH: CPT | Performed by: PATHOLOGY

## 2025-04-08 PROCEDURE — 74230 X-RAY XM SWLNG FUNCJ C+: CPT

## 2025-04-08 PROCEDURE — 82948 REAGENT STRIP/BLOOD GLUCOSE: CPT

## 2025-04-08 PROCEDURE — 92526 ORAL FUNCTION THERAPY: CPT

## 2025-04-08 PROCEDURE — 99232 SBSQ HOSP IP/OBS MODERATE 35: CPT | Performed by: INTERNAL MEDICINE

## 2025-04-08 PROCEDURE — 92611 MOTION FLUOROSCOPY/SWALLOW: CPT

## 2025-04-08 PROCEDURE — 80048 BASIC METABOLIC PNL TOTAL CA: CPT | Performed by: INTERNAL MEDICINE

## 2025-04-08 PROCEDURE — 85025 COMPLETE CBC W/AUTO DIFF WBC: CPT | Performed by: INTERNAL MEDICINE

## 2025-04-08 RX ADMIN — POLYETHYLENE GLYCOL 3350 17 G: 17 POWDER, FOR SOLUTION ORAL at 07:35

## 2025-04-08 RX ADMIN — PANTOPRAZOLE SODIUM 40 MG: 40 TABLET, DELAYED RELEASE ORAL at 16:01

## 2025-04-08 RX ADMIN — DEXAMETHASONE SODIUM PHOSPHATE 4 MG: 4 INJECTION, SOLUTION INTRAMUSCULAR; INTRAVENOUS at 05:39

## 2025-04-08 RX ADMIN — DEXAMETHASONE SODIUM PHOSPHATE 4 MG: 4 INJECTION, SOLUTION INTRAMUSCULAR; INTRAVENOUS at 22:31

## 2025-04-08 RX ADMIN — DEXAMETHASONE SODIUM PHOSPHATE 4 MG: 4 INJECTION, SOLUTION INTRAMUSCULAR; INTRAVENOUS at 17:58

## 2025-04-08 RX ADMIN — PANTOPRAZOLE SODIUM 40 MG: 40 TABLET, DELAYED RELEASE ORAL at 05:39

## 2025-04-08 RX ADMIN — DEXAMETHASONE SODIUM PHOSPHATE 4 MG: 4 INJECTION, SOLUTION INTRAMUSCULAR; INTRAVENOUS at 11:00

## 2025-04-08 NOTE — PROCEDURES
Video Swallow Study      Patient Name: Nino Silva  Today's Date: 4/8/2025        Past Medical History  History reviewed. No pertinent past medical history.     Past Surgical History  Past Surgical History:   Procedure Laterality Date    FL LUMBAR PUNCTURE DIAGNOSTIC  4/7/2025    HERNIA REPAIR      IR BIOPSY LUNG  03/05/2024    ORCHIECTOMY Left        Modified (Video) Barium Swallow Study    Summary:  Images are on PACS for review.     Pt presents w/ mild oral dysphagia, mild-mod pharyngeal dysphagia.     Reduced bolus control w/ premature spill over BOT. Slowed bolus transfer w/ reduced BOT retraction resulting in min BOT residue. Delayed swallow initiation w/ bolus head in the valleculae w/ majority of trials. Reduced laryngeal elevation, anterior hyoid excursion, vestibule closure, and incomplete epiglottic inversion w/ tip butting against posterior pharyngeal wall at times. Silent aspiration of thin liquids, material contacting vocal folds during trial of barium tablet w/ nectar thick liquids. Strategies were not effective in eliminating penetration or aspiration. See below for further details. Adequate pharyngeal stripping wave and UES opening. Min residue noted in the valleculae and pyriforms at times. Min esophageal retention noted as well as pill stasis, cleared w/ liquid wash    Pt remains at a high risk of aspiration 2/2 severity of swallow function, sensory deficit, progressive disease process, and poor prognosis per Oncology MD.     Recommendations:  Diet: Regular textures   Liquids: Nectar thick liquids   Meds: whole or crushed, in puree   Strategies: slow rate, small bites/sips, alternate solids/liquids, no mixed consistencies (regular textures mixed with thin liquids)  Frequent oral care  Upright position  F/u ST tx: as able and appropriate   Therapy Prognosis: fair/guarded  Prognosis considerations: progressive disease process, noted memory deficits, multiple  medical co-morbidities  Aspiration Precautions  Reflux Precautions  Consider consult with: Palliative   Results reviewed with: pt, nursing, family, physician  Aspiration precautions posted.  Repeat MBS as necessary  If a dedicated assessment of the esophagus is desired, consider esophagram/barium swallow or EGD.      Goals:  Pt will tolerate least restrictive diet w/out s/s aspiration or oral/pharyngeal difficulties.    Re: Compensatory Strategies  -Patient will utilize trained swallowing maneuver (e.g., supraglottic swallow, Mendelsohn maneuver, effortful swallow, etc.) with 80%  to facilitate improved airway protection,  tongue base retraction, pharyngeal constriction  and/or clearance of the bolus through the pharynx with 90% accuracy    -Patient will demonstrate independent use of recommended safe swallowing strategies during a clinician assessed meal across 1-3 diagnostic sessions.    -Patient’s caregiver will demonstrate adherence to recommended diet, as well as application of aspiration precautions and compensatory strategies.        H&P/pertinent provider notes: (PMH noted above)  Nino Silva is a 59 y.o. male well-known to medical oncology, follows with Dr. Morrell for history of extensive stage small cell lung cancer with multiple disease sites on third line therapy with Tarlatamab who called with symptoms of persistent headache, gait instability, dysphagia.  He was scheduled for outpatient MRI of the brain and MRI C-spine for 4/8/2025.  However due to worsening symptoms was recommended to present to ED for more urgent evaluation.   Patient's wife reports patient has been having persistent headache, symptoms of confusion.  Patient reports he has felt off balance.  Denies falls.  Denies double vision.  Does have blurry vision at times and some dizziness.  He has had a dose of dexamethasone since admission and this has improved his headache.  He appears dehydrated.  He states he has had difficulty  swallowing water over the past week.  Denies nausea/vomiting    Special Studies:  4/6/25 MRI cervical spine w wo contrast:  1.  Subtle foci of nodular enhancement along the dorsal surface of the cord at C4 and ventral surface of the cord at C6 could represent fortuitous vascular enhancement, although leptomeningeal metastatic disease is not excluded. Recommend further   clinical assessment, consider correlation with CSF analysis.  2.  Degenerative changes as discussed. No significant canal stenosis at any level. Multilevel foraminal stenosis as outlined above.  3.  Partially imaged cerebellar metastases, seen better on MRI brain from 4/4/2025.     4/6/25 CT head wo contrast:  Scattered hemorrhagic metastatic lesions most notably throughout the left frontal region and right brainstem similar to 4/4/2025.     Extensive treatment related radiation changes and vasogenic edema throughout the bilateral cerebral hemispheres, brainstem and cerebellum     4/5/25 CT head wo contrast:  Stable interval exam. No acute hemorrhage or mass effect.      4/4/25 MRI brain w wo contrast:  1.  Too many to count intracranial hemorrhagic metastatic lesions involving supratentorial and infratentorial brain and brainstem, majority of which have enlarged from prior exam with worsening perilesional edema. No midline shift.  2.  Confluent white matter FLAIR hyperintensity, likely combination of worsened perilesional edema and posttreatment changes    4/4/25 CTA head and neck with and without contrast:  CT Brain:  - Scattered hyperdense hemorrhagic metastatic lesions in left frontal lobe and brainstem have increased in size since MRI brain 2/5/2024 - the number of metastatic lesions is underestimated by CT when compared to MRI brain. Recommend follow-up MRI brain   with and without contrast for further evaluation.  - Diffuse severe white matter changes in bilateral cerebral hemispheres, brainstem, and cerebellum likely related to  treatment-related radiation changes with scattered areas of vasogenic edema.     CT Angiography:  - Negative CTA head and neck for large vessel occlusion, dissection, aneurysm, or high-grade stenosis.     Known large right upper lobe lung mass, compatible with primary lung malignancy. Probable metastatic mediastinal and right hilar lymphadenopathy. Recommend follow-up CT chest with contrast for further evaluation.    Previous VBS:  N/A     Does the pt have pain? No  If yes, was nursing made aware/was it addressed? N/A     Swallow Mechanism Exam  Facial: symmetrical  Labial: WFL  Lingual: WFL  Velum: symmetrical  Mandible: adequate ROM  Dentition: adequate  Vocal quality:clear/adequate   Volitional Cough: strong/productive   Respiratory Status: on RA       Swallow Information   Current Diet: regular diet and thin liquids   Baseline Diet: regular diet and thin liquids per pt report       Consistencies Administered:  Pt was viewed sitting upright in the lateral and AP positions. Trials administered were comparable to [facility] States[facility] Validated Protocol: tsp thin liquid x2, cup sip thin, sequential swallow cup sip thin, tsp nectar thick, cup sip nectar thick, sequential swallow cup sip nectar thick, tsp Honey thick, tsp pudding, ½ cookie coated with pudding coating. Pt was also given nectar thick liquids by straw, as well as a barium tablet with nectar thick liquids, head turn right, head turn left, prep set and chin tuck x2.       Oral Phase:  Lip Closure: Complete   Tongue Control During Bolus Hold: reduced w/ premature spill over BOT   Bolus Preparation/Mastication: Timely   Bolus Transport/Lingual Motion: Slowed   Oral Residue: min on BOT   Initiation of the Pharyngeal Swallow: delayed w/ bolus head in the valleculae w/ majority of trials, at times bolus head in the pyriforms     Pharyngeal Phase:  Soft Palate Elevation: WFL   Laryngeal Elevation: Reduced   Anterior Hyoid Excursion: Reduced   Epiglottic Movement: Incomplete    Laryngeal Vestibular Closure: Reduced   Pharyngeal Stripping Wave: Adequate  PES Opening: Adequate   Tongue Base Retraction: min reduced    Pharyngeal Residue: min noted in the valleculae and pyriforms at times       Penetration/Aspiration:  Thin: tsp- penetration before w/ silent aspiration during the swallow (PAS-8), cup- deep penetration during the swallow w/ material contacting vocal folds (PAS-5), sequential/chin tuck x1/x2- silent aspiration during the swallow (PAS-8), head turn right/head turn left- kiki silent aspiration during the swallow (PAS-8), prep set- kiki aspiration during the swallow- cough response appreciated (PAS-7)  Nectar: tsp- no penetration/aspiration (PAS-1), cup- flash penetration during the swallow (PAS-2), sequential/barium tablet- deep penetration during the swallow w/ materials contacting the vocal folds (PAS-5), straw- penetration during the swallow w/ epiglottic undercoating (PAS-3)  Honey: no penetration/aspiration (PAS-1)  Puree: no penetration/aspiration (PAS-1)  Solid: no penetration/aspiration (PAS-1)  Response to Aspiration: N/A  Strategies/Efficacy: chin tuck, head turn right, head turn left, prep set- did not aid in eliminating penetration/aspiration    8-Point Penetration-Aspiration Scale   1 Material does not enter the airway   2 Material enters the airway, remains above the vocal folds, and is ejected  from the  airway    3 Material enters the airway, remains above the vocal folds, and is not ejected from the airway   4 Material enters the airway, contacts the vocal folds, and is ejected from the airway   5 Material enters the airway, contacts the vocal folds, and is not ejected from the airway    6 Material enters the airway, passes below the vocal folds and is ejected into the larynx or out of the airway    7 Material enters the airway, passes below the vocal folds, and is not ejected from the trachea despite effort    8 Material enters the airway, passes below the  vocal folds, and no effort is made to eject         Screening of Esophageal Phase  Esophageal Clearance: min esophageal retention noted w/ pill stasis, cleared w/ liquid wash

## 2025-04-08 NOTE — SPEECH THERAPY NOTE
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 No pertinent past medical history.     Past Surgical History  Past Surgical History:   Procedure Laterality Date    HERNIA REPAIR      IR BIOPSY LUNG  03/05/2024    ORCHIECTOMY Left          Updated History:  Last seen by SLP 4/7 recommending regular textures and thin liquids, meds as tolerated.   Discussed rationale for completion of VFSS given abn MRI results, pt report of coughing w/ liquids prior to admission, and multiple medical co-morbidities. Pt and wife present requested to discuss and let SLP know tomorrow regarding decision.     Updated Imaging:  No new imaging.     Subjective:  Pt awake and alert upon arrival, agreeable to PO trials and HOB elevation. Pt stated that he and his wife discussed completion of VFSS and are opting for completion while admitted. SLP sent secure chat to MD for order placement and will complete as able and appropriate.     Objective:  Materials administered: regular textures and thin liquids    Complete labial seal for retrieval and containment of all materials, no anterior bolus loss present. Adequate bite strength for retrieval of and timely rotary mastication of regular textures w/ complete bolus breakdown. Adequate bolus transfer. Suspected delayed swallow initiation and fair hyolaryngeal elevation to palpation. Wet vocal quality noted x1 w/ thin liquids, pt w/ independent throat clear effective in clearing vocal quality. No further overt s/s of aspiration this date.     Assessment:  Pt presents w/ s/s suggestive of functional oral skills, suspected at least min  pharyngeal dysphagia characterized by descriptions above.     Plan/Recommendations:  Regular textures and thin liquids   Meds as tolerated   Swallow strategies: upright posture, slow rate, small bites/sips, single sips, NO STRAWS   Frequent/thorough oral care   ST to f/u for completion of VFSS as able and appropriate

## 2025-04-08 NOTE — PLAN OF CARE
Problem: Potential for Falls  Goal: Patient will remain free of falls  Description: INTERVENTIONS:- Educate patient/family on patient safety including physical limitations- Instruct patient to call for assistance with activity - Consult OT/PT to assist with strengthening/mobility - Keep Call bell within reach- Keep bed low and locked with side rails adjusted as appropriate- Keep care items and personal belongings within reach- Initiate and maintain comfort rounds- Make Fall Risk Sign visible to staff- Offer Toileting every 2 Hours, in advance of need- Initiate/Maintain bed/chair alarm- Obtain necessary fall risk management equipment: yellow bracelet and yellow socks - Apply yellow socks and bracelet for high fall risk patients- Consider moving patient to room near nurses station  INTERVENTIONS:- Educate patient/family on patient safety including physical limitations- Instruct patient to call for assistance with activity - Consult OT/PT to assist with strengthening/mobility - Keep Call bell within reach- Keep bed low and locked with side rails adjusted as appropriate- Keep care items and personal belongings within reach- Initiate and maintain comfort rounds- Make Fall Risk Sign visible to staff- Offer Toileting every 2 Hours, in advance of need- Initiate/Maintain bed/chair alarm- Obtain necessary fall risk management equipment: yellow bracelet and yellow socks - Apply yellow socks and bracelet for high fall risk patients- Consider moving patient to room near nurses station  Outcome: Progressing     Problem: PAIN - ADULT  Goal: Verbalizes/displays adequate comfort level or baseline comfort level  Description: Interventions:- Encourage patient to monitor pain and request assistance- Assess pain using appropriate pain scale- Administer analgesics based on type and severity of pain and evaluate response- Implement non-pharmacological measures as appropriate and evaluate response- Consider cultural and social influences  on pain and pain management- Notify physician/advanced practitioner if interventions unsuccessful or patient reports new pain  Outcome: Progressing     Problem: INFECTION - ADULT  Goal: Absence or prevention of progression during hospitalization  Description: INTERVENTIONS:- Assess and monitor for signs and symptoms of infection- Monitor lab/diagnostic results- Monitor all insertion sites, i.e. indwelling lines, tubes, and drains- Monitor endotracheal if appropriate and nasal secretions for changes in amount and color- [place] appropriate cooling/warming therapies per order- Administer medications as ordered- Instruct and encourage patient and family to use good hand hygiene technique- Identify and instruct in appropriate isolation precautions for identified infection/condition  Outcome: Progressing  Goal: Absence of fever/infection during neutropenic period  Description: INTERVENTIONS:- Monitor WBC  Outcome: Progressing     Problem: SAFETY ADULT  Goal: Patient will remain free of falls  Description: INTERVENTIONS:- Educate patient/family on patient safety including physical limitations- Instruct patient to call for assistance with activity - Consult OT/PT to assist with strengthening/mobility - Keep Call bell within reach- Keep bed low and locked with side rails adjusted as appropriate- Keep care items and personal belongings within reach- Initiate and maintain comfort rounds- Make Fall Risk Sign visible to staff- Offer Toileting every 2 Hours, in advance of need- Initiate/Maintain bed/chair alarm- Obtain necessary fall risk management equipment: yellow bracelet and yellow socks - Apply yellow socks and bracelet for high fall risk patients- Consider moving patient to room near nurses station  INTERVENTIONS:- Educate patient/family on patient safety including physical limitations- Instruct patient to call for assistance with activity - Consult OT/PT to assist with strengthening/mobility - Keep Call bell within  reach- Keep bed low and locked with side rails adjusted as appropriate- Keep care items and personal belongings within reach- Initiate and maintain comfort rounds- Make Fall Risk Sign visible to staff- Offer Toileting every 2 Hours, in advance of need- Initiate/Maintain bed/chair alarm- Obtain necessary fall risk management equipment: yellow bracelet and yellow socks - Apply yellow socks and bracelet for high fall risk patients- Consider moving patient to room near nurses station  Outcome: Progressing  Goal: Maintain or return to baseline ADL function  Description: INTERVENTIONS:-  Assess patient's ability to carry out ADLs; assess patient's baseline for ADL function and identify physical deficits which impact ability to perform ADLs (bathing, care of mouth/teeth, toileting, grooming, dressing, etc.)- Assess/evaluate cause of self-care deficits - Assess range of motion- Assess patient's mobility; develop plan if impaired- Assess patient's need for assistive devices and provide as appropriate- Encourage maximum independence but intervene and supervise when necessary- Involve family in performance of ADLs- Assess for home care needs following discharge - Consider OT consult to assist with ADL evaluation and planning for discharge- Provide patient education as appropriate  Outcome: Progressing  Goal: Maintains/Returns to pre admission functional level  Description: INTERVENTIONS:- Perform AM-PAC 6 Click Basic Mobility/ Daily Activity assessment daily.- Set and communicate daily mobility goal to care team and patient/family/caregiver. - Collaborate with rehabilitation services on mobility goals if consulted- Perform Range of Motion 3 times a day.- Reposition patient every 2 hours.- Dangle patient 3 times a day- Stand patient 3 times a day- Ambulate patient 3 times a day- Out of bed to chair 3 times a day - Out of bed for meals 3 times a day- Out of bed for toileting- Record patient progress and toleration of activity  level   Outcome: Progressing     Problem: DISCHARGE PLANNING  Goal: Discharge to home or other facility with appropriate resources  Description: INTERVENTIONS:- Identify barriers to discharge w/patient and caregiver- Arrange for needed discharge resources and transportation as appropriate- Identify discharge learning needs (meds, wound care, etc.)- Arrange for interpretive services to assist at discharge as needed- Refer to Case Management Department for coordinating discharge planning if the patient needs post-hospital services based on physician/advanced practitioner order or complex needs related to functional status, cognitive ability, or social support system  Outcome: Progressing     Problem: Knowledge Deficit  Goal: Patient/family/caregiver demonstrates understanding of disease process, treatment plan, medications, and discharge instructions  Description: Complete learning assessment and assess knowledge base.Interventions:- Provide teaching at level of understanding- Provide teaching via preferred learning methods  Outcome: Progressing     Problem: Nutrition/Hydration-ADULT  Goal: Nutrient/Hydration intake appropriate for improving, restoring or maintaining nutritional needs  Description: Monitor and assess patient's nutrition/hydration status for malnutrition. Collaborate with interdisciplinary team and initiate plan and interventions as ordered.  Monitor patient's weight and dietary intake as ordered or per policy. Utilize nutrition screening tool and intervene as necessary. Determine patient's food preferences and provide high-protein, high-caloric foods as appropriate. INTERVENTIONS:- Monitor oral intake, urinary output, labs, and treatment plans- Assess nutrition and hydration status and recommend course of action- Evaluate amount of meals eaten- Assist patient with eating if necessary - Allow adequate time for meals- Recommend/ encourage appropriate diets, oral nutritional supplements, and  vitamin/mineral supplements- Order, calculate, and assess calorie counts as needed- Recommend, monitor, and adjust tube feedings and TPN/PPN based on assessed needs- Assess need for intravenous fluids- Provide specific nutrition/hydration education as appropriate- Include patient/family/caregiver in decisions related to nutrition  Outcome: Progressing

## 2025-04-08 NOTE — SPEECH THERAPY NOTE
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 No pertinent past medical history.     Past Surgical History  Past Surgical History:   Procedure Laterality Date    FL LUMBAR PUNCTURE DIAGNOSTIC  4/7/2025    HERNIA REPAIR      IR BIOPSY LUNG  03/05/2024    ORCHIECTOMY Left          Updated History:  Pt last seen earlier this date for completion of VFSS recommending regular textures and nectar thick liquids, meds whole or crushed in puree.     Updated Imaging:  No new imaging.    Subjective:  Pt awake and alert upon arrival, agreeable to review of VFSS results, family present. Aspiration precaution sign placed in room.     Objective:  MBS findings thoroughly reviewed w/ pt and pt's family w/ use of images to aid in understanding. Education provided on anatomy/physiology of the swallow and pt specific identified impairments including reduced airway protection, sensory deficit, etc. Further education provided on risks of aspiration w/ each administered consistency and options for diet modifications to potentially reduce aspiration. Further education provided on risks/benefits of liquid/solid texture modifications (reduced aspiration risk and subsequent medical implications, increased retention, potential dehydration, etc.) vs risks/benefits of continuing baseline diet w/ use of strategies to optimize swallow safety and understanding/acceptance of any potential risks. Pt/pt's family verbalized understanding of options and associated risks/benefits and at this time states preference/choice for regular textures and nectar thick liquids. Physician notified of  decision. Education provided on the importance of frequent/thorough oral care and s/s worsening dysphagia/aspiration to notify medical team of should they arise. Pt demonstrated understanding and denied questions/concerns at this time.      Assessment:  Pt presents w/ mild oral dysphagia, mild-mod pharyngeal dysphagia characterized by recent VFSS.     Plan/Recommendations:  Regular textures and nectar thick liquids   Meds whole or crushed in puree   Swallow strategies: upright posture, slow rate, small bites/sips, alternate solids/liquids   Frequent/thorough oral care   ST to f/u for diet tolerance and dysphagia exercises as able and appropriate

## 2025-04-08 NOTE — PLAN OF CARE
Problem: Potential for Falls  Goal: Patient will remain free of falls  Description: INTERVENTIONS:- Educate patient/family on patient safety including physical limitations- Instruct patient to call for assistance with activity - Consult OT/PT to assist with strengthening/mobility - Keep Call bell within reach- Keep bed low and locked with side rails adjusted as appropriate- Keep care items and personal belongings within reach- Initiate and maintain comfort rounds- Make Fall Risk Sign visible to staff- Offer Toileting every 2 Hours, in advance of need- Initiate/Maintain bed alarm- Obtain necessary fall risk management equipment: - Apply yellow socks and bracelet for high fall   Problem: PAIN - ADULT  Goal: Verbalizes/displays adequate comfort level or baseline comfort level  Description: Interventions:- Encourage patient to monitor pain and request assistance- Assess pain using appropriate pain scale- Administer analgesics based on type and severity of pain and evaluate response- Implement non-pharmacological measures as appropriate and evaluate response- Consider cultural and social influences on pain and pain management- Notify physician/advanced practitioner if interventions unsuccessful or patient reports new pain  Outcome: Progressing     Problem: INFECTION - ADULT  Goal: Absence or prevention of progression during hospitalization  Description: INTERVENTIONS:- Assess and monitor for signs and symptoms of infection- Monitor lab/diagnostic results- Monitor all insertion sites, i.e. indwelling lines, tubes, and drains- Monitor endotracheal if appropriate and nasal secretions for changes in amount and color- [place] appropriate cooling/warming therapies per order- Administer medications as ordered- Instruct and encourage patient and family to use good hand hygiene technique- Identify and instruct in appropriate isolation precautions for identified infection/condition  Outcome: Progressing  Goal: Absence of  fever/infection during neutropenic period  Description: INTERVENTIONS:- Monitor WBC  Outcome: Progressing     Problem: SAFETY ADULT  Goal: Maintain or return to baseline ADL function  Description: INTERVENTIONS:-  Assess patient's ability to carry out ADLs; assess patient's baseline for ADL function and identify physical deficits which impact ability to perform ADLs (bathing, care of mouth/teeth, toileting, grooming, dressing, etc.)- Assess/evaluate cause of self-care deficits - Assess range of motion- Assess patient's mobility; develop plan if impaired- Assess patient's need for assistive devices and provide as appropriate- Encourage maximum independence but intervene and supervise when necessary- Involve family in performance of ADLs- Assess for home care needs following discharge - Consider OT consult to assist with ADL evaluation and planning for discharge- Provide patient education as appropriate  Outcome: Progressing

## 2025-04-08 NOTE — NURSING NOTE
Pt is requesting PT/OT consult. He states that doctor had stated they would order. On call doctor contacted and told RN to place a note for the morning.

## 2025-04-08 NOTE — PROGRESS NOTES
"Progress Note - Hospitalist   Name: Nino Silva 59 y.o. 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  Diffuse severe white matter changes in bilateral cerebral hemispheres, brainstem and cerebellum related to treatment-related radiation changes.  Negative CTA for large vessel occlusion, dissection, aneurysm or high-grade stenosis.  MRI brain-Too many to count intracranial hemorrhagic metastatic lesions involving supratentorial and infratentorial brain and brainstem, majority of which have enlarged from prior exam with worsening perilesional edema. No midline shift.     Continue with IV Decadron 4 mg Q6  Continue with pantoprazole and  Continue with fall/safety precautions  MRI cervical-\"Subtle foci of nodular enhancement along the dorsal surface of the cord at C4 and ventral surface of the cord at C6 could represent fortuitous vascular enhancement, although leptomeningeal metastatic disease is not excluded. Recommend further   clinical assessment, consider correlation with CSF analysis.  2.  Degenerative changes as discussed. No significant canal stenosis at any level. Multilevel foraminal stenosis as outlined above.  3.  Partially imaged cerebellar metastases, seen better on MRI brain from 4/4/2025.\"    Discussed with oncology, no need for neurosurgery involvement  No " "need for seizure precautions currently  Palliative involved, poor prognosis, however patient currently treatment focussed   MRI lumbar spine-\" Stable osseous metastasis within the L1 and L2 vertebral bodies with no extraosseous extension of disease or new lesions.Mild noncompressive lumbar degenerative change is stable.\"  IR consult for lumbar puncture.  Patient underwent lumbar puncture on 4/7  Radiation oncology and oncology follow-up  Patient is scheduled for video barium swallow  Brain mass  See above   Ambulatory dysfunction  In the setting of progressive metastatic disease  Safe ambulation  Fall precautions    Acute headache  Secondary to metastatic disease progression  Pending evaluation/imaging  For more details, see under small cell lung cancer  Palliative care encounter  Palliative care consult appreciated  Goals of care, counseling/discussion      Labs & Imaging: Results Review Statement: No pertinent imaging studies reviewed.    VTE Prophylaxis: in place.    Code Status:   Level 1 - Full Code    Patient Centered Rounds: I have performed bedside rounds with nursing staff today.    Mobility:   Basic Mobility Inpatient Raw Score: 16  JH-HLM Goal: 5: Stand one or more mins  JH-HLM Achieved: 4: Move to chair/commode  JH-HLM Goal achieved. Continue to encourage appropriate mobility.    Discussions with Specialists or Other Care Team Provider: RN    Education and Discussions with Family / Patient: Wife    Total Time Spent on Date of Encounter in care of patient: 35 mins. This time was spent on one or more of the following: performing physical exam; counseling and coordination of care; obtaining or reviewing history; documenting in the medical record; reviewing/ordering tests, medications or procedures; communicating with other healthcare professionals and discussing with patient's family/caregivers.    Current Length of Stay: 4 day(s)    Current Patient Status: Inpatient   Certification Statement: The patient " "will continue to require additional inpatient hospital stay due to see my assessment and plan.     Subjective:   Patient is seen and examined at bedside.  No new complaints.  Afebrile  All other ROS are negative.    Objective:    Vitals: Blood pressure 126/86, pulse (!) 44, temperature (!) 97.2 °F (36.2 °C), resp. rate 14, height 5' 10\" (1.778 m), weight 86.4 kg (190 lb 6.4 oz), SpO2 94%.,Body mass index is 27.32 kg/m².  SPO2 RA Rest      Flowsheet Row ED to Hosp-Admission (Current) from 4/4/2025 in [facility] Med Surg Unit   SpO2 94 %   SpO2 Activity At Rest   O2 Device None (Room air)   O2 Flow Rate --          I&O:   Intake/Output Summary (Last 24 hours) at 4/8/2025 0718  Last data filed at 4/8/2025 0501  Gross per 24 hour   Intake 796 ml   Output 450 ml   Net 346 ml       Physical Exam:    General- Alert, lying comfortably in bed. Not in any acute distress.  Neck- Supple, No JVD  CVS- regular, S1 and S2 normal  Chest- Bilateral Air entry, No rhochi, crackles or wheezing present.  Abdomen- soft, nontender, not distended, no guarding or rigidity, BS+  Extremities-  No pedal edema, No calf tenderness  CNS-   Alert, awake and orientedx3. No focal deficits present.    Invasive Devices       Peripheral Intravenous Line  Duration             Peripheral IV 04/08/25 Left Antecubital <1 day                          Social History  reviewed  Family History   Problem Relation Age of Onset    Breast cancer additional onset Mother     No Known Problems Father     Stroke Brother     reviewed    Meds:  Current Facility-Administered Medications   Medication Dose Route Frequency Provider Last Rate Last Admin    acetaminophen (TYLENOL) tablet 650 mg  650 mg Oral Q6H PRN Deirdre Newton MD        dexamethasone (DECADRON) injection 4 mg  4 mg Intravenous Q6H [facility] Deirdre Newton MD   4 mg at 04/08/25 0539    docusate sodium (COLACE) capsule 100 mg  100 mg Oral BID Deirdre Newton MD   100 mg at 04/05/25 1720    insulin lispro " "(HumALOG/ADMELOG) 100 units/mL subcutaneous injection 1-5 Units  1-5 Units Subcutaneous TID AC Deirdre Newton MD        insulin lispro (HumALOG/ADMELOG) 100 units/mL subcutaneous injection 1-5 Units  1-5 Units Subcutaneous HS Deirdre Newton MD        ondansetron (ZOFRAN) injection 4 mg  4 mg Intravenous Q6H PRN Deirdre Newton MD        pantoprazole (PROTONIX) EC tablet 40 mg  40 mg Oral BID AC Deirdre Newton MD   40 mg at 04/08/25 0539    polyethylene glycol (MIRALAX) packet 17 g  17 g Oral Daily Deirdre Newton MD   17 g at 04/07/25 0745    simethicone (MYLICON) chewable tablet 80 mg  80 mg Oral 4x Daily PRN Deirdre Newton MD            Medications Prior to Admission:     ascorbic acid (VITAMIN C) 250 mg tablet    Cholecalciferol (Vitamin D) 50 MCG (2000 UT) tablet    predniSONE 5 mg tablet    Labs:  Results from last 7 days   Lab Units 04/08/25  0540 04/07/25  1729 04/07/25  0520 04/06/25  0346   WBC Thousand/uL 9.55  --  9.73 9.80   HEMOGLOBIN g/dL 12.0  --  11.9* 11.7*   HEMATOCRIT % 36.0*  --  36.0* 34.5*   PLATELETS Thousands/uL 269 289 272 284   SEGS PCT % 83*  --  85* 89*   LYMPHO PCT % 11*  --  9* 6*   MONO PCT % 5  --  5 4   EOS PCT % 0  --  0 0     Results from last 7 days   Lab Units 04/08/25  0540 04/07/25  0520 04/06/25  0346 04/05/25  0405 04/04/25  0946   POTASSIUM mmol/L 4.0 4.0 4.3   < > 3.8   CHLORIDE mmol/L 103 105 105   < > 103   CO2 mmol/L 26 24 26   < > 24   BUN mg/dL 28* 25 25   < > 17   CREATININE mg/dL 0.73 0.70 0.82   < > 0.76   CALCIUM mg/dL 9.0 9.0 9.3   < > 9.2   ALK PHOS U/L  --   --   --   --  43   ALT U/L  --   --   --   --  19   AST U/L  --   --   --   --  16    < > = values in this interval not displayed.     Lab Results   Component Value Date    CKTOTAL 70 02/07/2025    CKTOTAL 99 01/17/2025    CKTOTAL 80 12/27/2024     Results from last 7 days   Lab Units 04/07/25  1729 04/04/25  0946   INR  0.99 1.06     No results found for: \"BLOODCX\", \"URINECX\", \"WOUNDCULT\", \"SPUTUMCULTUR\"      Imaging:  Results " for orders placed during the hospital encounter of 03/04/24    XR chest portable    Narrative  XR CHEST PORTABLE    INDICATION: follow up right PTX.    COMPARISON: 3/5/2024    FINDINGS:    Masslike density right midlung field similar to prior. Elsewhere, pulmonary opacities are also unchanged from recent prior. The right pneumothorax is no longer seen. No significant effusions are appreciated.  Stable cardiomediastinal silhouette.    Impression  The right pneumothorax appears to have resolved. No significant change in the appearance of the lungs. Right lung mass redemonstrated        Workstation performed: [id number]    No results found for this or any previous visit.      Last 24 Hours Medication List:   Current Facility-Administered Medications   Medication Dose Route Frequency Provider Last Rate    acetaminophen  650 mg Oral Q6H PRN Deirdre Newton MD      dexamethasone  4 mg Intravenous Q6H JESUS Deirdre Newton MD      docusate sodium  100 mg Oral BID Deirdre Newton MD      insulin lispro  1-5 Units Subcutaneous TID AC Deirdre Newton MD      insulin lispro  1-5 Units Subcutaneous HS Deirdre Newton MD      ondansetron  4 mg Intravenous Q6H PRN Deirdre Newton MD      pantoprazole  40 mg Oral BID AC Deirdre Newton MD      polyethylene glycol  17 g Oral Daily Deirdre Newton MD      simethicone  80 mg Oral 4x Daily PRN Deirdre Newton MD          Today, Patient Was Seen By: Ernie Cyr MD    ** Please Note: Dictation voice to text software may have been used in the creation of this document. **

## 2025-04-08 NOTE — SOCIAL WORK
PSC SW met with patient at bedside to introduce the role of PSC SW and provide emotional support and resources. No family was present during this visit. Pt seems to be in good spirits and was given the opportunity to have his questions and concerns addressed. Pt with metastatic non-small cell lung cancer(neuroendocrine), diagnosed in March 2024 with poor prognosis. Pt wishes to consider treatment options and goals of care.     Pt reports family especially his daughter is having a difficult time processing it all and the family is looking for counseling support.  SW was able to provide pt with resources information on behavioral health services in the area for children. Pt reports that they are looking to seek counseling support with the school. At this time pt will continue to require additional inpatient hospital stay. SW will follow for ongoing social/emotional support as situation evolves.      I have spent 40 minutes with patient today in which greater than 50% of this time was spent in counseling/coordination of care.      PCS SW will continue to follow as requested by the medical team, patient, and/or family.

## 2025-04-08 NOTE — ASSESSMENT & PLAN NOTE
"Patient with metastatic non-small cell lung cancer(neuroendocrine), diagnosed in March 2024  Status post whole brain radiation therapy in 2024  On third line chemo therapy with tarlatamab    Presented with headache, blurry vision, paresthesias, ambulatory dysfunction  Symptoms concerning for leptomeningeal involvement versus progression of brain mets    CTA head/neck-scattered hyperdense hemorrhagic metastatic lesions in left frontal lobe and brainstem, that have increased in size from prior.  Diffuse severe white matter changes in bilateral cerebral hemispheres, brainstem and cerebellum related to treatment-related radiation changes.  Negative CTA for large vessel occlusion, dissection, aneurysm or high-grade stenosis.  MRI brain-Too many to count intracranial hemorrhagic metastatic lesions involving supratentorial and infratentorial brain and brainstem, majority of which have enlarged from prior exam with worsening perilesional edema. No midline shift.     Continue with IV Decadron 4 mg Q6  Continue with pantoprazole and  Continue with fall/safety precautions  MRI cervical-\"Subtle foci of nodular enhancement along the dorsal surface of the cord at C4 and ventral surface of the cord at C6 could represent fortuitous vascular enhancement, although leptomeningeal metastatic disease is not excluded. Recommend further   clinical assessment, consider correlation with CSF analysis.  2.  Degenerative changes as discussed. No significant canal stenosis at any level. Multilevel foraminal stenosis as outlined above.  3.  Partially imaged cerebellar metastases, seen better on MRI brain from 4/4/2025.\"    Discussed with oncology, no need for neurosurgery involvement  No need for seizure precautions currently  Palliative involved, poor prognosis, however patient currently treatment focussed   MRI lumbar spine-\" Stable osseous metastasis within the L1 and L2 vertebral bodies with no extraosseous extension of disease or new " "lesions.Mild noncompressive lumbar degenerative change is stable.\"  IR consult for lumbar puncture.  Patient underwent lumbar puncture on 4/7  Radiation oncology and oncology follow-up  Patient is scheduled for video barium swallow  "

## 2025-04-09 VITALS
HEIGHT: 70 IN | TEMPERATURE: 97.2 F | DIASTOLIC BLOOD PRESSURE: 80 MMHG | RESPIRATION RATE: 18 BRPM | OXYGEN SATURATION: 94 % | BODY MASS INDEX: 27.26 KG/M2 | WEIGHT: 190.4 LBS | HEART RATE: 44 BPM | SYSTOLIC BLOOD PRESSURE: 120 MMHG

## 2025-04-09 LAB
ANION GAP SERPL CALCULATED.3IONS-SCNC: 9 MMOL/L (ref 4–13)
BASOPHILS # BLD AUTO: 0.01 THOUSANDS/ÂΜL (ref 0–0.1)
BASOPHILS NFR BLD AUTO: 0 % (ref 0–1)
BUN SERPL-MCNC: 28 MG/DL (ref 5–25)
CALCIUM SERPL-MCNC: 9.2 MG/DL (ref 8.4–10.2)
CHLORIDE SERPL-SCNC: 103 MMOL/L (ref 96–108)
CO2 SERPL-SCNC: 23 MMOL/L (ref 21–32)
CREAT SERPL-MCNC: 0.73 MG/DL (ref 0.6–1.3)
EOSINOPHIL # BLD AUTO: 0 THOUSAND/ÂΜL (ref 0–0.61)
EOSINOPHIL NFR BLD AUTO: 0 % (ref 0–6)
ERYTHROCYTE [DISTWIDTH] IN BLOOD BY AUTOMATED COUNT: 13.2 % (ref 11.6–15.1)
GFR SERPL CREATININE-BSD FRML MDRD: 100 ML/MIN/1.73SQ M
GLUCOSE SERPL-MCNC: 111 MG/DL (ref 65–140)
GLUCOSE SERPL-MCNC: 113 MG/DL (ref 65–140)
GLUCOSE SERPL-MCNC: 117 MG/DL (ref 65–140)
HCT VFR BLD AUTO: 38.6 % (ref 36.5–49.3)
HGB BLD-MCNC: 12.9 G/DL (ref 12–17)
IMM GRANULOCYTES # BLD AUTO: 0.19 THOUSAND/UL (ref 0–0.2)
IMM GRANULOCYTES NFR BLD AUTO: 2 % (ref 0–2)
LYMPHOCYTES # BLD AUTO: 0.97 THOUSANDS/ÂΜL (ref 0.6–4.47)
LYMPHOCYTES NFR BLD AUTO: 9 % (ref 14–44)
MCH RBC QN AUTO: 31.1 PG (ref 26.8–34.3)
MCHC RBC AUTO-ENTMCNC: 33.4 G/DL (ref 31.4–37.4)
MCV RBC AUTO: 93 FL (ref 82–98)
MONOCYTES # BLD AUTO: 0.44 THOUSAND/ÂΜL (ref 0.17–1.22)
MONOCYTES NFR BLD AUTO: 4 % (ref 4–12)
NEUTROPHILS # BLD AUTO: 9.13 THOUSANDS/ÂΜL (ref 1.85–7.62)
NEUTS SEG NFR BLD AUTO: 85 % (ref 43–75)
NRBC BLD AUTO-RTO: 0 /100 WBCS
PLATELET # BLD AUTO: 286 THOUSANDS/UL (ref 149–390)
PMV BLD AUTO: 9.7 FL (ref 8.9–12.7)
POTASSIUM SERPL-SCNC: 4.1 MMOL/L (ref 3.5–5.3)
RBC # BLD AUTO: 4.15 MILLION/UL (ref 3.88–5.62)
SODIUM SERPL-SCNC: 135 MMOL/L (ref 135–147)
WBC # BLD AUTO: 10.74 THOUSAND/UL (ref 4.31–10.16)

## 2025-04-09 PROCEDURE — 97163 PT EVAL HIGH COMPLEX 45 MIN: CPT

## 2025-04-09 PROCEDURE — 99239 HOSP IP/OBS DSCHRG MGMT >30: CPT | Performed by: INTERNAL MEDICINE

## 2025-04-09 PROCEDURE — 85025 COMPLETE CBC W/AUTO DIFF WBC: CPT | Performed by: INTERNAL MEDICINE

## 2025-04-09 PROCEDURE — 82948 REAGENT STRIP/BLOOD GLUCOSE: CPT

## 2025-04-09 PROCEDURE — 80048 BASIC METABOLIC PNL TOTAL CA: CPT | Performed by: INTERNAL MEDICINE

## 2025-04-09 PROCEDURE — 97116 GAIT TRAINING THERAPY: CPT

## 2025-04-09 PROCEDURE — 97129 THER IVNTJ 1ST 15 MIN: CPT

## 2025-04-09 PROCEDURE — 97167 OT EVAL HIGH COMPLEX 60 MIN: CPT

## 2025-04-09 RX ORDER — POLYETHYLENE GLYCOL 3350 17 G/17G
17 POWDER, FOR SOLUTION ORAL DAILY
Qty: 340 G | Refills: 0 | Status: SHIPPED | OUTPATIENT
Start: 2025-04-10 | End: 2025-04-30

## 2025-04-09 RX ORDER — PANTOPRAZOLE SODIUM 40 MG/1
40 TABLET, DELAYED RELEASE ORAL
Qty: 60 TABLET | Refills: 0 | Status: SHIPPED | OUTPATIENT
Start: 2025-04-09 | End: 2025-05-09

## 2025-04-09 RX ORDER — DEXAMETHASONE 4 MG/1
4 TABLET ORAL 3 TIMES DAILY
Qty: 90 TABLET | Refills: 0 | Status: SHIPPED | OUTPATIENT
Start: 2025-04-09 | End: 2025-05-09

## 2025-04-09 RX ADMIN — DEXAMETHASONE SODIUM PHOSPHATE 4 MG: 4 INJECTION, SOLUTION INTRAMUSCULAR; INTRAVENOUS at 04:43

## 2025-04-09 RX ADMIN — DEXAMETHASONE SODIUM PHOSPHATE 4 MG: 4 INJECTION, SOLUTION INTRAMUSCULAR; INTRAVENOUS at 13:22

## 2025-04-09 RX ADMIN — POLYETHYLENE GLYCOL 3350 17 G: 17 POWDER, FOR SOLUTION ORAL at 08:13

## 2025-04-09 RX ADMIN — PANTOPRAZOLE SODIUM 40 MG: 40 TABLET, DELAYED RELEASE ORAL at 04:43

## 2025-04-09 NOTE — OCCUPATIONAL THERAPY NOTE
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 No pertinent past medical history.  Past Surgical History  Past Surgical History:   Procedure Laterality Date    FL LUMBAR PUNCTURE DIAGNOSTIC  4/7/2025    HERNIA REPAIR      IR BIOPSY LUNG  03/05/2024    ORCHIECTOMY Left              04/09/25 1041   OT Last Visit   OT Visit Date 04/09/25   Note Type   Note type Evaluation   Restrictions/Precautions   Weight Bearing Precautions Per Order No   Other Precautions Cognitive;Chair Alarm;Bed Alarm;Fall Risk  (Home O2 - however, on room air)   Home Living   Type of Home Apartment   Home Layout Stairs to enter with rails  (2nd fl apt with full flight to enter, HR on L side going up)   Bathroom Shower/Tub Tub/shower unit   Bathroom Equipment Tub transfer bench   Home Equipment Walker;Cane;Wheelchair-manual   Additional Comments Reports not using DME PTA - states apt is too small for RW, wife assists with functional mobility   Prior Function   Level of [place] Needs assistance with ADLs;Needs assistance with IADLS;Needs assistance with functional mobility   Lives With Spouse;Daughter   Receives Help From Family   IADLs Family/Friend/Other provides transportation;Family/Friend/Other provides meals;Family/Friend/Other provides medication management   Falls in the last 6 months 1 to 4   Vocational Retired  ([employer])   Lifestyle   Autonomy Recently requiring A for I/ADLs from wife. Wife has been assisting with functional mobility/ADLs.   Reciprocal Relationships Lives with wife & 15 y/o dtr   Service to Others Retired [employer]   General   Additional Pertinent History H/o testicular CA, Small cell lung  "CA c mets to brain/bone. MRI 4/4/25: \"There are too many to count intracranial hemorrhagic metastatic lesions involving bilateral cerebral and cerebellar hemispheres as well as brainstem, majority of which have enlarged from prior exam\"   Family/Caregiver Present Yes   Additional General Comments Spouse & additional guest   Subjective   Subjective \"Just crossing out T's & dotting our I's\"   ADL   Eating Assistance 7  Independent   Grooming Assistance 4  Minimal Assistance   Grooming Deficit Steadying   UB Bathing Assistance 4  Minimal Assistance   LB Bathing Assistance 4  Minimal Assistance   UB Dressing Assistance 4  Minimal Assistance   LB Dressing Assistance 4  Minimal Assistance   LB Dressing Deficit Steadying;Increased time to complete;Supervision/safety  (Able to doff/don BL socks sitting in chair with figure 4 position & increased time)   Toileting Assistance  4  Minimal Assistance   Toileting Deficit Steadying;Supervison/safety   Bed Mobility   Supine to Sit Unable to assess   Additional Comments Received OOB to recliner   Transfers   Sit to Stand 4  Minimal assistance   Additional items Assist x 1;Increased time required;Verbal cues;Armrests  (CGA)   Stand to Sit 4  Minimal assistance   Additional items Assist x 1;Increased time required;Verbal cues;Armrests  (CGA)   Additional Comments VCs for hand placement   Functional Mobility   Functional Mobility 4  Minimal assistance   Additional Comments x1- with & without DME. Posterior LOB's when turning/stepping backwards. Swaying bilaterally. Listing toward R.  (pt & wife state pt typically lists toward L but states he is aware he is listing toward R side on exam)   Additional items Rolling walker   Balance   Static Sitting Good   Dynamic Sitting Good   Static Standing Fair -   Dynamic Standing Poor +   Ambulatory Fair -   Activity Tolerance   Activity Tolerance Patient tolerated treatment well   Medical Staff Made Aware BO Hubbard, PT Lesia   Nurse Made Aware RN " Chayo   RUE Assessment   RUE Assessment WFL  (5/5 MMT)   LUE Assessment   LUE Assessment WFL  (Grossly 4+/5 MMT)   Hand Function   Gross Motor Coordination Functional   Fine Motor Coordination Functional   Sensation   Light Touch   (Chronic numbness in R 4/5 digits)   Vision-Basic Assessment   Current Vision Wears glasses only for reading   Vision - Complex Assessment   Ocular Range of Motion Intact   Tracking Intact   Cognition   Overall Cognitive Status WFL   Arousal/Participation Alert;Cooperative   Attention Within functional limits   Orientation Level Oriented X3   (April 6th, 2025 - grossly oriented)   Memory Decreased recall of recent events;Decreased short term memory   Following Commands Follows one step commands without difficulty   Assessment   Limitation Decreased ADL status;Decreased cognition;Decreased self-care trans;Decreased high-level ADLs   Prognosis Good   Assessment Pt is a 59 y.o. male seen for OT evaluation at [facility], admitted 4/4/2025 w/ dehydration. . OT completed extensive review of pt's medical and social history. Comorbidities affecting pt's functional performance at time of assessment include: lung cancer with mets to brain/bone, brain mass, ambulatory dysfunction, COPD, h/o testicular CA. Personal factors affecting pt at time of IE include: steps to enter environment, difficulty performing ADLS, difficulty performing IADLS , and environment. Prior to admission, pt was living with his wife & dtr in a 2nd floor apt with full flight to enter.  Pt was A w/  ADLS and A w/ IADLS, (-) drove, & required use of RW PTA. Upon evaluation: Pt requires Min A/CGA x1 for functional transfers, Min Ax1 for functional mobility, Min A for UB ADLs and Min A for LB ADLS 2* the following deficits impacting occupational performance: decreased balance, impaired memory, and decreased safety awareness. Full objective findings from OT assessment regarding body systems outlined above. Pt to benefit  from continued skilled OT tx while in the hospital to address deficits as defined above and maximize level of functional independence w/ ADL's and functional mobility. Occupational Performance areas to address include: grooming, bathing/shower, toilet hygiene, dressing, medication management, functional mobility, community mobility, and clothing management. Based on findings, pt is of high complexity. The patient's raw score on the -PAC Daily Activity inpatient short form is 19, standardized score is 40.22, greater than 39.4. Patients at this level are likely to benefit from DC to home. However, please refer to therapist recommendation for discharge planning given other factors that may influence destination. At this time, OT recommendations at time of discharge are DC with Level I - Maximum Rehab Resource Intensity v. Level III resources with increased social/family supports.   Goals   Patient Goals Pt wants to improve his balance   Plan   Treatment Interventions ADL retraining;Functional transfer training;Patient/family training;Equipment evaluation/education;Compensatory technique education;Continued evaluation   Goal Expiration Date 04/19/25   OT Treatment Day 0   OT Frequency 2-3x/wk   Discharge Recommendation   Rehab Resource Intensity Level, OT I (Maximum Resource Intensity)  (v. Level 3 with continued family supports)   -[facility] Daily Activity Inpatient   Lower Body Dressing 3   Bathing 3   Toileting 3   Upper Body Dressing 3   Grooming 3   Eating 4   Daily Activity Raw Score 19   Daily Activity Standardized Score (Calc for Raw Score >=11) 40.22   AM-PAC Applied Cognition Inpatient   Following a Speech/Presentation 3   Understanding Ordinary Conversation 4   Taking Medications 3   Remembering Where Things Are Placed or Put Away 3   Remembering List of 4-5 Errands 3   Taking Care of Complicated Tasks 3   Applied Cognition Raw Score 19   Applied Cognition Standardized Score 39.77   End of Consult   Education  Provided Family or social support of family present for education by provider;Yes   Patient Position at End of Consult Bedside chair;Bed/Chair alarm activated;All needs within reach   Nurse Communication Nurse aware of consult     Pt will achieve the following goals within 10 days.    *Pt will complete grooming with close supervision in stance at sinkside to improve safey & independence with ADLs.    *Pt will complete UB bathing and dressing with S.    *Pt will complete LB bathing and dressing with S & AD PRN.    *Pt will complete toileting w/ S w/ G hygiene/thoroughness using AD PRN    *Pt will perform functional transfers with on/off all surfaces with Mod I using DME as needed w/ G balance/safety.    *Pt will improve standing balance to G for 8-10 minutes during purposeful activity w/ S & G endurance.    *Pt will improve functional mobility during ADL/IADL/leisure tasks to S using DME as needed w/ G balance/safety.    *Pt will engage in ongoing cognitive assessment w/ G participation to assist w/ safe d/c planning/recommendations    *Pt will independently identify 3-5 fall risks during ADL routine to ensure home safety upon discharge.     *Assess DME needs    Amy Dodge OTR/L

## 2025-04-09 NOTE — OCCUPATIONAL THERAPY NOTE
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 No pertinent past medical history.  Past Surgical History  Past Surgical History:   Procedure Laterality Date    FL LUMBAR PUNCTURE DIAGNOSTIC  4/7/2025    HERNIA REPAIR      IR BIOPSY LUNG  03/05/2024    ORCHIECTOMY Left              04/09/25 1152   OT Last Visit   OT Visit Date 04/09/25   Note Type   Note type Evaluation  (MOCA)   MOCA   Version 8.3   Visuopatial/Executive 2   Naming 3   Memory 0   Attention: Digits 2   Attention: Letters 1   Attention: Serial 1   Language: Repeat 1   Language: Fluency 0   Abstraction 2   Delayed Recall 0   Orientation 5   Does patient have less than or equal to 12 years of education? 1   MOCA Total Score 18   MOCA Comments Pt seen for [place] Cognitive Assessment (MOCA). Pt seated in recliner, TV off, door shut, & appropriate lighting. Pt scored 18/30 indicating mild cognitive impairment Pt's with this score are recommended receive a formal OT driving assessment.    Pt's with mild cognitive impairment may have a mild but noticeable decline in cognition as demonstrated by mild forgetfulness, mild disorientation, mild impairment in problem solving. They may be generally independent in most activities but struggle with complex tasks.    Pt's assessment demonstrates impairments in visuospatial skills, executive functioning, delayed recall, impaired attention, impaired language. Pt demonstrated ability to perform adequately in immediate recall. Pt benefits form category or multiple choice cues. Please refer to OT IE on 4/9/25 for additional DC recommendations.       Amy Dodge OTR/L

## 2025-04-09 NOTE — PHYSICAL THERAPY NOTE
PHYSICAL THERAPY EVALUATION NOTE          Patient Name: Nino Silva  Today's Date: 4/9/2025 04/09/25 1040   PT Last Visit   PT Visit Date 04/09/25   Note Type   Note type Evaluation   Pain Assessment   Pain Assessment Tool 0-10   Pain Score No Pain   Restrictions/Precautions   Weight Bearing Precautions Per Order No   Other Precautions Cognitive;Chair Alarm;Bed Alarm;Fall Risk;O2  (Has home O2, on RA upon eval)   Home Living   Type of Home Apartment   Home Layout Performs ADLs on one level;Stairs to enter with rails  (2nd floor apt with 12 SHARON, L railing ascending)   Bathroom Shower/Tub Tub/shower unit   Bathroom Equipment Shower chair;Tub transfer bench   Home Equipment Walker;Wheelchair-manual;Cane   Additional Comments pt reports home is too small for RW   Prior Function   Level of [place] Needs assistance with ADLs;Needs assistance with functional mobility;Needs assistance with IADLS   Lives With Spouse;Daughter   Receives Help From Family   IADLs Family/Friend/Other provides transportation;Family/Friend/Other provides meals;Family/Friend/Other provides medication management   Falls in the last 6 months 1 to 4   Vocational Retired  ([employer])   Comments Not using AD/DME PTA, wife assists with functional mobility, W/C for hospital visits   General   Family/Caregiver Present Yes   Cognition   Overall Cognitive Status Impaired   Arousal/Participation Alert   Attention Within functional limits   Orientation Level Oriented to person;Oriented to situation;Disoriented to time;Oriented to place  (Stated April 3rd, 2025 for today's date)   Memory Decreased short term memory  (Difficulty recalling names of recent hospital procedures)   Following Commands Follows one step commands without difficulty   Comments pleasant and cooperative, appreciative of therapy evaluations; expressed feeling like memory has gotten worse recently  "  Subjective   Subjective \"I feel off-balance.\"   Strength RLE   R Hip Flexion 4-/5   R Knee Extension 4/5   R Ankle Dorsiflexion 4+/5   Strength LLE   L Hip Flexion 4-/5   L Knee Extension 4/5   L Ankle Dorsiflexion 4+/5   Vision-Basic Assessment   Current Vision Wears glasses only for reading   Coordination   Movements are Fluid and Coordinated 1  (Intact smooth pursuits)   Finger to Nose & Finger to Finger  Intact   Five Times Sit To Stand   Time (Seconds) 25.87 Seconds  (used hands to push off of own legs, had to re-attempt 4th STS)   Bed Mobility   Additional Comments pt received OOB in chair   Transfers   Sit to Stand 4  Minimal assistance   Additional items Assist x 1;Increased time required;Armrests   Stand to Sit 5  Supervision   Additional items Assist x 1;Verbal cues  (VC to reach back for armrests when sitting)   Ambulation/Elevation   Gait pattern Improper Weight shift;Retropulsion;Decreased foot clearance;Step through pattern;Short stride;Decreased heel strike  (R forefoot strike)   Gait Assistance 4  Minimal assist   Additional items Assist x 2   Assistive Device Other (Comment)  (HHA)   Distance 5 feet   Ambulation/Elevation Additional Comments Pt ambulated 5 feet with HHA and min Ax2, see additonal tx below for further ambulation with RW   Balance   Static Sitting Fair   Dynamic Sitting Fair   Static Standing Poor +   Dynamic Standing Poor   Ambulatory Poor   Activity Tolerance   Activity Tolerance Patient tolerated treatment well   Medical Staff Made Aware CHRISTIAN Reyes, PT Lesia   Nurse Made Aware FRAN Domingo   Assessment   Prognosis Fair   Problem List Decreased strength;Decreased endurance;Impaired balance;Decreased mobility;Decreased coordination;Decreased cognition;Decreased safety awareness   Assessment Nino Silva is a 60 yo male presenting to UB with ambulatory dysfunction and dehydration 2/2 small cell lung cx metastatic to brain and bone. Upon evaluation, Nino required min Ax1 for " standing from chair and supervision when sitting down, benefiting from VC to use armrests to control descent. When ambulating with HHA, Nino demonstrated significant retropulsion and gait deficits as listed above, requiring min Ax2. His imbalance was more pronounced when turning or taking steps backwards. His 5xSTS time reveals he is at an increased risk of falling, and his use of his arms to push off from his legs further demonstrates LE weakness. He currently presents with decreased strength, decreased endurance, impaired balance, decreased mobility, decreased coordination, decreased cognition, impaired judgement, and decreased safety awareness. He would benefit from being seen by acute PT to progress mobility and accomplish goals. He would benefit from level I therapy resources to address impairments and return to functional baseline.   Goals   Patient Goals to improve balance   STG Expiration Date 04/19/25   Short Term Goal #1 Patient will: perform all transfers from various surfaces with supervision to improve independence within the home; ambulate 150 feet with RW and supervision to demonstrate increased functional mobility and safety; decrease his 5xSTS to below 15 seconds to indicate decreased risk of falls; navigate 12 steps with supervision to support safe D/C to home   Plan   Treatment/Interventions Functional transfer training;LE strengthening/ROM;Elevations;Therapeutic exercise;Endurance training;Cognitive reorientation;Patient/family training;Equipment eval/education;Bed mobility;Gait training   PT Frequency 3-5x/wk   Discharge Recommendation   Rehab Resource Intensity Level, PT I (Maximum Resource Intensity)   Additional Comments Discussing D/C recommendation with pt and wife, they expressed more interest in HHC than rehab; explained that PT will still recommend level I resources but they are able to choose what works best for them   AM-PAC Basic Mobility Inpatient   Turning in Flat Bed Without  Bedrails 4   Lying on Back to Sitting on Edge of Flat Bed Without Bedrails 4   Moving Bed to Chair 3   Standing Up From Chair Using Arms 3   Walk in Room 3   Climb 3-5 Stairs With Railing 2   Basic Mobility Inpatient Raw Score 19   Basic Mobility Standardized Score 42.48   [facility] Highest Level Of Mobility   -HLM Goal 6: Walk 10 steps or more   JH-HLM Achieved 7: Walk 25 feet or more   Additional Treatment Session   Start Time 1053   End Time 1102   Treatment Assessment Pt was seen for additional treatment session. Pt was provided with RW due to imbalance noted during evaluation. Pt required less assistance to ambulate with RW, requiring fluctuating CGA-min Ax1 to ambulate 60 feet with RW. Significant retropulsion was noted with backward steps taken in hallway, requiring min Ax1 to maintain balance. Turning also required min Ax1 to maintain balance. After ambulation, pt sat back down in bedside chair with supervision, needing VC to reach back for armrests.   Equipment Use RW   End of Consult   Patient Position at End of Consult Bedside chair;Bed/Chair alarm activated;All needs within reach     Erick Goodson, SPT

## 2025-04-09 NOTE — PROGRESS NOTES
Patient:    MRN:  [id number]    [employer] Request ID:  [id number]    Level of care reserved:  Home Health Agency    Partner Reserved:  [facility], [place] [address] [phone]    Clinical needs requested:    Geography searched:  [phone]    Start of Service:    Request sent:  12:15pm EDT on 4/9/2025 by Tracee Corbett    Partner reserved:  2:34pm EDT on 4/9/2025 by Tracee Corbett    Choice list shared:

## 2025-04-09 NOTE — DISCHARGE SUMMARY
"Discharge Summary - Hospitalist   Name: Nino Silva 59 y.o. 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  Diffuse severe white matter changes in bilateral cerebral hemispheres, brainstem and cerebellum related to treatment-related radiation changes.  Negative CTA for large vessel occlusion, dissection, aneurysm or high-grade stenosis.  MRI brain-Too many to count intracranial hemorrhagic metastatic lesions involving supratentorial and infratentorial brain and brainstem, majority of which have enlarged from prior exam with worsening perilesional edema. No midline shift.     Continue with IV Decadron 4 mg Q6  Continue with pantoprazole and  Continue with fall/safety precautions  MRI cervical-\"Subtle foci of nodular enhancement along the dorsal surface of the cord at C4 and ventral surface of the cord at C6 could represent fortuitous vascular enhancement, although leptomeningeal metastatic disease is not excluded. Recommend further   clinical assessment, consider correlation with CSF analysis.  2.  Degenerative changes as discussed. No significant canal stenosis at any level. Multilevel foraminal stenosis as outlined above.  3.  Partially imaged cerebellar metastases, seen better on MRI brain from 4/4/2025.\"    Discussed with oncology, no need for neurosurgery " "involvement  No need for seizure precautions currently  Palliative involved, poor prognosis, however patient currently treatment focussed   MRI lumbar spine-\" Stable osseous metastasis within the L1 and L2 vertebral bodies with no extraosseous extension of disease or new lesions.Mild noncompressive lumbar degenerative change is stable.\"  IR consult for lumbar puncture.  Patient underwent lumbar puncture on 4/7  Radiation oncology and oncology follow-up  Patient underwent video barium swallow recommended regular with nectar thick liquids  Patient is cleared by radiation oncology and oncology with outpatient follow-up  Patient will be discharged on Decadron 4 mg 3 times daily and Protonix twice daily and outpatient follow-up with oncology  Patient was seen by PT OT and recommended acute rehab.  Patient and family refused rehab and will be discharged home with home care services  Brain mass  See above   Ambulatory dysfunction  In the setting of progressive metastatic disease  Safe ambulation  Fall precautions    Acute headache  Secondary to metastatic disease progression  For more details, see under small cell lung cancer  Palliative care encounter  Palliative care consult appreciated  Patient is not interested in hospice at this time.  Continue outpatient follow-up with palliative care  Goals of care, counseling/discussion     Hospital Course:     Nino Silva is a 59 y.o. male patient who originally presented to the hospital on   Admission Orders (From admission, onward)       Ordered        04/04/25 1225  INPATIENT ADMISSION  Once                         due to small cell lung cancer with metastatic disease.  Patient was started on IV Decadron 4 mg every 6 hours and underwent MRI brain, MRI cervical and lumbar spine.  Patient was seen by radiation oncology and oncology.  Patient was also seen by palliative care and is not agreeable to hospice at this time.  Patient is cleared for discharge by radiation oncology " on Decadron 4 mg 3 times daily and Protonix twice daily and outpatient follow-up with them.  Patient was recommended acute rehab but refused and will be discharged home with home care services  On Exam-  Chest-bilateral air entry, clear to auscultation  Abdomen-soft, nontender  Heart-S1 S2 regular  Extremities-no pedal edema or calf tenderness  Neuro-alert awake oriented x3.  No focal deficits    Please see above list of diagnoses and related plan for additional information.     CONSULTING PROVIDERS   IP CONSULT TO ONCOLOGY  IP CONSULT TO RADIATION ONCOLOGY  IP CONSULT TO NUTRITION SERVICES  IP CONSULT TO PASTORAL CARE  IP CONSULT TO PALLIATIVE CARE  IP CONSULT TO CASE MANAGEMENT    PROCEDURES PERFORMED  * No surgery found *    RADIOLOGY RESULTS  FL barium swallow video w speech  Result Date: 4/8/2025  Narrative: A video barium swallow study was performed by the Department of Speech Pathology. Please refer to the report for the official interpretation. The images are stored for archival purposes only. Study images were not formally reviewed by the Radiology Department.    FL barium swallow video w speech  Result Date: 4/8/2025  Narrative: TERRY Irwin     4/8/2025  3:38 PM                                  Video Swallow Study Patient Name: Nino Silva Today's Date: 4/8/2025   Past Medical History History reviewed. No pertinent past medical history.  Past Surgical History Past Surgical History: Procedure Laterality Date  FL LUMBAR PUNCTURE DIAGNOSTIC  4/7/2025  HERNIA REPAIR    IR BIOPSY LUNG  03/05/2024  ORCHIECTOMY Left  Modified (Video) Barium Swallow Study Summary: Images are on PACS for review. Pt presents w/ mild oral dysphagia, mild-mod pharyngeal dysphagia. Reduced bolus control w/ premature spill over BOT. Slowed bolus transfer w/ reduced BOT retraction resulting in min BOT residue. Delayed swallow initiation w/ bolus head in the valleculae w/ majority of trials. Reduced laryngeal elevation,  anterior hyoid excursion, vestibule closure, and incomplete epiglottic inversion w/ tip butting against posterior pharyngeal wall at times. Silent aspiration of thin liquids, material contacting vocal folds during trial of barium tablet w/ nectar thick liquids. Strategies were not effective in eliminating penetration or aspiration. See below for further details. Adequate pharyngeal stripping wave and UES opening. Min residue noted in the valleculae and pyriforms at times. Min esophageal retention noted as well as pill stasis, cleared w/ liquid wash Pt remains at a high risk of aspiration 2/2 severity of swallow function, sensory deficit, progressive disease process, and poor prognosis per Oncology MD. Recommendations: Diet: Regular textures Liquids: Nectar thick liquids Meds: whole or crushed, in puree Strategies: slow rate, small bites/sips, alternate solids/liquids, no mixed consistencies (regular textures mixed with thin liquids) Frequent oral care Upright position F/u ST tx: as able and appropriate Therapy Prognosis: fair/guarded Prognosis considerations: progressive disease process, noted memory deficits, multiple medical co-morbidities Aspiration Precautions Reflux Precautions Consider consult with: Palliative Results reviewed with: pt, nursing, family, physician Aspiration precautions posted. Repeat MBS as necessary If a dedicated assessment of the esophagus is desired, consider esophagram/barium swallow or EGD. Goals: Pt will tolerate least restrictive diet w/out s/s aspiration or oral/pharyngeal difficulties. Re: Compensatory Strategies -Patient will utilize trained swallowing maneuver (e.g., supraglottic swallow, Mendelsohn maneuver, effortful swallow, etc.) with 80%  to facilitate improved airway protection,  tongue base retraction, pharyngeal constriction  and/or clearance of the bolus through the pharynx with 90% accuracy -Patient will demonstrate independent use of recommended safe swallowing  strategies during a clinician assessed meal across 1-3 diagnostic sessions. -Patient’s caregiver will demonstrate adherence to recommended diet, as well as application of aspiration precautions and compensatory strategies. H&P/pertinent provider notes: (PMH noted above) Nino Silva is a 59 y.o. male well-known to medical oncology, follows with Dr. Morrell for history of extensive stage small cell lung cancer with multiple disease sites on third line therapy with Tarlatamab who called with symptoms of persistent headache, gait instability, dysphagia.  He was scheduled for outpatient MRI of the brain and MRI C-spine for 4/8/2025.  However due to worsening symptoms was recommended to present to ED for more urgent evaluation. Patient's wife reports patient has been having persistent headache, symptoms of confusion.  Patient reports he has felt off balance.  Denies falls.  Denies double vision.  Does have blurry vision at times and some dizziness.  He has had a dose of dexamethasone since admission and this has improved his headache. He appears dehydrated.  He states he has had difficulty swallowing water over the past week.  Denies nausea/vomiting Special Studies: 4/6/25 MRI cervical spine w wo contrast: 1.  Subtle foci of nodular enhancement along the dorsal surface of the cord at C4 and ventral surface of the cord at C6 could represent fortuitous vascular enhancement, although leptomeningeal metastatic disease is not excluded. Recommend further clinical assessment, consider correlation with CSF analysis. 2.  Degenerative changes as discussed. No significant canal stenosis at any level. Multilevel foraminal stenosis as outlined above. 3.  Partially imaged cerebellar metastases, seen better on MRI brain from 4/4/2025.  4/6/25 CT head wo contrast: Scattered hemorrhagic metastatic lesions most notably throughout the left frontal region and right brainstem similar to 4/4/2025.  Extensive treatment related radiation  changes and vasogenic edema throughout the bilateral cerebral hemispheres, brainstem and cerebellum  4/5/25 CT head wo contrast: Stable interval exam. No acute hemorrhage or mass effect.  4/4/25 MRI brain w wo contrast: 1.  Too many to count intracranial hemorrhagic metastatic lesions involving supratentorial and infratentorial brain and brainstem, majority of which have enlarged from prior exam with worsening perilesional edema. No midline shift. 2.  Confluent white matter FLAIR hyperintensity, likely combination of worsened perilesional edema and posttreatment changes 4/4/25 CTA head and neck with and without contrast: CT Brain: - Scattered hyperdense hemorrhagic metastatic lesions in left frontal lobe and brainstem have increased in size since MRI brain 2/5/2024 - the number of metastatic lesions is underestimated by CT when compared to MRI brain. Recommend follow-up MRI brain with and without contrast for further evaluation. - Diffuse severe white matter changes in bilateral cerebral hemispheres, brainstem, and cerebellum likely related to treatment-related radiation changes with scattered areas of vasogenic edema.  CT Angiography: - Negative CTA head and neck for large vessel occlusion, dissection, aneurysm, or high-grade stenosis.  Known large right upper lobe lung mass, compatible with primary lung malignancy. Probable metastatic mediastinal and right hilar lymphadenopathy. Recommend follow-up CT chest with contrast for further evaluation. Previous VBS: N/A Does the pt have pain? No If yes, was nursing made aware/was it addressed? N/A Swallow Mechanism Exam Facial: symmetrical Labial: WFL Lingual: WFL Velum: symmetrical Mandible: adequate ROM Dentition: adequate Vocal quality:clear/adequate Volitional Cough: strong/productive Respiratory Status: on RA   Swallow Information Current Diet: regular diet and thin liquids Baseline Diet: regular diet and thin liquids per pt report Consistencies Administered: Pt was  viewed sitting upright in the lateral and AP positions. Trials administered were comparable to MBSImP Validated Protocol: tsp thin liquid x2, cup sip thin, sequential swallow cup sip thin, tsp nectar thick, cup sip nectar thick, sequential swallow cup sip nectar thick, tsp Honey thick, tsp pudding, ½ cookie coated with pudding coating. Pt was also given nectar thick liquids by straw, as well as a barium tablet with nectar thick liquids, head turn right, head turn left, prep set and chin tuck x2. Oral Phase: Lip Closure: Complete Tongue Control During Bolus Hold: reduced w/ premature spill over BOT Bolus Preparation/Mastication: Timely Bolus Transport/Lingual Motion: Slowed Oral Residue: min on BOT Initiation of the Pharyngeal Swallow: delayed w/ bolus head in the valleculae w/ majority of trials, at times bolus head in the pyriforms Pharyngeal Phase: Soft Palate Elevation: WFL Laryngeal Elevation: Reduced Anterior Hyoid Excursion: Reduced Epiglottic Movement: Incomplete Laryngeal Vestibular Closure: Reduced Pharyngeal Stripping Wave: Adequate PES Opening: Adequate Tongue Base Retraction: min reduced  Pharyngeal Residue: min noted in the valleculae and pyriforms at times Penetration/Aspiration: Thin: tsp- penetration before w/ silent aspiration during the swallow (PAS-8), cup- deep penetration during the swallow w/ material contacting vocal folds (PAS-5), sequential/chin tuck x1/x2- silent aspiration during the swallow (PAS-8), head turn right/head turn left- kiki silent aspiration during the swallow (PAS-8), prep set- kiki aspiration during the swallow- cough response appreciated (PAS-7) Nectar: tsp- no penetration/aspiration (PAS-1), cup- flash penetration during the swallow (PAS-2), sequential/barium tablet- deep penetration during the swallow w/ materials contacting the vocal folds (PAS-5), straw- penetration during the swallow w/ epiglottic undercoating (PAS-3) Honey: no penetration/aspiration (PAS-1) Puree:  no penetration/aspiration (PAS-1) Solid: no penetration/aspiration (PAS-1) Response to Aspiration: N/A Strategies/Efficacy: chin tuck, head turn right, head turn left, prep set- did not aid in eliminating penetration/aspiration 8-Point Penetration-Aspiration Scale 1 Material does not enter the airway 2 Material enters the airway, remains above the vocal folds, and is ejected  from the  airway  3 Material enters the airway, remains above the vocal folds, and is not ejected from the airway 4 Material enters the airway, contacts the vocal folds, and is ejected from the airway 5 Material enters the airway, contacts the vocal folds, and is not ejected from the airway  6 Material enters the airway, passes below the vocal folds and is ejected into the larynx or out of the airway  7 Material enters the airway, passes below the vocal folds, and is not ejected from the trachea despite effort  8 Material enters the airway, passes below the vocal folds, and no effort is made to eject   Screening of Esophageal Phase Esophageal Clearance: min esophageal retention noted w/ pill stasis, cleared w/ liquid wash     FL IN-patient lumbar puncture  Result Date: 4/8/2025  Narrative: FLUOROSCOPICALLY GUIDED LUMBAR PUNCTURE INDICATION: Evaluation for paraneoplastic disease FLUOROSCOPY TIME:  0.8 mins IMAGES: 1 TECHNIQUE: Consent was obtained after fully explaining the procedure to the patient. Risks and benefits of procedure were described and understood. Precautions to avoid spinal headache were reviewed. 1% lidocaine was infiltrated at the puncture site. Utilizing Left paravertebral approach, a 20 gauge spinal needle was advanced under fluoroscopic guidance into the subarachnoid space at the L 3-4 level, utilizing sterile technique. Once in position, approximately 11 cc of clear, colorless CSF were removed and placed into 4 tubes which subsequently were transported to the lab for requested analysis. The needle was removed. The patient  tolerated the procedure well.     Impression: Successful fluoroscopically guided lumbar puncture. Workstation performed: [id number]     MRI lumbar spine w wo contrast  Result Date: 4/8/2025  Narrative: MRI LUMBAR SPINE WITH AND WITHOUT CONTRAST INDICATION: History of lung carcinoma with metastasis. COMPARISON: 3/15/2025, MRI. PET/CT dated 2/7/2025. TECHNIQUE:  Multiplanar, multisequence imaging of the lumbar spine was performed before and after gadolinium administration. . IV Contrast:  9 mL of Gadobutrol injection IMAGE QUALITY:  Diagnostic FINDINGS: VERTEBRAL BODIES:  There are 5 lumbar type vertebral bodies.  Normal alignment of the lumbar spine.  No spondylolysis or spondylolisthesis. No scoliosis.  No compression fracture.    Once again identified are 2 focal marrow replacing lesions within the L1 and L2 vertebral bodies. These are grossly unchanged in size and configuration and are contained within the bone without extraosseous extension. No new bony metastasis identified. SACRUM:  Normal signal within the sacrum. No evidence of insufficiency or stress fracture. DISTAL CORD AND CONUS:  Normal size and signal within the distal cord and conus. PARASPINAL SOFT TISSUES: Unremarkable. LOWER THORACIC DISC SPACES:  Normal disc height and signal.  No disc herniation, canal stenosis or foraminal narrowing. LUMBAR DISC SPACES: L1-L2:  Normal. L2-L3: There is a tiny central disc herniation with minimal superior extrusion. Only minimal canal stenosis without mass effect upon the thecal sac. No foraminal nerve impingement. L3-L4: Mild diffuse disc bulge without disc herniation, canal stenosis or foraminal narrowing. L4-L5: Minor diffuse disc bulge. No disc herniation, canal stenosis or foraminal narrowing. L5-S1: Small central annular fissure. No disc herniation. No canal stenosis or foraminal narrowing. POSTCONTRAST IMAGING: Mild enhancement of the marrow replacing lesion within the L1 vertebral body and more prominent  enhancement within the marrow lesion centrally in the L2 vertebral body. OTHER FINDINGS: Small left upper pole renal cyst.     Impression: Stable osseous metastasis within the L1 and L2 vertebral bodies with no extraosseous extension of disease or new lesions. Mild noncompressive lumbar degenerative change is stable. Workstation performed: [id number]     MRI cervical spine w wo contrast  Result Date: 4/6/2025  Narrative: MRI CERVICAL SPINE WITH AND WITHOUT CONTRAST INDICATION: Metastatic disease follow-up. COMPARISON: CTA from April 4, 2025 TECHNIQUE:  Multiplanar, multisequence imaging of the cervical spine was performed before and after gadolinium administration. . IV Contrast:  9 mL of Gadobutrol injection IMAGE QUALITY:  Diagnostic. FINDINGS: ALIGNMENT: Straightening of the cervical spine. No subluxations. MARROW SIGNAL: No destructive osseous lesions. CERVICAL AND VISUALIZED UPPER THORACIC CORD:  Normal signal within the visualized cord. PREVERTEBRAL AND PARASPINAL SOFT TISSUES:  Normal. VISUALIZED POSTERIOR FOSSA: Partially imaged hemorrhagic cerebellar metastases, seen better on MRI brain from 4/4/2025. CERVICAL DISC SPACES: C2-C3: Mild bilateral facet arthrosis and shallow disc osteophyte without significant stenosis. C3-C4: Broad disc osteophyte with mild bilateral facet and uncovertebral spurring. No canal or left foraminal stenosis. Minimal right foraminal narrowing. C4-C5: Broad disc bulge. Mild bilateral facet arthrosis and right uncovertebral spurring. No canal or left foraminal stenosis. Mild right foraminal narrowing. C5-C6: Disc height loss. Broad disc osteophyte. Bilateral uncovertebral and facet arthrosis. Mild canal stenosis. Severe bilateral foraminal stenosis. C6-C7: Bilateral uncovertebral and facet spurring. Shallow disc osteophyte and disc height loss. No significant canal narrowing. Moderate to severe bilateral foraminal stenosis. C7-T1: Disc height loss with broad disc osteophyte. Moderate  bilateral facet arthrosis and uncovertebral spurring. No canal narrowing. Moderate to severe right and moderate left foraminal narrowing. UPPER THORACIC DISC SPACES:  Normal. POSTCONTRAST IMAGING: Subtle foci of nodular enhancement along the dorsal surface of the cord at C4 (series 8 image 8) and ventral surface of the cord at C6 (series 8 image 8). OTHER FINDINGS:  None.     Impression: 1.  Subtle foci of nodular enhancement along the dorsal surface of the cord at C4 and ventral surface of the cord at C6 could represent fortuitous vascular enhancement, although leptomeningeal metastatic disease is not excluded. Recommend further clinical assessment, consider correlation with CSF analysis. 2.  Degenerative changes as discussed. No significant canal stenosis at any level. Multilevel foraminal stenosis as outlined above. 3.  Partially imaged cerebellar metastases, seen better on MRI brain from 4/4/2025. Workstation performed: [id number]     CT head wo contrast  Result Date: 4/6/2025  Narrative: CT BRAIN - WITHOUT CONTRAST INDICATION:   transient confusion. COMPARISON: 4/4/2025 TECHNIQUE:  CT examination of the brain was performed.  Multiplanar 2D reformatted images were created from the source data. Radiation dose length product (DLP) for this visit:  935.15 mGy-cm .  This examination, like all CT scans performed in the [facility], was performed utilizing techniques to minimize radiation dose exposure, including the use of iterative  reconstruction and automated exposure control. IMAGE QUALITY:  Diagnostic. FINDINGS: PARENCHYMA: Scattered hemorrhagic metastatic lesions most notably throughout the left frontal region and right brainstem. Diffuse severe low-attenuation throughout the bilateral cerebral hemispheres, brainstem and cerebellum compatible with treatment related radiation changes and vasogenic edema. No CT signs of acute infarction.  No acute parenchymal hemorrhage. VENTRICLES AND EXTRA-AXIAL  SPACES:  Normal for the patient's age. VISUALIZED ORBITS: Normal visualized orbits. PARANASAL SINUSES: Normal visualized paranasal sinuses. CALVARIUM AND EXTRACRANIAL SOFT TISSUES: Normal.     Impression: Scattered hemorrhagic metastatic lesions most notably throughout the left frontal region and right brainstem similar to 4/4/2025. Extensive treatment related radiation changes and vasogenic edema throughout the bilateral cerebral hemispheres, brainstem and cerebellum Workstation performed: [id number]     CT head wo contrast  Result Date: 4/5/2025  Narrative: CT BRAIN - WITHOUT CONTRAST INDICATION:   Fall. COMPARISON: MRI and CT/CTA from 4/4/2025 TECHNIQUE:  CT examination of the brain was performed.  Multiplanar 2D reformatted images were created from the source data. Radiation dose length product (DLP) for this visit:  885 mGy-cm .  This examination, like all CT scans performed in the [facility], was performed utilizing techniques to minimize radiation dose exposure, including the use of iterative reconstruction and automated exposure control. IMAGE QUALITY:  Diagnostic. FINDINGS: PARENCHYMA: Unchanged scattered hemorrhagic metastases. No acute hemorrhage. No significant edema or mass effect. Stable severe white matter disease. No acute transcortical infarction. No midline shift. VENTRICLES AND EXTRA-AXIAL SPACES: Stable in size and configuration. VISUALIZED ORBITS: Normal visualized orbits. PARANASAL SINUSES: Normal visualized paranasal sinuses. CALVARIUM AND EXTRACRANIAL SOFT TISSUES: Normal.     Impression: Stable interval exam. No acute hemorrhage or mass effect. Workstation performed: [id number]     MRI brain w wo contrast  Result Date: 4/4/2025  Narrative: MRI BRAIN WITH AND WITHOUT CONTRAST INDICATION: mets. COMPARISON: Brain MRI 2/5/2025 TECHNIQUE: Multiplanar, multisequence imaging of the brain was performed before and after gadolinium administration. IV Contrast:  8 mL of Gadobutrol  injection IMAGE QUALITY:   Diagnostic. FINDINGS: BRAIN PARENCHYMA: There are too many to count intracranial hemorrhagic metastatic lesions involving bilateral cerebral and cerebellar hemispheres as well as brainstem, majority of which have enlarged from prior exam. - The largest right cerebral hemisphere lesion is seen in the parasagittal right parietal lobe measuring 1.5 x 1.1 cm, previously 0.8 x 0.5 cm (series 9 image 222). - The largest left cerebral hemisphere lesions are seen in the left centrum semiovale measuring 0.9 x 0.7 cm, previously 0.6 x 0.6 cm (series 9 image 233), and left temporal lobe measuring 0.8 x 0.9 cm, previously 0.5 x 0.6 cm (series 9 image 134) - The largest left cerebellar hemisphere lesion measures 2.1 x 2 cm, previously 1.1 x 1.2 cm (series 9 image 83). - The largest right cerebellar hemisphere lesion measures 0.7 x 0.9 cm, previously 0.4 x 0.4 cm (series 9 image 97). - The largest right brainstem lesion at the junction of anterior right midbrain and mitchel measures 1.7 x 1.4 cm, previously 0.6 x 0.6 cm (series 9 image 115). - The largest left brainstem lesion at the junction of posterior left midbrain and mitchel measures 1.1 x 1 cm, previously 0.6 x 0.6 cm (series 9 image 117) There is worsening of perilesional edema in supratentorial and infratentorial brain as well as brainstem, with the worsening most pronounced in the brainstem and right cerebellum. Confluent white matter FLAIR hyperintensity is most likely a combination of worsened perilesional edema and posttreatment changes. No acute infarct. No midline shift. VENTRICLES:  Normal for the patient's age. SELLA AND PITUITARY GLAND:  Normal. ORBITS:  Normal. PARANASAL SINUSES:  Normal. VASCULATURE:  Evaluation of the major intracranial vasculature demonstrates appropriate flow voids. CALVARIUM AND SKULL BASE:  Normal. Mild bilateral mastoid effusion. EXTRACRANIAL SOFT TISSUES:  Normal.     Impression: 1.  Too many to count intracranial  hemorrhagic metastatic lesions involving supratentorial and infratentorial brain and brainstem, majority of which have enlarged from prior exam with worsening perilesional edema. No midline shift. 2.  Confluent white matter FLAIR hyperintensity, likely combination of worsened perilesional edema and posttreatment changes. Workstation performed: [id number]     CTA head and neck with and without contrast  Result Date: 4/4/2025  Narrative: CTA NECK AND BRAIN WITH AND WITHOUT CONTRAST INDICATION: HA, blurred vision, dysphagia, tinnitus COMPARISON: CTA chest PE study abdomen/pelvis routine 3/4/2024. MRI brain with and without contrast 2/5/2025, 10/21/2024, 6/29/2024. CT head without contrast 3/4/2024. TECHNIQUE:  Routine CT imaging of the Brain without contrast.Post contrast imaging was performed after administration of iodinated contrast through the neck and brain. Post contrast axial 0.625 mm images timed to opacify the arterial system.  3D rendering was performed on an independent workstation.   MIP reconstructions performed. Coronal and sagittal reconstructions were performed of the non contrast portion of the brain. Radiation dose length product (DLP) for this visit:  1316.98 mGy-cm .  This examination, like all CT scans performed in the [facility], was performed utilizing techniques to minimize radiation dose exposure, including the use of iterative reconstruction and automated exposure control. IV Contrast:  85 mL of iohexol IMAGE QUALITY:   Diagnostic FINDINGS: NONCONTRAST BRAIN PARENCHYMA: Diffuse severe confluent white matter changes in bilateral cerebral hemispheres, brainstem, and cerebellum likely related to treatment-related radiation changes with scattered areas of vasogenic edema. Scattered hyperdense hemorrhagic metastatic lesions is underestimated by CT when compared to MRI brain. 3 reference lesions: - 0.8 cm left lateral frontal hemorrhagic lesion (2:27), increased size. - 1.4 cm  right upper ventral mitchel hemorrhagic lesion (2:16), increased size. - 0.9 cm left upper dorsal mitchel hemorrhagic lesion (2:16), increased size. Small calcification right anterior thalamus likely treated metastatic lesion. No CT signs of acute infarction.  No mass effect or midline shift. VENTRICLES AND EXTRA-AXIAL SPACES: Similar mild ventriculomegaly likely due to ex-vacuo dilatation. No acute intraventricular extra-axial hemorrhage. VISUALIZED ORBITS: Normal. PARANASAL SINUSES: Normal. CTA NECK ARCH AND GREAT VESSELS: Mild calcified atherosclerotic disease of aortic arch. Great vessels are normal. VERTEBRAL ARTERIES: Patent extracranial segments. RIGHT CAROTID: Patent. Mild mixed atherosclerotic disease in carotid bifurcation. No stenosis.   No dissection. Tortuous right ICA proximal cervical segment. LEFT CAROTID: Patent. Mild mixed atherosclerotic disease in carotid bifurcation. No stenosis.   No dissection. NASCET criteria was used to determine the degree of internal carotid artery diameter stenosis. CTA BRAIN: INTERNAL CAROTID ARTERIES: Mild calcified atherosclerotic disease of bilateral ICA cavernous segment. No stenosis or occlusion. ANTERIOR CEREBRAL ARTERY CIRCULATION:  No stenosis or occlusion. Mild hypoplastic left A1 segment, normal variant. MIDDLE CEREBRAL ARTERY CIRCULATION:  No stenosis or occlusion. DISTAL VERTEBRAL ARTERIES:  No stenosis or occlusion. BASILAR ARTERY:  No stenosis or occlusion. POSTERIOR CEREBRAL ARTERIES: No stenosis or occlusion. Fetal-type left PCA, normal variant VENOUS STRUCTURES:  Normal. NON VASCULAR ANATOMY BONY STRUCTURES:  No acute osseous abnormality. Straightened cervical spine. Mild-to-moderate spinal degenerative changes. SOFT TISSUES OF THE NECK: Dental amalgam with extensive beam hardening artifact limits evaluation of anterior oral cavity for which direct visualization is recommended. Small amount of secretions in the posterior pharyngeal wall extending into the  hypopharynx. No acute neck abnormality. THORACIC INLET: Known large right upper lobe lung mass, probable metastatic mediastinal and right hilar lymphadenopathy, emphysema with subpleural pulmonary fibrotic change, and coronary artery calcifications.     Impression: CT Brain: - Scattered hyperdense hemorrhagic metastatic lesions in left frontal lobe and brainstem have increased in size since MRI brain 2/5/2024 - the number of metastatic lesions is underestimated by CT when compared to MRI brain. Recommend follow-up MRI brain with and without contrast for further evaluation. - Diffuse severe white matter changes in bilateral cerebral hemispheres, brainstem, and cerebellum likely related to treatment-related radiation changes with scattered areas of vasogenic edema. CT Angiography: - Negative CTA head and neck for large vessel occlusion, dissection, aneurysm, or high-grade stenosis. Known large right upper lobe lung mass, compatible with primary lung malignancy. Probable metastatic mediastinal and right hilar lymphadenopathy. Recommend follow-up CT chest with contrast for further evaluation. Additional chronic/incidental findings as detailed above. The study was marked in EPIC for immediate notification. Workstation performed: [id number]     MRI lumbar spine w wo contrast  Result Date: 3/17/2025  Narrative: MRI LUMBAR SPINE WITH AND WITHOUT CONTRAST INDICATION: C34.00: Malignant neoplasm of unspecified main bronchus.   extended stage SCLC now with new bone mets T11 and L5  assess r/o compression COMPARISON: Same day MRI thoracic spine with and without contrast NM PET CT skull base to MID thigh 2/7/2025. MRI brain with and without contrast 2/5/2021. TECHNIQUE:  Multiplanar, multisequence imaging of the lumbar spine was performed before and after gadolinium administration. . IV Contrast:  9 mL of Gadobutrol injection IMAGE QUALITY:  Diagnostic FINDINGS: VERTEBRAL BODIES:  There are 5 lumbar type vertebral bodies.   Normal alignment of the lumbar spine.  No spondylolysis or spondylolisthesis. No scoliosis.  No compression fracture. Small pathologic marrow replacing lesions in L1 and L2 vertebral body with associated enhancement, compatible with osseous metastasis. Mildly heterogeneous bone marrow signal. SACRUM: The heterogeneous bone marrow signal in visualized sacrum. No evidence of insufficiency or stress fracture. DISTAL CORD AND CONUS:  Normal size and signal within the distal cord and conus. PARASPINAL SOFT TISSUES:  Paraspinal soft tissues are unremarkable. LOWER THORACIC DISC SPACES: Please see same day MRI thoracic spine with and without contrast for further evaluation. LUMBAR DISC SPACES: Multilevel endplate osteophytes, mild disc height loss, and facet arthropathy. L1-L2: Mild disc bulge. No significant canal stenosis or foraminal narrowing. L2-L3: Mild disc bulge, small central disc protrusion. Mild canal stenosis. No significant foraminal narrowing. L3-L4: Mild disc bulge, small left posterior annular fissure. No significant canal stenosis or foraminal narrowing. L4-L5: Mild disc bulge. Facet arthropathy. Mild canal stenosis. Mild bilateral foraminal narrowing. L5-S1: Mild disc bulge, small posterior annular fissure. Small synovial cyst adjacent to the right L5 pars interarticularis. No significant canal stenosis or foraminal narrowing. POSTCONTRAST IMAGING: Please see above discussion. OTHER FINDINGS: Partially imaged left renal cyst.     Impression: Osseous metastasis in L1 and L2 vertebral bodies. No pathologic compression fracture, as clinically questioned. Multilevel degenerative changes of lumbar spine with varying degrees of canal stenosis (mild L2-L3 and L4-L5) and foraminal narrowing (mild bilateral L4-L5), as detailed above. Please see same day MRI thoracic spine with and without contrast for further evaluation. The study was marked in EPIC for immediate notification. Workstation performed: [id number]      MRI thoracic spine w wo contrast  Result Date: 3/17/2025  Narrative: MRI THORACIC SPINE WITH AND WITHOUT CONTRAST INDICATION: C34.00: Malignant neoplasm of unspecified main bronchus.   extended stage SCLC now with new bone mets T11 and L5  assess r/o compression COMPARISON: Same day MRI lumbar spine with and without contrast. NM PET CT skull base to MID thigh on 2/7/2025. MRI brain with and without contrast 2/5/2025. TECHNIQUE:  Multiplanar, multisequence imaging of the thoracic spine was performed before and after gadolinium administration. . IV Contrast:  9 mL of Gadobutrol injection IMAGE QUALITY:  Diagnostic. FINDINGS: ALIGNMENT:  Normal alignment of the thoracic spine.  No compression fracture.  No subluxation.  No evidence of scoliosis. MARROW SIGNAL: Small right T4 transverse process and L1 vertebral body mildly enhancing pathologic marrow replacing lesions, compatible with osseous metastasis. Mildly heterogeneous bone marrow signal. Type II Modic endplate change C5-6, T6-T7, T7-T8. THORACIC CORD:  Normal signal within the thoracic cord. PREVERTEBRAL AND PARASPINAL SOFT TISSUES:   Normal. THORACIC DEGENERATIVE CHANGE: Mild multilevel degenerative changes consisting of endplate osteophytes and mild disc height loss. No significant canal stenosis or foraminal narrowing. POSTCONTRAST: Please see above discussion. OTHER FINDINGS: Partially imaged known right upper lobe malignancy, mediastinal and hilar beata metastasis, left renal cyst.     Impression: Osseous metastasis in right T4 transverse process and L1 vertebral body. No pathologic compression fracture, as clinically questioned. Partially imaged known right upper lobe malignancy, mediastinal and hilar beata metastasis. Mild degenerative changes of thoracic spine, as detailed above. No significant canal stenosis or foraminal narrowing. Please see same day MRI lumbar spine with and without contrast for further evaluation. The study was marked in EPIC for  "immediate notification. Workstation performed: [id number]       LABS  Results from last 7 days   Lab Units 04/09/25  0415 04/08/25  0540 04/07/25  1729 04/07/25  0520 04/06/25  0346 04/05/25  0405 04/04/25  0946   WBC Thousand/uL 10.74* 9.55  --  9.73 9.80 7.85 8.59   HEMOGLOBIN g/dL 12.9 12.0  --  11.9* 11.7* 12.5 12.5   HEMATOCRIT % 38.6 36.0*  --  36.0* 34.5* 37.3 36.5   MCV fL 93 94  --  95 93 94 92   PLATELETS Thousands/uL 286 269 289 272 284 302 276   INR   --   --  0.99  --   --   --  1.06     Results from last 7 days   Lab Units 04/09/25 0415 04/08/25  0540 04/07/25  0520 04/06/25  0346 04/05/25  0405 04/04/25  0946   SODIUM mmol/L 135 137 138 138 139 136   POTASSIUM mmol/L 4.1 4.0 4.0 4.3 3.9 3.8   CHLORIDE mmol/L 103 103 105 105 104 103   CO2 mmol/L 23 26 24 26 23 24   BUN mg/dL 28* 28* 25 25 17 17   CREATININE mg/dL 0.73 0.73 0.70 0.82 0.77 0.76   CALCIUM mg/dL 9.2 9.0 9.0 9.3 9.5 9.2   ALBUMIN g/dL  --   --   --   --   --  4.1   TOTAL BILIRUBIN mg/dL  --   --   --   --   --  0.57   ALK PHOS U/L  --   --   --   --   --  43   ALT U/L  --   --   --   --   --  19   AST U/L  --   --   --   --   --  16   EGFR ml/min/1.73sq m 100 100 102 96 98 99   GLUCOSE RANDOM mg/dL 111 104 109 122 123 110     Results from last 7 days   Lab Units 04/04/25  1150 04/04/25  0946   HS TNI 0HR ng/L  --  9   HS TNI 2HR ng/L 9  --               Results from last 7 days   Lab Units 04/09/25  0707 04/08/25  2042 04/08/25  1631 04/08/25  1115 04/08/25  0712 04/07/25  1932 04/07/25  1549 04/07/25  1106 04/07/25  0724 04/06/25  2117   POC GLUCOSE mg/dl 117 136 116 138 112 120 191* 154* 118 147*         Results from last 7 days   Lab Units 04/04/25  1150 04/04/25  0946   TSH 3RD GENERATON uIU/mL 0.137* 0.219*   FREE T4 ng/dL  --  0.96               Cultures:         Invalid input(s): \"URIBILINOGEN\"              Condition at Discharge:  good      Discharge instructions/Information to patient and family:   See after visit summary for " information provided to patient and family.      Provisions for Follow-Up Care:  See after visit summary for information related to follow-up care and any pertinent home health orders.      Disposition:     Home with VNA Services (Reminder: Complete face to face encounter)       Discharge Statement:  I spent 40 minutes discharging the patient. This time was spent on the day of discharge. I had direct contact with the patient on the day of discharge. Greater than 50% of the total time was spent examining patient, answering all patient questions, arranging and discussing plan of care with patient as well as directly providing post-discharge instructions.  Additional time then spent on discharge activities.    Discharge Medications:  See after visit summary for reconciled discharge medications provided to patient and family.      ** Please Note: This note has been constructed using a voice recognition system **

## 2025-04-09 NOTE — CASE MANAGEMENT
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  [address] [facility]  [address] [place] [address]  Phone: [phone] Fax: [phone]    Primary Care Provider: Obed Marinelli MD    Primary Insurance: [employer]  Secondary Insurance:     DISCHARGE DETAILS:        Therapy is recommending acute rehab and family declined. They prefer HH. CM placed HH refs in [facility].        Requested Home Health Care         Is the patient interested in HHC at discharge?: Yes  Home Health Discipline requested:: Occupational Therapy, Physical Therapy  Home Health Follow-Up Provider:: PCP  Home Health Services Needed:: Evaluate Functional Status and Safety, Gait/ADL Training, Strengthening/Theraputic Exercises to Improve Function  Homebound Criteria Met::  Goal Outcome Evaluation:  Plan of Care Reviewed With: patient           Outcome Evaluation: Pt is a 69 YO M admitted s/p hyptension following dialysis. Pt states he currently resides at [facility], but is hoping to transfer. Pt states at baseline he is using a lift to transfer to from w/c, but when he entered [facility] he was able to stand and turn. This date pt requires MOD A to come to sitting EOB. PT attempted to assist pt to come to stand but pt was unable. Pt appears motivated to continue to work with therapy, PT to  3x/week while admitted and recommendation is IP rehab following d/c.   Requires the Assistance of Another Person for Safe Ambulation or to Leave the Home         Other Referral/Resources/Interventions Provided:  Interventions: [facility]         Treatment Team Recommendation: Home with Home Health Care  Discharge Destination Plan:: Home with Home Health Care

## 2025-04-09 NOTE — PLAN OF CARE
Problem: PAIN - ADULT  Goal: Verbalizes/displays adequate comfort level or baseline comfort level  Description: Interventions:- Encourage patient to monitor pain and request assistance- Assess pain using appropriate pain scale- Administer analgesics based on type and severity of pain and evaluate response- Implement non-pharmacological measures as appropriate and evaluate response- Consider cultural and social influences on pain and pain management- Notify physician/advanced practitioner if interventions unsuccessful or patient reports new pain  Outcome: Progressing     Problem: INFECTION - ADULT  Goal: Absence or prevention of progression during hospitalization  Description: INTERVENTIONS:- Assess and monitor for signs and symptoms of infection- Monitor lab/diagnostic results- Monitor all insertion sites, i.e. indwelling lines, tubes, and drains- Monitor endotracheal if appropriate and nasal secretions for changes in amount and color- [place] appropriate cooling/warming therapies per order- Administer medications as ordered- Instruct and encourage patient and family to use good hand hygiene technique- Identify and instruct in appropriate isolation precautions for identified infection/condition  Outcome: Progressing  Goal: Absence of fever/infection during neutropenic period  Description: INTERVENTIONS:- Monitor WBC  Outcome: Progressing     Problem: SAFETY ADULT  Goal: Maintain or return to baseline ADL function  Description: INTERVENTIONS:-  Assess patient's ability to carry out ADLs; assess patient's baseline for ADL function and identify physical deficits which impact ability to perform ADLs (bathing, care of mouth/teeth, toileting, grooming, dressing, etc.)- Assess/evaluate cause of self-care deficits - Assess range of motion- Assess patient's mobility; develop plan if impaired- Assess patient's need for assistive devices and provide as appropriate- Encourage maximum independence but intervene and supervise  when necessary- Involve family in performance of ADLs- Assess for home care needs following discharge - Consider OT consult to assist with ADL evaluation and planning for discharge- Provide patient education as appropriate  Outcome: Progressing

## 2025-04-09 NOTE — PLAN OF CARE
Problem: Potential for Falls  Goal: Patient will remain free of falls  Description: INTERVENTIONS:- Educate patient/family on patient safety including physical limitations- Instruct patient to call for assistance with activity - Consult OT/PT to assist with strengthening/mobility - Keep Call bell within reach- Keep bed low and locked with side rails adjusted as appropriate- Keep care items and personal belongings within reach- Initiate and maintain comfort rounds- Make Fall Risk Sign visible to staff- Offer Toileting every x Hours, in advance of need- Initiate/Maintain xalarm- Obtain necessary fall risk management equipment: x- Apply yellow socks and bracelet for high fall risk patients- Consider moving patient to room near nurses station  INTERVENTIONS:- Educate patient/family on patient safety including physical limitations- Instruct patient to call for assistance with activity - Consult OT/PT to assist with strengthening/mobility - Keep Call bell within reach- Keep bed low and locked with side rails adjusted as appropriate- Keep care items and personal belongings within reach- Initiate and maintain comfort rounds- Make Fall Risk Sign visible to staff- Offer Toileting every x Hours, in advance of need- Initiate/Maintain xalarm- Obtain necessary fall risk management equipment: x- Apply yellow socks and bracelet for high fall risk patients- Consider moving patient to room near nurses station  Outcome: Progressing     Problem: PAIN - ADULT  Goal: Verbalizes/displays adequate comfort level or baseline comfort level  Description: Interventions:- Encourage patient to monitor pain and request assistance- Assess pain using appropriate pain scale- Administer analgesics based on type and severity of pain and evaluate response- Implement non-pharmacological measures as appropriate and evaluate response- Consider cultural and social influences on pain and pain management- Notify physician/advanced practitioner if  interventions unsuccessful or patient reports new pain  Outcome: Progressing     Problem: INFECTION - ADULT  Goal: Absence or prevention of progression during hospitalization  Description: INTERVENTIONS:- Assess and monitor for signs and symptoms of infection- Monitor lab/diagnostic results- Monitor all insertion sites, i.e. indwelling lines, tubes, and drains- Monitor endotracheal if appropriate and nasal secretions for changes in amount and color- Oracle appropriate cooling/warming therapies per order- Administer medications as ordered- Instruct and encourage patient and family to use good hand hygiene technique- Identify and instruct in appropriate isolation precautions for identified infection/condition  Outcome: Progressing  Goal: Absence of fever/infection during neutropenic period  Description: INTERVENTIONS:- Monitor WBC  Outcome: Progressing     Problem: SAFETY ADULT  Goal: Patient will remain free of falls  Description: INTERVENTIONS:- Educate patient/family on patient safety including physical limitations- Instruct patient to call for assistance with activity - Consult OT/PT to assist with strengthening/mobility - Keep Call bell within reach- Keep bed low and locked with side rails adjusted as appropriate- Keep care items and personal belongings within reach- Initiate and maintain comfort rounds- Make Fall Risk Sign visible to staff- Offer Toileting every x Hours, in advance of need- Initiate/Maintain xalarm- Obtain necessary fall risk management equipment: x- Apply yellow socks and bracelet for high fall risk patients- Consider moving patient to room near nurses station  INTERVENTIONS:- Educate patient/family on patient safety including physical limitations- Instruct patient to call for assistance with activity - Consult OT/PT to assist with strengthening/mobility - Keep Call bell within reach- Keep bed low and locked with side rails adjusted as appropriate- Keep care items and personal belongings  within reach- Initiate and maintain comfort rounds- Make Fall Risk Sign visible to staff- Offer Toileting every xx Hours, in advance of need- Initiate/Maintain xalarm- Obtain necessary fall risk management equipment: x- Apply yellow socks and bracelet for high fall risk patients- Consider moving patient to room near nurses station  Outcome: Progressing  Goal: Maintain or return to baseline ADL function  Description: INTERVENTIONS:-  Assess patient's ability to carry out ADLs; assess patient's baseline for ADL function and identify physical deficits which impact ability to perform ADLs (bathing, care of mouth/teeth, toileting, grooming, dressing, etc.)- Assess/evaluate cause of self-care deficits - Assess range of motion- Assess patient's mobility; develop plan if impaired- Assess patient's need for assistive devices and provide as appropriate- Encourage maximum independence but intervene and supervise when necessary- Involve family in performance of ADLs- Assess for home care needs following discharge - Consider OT consult to assist with ADL evaluation and planning for discharge- Provide patient education as appropriate  Outcome: Progressing  Goal: Maintains/Returns to pre admission functional level  Description: INTERVENTIONS:- Perform AM-PAC 6 Click Basic Mobility/ Daily Activity assessment daily.- Set and communicate daily mobility goal to care team and patient/family/caregiver. - Collaborate with rehabilitation services on mobility goals if consulted- Perform Range of Motion x times a day.- Reposition patient every x hours.- Dangle patient x times a day- Stand patient x times a day- Ambulate patient xxxxx times a day- Out of bed to chair x times a day - Out of bed for meals x times a day- Out of bed for toileting- Record patient progress and toleration of activity level   Outcome: Progressing     Problem: DISCHARGE PLANNING  Goal: Discharge to home or other facility with appropriate resources  Description:  INTERVENTIONS:- Identify barriers to discharge w/patient and caregiver- Arrange for needed discharge resources and transportation as appropriate- Identify discharge learning needs (meds, wound care, etc.)- Arrange for interpretive services to assist at discharge as needed- Refer to Case Management Department for coordinating discharge planning if the patient needs post-hospital services based on physician/advanced practitioner order or complex needs related to functional status, cognitive ability, or social support system  Outcome: Progressing     Problem: Knowledge Deficit  Goal: Patient/family/caregiver demonstrates understanding of disease process, treatment plan, medications, and discharge instructions  Description: Complete learning assessment and assess knowledge base.Interventions:- Provide teaching at level of understanding- Provide teaching via preferred learning methods  Outcome: Progressing     Problem: Nutrition/Hydration-ADULT  Goal: Nutrient/Hydration intake appropriate for improving, restoring or maintaining nutritional needs  Description: Monitor and assess patient's nutrition/hydration status for malnutrition. Collaborate with interdisciplinary team and initiate plan and interventions as ordered.  Monitor patient's weight and dietary intake as ordered or per policy. Utilize nutrition screening tool and intervene as necessary. Determine patient's food preferences and provide high-protein, high-caloric foods as appropriate. INTERVENTIONS:- Monitor oral intake, urinary output, labs, and treatment plans- Assess nutrition and hydration status and recommend course of action- Evaluate amount of meals eaten- Assist patient with eating if necessary - Allow adequate time for meals- Recommend/ encourage appropriate diets, oral nutritional supplements, and vitamin/mineral supplements- Order, calculate, and assess calorie counts as needed- Recommend, monitor, and adjust tube feedings and TPN/PPN based on  assessed needs- Assess need for intravenous fluids- Provide specific nutrition/hydration education as appropriate- Include patient/family/caregiver in decisions related to nutrition  Outcome: Progressing

## 2025-04-09 NOTE — CASE MANAGEMENT
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) 2024     Small cell lung cancer (HCC) 2024     Brain mass 2024     COPD (chronic obstructive pulmonary disease) (HCC) 2024     Hx of testicular cancer 2024       LOS (days): 5  Geometric Mean LOS (GMLOS) (days):   Days to GMLOS:     OBJECTIVE:  Risk of Unplanned Readmission Score: 17.09         Current admission status: Inpatient   Preferred Pharmacy:   [id number] [place] [facility] [address]  [address]  [place] [address]  Phone: [phone] Fax: [phone]    [id number] - [facility] [id number] [place] [address]  [address] [facility]  [address] [place] [address]  Phone: [phone] Fax: [phone]    Primary Care Provider: Obed Marinelli MD    Primary Insurance: [employer]  Secondary Insurance:     DISCHARGE DETAILS:     [facility] can accept patient. Reserved in [facility].      Requested Home Health Care         Home Health Agency Name:: Other ([facility])  [facility]HA External Referral Reason (only applicable if external HHA name selected): Scheduling access issues  Supporting Clincal Findings:: Limited Endurance

## 2025-04-09 NOTE — PLAN OF CARE
Problem: OCCUPATIONAL THERAPY ADULT  Goal: Performs self-care activities at highest level of function for planned discharge setting.  See evaluation for individualized goals.  Description: Treatment Interventions: ADL retraining, Functional transfer training, Patient/family training, Equipment evaluation/education, Compensatory technique education, Continued evaluation          See flowsheet documentation for full assessment, interventions and recommendations.   Note: Limitation: Decreased ADL status, Decreased cognition, Decreased self-care trans, Decreased high-level ADLs  Prognosis: Good  Assessment: Pt is a 59 y.o. male seen for OT evaluation at [facility], admitted 4/4/2025 w/ dehydration. . OT completed extensive review of pt's medical and social history. Comorbidities affecting pt's functional performance at time of assessment include: lung cancer with mets to brain/bone, brain mass, ambulatory dysfunction, COPD, h/o testicular CA. Personal factors affecting pt at time of IE include: steps to enter environment, difficulty performing ADLS, difficulty performing IADLS , and environment. Prior to admission, pt was living with his wife & dtr in a 2nd floor apt with full flight to enter.  Pt was A w/  ADLS and A w/ IADLS, (-) drove, & required use of RW PTA. Upon evaluation: Pt requires Min A/CGA x1 for functional transfers, Min Ax1 for functional mobility, Min A for UB ADLs and Min A for LB ADLS 2* the following deficits impacting occupational performance: decreased balance, impaired memory, and decreased safety awareness. Full objective findings from OT assessment regarding body systems outlined above. Pt to benefit from continued skilled OT tx while in the hospital to address deficits as defined above and maximize level of functional independence w/ ADL's and functional mobility. Occupational Performance areas to address include: grooming, bathing/shower, toilet hygiene, dressing, medication  management, functional mobility, community mobility, and clothing management. Based on findings, pt is of high complexity. The patient's raw score on the AM-PAC Daily Activity inpatient short form is 19, standardized score is 40.22, greater than 39.4. Patients at this level are likely to benefit from DC to home. However, please refer to therapist recommendation for discharge planning given other factors that may influence destination. At this time, OT recommendations at time of discharge are DC with Level I - Maximum Rehab Resource Intensity v. Level III resources with increased social/family supports.     Rehab Resource Intensity Level, OT: I (Maximum Resource Intensity) (v. Level 3 with continued family supports)

## 2025-04-09 NOTE — PLAN OF CARE
Problem: PHYSICAL THERAPY ADULT  Goal: Performs mobility at highest level of function for planned discharge setting.  See evaluation for individualized goals.  Description: Treatment/Interventions: Functional transfer training, LE strengthening/ROM, Elevations, Therapeutic exercise, Endurance training, Cognitive reorientation, Patient/family training, Equipment eval/education, Bed mobility, Gait training          See flowsheet documentation for full assessment, interventions and recommendations.  4/9/2025 1245 by Lesia Mckeon, PT  Note: Prognosis: Fair  Problem List: Decreased strength, Decreased endurance, Impaired balance, Decreased mobility, Decreased coordination, Decreased cognition, Decreased safety awareness  Assessment: Nino Silva is a 58 yo male presenting to [facility] with ambulatory dysfunction and dehydration 2/2 small cell lung cx metastatic to brain and bone. Upon evaluation, Nino required min Ax1 for standing from chair and supervision when sitting down, benefiting from VC to use armrests to control descent. When ambulating with HHA, Nino demonstrated significant retropulsion and gait deficits as listed above, requiring min Ax2. His imbalance was more pronounced when turning or taking steps backwards. His 5xSTS time reveals he is at an increased risk of falling, and his use of his arms to push off from his legs further demonstrates LE weakness. He currently presents with decreased strength, decreased endurance, impaired balance, decreased mobility, decreased coordination, decreased cognition, impaired judgement, and decreased safety awareness. He would benefit from being seen by acute PT to progress mobility and accomplish goals. He would benefit from level I therapy resources to address impairments and return to functional baseline.        Rehab Resource Intensity Level, PT: I (Maximum Resource Intensity)    See flowsheet documentation for full assessment.

## 2025-04-09 NOTE — ASSESSMENT & PLAN NOTE
Palliative care consult appreciated  Patient is not interested in hospice at this time.  Continue outpatient follow-up with palliative care

## 2025-04-09 NOTE — ASSESSMENT & PLAN NOTE
"Patient with metastatic non-small cell lung cancer(neuroendocrine), diagnosed in March 2024  Status post whole brain radiation therapy in 2024  On third line chemo therapy with tarlatamab    Presented with headache, blurry vision, paresthesias, ambulatory dysfunction  Symptoms concerning for leptomeningeal involvement versus progression of brain mets    CTA head/neck-scattered hyperdense hemorrhagic metastatic lesions in left frontal lobe and brainstem, that have increased in size from prior.  Diffuse severe white matter changes in bilateral cerebral hemispheres, brainstem and cerebellum related to treatment-related radiation changes.  Negative CTA for large vessel occlusion, dissection, aneurysm or high-grade stenosis.  MRI brain-Too many to count intracranial hemorrhagic metastatic lesions involving supratentorial and infratentorial brain and brainstem, majority of which have enlarged from prior exam with worsening perilesional edema. No midline shift.     Continue with IV Decadron 4 mg Q6  Continue with pantoprazole and  Continue with fall/safety precautions  MRI cervical-\"Subtle foci of nodular enhancement along the dorsal surface of the cord at C4 and ventral surface of the cord at C6 could represent fortuitous vascular enhancement, although leptomeningeal metastatic disease is not excluded. Recommend further   clinical assessment, consider correlation with CSF analysis.  2.  Degenerative changes as discussed. No significant canal stenosis at any level. Multilevel foraminal stenosis as outlined above.  3.  Partially imaged cerebellar metastases, seen better on MRI brain from 4/4/2025.\"    Discussed with oncology, no need for neurosurgery involvement  No need for seizure precautions currently  Palliative involved, poor prognosis, however patient currently treatment focussed   MRI lumbar spine-\" Stable osseous metastasis within the L1 and L2 vertebral bodies with no extraosseous extension of disease or new " "lesions.Mild noncompressive lumbar degenerative change is stable.\"  IR consult for lumbar puncture.  Patient underwent lumbar puncture on 4/7  Radiation oncology and oncology follow-up  Patient underwent video barium swallow recommended regular with nectar thick liquids  Patient is cleared by radiation oncology and oncology with outpatient follow-up  Patient will be discharged on Decadron 4 mg 3 times daily and Protonix twice daily and outpatient follow-up with oncology  Patient was seen by PT OT and recommended acute rehab.  Patient and family refused rehab and will be discharged home with home care services  "

## 2025-04-10 ENCOUNTER — TELEPHONE (OUTPATIENT)
Age: 60
End: 2025-04-10

## 2025-04-10 ENCOUNTER — PATIENT OUTREACH (OUTPATIENT)
Dept: CASE MANAGEMENT | Facility: HOSPITAL | Age: 60
End: 2025-04-10

## 2025-04-10 ENCOUNTER — TELEPHONE (OUTPATIENT)
Dept: FAMILY MEDICINE CLINIC | Facility: HOSPITAL | Age: 60
End: 2025-04-10

## 2025-04-10 NOTE — TELEPHONE ENCOUNTER
Spoke with patient. States he doesn't see Dr. Morrell until 4/22 so he really just wants to know whether the 14th infusion will be cancelled/if infusions are being cancelled all together, what her interpretation of the MRI results is, and what plan is moving forward. States he needs to decide about going forward with Radiation and wants to know if she thinks she should move forward at this time.

## 2025-04-10 NOTE — TELEPHONE ENCOUNTER
Called and left voicemail for Nino. I have placed an order for a hospital bed in his home. Dr. Morrell would also like to see him tomorrow for an appointment now that he is discharged. She would like him to be seen before determining if he can be treated on Monday. Call back requested and call back number provided.

## 2025-04-10 NOTE — PROGRESS NOTES
Pt called this day to share that he has been discharged to home and he is requesting a hospital bed.  PT states that he did request while in pt but was unsure why this was not able to be ordered.  PT states that he will be transitioning to his sisters home, where he will be renting an apartment.  His new address will be [address] [place] [address].  Pt states that he will be able to be on the first floor and she will be helping him with his care needs.  They will also plan to keep their apartment so as a result, pt states that he is In need of assistance with rent.  Pt states that he also needs assistance with a car insurance bill as his wife has missed a great deal of work.  Pt will send to this LSW.  Support was provided to pt.    W sent secure chat to oncology RN requesting the hospital bed.

## 2025-04-10 NOTE — UTILIZATION REVIEW
NOTIFICATION OF ADMISSION DISCHARGE   This is a Notification of Discharge from [facility]. Please be advised that this patient has been discharge from our facility. Below you will find the admission and discharge date and time including the patient’s disposition.   UTILIZATION REVIEW CONTACT:  Utilization Review Assistants  Network Utilization Review Department  Phone: [phone] x carefully listen to the prompts. All voicemails are confidential.  Email: [email or web address]     ADMISSION INFORMATION  PRESENTATION DATE: 4/4/2025  9:12 AM  OBERVATION ADMISSION DATE: N/A  INPATIENT ADMISSION DATE: 4/4/25 12:25 PM   DISCHARGE DATE: 4/9/2025  5:25 PM   DISPOSITION:Home with Home Health Care    [facility] Utilization Review Department  ATTENTION: Please call with any questions or concerns to [phone] and carefully listen to the prompts so that you are directed to the right person. All voicemails are confidential.   For Discharge needs, contact Care Management DC Support Team at [phone] opt. 2  Send all requests for admission clinical reviews, approved or denied determinations and any other requests to dedicated fax number below belonging to the campus where the patient is receiving treatment. List of dedicated fax numbers for the Facilities:  FACILITY NAME UR FAX NUMBER   ADMISSION DENIALS (Administrative/Medical Necessity) [phone]   DISCHARGE SUPPORT TEAM ([facility]) [phone]   PARENT CHILD HEALTH (Maternity/NICU/Pediatrics) [phone]   [facility] [phone]   [facility] [phone]   [facility] [phone]   [facility] [phone]   [facility] [phone]   [facility] [phone]   [facility] [phone]   [facility] [phone]   [facility]  [facility] [phone]   [facility] [phone]   [facility] [phone]   [facility] [phone]

## 2025-04-10 NOTE — TELEPHONE ENCOUNTER
Call from [facility].  She had mary from [facility] on the phone. I talked to mary.  She wanted to know if cb would sign homecare orders for PT/OT.  I said yes.   She also asked who ordered O2 for pt.  I checked chart and CB ordered on 8/19/24.  Concentrator and portable tank.  Pt uses 2/ LPM as needed per mary.  His O2 level today was 96%.   Mary #  [phone].

## 2025-04-10 NOTE — TELEPHONE ENCOUNTER
Called and spoke to Nino. Appointment made with Dr. Morrell for tomorrow, 4/11, to discuss treatment moving forward.

## 2025-04-10 NOTE — TELEPHONE ENCOUNTER
Nino Silva to [facility] Clinical (supporting Cortney Morrell MD)         4/10/25 10:21 AM  Good morning,     Is the infusion will be canceled on the 14th? I also have other questions for Dr Morrell. Can you give me a call or give me a phone number so I can call you.  Thank you,

## 2025-04-11 ENCOUNTER — TELEPHONE (OUTPATIENT)
Age: 60
End: 2025-04-11

## 2025-04-11 ENCOUNTER — OFFICE VISIT (OUTPATIENT)
Age: 60
End: 2025-04-11
Payer: COMMERCIAL

## 2025-04-11 ENCOUNTER — HOSPITAL ENCOUNTER (OUTPATIENT)
Dept: INFUSION CENTER | Facility: HOSPITAL | Age: 60
End: 2025-04-11
Attending: INTERNAL MEDICINE
Payer: COMMERCIAL

## 2025-04-11 ENCOUNTER — TRANSITIONAL CARE MANAGEMENT (OUTPATIENT)
Dept: FAMILY MEDICINE CLINIC | Facility: HOSPITAL | Age: 60
End: 2025-04-11

## 2025-04-11 VITALS
RESPIRATION RATE: 18 BRPM | OXYGEN SATURATION: 94 % | SYSTOLIC BLOOD PRESSURE: 117 MMHG | HEART RATE: 52 BPM | DIASTOLIC BLOOD PRESSURE: 84 MMHG | TEMPERATURE: 96.8 F

## 2025-04-11 VITALS
RESPIRATION RATE: 18 BRPM | WEIGHT: 190 LBS | HEART RATE: 54 BPM | SYSTOLIC BLOOD PRESSURE: 116 MMHG | OXYGEN SATURATION: 97 % | TEMPERATURE: 97.5 F | BODY MASS INDEX: 27.2 KG/M2 | HEIGHT: 70 IN | DIASTOLIC BLOOD PRESSURE: 74 MMHG

## 2025-04-11 DIAGNOSIS — E86.0 DEHYDRATION: ICD-10-CM

## 2025-04-11 DIAGNOSIS — C34.11 SMALL CELL CARCINOMA OF UPPER LOBE OF RIGHT LUNG (HCC): Primary | ICD-10-CM

## 2025-04-11 DIAGNOSIS — C34.11 SMALL CELL CARCINOMA OF UPPER LOBE OF RIGHT LUNG (HCC): ICD-10-CM

## 2025-04-11 DIAGNOSIS — E86.0 DEHYDRATION: Primary | ICD-10-CM

## 2025-04-11 DIAGNOSIS — R13.10 DYSPHAGIA, UNSPECIFIED TYPE: ICD-10-CM

## 2025-04-11 LAB
ALBUMIN SERPL BCG-MCNC: 4.2 G/DL (ref 3.5–5)
ALP SERPL-CCNC: 45 U/L (ref 34–104)
ALT SERPL W P-5'-P-CCNC: 71 U/L (ref 7–52)
ANION GAP SERPL CALCULATED.3IONS-SCNC: 8 MMOL/L (ref 4–13)
AST SERPL W P-5'-P-CCNC: 20 U/L (ref 13–39)
BASOPHILS # BLD AUTO: 0.01 THOUSANDS/ÂΜL (ref 0–0.1)
BASOPHILS NFR BLD AUTO: 0 % (ref 0–1)
BILIRUB SERPL-MCNC: 0.52 MG/DL (ref 0.2–1)
BUN SERPL-MCNC: 31 MG/DL (ref 5–25)
CALCIUM SERPL-MCNC: 9 MG/DL (ref 8.4–10.2)
CHLORIDE SERPL-SCNC: 100 MMOL/L (ref 96–108)
CO2 SERPL-SCNC: 28 MMOL/L (ref 21–32)
CREAT SERPL-MCNC: 0.57 MG/DL (ref 0.6–1.3)
DME PARACHUTE DELIVERY DATE ACTUAL: NORMAL
DME PARACHUTE DELIVERY DATE EXPECTED: NORMAL
DME PARACHUTE DELIVERY DATE REQUESTED: NORMAL
DME PARACHUTE ITEM DESCRIPTION: NORMAL
DME PARACHUTE ORDER STATUS: NORMAL
DME PARACHUTE SUPPLIER NAME: NORMAL
DME PARACHUTE SUPPLIER PHONE: NORMAL
EOSINOPHIL # BLD AUTO: 0 THOUSAND/ÂΜL (ref 0–0.61)
EOSINOPHIL NFR BLD AUTO: 0 % (ref 0–6)
ERYTHROCYTE [DISTWIDTH] IN BLOOD BY AUTOMATED COUNT: 13.2 % (ref 11.6–15.1)
GFR SERPL CREATININE-BSD FRML MDRD: 111 ML/MIN/1.73SQ M
GLUCOSE SERPL-MCNC: 104 MG/DL (ref 65–140)
HCT VFR BLD AUTO: 38.9 % (ref 36.5–49.3)
HGB BLD-MCNC: 13.1 G/DL (ref 12–17)
IMM GRANULOCYTES # BLD AUTO: 0.26 THOUSAND/UL (ref 0–0.2)
IMM GRANULOCYTES NFR BLD AUTO: 2 % (ref 0–2)
LYMPHOCYTES # BLD AUTO: 0.86 THOUSANDS/ÂΜL (ref 0.6–4.47)
LYMPHOCYTES NFR BLD AUTO: 8 % (ref 14–44)
MCH RBC QN AUTO: 31.4 PG (ref 26.8–34.3)
MCHC RBC AUTO-ENTMCNC: 33.7 G/DL (ref 31.4–37.4)
MCV RBC AUTO: 93 FL (ref 82–98)
MONOCYTES # BLD AUTO: 0.75 THOUSAND/ÂΜL (ref 0.17–1.22)
MONOCYTES NFR BLD AUTO: 7 % (ref 4–12)
NEUTROPHILS # BLD AUTO: 8.91 THOUSANDS/ÂΜL (ref 1.85–7.62)
NEUTS SEG NFR BLD AUTO: 83 % (ref 43–75)
NRBC BLD AUTO-RTO: 0 /100 WBCS
PLATELET # BLD AUTO: 280 THOUSANDS/UL (ref 149–390)
PMV BLD AUTO: 9.7 FL (ref 8.9–12.7)
POTASSIUM SERPL-SCNC: 4.3 MMOL/L (ref 3.5–5.3)
PROT SERPL-MCNC: 7.4 G/DL (ref 6.4–8.4)
RBC # BLD AUTO: 4.17 MILLION/UL (ref 3.88–5.62)
SODIUM SERPL-SCNC: 136 MMOL/L (ref 135–147)
WBC # BLD AUTO: 10.79 THOUSAND/UL (ref 4.31–10.16)

## 2025-04-11 PROCEDURE — 85025 COMPLETE CBC W/AUTO DIFF WBC: CPT | Performed by: INTERNAL MEDICINE

## 2025-04-11 PROCEDURE — 99215 OFFICE O/P EST HI 40 MIN: CPT | Performed by: INTERNAL MEDICINE

## 2025-04-11 PROCEDURE — 96360 HYDRATION IV INFUSION INIT: CPT

## 2025-04-11 PROCEDURE — 80053 COMPREHEN METABOLIC PANEL: CPT | Performed by: INTERNAL MEDICINE

## 2025-04-11 RX ORDER — SODIUM CHLORIDE 9 MG/ML
20 INJECTION, SOLUTION INTRAVENOUS ONCE
Status: CANCELLED | OUTPATIENT
Start: 2025-04-14

## 2025-04-11 RX ORDER — SODIUM CHLORIDE 9 MG/ML
20 INJECTION, SOLUTION INTRAVENOUS ONCE
OUTPATIENT
Start: 2025-04-29

## 2025-04-11 RX ADMIN — SODIUM CHLORIDE 1000 ML: 0.9 INJECTION, SOLUTION INTRAVENOUS at 09:04

## 2025-04-11 NOTE — PLAN OF CARE
Problem: Potential for Falls  Goal: Patient will remain free of falls  Description: INTERVENTIONS:- Educate patient/family on patient safety including physical limitations- Instruct patient to call for assistance with activity -   Outcome: Progressing     Problem: Knowledge Deficit  Goal: Patient/family/caregiver demonstrates understanding of disease process, treatment plan, medications, and discharge instructions  Description: Complete learning assessment and assess knowledge base.Interventions:- Provide teaching at level of understanding- Provide teaching via preferred learning methods  Outcome: Progressing

## 2025-04-11 NOTE — PROGRESS NOTES
Nino Silva  tolerated treatment well with no complications.      Nino Silva is aware of future appt on 4/14 at 0700.     AVS printed and given to Nino Silva:    No (Declined by Nino Silva)

## 2025-04-11 NOTE — PATIENT INSTRUCTIONS
Decadron 4 mg 3 times per day for 7 days as of today    Then decadron 4 mg two times per day for 7 days-will then decide on dosing after see you    IV hydration    Follow up Day 15 which was moved to 29th April so can be seen same day

## 2025-04-11 NOTE — TELEPHONE ENCOUNTER
Dr. Morrell saw patient in office today. Added on 1L hydration for today and 1L onto treatment on Monday, 4/14. Spoke to Naheed at  infusion.    Dr. Morrell requesting patient's treatments be on days she is at  (Tues, Wed, Fri) to prevent him from having to travel multiple days. She will see him while he's at infusion due to the lengths of his treatments.

## 2025-04-11 NOTE — PROGRESS NOTES
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 chemotherapy and thus optimal dosing is 4-6 cycles.  There has been a study comparing Cisplatin to Carboplatin/noninferiority demonstrating that there is no difference with PFS, OS CARTER and thus can be used interchangeably although Cisplatin remains preferred.     The first study to demonstrate any improvement in OS in the first line treatment of eSCLC in several decades was Impower 133 a randomized Phase III trial of Carboplatin/Etoposide with the PDL1 inhibitor atezolizumab.  This was a global trial with patients with eSCLC getting 4 cycles of Carbo/Etoposide with or without concurrent atezolizumab/placebo with maintenance afterward.  The addition of atezolizumab led to significant improvement in OS 12.3 vs 10.3 months HR 0.7 as well as PFS .  OS was independent of PDL1 expression.  The CASPIAN study demonstrated that durvalumab with first suzi therapy was similar in terms of response with OS at 13 months vs 10.3 months.  Either agent can be used in this setting.  KEYNOTE 604 with Pembrolizumab was negative for OS benefit which may be a design/patient flaw as generally these agents are similar in terms of efficacy.          Discussion Large cell neuroendocrine        The clinical presentation of large cell neuroendocrine or ?C??? appears similar to the  other high-grade neuroendocrine pulmonary tumor, small cell lung ?????r (?C?C), with notable exceptions: primary LCNECs tend to be located peripherally rather than centrally, and presentation of LCNECs with early-stage (I to II) disease is more common than for S??C (approximately 25 versus less than 5 percent). Thus, patients with ??N?C more commonly undergo resection.       For patients with stage IV disease, we suggest using a standard S?LC regimen (etoposide plus a either carboplatin or cisplatin) for four to six cycles, independent of retinoblastoma gene 1 (RB1) status.     However, prognosis for this tumor type is poor regardless of choice of ?h?m?th?r?py, and other options also are reasonable.     The approach of Denise is based on integrative profiling that clearly identifies ?CN?? to be a neuroendocrine tumor most similar to ?CL?     Data from a prospective phase II trial of 29 patients treated with etoposide/cisplatin along with review of published retrospective experience with ?h?m?ther?py in stage IV ??N?? demonstrated that major efficacy endpoints were similar to SCL?, with the exception that overall response rate is lower (34 percent) than the approximate 60 percent rate observed in S?L?     Thus, these are treated as small cell     Discussion progression/second line      Sensitive vs > 6 months      Single-agent ?h?m?th?r?py is standard second-line treatment after ?l?ti?um-based ?h?m?th?rap? and immunotherapy. Lurbinectedin represents our preferred initial approach, although tarlatamab or camptothecins are acceptable alternatives.      For patients that are not eligible for, cannot tolerate, or progress on tarlatamab, other ?h?m?ther?p? agents are available        Data below represent efficacy of ?h?m?thera?? after progression on initial combination ?h?m?ther?py. These trials were conducted prior to introduction of immunotherapy into the management of SC?C.     Lurbinectedin is an alkylating agent that  has received accelerated approval by the US Food and Drug Administration (FDA) for patients with metastatic ?CLC with disease progression on or after platin?m-based ?h?m?th?r??? [3]. It is given at a dose of 3.2 mg/m2 intravenously (IV) every three weeks.        In an open-label study of 105 patients with SCL? and no brain m?t??ta?es who progressed on or after ?l?tinum-based ?h?m?th?rap?, 8 percent of whom had prior immunotherapy in addition to plati?um-based ?h?m?th?r?py, the overall response rate (CARTER) with lurbinectedin according to independent review committee was 33 percent The median duration of response was 5.1 months, and 25 percent of responding patients experienced a duration of response exceeding six months. Among patients with resistant relapse (?h?m?th?r?py-free interval <90 days), the CARTER was 22 percent with a progression-free survival (PFS) of 2.6 months and an overall survival (OS) of 5 months. For patients with sensitive relapse, the CARTER was 45 percent with a PFS of 4.5 months and an OS of 11.9 months      All patients in this study received a prespecified antiemetic regimen consisting of a corticosteroid and serotonin antagonist. Serious adverse reactions occurred in 10 percent of patients, of which neutropenia and febrile neutropenia were the most common (5 percent for each).        Tarlatamab is a reasonable alternative to lurbinectedin as a second-line treatment option, extrapolating from data in the third-line setting. These data are discussed below.      Topotecan has been shown to increase survival compared with best supportive care (BSC) and results in greater symptom management relative to a multiagent regimen [5,6]. It is approved by the FDA for relapses that occur after 45 days from the completion of ?h?m?ther?p?. ????t???? also has activity against brain m?t?st???s. The primary toxicities of t???te??? are hematologic, with most patients experiencing grade 3 or 4 neutropenia, anemia, or  thrombocytopenia.     In a trial in which 211 patients with relapse >=60 days after completion of first-line therapy (usually pl?tinum plus etoposide) were randomly assigned to daily IV topotecan or cyclophosphamide, doxorubicin, and vincristine (CAV), disease outcomes were similar between the groups, but cancer symptoms were improved with t???t?can  For t???te??? versus CAV, the ORRs were 24 versus 18 percent, the median time to progression was 13 versus 12 weeks, and the median survival was 25 versus 24.7 weeks, differences that were not statistically significant. Control of several symptoms including dyspnea, anorexia, hoarseness, and fatigue was improved with t???t??a?. Severe neutropenia was less common with t???te?an (38 versus 51 percent), but grade 3 or 4 thrombocytopenia and anemia occurred more frequently (9.8 versus 1.4 percent and 17.7 versus 7.2 percent, respectively). The improvement in symptom control led to its FDA approval.\         Oral and IV formulations of topotecan have been extensively evaluated for the second-line treatment of relapsed ?CLC and are found to be equally effective     In a phase III noninferiority trial, 304 patients with chemosensitive relapse (>90 days) of ???C were randomly assigned to oral (2.3 mg/m2 daily for five days) or IV (1.5 mg/m2 daily for five days) topotecan every three weeks. Response rates were similar (18 versus 22 percent), as were median and one-year survival rates (33 versus 35 weeks and 33 versus 29 percent, respectively) [9]. The toxicity profiles of the two regimens were similar as well.        Topotecan daily times five every three weeks rather than a once-weekly schedule, given better efficacy in cross-trial comparisons. In the  phase II trial, 192 patients with previously treated ??L? were randomly assigned to weekly t???te?an with or without aflibercept after progression on a ?l?ti??m-based regimen. Only two partial responses (1 percent) were  "observed in the entire study population (both in patients who also received ?flib?r?e?t), and the median OS was approximately five months. Response rates of the daily times five every three weeks schedule were higher in other studies, on the order of 20 percent  These data are discussed above.         As an alternative to topotecan, three small clinical trials have evaluated irinotecan as second-line therapy, with objective response rates ranging from 16 to 47 percent . As an example, in one study, 44 patients (17 with sensitive relapse and 27 with resistant or refractory disease) were treated with iri??t?c?n 125 mg/m2 weekly times four with a two-week break. The overall objective response rate was 16 percent. The response rate in patients with resistant or refractory disease was 4 percent. The response rate was 35 percent in the patients with sensitive relapse, with a median survival of 6.8 months. Primary toxicities of iri??te??n included diarrhea and neutropenia. Results for those with sensitive relapse are discussed below.         Tarlatamab, is a bispecific T-cell engager immunotherapy that directs the patient's T cells to cancer cells expressing delta-like ligand 3 (overexpressed in approximately 90 percent of SCLCs). It is approved by the US Food and  Drug Administration (FDA) at a dose of 10 mg intravenously every two weeks (after an initial \"step-up\" dosing schedule) . The approval is based on response rates and is contingent upon results of a confirmatory trial.     Tarlatamab has shown promising activity in a phase II trial among patients with disease that had relapsed after, or was refractory to, one platin?m-based treatment regimen and at least one other line of therapy. At a dose of 10 mg intravenously every two weeks, the response rate was 40 percent, median progression-free survival was 4.9 months, and overall survival was 14.3 months  With longer follow-up (median 12.1 months), the response rate was " 35 percent and median overall survival was 20 months.     The most common adverse events in these patients were cytokine-release syndrome (CRS; in 51 percent), decreased appetite (29 percent), and pyrexia (35 percent). Grade 3 CRS occurred in 1 percent.     In a pooled safety analysis reported in the FDA label         ?CRS occurred in 55 percent, mostly occurring during treatment cycle 1. The median time to onset of any grade CRS from last exposure to tarlatamab was 13.5 hours. CRS can manifest as pyrexia, hypotension, fatigue, tachycardia, headache, hypoxia, nausea, and vomiting. Potentially life-threatening complications of CRS include cardiac dysfunction, acute respiratory distress syndrome, neurologic toxicity, kidney and/or hepatic failure, and disseminated intravascular coagulation. Management of CRS is discussed elsewhere        ?Neurologic toxicity occurred in 47 percent, including 10 percent with grade 3 toxicity. The most frequent neurologic toxicities included headache (14 percent), peripheral neuropathy (7 percent), dizziness (7 percent), and insomnia (6 percent). Immune effector cell-associated neurotoxicity syndrome (ICANS), which can present as confusion, lethargy, or disorientation (among other symptoms) occurred in 9 percent. It most frequently occurred following cycle 2 day 1 and had a median time of onset from first dose of 29.5 days. Further discussion of clinical features and management of ICANS is found elsewhere           Review of Systems   Constitutional:  Positive for fatigue.   HENT: Negative.     Eyes: Negative.    Respiratory: Negative.     Cardiovascular: Negative.    Gastrointestinal: Negative.    Endocrine: Negative.    Genitourinary: Negative.    Musculoskeletal: Negative.    Neurological:  Positive for headaches.   Hematological: Negative.    Psychiatric/Behavioral: Negative.               03/19/25:     04/11/25:      Review of Systems   Constitutional:  Positive for fatigue.    HENT:  Positive for trouble swallowing.    Respiratory: Negative.     Cardiovascular: Negative.    Gastrointestinal: Negative.    Genitourinary: Negative.    Musculoskeletal: Negative.    Skin: Negative.    Neurological:  Positive for weakness, light-headedness and headaches.   Hematological: Negative.    Psychiatric/Behavioral: Negative.             Objective   There were no vitals taken for this visit.    Pain Screening:     ECOG   3-4  Physical Exam  Vitals reviewed.   Constitutional:       Appearance: He is ill-appearing.   HENT:      Head: Normocephalic.      Mouth/Throat:      Pharynx: Oropharynx is clear.   Eyes:      Conjunctiva/sclera: Conjunctivae normal.   Cardiovascular:      Rate and Rhythm: Normal rate.      Pulses: Normal pulses.   Pulmonary:      Effort: Pulmonary effort is normal.   Abdominal:      General: Bowel sounds are normal.   Musculoskeletal:         General: Normal range of motion.      Cervical back: Normal range of motion.   Skin:     General: Skin is warm.   Neurological:      General: No focal deficit present.      Mental Status: He is alert.   Psychiatric:         Mood and Affect: Mood normal.         Labs: I have reviewed the following labs:  Lab Results   Component Value Date/Time    WBC 10.74 (H) 04/09/2025 04:15 AM    RBC 4.15 04/09/2025 04:15 AM    Hemoglobin 12.9 04/09/2025 04:15 AM    Hematocrit 38.6 04/09/2025 04:15 AM    MCV 93 04/09/2025 04:15 AM    MCH 31.1 04/09/2025 04:15 AM    RDW 13.2 04/09/2025 04:15 AM    Platelets 286 04/09/2025 04:15 AM    Segmented % 85 (H) 04/09/2025 04:15 AM    Lymphocytes % 9 (L) 04/09/2025 04:15 AM    Monocytes % 4 04/09/2025 04:15 AM    Eosinophils Relative 0 04/09/2025 04:15 AM    Basophils Relative 0 04/09/2025 04:15 AM    Immature Grans % 2 04/09/2025 04:15 AM    Absolute Neutrophils 9.13 (H) 04/09/2025 04:15 AM     Lab Results   Component Value Date/Time    Potassium 4.1 04/09/2025 04:15 AM    Chloride 103 04/09/2025 04:15 AM    CO2 23  04/09/2025 04:15 AM    BUN 28 (H) 04/09/2025 04:15 AM    Creatinine 0.73 04/09/2025 04:15 AM    Glucose, Fasting 106 (H) 03/28/2025 10:22 AM    Calcium 9.2 04/09/2025 04:15 AM    AST 16 04/04/2025 09:46 AM    ALT 19 04/04/2025 09:46 AM    Alkaline Phosphatase 43 04/04/2025 09:46 AM    Total Protein 7.5 04/04/2025 09:46 AM    Albumin 4.1 04/04/2025 09:46 AM    Total Bilirubin 0.57 04/04/2025 09:46 AM    eGFR 100 04/09/2025 04:15 AM           Administrative Statements   I have spent a total time of 40 minutes in caring for this patient on the day of the visit/encounter including Diagnostic results, Prognosis, Impressions, Counseling / Coordination of care, Documenting in the medical record, Reviewing/placing orders in the medical record (including tests, medications, and/or procedures), and Obtaining or reviewing history  .

## 2025-04-14 ENCOUNTER — PATIENT OUTREACH (OUTPATIENT)
Dept: CASE MANAGEMENT | Facility: HOSPITAL | Age: 60
End: 2025-04-14

## 2025-04-14 ENCOUNTER — HOSPITAL ENCOUNTER (OUTPATIENT)
Dept: INFUSION CENTER | Facility: HOSPITAL | Age: 60
Discharge: HOME | End: 2025-04-14
Attending: INTERNAL MEDICINE
Payer: COMMERCIAL

## 2025-04-14 VITALS
TEMPERATURE: 96.8 F | DIASTOLIC BLOOD PRESSURE: 74 MMHG | HEART RATE: 55 BPM | RESPIRATION RATE: 16 BRPM | SYSTOLIC BLOOD PRESSURE: 110 MMHG | OXYGEN SATURATION: 95 % | HEIGHT: 70 IN | BODY MASS INDEX: 27.26 KG/M2

## 2025-04-14 DIAGNOSIS — C34.00 SMALL CELL CARCINOMA OF HILUM OF LUNG, UNSPECIFIED LATERALITY (HCC): Primary | ICD-10-CM

## 2025-04-14 PROCEDURE — 96365 THER/PROPH/DIAG IV INF INIT: CPT

## 2025-04-14 PROCEDURE — 96361 HYDRATE IV INFUSION ADD-ON: CPT

## 2025-04-14 RX ORDER — SODIUM CHLORIDE 9 MG/ML
20 INJECTION, SOLUTION INTRAVENOUS ONCE
Status: COMPLETED | OUTPATIENT
Start: 2025-04-14 | End: 2025-04-14

## 2025-04-14 RX ADMIN — SODIUM CHLORIDE 20 ML/HR: 0.9 INJECTION, SOLUTION INTRAVENOUS at 07:30

## 2025-04-14 RX ADMIN — SODIUM CHLORIDE 1000 ML: 0.9 INJECTION, SOLUTION INTRAVENOUS at 07:16

## 2025-04-14 RX ADMIN — TARLATAMAB-DLLE 10 MG: KIT at 08:05

## 2025-04-14 NOTE — PROGRESS NOTES
"Pt asked to meet with LSW in the infusion center this day.  He expressed confusion as he had met with radiation oncologist while in pt and was told, \"there is no more they can do for me and he told me to say my goodbyes to my family.\"  Pt expressed feeling upset that this conversation took place with his 14 year old daughter present.  When he was discharged, he asked to meet with the Medical Oncologist, who he states explained he \"could still have chemo and radiation.\"  Pt states he feels confused with his team providing him different information.  LSW acknowledged his concern and spoke with him further to determine if he was realistic with his prognosis.  Pt appears to be realistic and \"just wanting more time\".  He states that his medical oncologist felt that he could have radiation and he was hoping to gain clarity.  LSW offered to reach out to his team to determine if this could be cleared up for him.  Pt expressed appreciation.  LSW provided significant support and will follow up with the pt.      Secure chat sent to radiation and medical oncology.  "

## 2025-04-14 NOTE — PROGRESS NOTES
Nino Silva  tolerated treatment well with no complications.      Nino Silva is aware of future appt on 4/29/2025 at 0700.     AVS printed and given to Nino Silva:    No (Declined by Nino Silva)

## 2025-04-15 ENCOUNTER — DOCUMENTATION (OUTPATIENT)
Dept: HEMATOLOGY ONCOLOGY | Facility: CLINIC | Age: 60
End: 2025-04-15

## 2025-04-15 ENCOUNTER — TELEMEDICINE (OUTPATIENT)
Dept: FAMILY MEDICINE CLINIC | Facility: HOSPITAL | Age: 60
End: 2025-04-15
Payer: COMMERCIAL

## 2025-04-15 VITALS — HEIGHT: 70 IN | BODY MASS INDEX: 27.2 KG/M2 | WEIGHT: 190 LBS

## 2025-04-15 DIAGNOSIS — C34.11 SMALL CELL CARCINOMA OF UPPER LOBE OF RIGHT LUNG (HCC): Primary | ICD-10-CM

## 2025-04-15 DIAGNOSIS — E86.0 DEHYDRATION: ICD-10-CM

## 2025-04-15 DIAGNOSIS — Z99.89 USES WALKER: ICD-10-CM

## 2025-04-15 DIAGNOSIS — J44.9 CHRONIC OBSTRUCTIVE PULMONARY DISEASE, UNSPECIFIED COPD TYPE (HCC): ICD-10-CM

## 2025-04-15 DIAGNOSIS — R26.2 AMBULATORY DYSFUNCTION: ICD-10-CM

## 2025-04-15 PROBLEM — R51.9 ACUTE HEADACHE: Status: RESOLVED | Noted: 2025-04-04 | Resolved: 2025-04-15

## 2025-04-15 PROCEDURE — 99496 TRANSJ CARE MGMT HIGH F2F 7D: CPT | Performed by: INTERNAL MEDICINE

## 2025-04-15 RX ORDER — SENNOSIDES A AND B 8.6 MG/1
1 TABLET, FILM COATED ORAL DAILY PRN
COMMUNITY

## 2025-04-15 NOTE — ASSESSMENT & PLAN NOTE
His COPD is stable.  Denies increased cough, wheezing, chest tightness.  Will monitor patient

## 2025-04-15 NOTE — ASSESSMENT & PLAN NOTE
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  He was discharged on April 9 and today we had a virtual video appointment for ANNALEE management.  Patient takes the Decadron and Protonix and had Imdelltra infusion yesterday as well as IV hydration.    He has a mild headache which is not as severe as it was when he was in the hospital.  MRI of the brain showed multiple intracranial hemorrhagic metastatic lesions on April 4.  he saw speech therapy for dysphagia and was cleared for nectar thick liquids.  He feels that he is able to swallow nectar thick liquids without any coughing, dysphagia, shortness of breath.  He denies neck pain, back pain.    His walking is still unstable, he is weak and his wife needs to help him with a gait belt and uses a walker.  He has home physical and Occupational Therapy  His oxygen saturation at home was 92 there is 96% since hospital discharge though he did not have to use his home oxygen.  He is out of breath when he speaks continuously for a longer period of time.  He was in no respiratory distress during our conversation and he had no conversational dyspnea    They bought a hospital bed which is an XL size since due to the metastatic small cell lung cancer he's mostly bed bound and he requested a gel pad for the hospital bed through [employer] that I ordered.

## 2025-04-15 NOTE — ASSESSMENT & PLAN NOTE
He has gait dysfunction due to metastatic small cell lung cancer.  MRI of the lumbar spine showed stable osseous metastases of L1 and L2 without extraosseous extension.  He uses a walker, needs assistance.  He is working with physical therapy in his home.

## 2025-04-15 NOTE — PROGRESS NOTES
In-basket message received from Dr. Funes to add patient to the neuro MDCC on 4/16/2025. Chart reviewed and prep completed.

## 2025-04-15 NOTE — PROGRESS NOTES
Virtual TCM Visit:Name: Nino Silva      : 1965      MRN: [id number]  Encounter Provider: Obed Marinelli MD  Encounter Date: 4/15/2025   Encounter department: 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  He was discharged on  and today we had a virtual video appointment for ANNALEE management.  Patient takes the Decadron and Protonix and had Imdelltra infusion yesterday as well as IV hydration.    He has a mild headache which is not as severe as it was when he was in the hospital.  MRI of the brain showed multiple intracranial hemorrhagic metastatic lesions on .  he saw speech therapy for dysphagia and was cleared for nectar thick liquids.  He feels that he is able to swallow nectar thick liquids without any coughing, dysphagia, shortness of breath.  He denies neck pain, back pain.    His walking is still unstable, he is weak and his wife needs to help him with a gait belt and uses a walker.  He has home physical and Occupational Therapy  His oxygen saturation at home was 92 there is 96% since hospital discharge though he did not have to use his home oxygen.  He is out of breath when he speaks continuously for a longer period of time.  He was in no respiratory distress during our conversation and he had no conversational dyspnea    They bought a hospital bed which is an XL size since due to the metastatic small cell lung cancer he's mostly bed bound and he requested a gel pad for the hospital bed through [employer] that I ordered.       Chronic obstructive pulmonary disease, unspecified COPD type (HCC)  His COPD is stable.  Denies increased cough, wheezing, chest tightness.  Will monitor patient       Ambulatory dysfunction  He has gait dysfunction due to metastatic small cell lung cancer.  MRI of the lumbar spine showed stable  "osseous metastases of L1 and L2 without extraosseous extension.  He uses a walker, needs assistance.  He is working with physical therapy in his home.       Uses walker  He is dependent on a walker because of gait dysfunction.  Continue Using a walker.       Dehydration  He gets IV infusions for dehydration.               Depression Screening and Follow-up Plan: Patient was screened for depression during today's encounter. They screened negative with a PHQ-2 score of 0.        History of Present Illness     Transitional Care Management Review:   Nino Silva is a 59 y.o. male here for TCM follow up.    During the TCM phone call patient stated:  TCM Call (since 4/1/2025)       Date and time call was made  4/11/2025  9:52 AM    Hospital care reviewed  Records reviewed    Patient was hospitialized at  [facility]    Date of Admission  04/04/25    Date of discharge  04/09/25    Diagnosis  admitting dx---small cell lung CA metastatic to brain and bone, d/c dx--active problems    Disposition  Home    Were the patients medications reviewed and updated  Yes    Current Symptoms  None          TCM Call (since 4/1/2025)       I have advised the patient to call PCP with any new or worsening symptoms  Shireen Shaw MA [facility], [id number].  PT DOING OK.  MEDS REVIEWED, CHART CURRENT.  PT WANTS TO DO VIRT TCM,  HE WAS AT INFUSION, HE WANTED US TO CALL HIM THIS AFTERNOON TO SET UP.  DK          HPI  Review of Systems  Objective   Ht 5' 10\" (1.778 m)   Wt 86.2 kg (190 lb)   BMI 27.26 kg/m²     Physical Exam  Constitutional:       General: He is not in acute distress.     Appearance: He is not toxic-appearing.   Pulmonary:      Effort: Pulmonary effort is normal. No respiratory distress.   Neurological:      Mental Status: He is alert and oriented to person, place, and time.      Cranial Nerves: No cranial nerve deficit.      Motor: No weakness.   Psychiatric:         Mood and Affect: Mood " normal.       Medications have been reviewed by provider in current encounter    Administrative Statements   Encounter provider Obed Marinelli MD    The Patient is located at Home and in the following state in which I hold an active license [place].    The patient was identified by name and date of birth. Nino Silva was informed that this is a telemedicine visit and that the visit is being conducted through the Epic Embedded platform. He agrees to proceed..  My office door was closed. No one else was in the room.  He acknowledged consent and understanding of privacy and security of the video platform. The patient has agreed to participate and understands they can discontinue the visit at any time.    I have spent a total time of 30 minutes in caring for this patient on the day of the visit/encounter including Diagnostic results, Instructions for management, Impressions, Documenting in the medical record, Reviewing/placing orders in the medical record (including tests, medications, and/or procedures), and Obtaining or reviewing history  , not including the time spent for establishing the audio/video connection.    Obed Marinelli MD

## 2025-04-16 ENCOUNTER — TELEPHONE (OUTPATIENT)
Dept: RADIATION ONCOLOGY | Facility: HOSPITAL | Age: 60
End: 2025-04-16

## 2025-04-16 ENCOUNTER — DOCUMENTATION (OUTPATIENT)
Dept: HEMATOLOGY ONCOLOGY | Facility: CLINIC | Age: 60
End: 2025-04-16

## 2025-04-16 NOTE — TELEPHONE ENCOUNTER
Patient's imaging and history reviewed in neuro multidisciplinary conference. Discussed MRI brain findings indicating progression of disease with more extensive involvement of the brainstem and the cerebellum (in addition to innumerable additional lesions).  C-spine MRI demonstrated a question of leptomeningeal disease, although LP was negative.  He is now continuing tarlatamab per medical oncology.    The group reviewed treatment options including repeat whole brain radiation or hospice/supportive care. SRS/SRT was not favored given the size and distribution of lesions involving the cerebellum and brainstem, prognosis, and risk for treatment-related toxicity/necrosis.      The consensus recommendation was continued tarlatamab given potential for intracranial activity.

## 2025-04-16 NOTE — PROGRESS NOTES
NEURO ONCOLOGY MULTIDISCIPLINARY CANCER CONFERENCE    DATE:   4/16/2025    PRESENTING PROVIDER: Dr Prince Funes    DIAGNOSIS:  Small cell lung cancer       Nino Silva is a 59 y.o. male who was presented at [facility] Cancer Conference today. 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 He received whole brain radiation, treatment completion on 3/19/2024. He initiated systemic therapy on 4/8/24. Recent imaging has shown disease progression, he is currently on third line treatment. Recent LP was negative. Patient was presented for image review and plan of care discussion.     PHYSICIAN RECOMMENDED PLAN:   - Continue systemic therapy, currently on Tarlatamab  - Continue brain surveillance (patient was offered whole brain re-irradiation versus hospice, both were declined)    Pathology reviewed: NA    Imaging reviewed:   2/5/2025 MRI brain  4/4/2025 MRI brain  4/6/2025 MRI cervical spine    Future Appointments   Date Time Provider Department Center   4/18/2025 12:40 PM Cortney Morrell MD [facility]   4/22/2025  8:00 AM Cortney Morrell MD [facility]   4/24/2025  8:00 AM Arlen Lion MD [facility]   4/29/2025  7:00 AM [facility]   5/12/2025  7:30 AM [facility]   5/23/2025  9:00 AM Harmeet Webster MD [facility]   5/27/2025  7:30 AM [facility]   6/9/2025  7:30 AM [facility]   6/23/2025  7:30 AM [facility]   7/7/2025  8:00 AM [facility]   7/21/2025  8:00 AM [facility]        Team agreed to plan.   NCCN guidelines were readily available for review at this  discussion.    The final treatment plan will be left at the discretion of the patient and the treating physician.     DISCLAIMERS:  TO THE TREATING PHYSICIAN:  This conference is a meeting of clinicians from various specialty areas who evaluate and discuss patients for whom a multidisciplinary treatment approach is being considered. Please note that the above opinion was a consensus of the conference attendees and is intended only to assist in quality care of the discussed patient.  The responsibility for follow up on the input given during the conference, along with any final decisions regarding plan of care, is that of the patient and the patient's provider. Accordingly, appointments have only been recommended based on this information; and have NOT been scheduled unless otherwise noted.      TO THE PATIENT:  This summary is a brief record of major aspects of your cancer treatment. You may choose to can share a your copy with any of your doctors or nurses. However, this is not a detailed or comprehensive record of your care.

## 2025-04-17 NOTE — PROGRESS NOTES
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 cycles  Administration: 10 mg (3/24/2025), 10 mg (3/31/2025), 10 mg (4/14/2025)  tarlatamab (Imdelltra) 1 mg IVPB, 1 mg, 1 of 1 cycle  Administration: 1 mg (3/17/2025)        Pertinent Medical History   02/09/25:   Discussion  extended stage SCLC/Initial treatment      Most patients with eSCLC have disease relapse; thus this is incurable.  However the cornerstone of treatment remains platinum based.  Response rates are as high as 61% with CR of 10%.  Responses to chemotherapy are frequent and rapid with median time to best response in approximately 4.6 weeks.  Unfortunately, these are no durable responses and the median time to progression (TTP) is 4 months with OS 8.6 months.  Despite predictable relapse randomized studies have not shown a survival benefit for prolonged administration of chemotherapy and thus optimal dosing is 4-6 cycles.  There has been a study comparing Cisplatin to Carboplatin/noninferiority demonstrating that there is no difference with PFS, OS CARTER and thus can be used interchangeably although Cisplatin remains preferred.     The first study to demonstrate any improvement in OS in the first line treatment of eSCLC in several decades was Impower 133 a randomized Phase III trial of Carboplatin/Etoposide with the PDL1 inhibitor atezolizumab.  This was a global trial with  patients with eSCLC getting 4 cycles of Carbo/Etoposide with or without concurrent atezolizumab/placebo with maintenance afterward.  The addition of atezolizumab led to significant improvement in OS 12.3 vs 10.3 months HR 0.7 as well as PFS .  OS was independent of PDL1 expression.  The CASPIAN study demonstrated that durvalumab with first suzi therapy was similar in terms of response with OS at 13 months vs 10.3 months.  Either agent can be used in this setting.  KEYNOTE 604 with Pembrolizumab was negative for OS benefit which may be a design/patient flaw as generally these agents are similar in terms of efficacy.          Discussion Large cell neuroendocrine        The clinical presentation of large cell neuroendocrine or ?C??? appears similar to the other high-grade neuroendocrine pulmonary tumor, small cell lung ?????r (?C?C), with notable exceptions: primary LCNECs tend to be located peripherally rather than centrally, and presentation of LCNECs with early-stage (I to II) disease is more common than for S??C (approximately 25 versus less than 5 percent). Thus, patients with ??N?C more commonly undergo resection.       For patients with stage IV disease, we suggest using a standard S?LC regimen (etoposide plus a either carboplatin or cisplatin) for four to six cycles, independent of retinoblastoma gene 1 (RB1) status.     However, prognosis for this tumor type is poor regardless of choice of ?h?m?th?r?py, and other options also are reasonable.     The approach of [facility] is based on integrative profiling that clearly identifies ?CN?? to be a neuroendocrine tumor most similar to ?CL?     Data from a prospective phase II trial of 29 patients treated with etoposide/cisplatin along with review of published retrospective experience with ?h?m?ther?py in stage IV ??N?? demonstrated that major efficacy endpoints were similar to SCL?, with the exception that overall response rate is lower (34 percent) than the approximate 60  percent rate observed in S?L?     Thus, these are treated as small cell     Discussion progression/second line      Sensitive vs > 6 months      Single-agent ?h?m?th?r?py is standard second-line treatment after ?l?ti?um-based ?h?m?th?rap? and immunotherapy. Lurbinectedin represents our preferred initial approach, although tarlatamab or camptothecins are acceptable alternatives.      For patients that are not eligible for, cannot tolerate, or progress on tarlatamab, other ?h?m?ther?p? agents are available        Data below represent efficacy of ?h?m?thera?? after progression on initial combination ?h?m?ther?py. These trials were conducted prior to introduction of immunotherapy into the management of SC?C.     Lurbinectedin is an alkylating agent that has received accelerated approval by the [place] Food and Drug Administration (FDA) for patients with metastatic ?CLC with disease progression on or after platin?m-based ?h?m?th?r??? [3]. It is given at a dose of 3.2 mg/m2 intravenously (IV) every three weeks.        In an open-label study of 105 patients with SCL? and no brain m?t??ta?es who progressed on or after ?l?tinum-based ?h?m?th?rap?, 8 percent of whom had prior immunotherapy in addition to plati?um-based ?h?m?th?r?py, the overall response rate (CARTER) with lurbinectedin according to independent review committee was 33 percent The median duration of response was 5.1 months, and 25 percent of responding patients experienced a duration of response exceeding six months. Among patients with resistant relapse (?h?m?th?r?py-free interval <90 days), the CARTER was 22 percent with a progression-free survival (PFS) of 2.6 months and an overall survival (OS) of 5 months. For patients with sensitive relapse, the CARTER was 45 percent with a PFS of 4.5 months and an OS of 11.9 months      All patients in this study received a prespecified antiemetic regimen consisting of a corticosteroid and serotonin antagonist. Serious adverse  reactions occurred in 10 percent of patients, of which neutropenia and febrile neutropenia were the most common (5 percent for each).        Tarlatamab is a reasonable alternative to lurbinectedin as a second-line treatment option, extrapolating from data in the third-line setting. These data are discussed below.      Topotecan has been shown to increase survival compared with best supportive care (BSC) and results in greater symptom management relative to a multiagent regimen [5,6]. It is approved by the FDA for relapses that occur after 45 days from the completion of ?h?m?ther?p?. ????t???? also has activity against brain m?t?st???s. The primary toxicities of t???te??? are hematologic, with most patients experiencing grade 3 or 4 neutropenia, anemia, or thrombocytopenia.     In a trial in which 211 patients with relapse >=60 days after completion of first-line therapy (usually pl?tinum plus etoposide) were randomly assigned to daily IV topotecan or cyclophosphamide, doxorubicin, and vincristine (CAV), disease outcomes were similar between the groups, but cancer symptoms were improved with t???t?can  For t???te??? versus CAV, the ORRs were 24 versus 18 percent, the median time to progression was 13 versus 12 weeks, and the median survival was 25 versus 24.7 weeks, differences that were not statistically significant. Control of several symptoms including dyspnea, anorexia, hoarseness, and fatigue was improved with t???t??a?. Severe neutropenia was less common with t???te?an (38 versus 51 percent), but grade 3 or 4 thrombocytopenia and anemia occurred more frequently (9.8 versus 1.4 percent and 17.7 versus 7.2 percent, respectively). The improvement in symptom control led to its FDA approval.\         Oral and IV formulations of topotecan have been extensively evaluated for the second-line treatment of relapsed ?CLC and are found to be equally effective     In a phase III noninferiority trial, 304 patients with  chemosensitive relapse (>90 days) of ???C were randomly assigned to oral (2.3 mg/m2 daily for five days) or IV (1.5 mg/m2 daily for five days) topotecan every three weeks. Response rates were similar (18 versus 22 percent), as were median and one-year survival rates (33 versus 35 weeks and 33 versus 29 percent, respectively) [9]. The toxicity profiles of the two regimens were similar as well.        Topotecan daily times five every three weeks rather than a once-weekly schedule, given better efficacy in cross-trial comparisons. In the  phase II trial, 192 patients with previously treated ??L? were randomly assigned to weekly t???te?an with or without aflibercept after progression on a ?l?ti??m-based regimen. Only two partial responses (1 percent) were observed in the entire study population (both in patients who also received ?flib?r?e?t), and the median OS was approximately five months. Response rates of the daily times five every three weeks schedule were higher in other studies, on the order of 20 percent  These data are discussed above.         As an alternative to topotecan, three small clinical trials have evaluated irinotecan as second-line therapy, with objective response rates ranging from 16 to 47 percent . As an example, in one study, 44 patients (17 with sensitive relapse and 27 with resistant or refractory disease) were treated with iri??t?c?n 125 mg/m2 weekly times four with a two-week break. The overall objective response rate was 16 percent. The response rate in patients with resistant or refractory disease was 4 percent. The response rate was 35 percent in the patients with sensitive relapse, with a median survival of 6.8 months. Primary toxicities of iri??te??n included diarrhea and neutropenia. Results for those with sensitive relapse are discussed below.         Tarlatamab, is a bispecific T-cell engager immunotherapy that directs the patient's T cells to cancer cells expressing delta-like  "ligand 3 (overexpressed in approximately 90 percent of SCLCs). It is approved by the [place] Food and  Drug Administration (FDA) at a dose of 10 mg intravenously every two weeks (after an initial \"step-up\" dosing schedule) . The approval is based on response rates and is contingent upon results of a confirmatory trial.     Tarlatamab has shown promising activity in a phase II trial among patients with disease that had relapsed after, or was refractory to, one platin?m-based treatment regimen and at least one other line of therapy. At a dose of 10 mg intravenously every two weeks, the response rate was 40 percent, median progression-free survival was 4.9 months, and overall survival was 14.3 months  With longer follow-up (median 12.1 months), the response rate was 35 percent and median overall survival was 20 months.     The most common adverse events in these patients were cytokine-release syndrome (CRS; in 51 percent), decreased appetite (29 percent), and pyrexia (35 percent). Grade 3 CRS occurred in 1 percent.     In a pooled safety analysis reported in the FDA label         ?CRS occurred in 55 percent, mostly occurring during treatment cycle 1. The median time to onset of any grade CRS from last exposure to tarlatamab was 13.5 hours. CRS can manifest as pyrexia, hypotension, fatigue, tachycardia, headache, hypoxia, nausea, and vomiting. Potentially life-threatening complications of CRS include cardiac dysfunction, acute respiratory distress syndrome, neurologic toxicity, kidney and/or hepatic failure, and disseminated intravascular coagulation. Management of CRS is discussed elsewhere        ?Neurologic toxicity occurred in 47 percent, including 10 percent with grade 3 toxicity. The most frequent neurologic toxicities included headache (14 percent), peripheral neuropathy (7 percent), dizziness (7 percent), and insomnia (6 percent). Immune effector cell-associated neurotoxicity syndrome (ICANS), which can present as " confusion, lethargy, or disorientation (among other symptoms) occurred in 9 percent. It most frequently occurred following cycle 2 day 1 and had a median time of onset from first dose of 29.5 days. Further discussion of clinical features and management of ICANS is found elsewhere           Review of Systems   Constitutional:  Positive for fatigue.   HENT: Negative.     Eyes: Negative.    Respiratory: Negative.     Cardiovascular: Negative.    Gastrointestinal: Negative.    Endocrine: Negative.    Genitourinary: Negative.    Musculoskeletal: Negative.    Neurological:  Positive for headaches.   Hematological: Negative.    Psychiatric/Behavioral: Negative.               03/19/25:     04/11/25:     04/17/25:      Review of Systems   Constitutional:  Positive for fatigue.   Neurological:  Positive for weakness.   All other systems reviewed and are negative.          Objective   There were no vitals taken for this visit.    Pain Screening:     ECOG   3  Physical Exam no physical-telemedicine     Labs: I have reviewed the following labs:  Lab Results   Component Value Date/Time    WBC 10.79 (H) 04/11/2025 09:02 AM    RBC 4.17 04/11/2025 09:02 AM    Hemoglobin 13.1 04/11/2025 09:02 AM    Hematocrit 38.9 04/11/2025 09:02 AM    MCV 93 04/11/2025 09:02 AM    MCH 31.4 04/11/2025 09:02 AM    RDW 13.2 04/11/2025 09:02 AM    Platelets 280 04/11/2025 09:02 AM    Segmented % 83 (H) 04/11/2025 09:02 AM    Lymphocytes % 8 (L) 04/11/2025 09:02 AM    Monocytes % 7 04/11/2025 09:02 AM    Eosinophils Relative 0 04/11/2025 09:02 AM    Basophils Relative 0 04/11/2025 09:02 AM    Immature Grans % 2 04/11/2025 09:02 AM    Absolute Neutrophils 8.91 (H) 04/11/2025 09:02 AM     Lab Results   Component Value Date/Time    Potassium 4.3 04/11/2025 09:02 AM    Chloride 100 04/11/2025 09:02 AM    CO2 28 04/11/2025 09:02 AM    BUN 31 (H) 04/11/2025 09:02 AM    Creatinine 0.57 (L) 04/11/2025 09:02 AM    Glucose, Fasting 106 (H) 03/28/2025 10:22 AM     Calcium 9.0 04/11/2025 09:02 AM    AST 20 04/11/2025 09:02 AM    ALT 71 (H) 04/11/2025 09:02 AM    Alkaline Phosphatase 45 04/11/2025 09:02 AM    Total Protein 7.4 04/11/2025 09:02 AM    Albumin 4.2 04/11/2025 09:02 AM    Total Bilirubin 0.52 04/11/2025 09:02 AM    eGFR 111 04/11/2025 09:02 AM           Administrative Statements   Encounter provider Cortney Morrell MD    The Patient is located at Home and in the following state in which I hold an active license [place].    The patient was identified by name and date of birth. Nino Silva was informed that this is a telemedicine visit and that the visit is being conducted through the Epic Embedded platform. He agrees to proceed..  My office door was closed. No one else was in the room.  He acknowledged consent and understanding of privacy and security of the video platform. The patient has agreed to participate and understands they can discontinue the visit at any time.    I have spent a total time of 30 minutes in caring for this patient on the day of the visit/encounter including Impressions, Counseling / Coordination of care, Documenting in the medical record, Reviewing/placing orders in the medical record (including tests, medications, and/or procedures), and Obtaining or reviewing history  , not including the time spent for establishing the audio/video connection.

## 2025-04-18 ENCOUNTER — TELEPHONE (OUTPATIENT)
Age: 60
End: 2025-04-18

## 2025-04-18 ENCOUNTER — DOCUMENTATION (OUTPATIENT)
Dept: HEMATOLOGY ONCOLOGY | Facility: CLINIC | Age: 60
End: 2025-04-18

## 2025-04-18 ENCOUNTER — TELEMEDICINE (OUTPATIENT)
Age: 60
End: 2025-04-18
Payer: COMMERCIAL

## 2025-04-18 DIAGNOSIS — C34.11 SMALL CELL CARCINOMA OF UPPER LOBE OF RIGHT LUNG (HCC): Primary | ICD-10-CM

## 2025-04-18 DIAGNOSIS — Z29.89 IMMUNOTHERAPY: ICD-10-CM

## 2025-04-18 LAB
DME PARACHUTE DELIVERY DATE ACTUAL: NORMAL
DME PARACHUTE DELIVERY DATE EXPECTED: NORMAL
DME PARACHUTE DELIVERY DATE REQUESTED: NORMAL
DME PARACHUTE ITEM DESCRIPTION: NORMAL
DME PARACHUTE ORDER STATUS: NORMAL
DME PARACHUTE SUPPLIER NAME: NORMAL
DME PARACHUTE SUPPLIER PHONE: NORMAL

## 2025-04-18 PROCEDURE — 99214 OFFICE O/P EST MOD 30 MIN: CPT | Performed by: INTERNAL MEDICINE

## 2025-04-18 NOTE — PATIENT INSTRUCTIONS
Decrease decadron to 4 mg twice a day from three times a day for next 2 weeks-will decide next visit if going to continue taper    If symptoms get worse with decreased steroids, will call    Speech pathology follow up-is tolerating thickened feeds-wants to advance diet as swallowing better

## 2025-04-18 NOTE — TELEPHONE ENCOUNTER
Called and spoke to Leslie advised   Dr Morrell wants 4/22 appt canceled and to schedule a telemed appt on 5/2 at 340 pm.  Date and time is fine.

## 2025-04-18 NOTE — PROGRESS NOTES
Writer received completed PT FA application from Pt spouse (Leslie) via email on 4/18/25. Writer confirmed recpetion and submission of PT Fa application to Leslie. Writer submitted PT FA application to [facility] team via email on 4/18/25.            Brian Santiago  Phone:[phone]  Email:[email or web address]

## 2025-04-24 ENCOUNTER — APPOINTMENT (EMERGENCY)
Dept: CT IMAGING | Facility: HOSPITAL | Age: 60
End: 2025-04-24
Payer: COMMERCIAL

## 2025-04-24 ENCOUNTER — PATIENT MESSAGE (OUTPATIENT)
Age: 60
End: 2025-04-24

## 2025-04-24 ENCOUNTER — APPOINTMENT (EMERGENCY)
Dept: NON INVASIVE DIAGNOSTICS | Facility: HOSPITAL | Age: 60
End: 2025-04-24
Payer: COMMERCIAL

## 2025-04-24 ENCOUNTER — HOSPITAL ENCOUNTER (EMERGENCY)
Facility: HOSPITAL | Age: 60
Discharge: HOME | End: 2025-04-24
Attending: EMERGENCY MEDICINE
Payer: COMMERCIAL

## 2025-04-24 ENCOUNTER — TELEPHONE (OUTPATIENT)
Age: 60
End: 2025-04-24

## 2025-04-24 ENCOUNTER — APPOINTMENT (EMERGENCY)
Dept: RADIOLOGY | Facility: HOSPITAL | Age: 60
End: 2025-04-24
Payer: COMMERCIAL

## 2025-04-24 VITALS
HEART RATE: 45 BPM | DIASTOLIC BLOOD PRESSURE: 82 MMHG | TEMPERATURE: 97.6 F | OXYGEN SATURATION: 97 % | RESPIRATION RATE: 16 BRPM | SYSTOLIC BLOOD PRESSURE: 137 MMHG

## 2025-04-24 DIAGNOSIS — Z29.89 IMMUNOTHERAPY: ICD-10-CM

## 2025-04-24 DIAGNOSIS — E86.0 DEHYDRATION: ICD-10-CM

## 2025-04-24 DIAGNOSIS — R60.9 SWELLING: Primary | ICD-10-CM

## 2025-04-24 DIAGNOSIS — C34.90 LUNG CANCER METASTATIC TO BRAIN (HCC): ICD-10-CM

## 2025-04-24 DIAGNOSIS — C34.11 SMALL CELL CARCINOMA OF UPPER LOBE OF RIGHT LUNG (HCC): Primary | ICD-10-CM

## 2025-04-24 DIAGNOSIS — C79.31 LUNG CANCER METASTATIC TO BRAIN (HCC): ICD-10-CM

## 2025-04-24 LAB
ALBUMIN SERPL BCG-MCNC: 3.7 G/DL (ref 3.5–5)
ALP SERPL-CCNC: 37 U/L (ref 34–104)
ALT SERPL W P-5'-P-CCNC: 37 U/L (ref 7–52)
ANION GAP SERPL CALCULATED.3IONS-SCNC: 7 MMOL/L (ref 4–13)
ANISOCYTOSIS BLD QL SMEAR: PRESENT
APTT PPP: 25 SECONDS (ref 23–34)
AST SERPL W P-5'-P-CCNC: 21 U/L (ref 13–39)
BASOPHILS # BLD AUTO: 0.01 THOUSANDS/ÂΜL (ref 0–0.1)
BASOPHILS NFR BLD AUTO: 0 % (ref 0–1)
BILIRUB SERPL-MCNC: 0.5 MG/DL (ref 0.2–1)
BUN SERPL-MCNC: 29 MG/DL (ref 5–25)
CALCIUM SERPL-MCNC: 8.3 MG/DL (ref 8.4–10.2)
CHLORIDE SERPL-SCNC: 101 MMOL/L (ref 96–108)
CO2 SERPL-SCNC: 27 MMOL/L (ref 21–32)
CREAT SERPL-MCNC: 0.59 MG/DL (ref 0.6–1.3)
EOSINOPHIL # BLD AUTO: 0.02 THOUSAND/ÂΜL (ref 0–0.61)
EOSINOPHIL NFR BLD AUTO: 0 % (ref 0–6)
ERYTHROCYTE [DISTWIDTH] IN BLOOD BY AUTOMATED COUNT: 14.6 % (ref 11.6–15.1)
GFR SERPL CREATININE-BSD FRML MDRD: 110 ML/MIN/1.73SQ M
GLUCOSE SERPL-MCNC: 108 MG/DL (ref 65–140)
HCT VFR BLD AUTO: 35.8 % (ref 36.5–49.3)
HGB BLD-MCNC: 12 G/DL (ref 12–17)
IMM GRANULOCYTES # BLD AUTO: 0.18 THOUSAND/UL (ref 0–0.2)
IMM GRANULOCYTES NFR BLD AUTO: 1 % (ref 0–2)
INR PPP: 0.94 (ref 0.85–1.19)
LYMPHOCYTES # BLD AUTO: 0.38 THOUSANDS/ÂΜL (ref 0.6–4.47)
LYMPHOCYTES NFR BLD AUTO: 3 % (ref 14–44)
MCH RBC QN AUTO: 31.9 PG (ref 26.8–34.3)
MCHC RBC AUTO-ENTMCNC: 33.5 G/DL (ref 31.4–37.4)
MCV RBC AUTO: 95 FL (ref 82–98)
MONOCYTES # BLD AUTO: 0.66 THOUSAND/ÂΜL (ref 0.17–1.22)
MONOCYTES NFR BLD AUTO: 5 % (ref 4–12)
NEUTROPHILS # BLD AUTO: 12.01 THOUSANDS/ÂΜL (ref 1.85–7.62)
NEUTS SEG NFR BLD AUTO: 91 % (ref 43–75)
NRBC BLD AUTO-RTO: 0 /100 WBCS
PLATELET # BLD AUTO: 232 THOUSANDS/UL (ref 149–390)
PLATELET BLD QL SMEAR: ADEQUATE
PMV BLD AUTO: 9.8 FL (ref 8.9–12.7)
POTASSIUM SERPL-SCNC: 4.5 MMOL/L (ref 3.5–5.3)
PROT SERPL-MCNC: 6.5 G/DL (ref 6.4–8.4)
PROTHROMBIN TIME: 13.1 SECONDS (ref 12.3–15)
RBC # BLD AUTO: 3.76 MILLION/UL (ref 3.88–5.62)
RBC MORPH BLD: PRESENT
SODIUM SERPL-SCNC: 135 MMOL/L (ref 135–147)
WBC # BLD AUTO: 13.26 THOUSAND/UL (ref 4.31–10.16)

## 2025-04-24 PROCEDURE — 71045 X-RAY EXAM CHEST 1 VIEW: CPT

## 2025-04-24 PROCEDURE — 93970 EXTREMITY STUDY: CPT

## 2025-04-24 PROCEDURE — 71260 CT THORAX DX C+: CPT

## 2025-04-24 PROCEDURE — 36415 COLL VENOUS BLD VENIPUNCTURE: CPT

## 2025-04-24 PROCEDURE — 85025 COMPLETE CBC W/AUTO DIFF WBC: CPT

## 2025-04-24 PROCEDURE — 85730 THROMBOPLASTIN TIME PARTIAL: CPT

## 2025-04-24 PROCEDURE — 70491 CT SOFT TISSUE NECK W/DYE: CPT

## 2025-04-24 PROCEDURE — 96360 HYDRATION IV INFUSION INIT: CPT

## 2025-04-24 PROCEDURE — 99285 EMERGENCY DEPT VISIT HI MDM: CPT

## 2025-04-24 PROCEDURE — 80053 COMPREHEN METABOLIC PANEL: CPT

## 2025-04-24 PROCEDURE — 85610 PROTHROMBIN TIME: CPT

## 2025-04-24 PROCEDURE — 93970 EXTREMITY STUDY: CPT | Performed by: SURGERY

## 2025-04-24 RX ADMIN — SODIUM CHLORIDE 1000 ML: 0.9 INJECTION, SOLUTION INTRAVENOUS at 15:08

## 2025-04-24 RX ADMIN — IOHEXOL 85 ML: 350 INJECTION, SOLUTION INTRAVENOUS at 12:50

## 2025-04-24 NOTE — DISCHARGE INSTRUCTIONS
Start taking your dexamethasone three times a day again.  Return for worsening symptoms.   Follow up with oncology on Tuesday.

## 2025-04-24 NOTE — ED PROVIDER NOTES
Time reflects when diagnosis was documented in both MDM as applicable and the Disposition within this note       Time User Action Codes Description Comment    4/24/2025  3:23 PM Payton Joiner Add [R60.9] Swelling           ED Disposition       ED Disposition   Discharge    Condition   Stable    Date/Time   Thu Apr 24, 2025  3:22 PM    Comment   Nino Silva discharge to home/self care.                   Assessment & Plan       Medical Decision Making  DDx: superior vena cava syndrome, electrolyte abnormality, anemia   Due to history of lung cancer and  supraclavicular edema plan to check CT soft tissue neck and CT chest. Edema improved with patient's upright position. There is no palpable mass/lump. Confirmed with patient that facial and neck swelling occurred with dexamethasone use in the past and considered an expected side effect with the medication. Patient expressing he does not want admission.   Blood work is reassuring. Mild leukocytosis similar to prior readings, no fever, or chills, tachycardia, hypotension--no sirs critiera. No gross electrolyte abnormality. CT findings appreciated. Discussed findings with patient. Discussed patient with Dr. Morrell--the patient's oncologist. Recommendation for return increase from BID to TID dexamethasone, and provide a liter of fluid. Reviewed patient did not want admission and otherwise appears stable. Plan to have patient follow up on Tuesday with strict return precautions to ED.   Reviewed reasons to return to ed.  Patient verbalized understanding of diagnosis and agreement with discharge plan of care as well as understanding of reasons to return to ed.      Amount and/or Complexity of Data Reviewed  Labs: ordered.  Radiology: ordered and independent interpretation performed.    Risk  Prescription drug management.        ED Course as of 04/25/25 1017   Thu Apr 24, 2025   1240 Patient's vascular duplex was negative.        Medications   iohexol (OMNIPAQUE) 350  MG/ML injection (MULTI-DOSE) 85 mL (85 mL Intravenous Given 4/24/25 1250)   sodium chloride 0.9 % bolus 1,000 mL (0 mL Intravenous Stopped 4/24/25 1558)       ED Risk Strat Scores                    No data recorded        SBIRT 20yo+      Flowsheet Row Most Recent Value   Initial Alcohol Screen: US AUDIT-C     1. How often do you have a drink containing alcohol? 0 Filed at: 04/24/2025 1222   2. How many drinks containing alcohol do you have on a typical day you are drinking?  0 Filed at: 04/24/2025 1222   3a. Male UNDER 65: How often do you have five or more drinks on one occasion? 0 Filed at: 04/24/2025 1222   Audit-C Score 0 Filed at: 04/24/2025 1222   NEFTALI: How many times in the past year have you...    Used an illegal drug or used a prescription medication for non-medical reasons? Never Filed at: 04/24/2025 1222                            History of Present Illness       Chief Complaint   Patient presents with    Mass     Mass to BL neck. Non tender to the touch, pt reports no pain. Denies chest pain/sob h/a no other symptoms. Was told to come into er for steroids.        Past Medical History:   Diagnosis Date    Brain cancer (HCC)     Lung cancer (HCC)       Past Surgical History:   Procedure Laterality Date    FL LUMBAR PUNCTURE DIAGNOSTIC  4/7/2025    HERNIA REPAIR      IR BIOPSY LUNG  03/05/2024    ORCHIECTOMY Left       Family History   Problem Relation Age of Onset    Breast cancer additional onset Mother     No Known Problems Father     Stroke Brother       Social History     Tobacco Use    Smoking status: Never    Smokeless tobacco: Never   Vaping Use    Vaping status: Never Used   Substance Use Topics    Alcohol use: Not Currently    Drug use: Not Currently      E-Cigarette/Vaping    E-Cigarette Use Never User       E-Cigarette/Vaping Substances      I have reviewed and agree with the history as documented.     Patient is a 60 yo pmhx Small cell lung cancer in for evaluation of his clavicles.  Patient  states that he woke up this morning and noted it, but reports that he is improving.  Report that this area was nontender.  Patient reports that he has no shortness of breath chest pain abdominal pain nausea vomiting.  Patient states that he was not come to the ED if he had not been referred to by the oncologist.  Patient reports that he has been taking dexamethasone for known brain mets.  Denies any neurologic deficits, no headache.        Review of Systems   Constitutional: Negative.    HENT: Negative.     Eyes: Negative.    Respiratory: Negative.     Cardiovascular: Negative.    Gastrointestinal: Negative.    Endocrine: Negative.    Genitourinary: Negative.    Musculoskeletal: Negative.    Skin: Negative.    Allergic/Immunologic: Negative.    Neurological: Negative.    Hematological: Negative.    Psychiatric/Behavioral: Negative.     All other systems reviewed and are negative.          Objective       ED Triage Vitals [04/24/25 1133]   Temperature Pulse Blood Pressure Respirations SpO2 Patient Position - Orthostatic VS   97.6 °F (36.4 °C) 63 119/74 18 97 % --      Temp Source Heart Rate Source BP Location FiO2 (%) Pain Score    Oral Monitor -- -- --      Vitals      Date and Time Temp Pulse SpO2 Resp BP Pain Score FACES Pain Rating User   04/24/25 1530 -- 45 97 % 16 137/82 -- -- MS   04/24/25 1515 -- 51 97 % -- 131/86 -- --    04/24/25 1330 -- -- 97 % -- -- -- -- MS   04/24/25 1300 -- 50 97 % 16 130/72 -- -- MS   04/24/25 1230 -- 52 97 % 17 109/70 -- -- MS   04/24/25 1200 -- 57 97 % 16 110/71 -- -- MS   04/24/25 1133 97.6 °F (36.4 °C) 63 97 % 18 119/74 -- -- LK            Physical Exam  Vitals and nursing note reviewed.   Constitutional:       Appearance: Normal appearance. He is normal weight.   HENT:      Head: Normocephalic.      Right Ear: External ear normal.      Left Ear: External ear normal.      Nose: Nose normal.      Mouth/Throat:      Mouth: Mucous membranes are moist.   Eyes:      Extraocular  Movements: Extraocular movements intact.      Conjunctiva/sclera: Conjunctivae normal.      Pupils: Pupils are equal, round, and reactive to light.   Neck:      Thyroid: No thyroid mass.      Vascular: No carotid bruit.     Cardiovascular:      Rate and Rhythm: Normal rate and regular rhythm.      Pulses: Normal pulses.      Heart sounds: Normal heart sounds.   Pulmonary:      Effort: Pulmonary effort is normal.      Breath sounds: Normal breath sounds.   Abdominal:      General: Abdomen is flat. Bowel sounds are normal. There is no distension.      Palpations: Abdomen is soft. There is no mass.      Tenderness: There is no abdominal tenderness. There is no right CVA tenderness, left CVA tenderness, guarding or rebound.      Hernia: No hernia is present.   Musculoskeletal:         General: Normal range of motion.      Cervical back: Normal range of motion and neck supple. No rigidity or tenderness.   Lymphadenopathy:      Cervical: No cervical adenopathy.   Skin:     General: Skin is warm.      Capillary Refill: Capillary refill takes less than 2 seconds.   Neurological:      General: No focal deficit present.      Mental Status: He is alert and oriented to person, place, and time. Mental status is at baseline.   Psychiatric:         Mood and Affect: Mood normal.         Behavior: Behavior normal.         Thought Content: Thought content normal.         Judgment: Judgment normal.         Results Reviewed       Procedure Component Value Units Date/Time    Smear Review(Phlebs Do Not Order) [[id number]] Collected: 04/24/25 1211    Lab Status: Final result Specimen: Blood from Arm, Right Updated: 04/24/25 1241     RBC Morphology Present     Platelet Estimate Adequate     Anisocytosis Present    CBC and differential [[id number]]  (Abnormal) Collected: 04/24/25 1211    Lab Status: Final result Specimen: Blood from Arm, Right Updated: 04/24/25 1241     WBC 13.26 Thousand/uL      RBC 3.76 Million/uL      Hemoglobin 12.0 g/dL       Hematocrit 35.8 %      MCV 95 fL      MCH 31.9 pg      MCHC 33.5 g/dL      RDW 14.6 %      MPV 9.8 fL      Platelets 232 Thousands/uL      nRBC 0 /100 WBCs      Segmented % 91 %      Immature Grans % 1 %      Lymphocytes % 3 %      Monocytes % 5 %      Eosinophils Relative 0 %      Basophils Relative 0 %      Absolute Neutrophils 12.01 Thousands/µL      Absolute Immature Grans 0.18 Thousand/uL      Absolute Lymphocytes 0.38 Thousands/µL      Absolute Monocytes 0.66 Thousand/µL      Eosinophils Absolute 0.02 Thousand/µL      Basophils Absolute 0.01 Thousands/µL     Narrative:      This is an appended report.  These results have been appended to a previously verified report.    Comprehensive metabolic panel [[id number]]  (Abnormal) Collected: 04/24/25 1211    Lab Status: Final result Specimen: Blood from Arm, Right Updated: 04/24/25 1233     Sodium 135 mmol/L      Potassium 4.5 mmol/L      Chloride 101 mmol/L      CO2 27 mmol/L      ANION GAP 7 mmol/L      BUN 29 mg/dL      Creatinine 0.59 mg/dL      Glucose 108 mg/dL      Calcium 8.3 mg/dL      AST 21 U/L      ALT 37 U/L      Alkaline Phosphatase 37 U/L      Total Protein 6.5 g/dL      Albumin 3.7 g/dL      Total Bilirubin 0.50 mg/dL      eGFR 110 ml/min/1.73sq m     Narrative:      [employer] guidelines for Chronic Kidney Disease (CKD):     Stage 1 with normal or high GFR (GFR > 90 mL/min/1.73 square meters)    Stage 2 Mild CKD (GFR = 60-89 mL/min/1.73 square meters)    Stage 3A Moderate CKD (GFR = 45-59 mL/min/1.73 square meters)    Stage 3B Moderate CKD (GFR = 30-44 mL/min/1.73 square meters)    Stage 4 Severe CKD (GFR = 15-29 mL/min/1.73 square meters)    Stage 5 End Stage CKD (GFR <15 mL/min/1.73 square meters)  Note: GFR calculation is accurate only with a steady state creatinine    Protime-INR [[id number]]  (Normal) Collected: 04/24/25 1211    Lab Status: Final result Specimen: Blood from Arm, Right Updated: 04/24/25 1231     Protime  13.1 seconds      INR 0.94    Narrative:      INR Therapeutic Range    Indication                                             INR Range      Atrial Fibrillation                                               2.0-3.0  Hypercoagulable State                                    2.0.2.3  Left Ventricular Asist Device                            2.0-3.0  Mechanical Heart Valve                                  -    Aortic(with afib, MI, embolism, HF, LA enlargement,    and/or coagulopathy)                                     2.0-3.0 (2.5-3.5)     Mitral                                                             2.5-3.5  Prosthetic/Bioprosthetic Heart Valve               2.0-3.0  Venous thromboembolism (VTE: VT, PE        2.0-3.0    APTT [[id number]]  (Normal) Collected: 04/24/25 1211    Lab Status: Final result Specimen: Blood from Arm, Right Updated: 04/24/25 1231     PTT 25 seconds              VAS VENOUS DUPLEX - LOWER LIMB BILATERAL   Final Interpretation by Ari Corrales MD (04/24 1529)      CT soft tissue neck with contrast   Final Interpretation by Morris Hernandez MD (04/24 1313)      1. No neck mass or adenopathy seen.   2. Normal enhancement of the internal jugular veins and visualized upper SVC. Please see chest CT for evaluation of the remainder of the SVC.   3. Partially imaged lung mass. Please see dedicated chest CT for further details.   4. Brain metastases are noted as seen on recent MRI.      Workstation performed: [id number]         CT chest with contrast   Final Interpretation by Beverley Velasco MD (04/24 8104)      No evidence for superior vena cava syndrome.      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 Right perihilar upper lobe mass and several mediastinal and hilar lymph nodes have decreased in size from prior PET/CT dated 3/15/2024. However, there is been increase in size of left    infrahilar lymph node and interval development of right adrenal nodule.  Constellation of findings are concerning for mixed response. Recommend continued attention on follow-up imaging per oncologic protocol.         Workstation performed: [id number]         XR chest 1 view portable   ED Interpretation by MARCOS Mallory (04/24 1424)   No acute cardiopulmonary disease      Final Interpretation by Moises Larios MD (04/24 7311)      Persistent right perihilar lung mass.      Persistent bilateral lung disease. No superimposed acute appearing opacities.      Please see subsequent CT report.      Workstation performed: [id number]             Procedures    ED Medication and Procedure Management   Prior to Admission Medications   Prescriptions Last Dose Informant Patient Reported? Taking?   Cholecalciferol (Vitamin D) 50 MCG (2000 UT) tablet   Yes No   Sig: Take 2,000 Units by mouth daily   ascorbic acid (VITAMIN C) 250 mg tablet   Yes No   Sig: Take 500 mg by mouth daily   dexamethasone (DECADRON) 4 mg tablet   No No   Sig: Take 1 tablet (4 mg total) by mouth 3 (three) times a day   pantoprazole (PROTONIX) 40 mg tablet   No No   Sig: Take 1 tablet (40 mg total) by mouth 2 (two) times a day before meals   polyethylene glycol (MIRALAX) 17 g packet   No No   Sig: Take 17 g by mouth daily for 20 days   senna (SENOKOT) 8.6 MG tablet   Yes No   Sig: Take 1 tablet by mouth daily as needed for constipation Takes after chemo prn      Facility-Administered Medications: None     Discharge Medication List as of 4/24/2025  3:28 PM        CONTINUE these medications which have NOT CHANGED    Details   ascorbic acid (VITAMIN C) 250 mg tablet Take 500 mg by mouth daily, Historical Med      Cholecalciferol (Vitamin D) 50 MCG (2000 UT) tablet Take 2,000 Units by mouth daily, Historical Med      dexamethasone (DECADRON) 4 mg tablet Take 1 tablet (4 mg total) by mouth 3 (three) times a day, Starting Wed 4/9/2025, Until Fri 5/9/2025, Normal      pantoprazole (PROTONIX) 40 mg tablet Take 1 tablet (40 mg  total) by mouth 2 (two) times a day before meals, Starting Wed 4/9/2025, Until Fri 5/9/2025, Normal      polyethylene glycol (MIRALAX) 17 g packet Take 17 g by mouth daily for 20 days, Starting Thu 4/10/2025, Until Wed 4/30/2025, Normal      senna (SENOKOT) 8.6 MG tablet Take 1 tablet by mouth daily as needed for constipation Takes after chemo prn, Historical Med           No discharge procedures on file.  ED SEPSIS DOCUMENTATION   Time reflects when diagnosis was documented in both MDM as applicable and the Disposition within this note       Time User Action Codes Description Comment    4/24/2025  3:23 PM Payton Joiner Add [R60.9] Swelling                  MARCOS Mallory  04/25/25 1017

## 2025-04-24 NOTE — TELEPHONE ENCOUNTER
Called and spoke to Leslie to advise   Dr Morrell would like to see Nino on 4/29 in the office.  Leslie advised Nino would be in infusion.  Explained I would speak to nurse regarding appt being virtual

## 2025-04-24 NOTE — TELEPHONE ENCOUNTER
"Called wife Leslie with patient on the line, in response to [employer] message received to obtain more information.    He states that this morning he woke up and noticed spots above bilateral collarbones that are soft to the touch. He describes them as \"fleshy\" and not painful at all. He states they are long an narrow, approximately 2-3 inches. He sent over images via [employer] message to review. He denies any redness.    He also reports that for about the past 3 days he has had night sweats and joint pain. He states that some nights are worse than others and he feels that he gets this joint pain when it gets cold at night. The joint pain seems to resolve once he warms up. Regarding the night sweats, he states that he does not feel feverish and he has not had a fever when he takes his temperature. He states he uses a temporal thermometer, and I did advise that this reading could be inaccurate if taken while he is diaphoretic. I advised that he please try to obtain an oral thermometer for the future in these instances.    I advised that I would review his symptoms with Dr. Morrell and someone would then send a message or callback with her recommendations. Patient and spouse verbalized understanding.  "

## 2025-04-26 NOTE — PROGRESS NOTES
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 (5/20/2024), 0.25 mg (6/17/2024)  fosaprepitant (EMEND) IVPB, 150 mg, Intravenous, Once, 4 of 4 cycles  Administration: 150 mg (4/8/2024), 150 mg (4/29/2024), 150 mg (5/20/2024), 150 mg (6/17/2024)  etoposide (TOPOSAR), 80 mg/m2 = 164 mg, Intravenous, Once, 4 of 4 cycles  Dose modification: 100 mg/m2 (original dose 80 mg/m2, Cycle 1, Reason: Anticipated Tolerance), 80 mg/m2 (original dose 80 mg/m2, Cycle 1, Reason: Anticipated Tolerance), 100 mg/m2 (original dose 80 mg/m2, Cycle 2, Reason: Anticipated Tolerance)  Administration: 164 mg (4/8/2024), 164 mg (4/9/2024), 164 mg (4/10/2024), 205 mg (4/29/2024), 205 mg (4/30/2024), 205 mg (5/1/2024), 200 mg (5/20/2024), 200 mg (5/21/2024), 200 mg (5/22/2024), 200 mg (6/17/2024), 200 mg (6/18/2024), 200 mg (6/19/2024)  CARBOplatin (PARAPLATIN) IVPB (GOG AUC DOSING), 750 mg (574.7 % of original dose 130.5 mg), Intravenous, Once, 4 of 4 cycles  Dose modification: 130.5 mg (original dose 130.5 mg, Cycle 1, Reason: Anticipated Tolerance),   (original dose 130.5 mg, Cycle 1, Reason: Other (Must fill in a comment))  Administration: 750 mg (4/8/2024), 701.5 mg (4/29/2024), 664 mg (5/20/2024), 633 mg (6/17/2024)  durvalumab (IMFINZI) IVPB, 1,500 mg, Intravenous, Once, 8 of 10 cycles  Administration: 1,500 mg (4/8/2024), 1,500 mg (4/29/2024),  1,500 mg (5/20/2024), 1,500 mg (6/17/2024), 1,500 mg (7/10/2024), 1,500 mg (8/13/2024), 1,500 mg (9/9/2024), 1,500 mg (10/7/2024)     11/19/2024 - 1/21/2025 Chemotherapy    alteplase (CATHFLO), 2 mg, Intracatheter, Every 1 Minute as needed, 4 of 6 cycles  Lurbinectedin (ZEPZELCA) IVPB, 3.2 mg/m2 = 6.4 mg, Intravenous, Once, 4 of 6 cycles  Administration: 6.4 mg (11/19/2024), 6.4 mg (12/10/2024), 6.4 mg (12/31/2024), 6.4 mg (1/21/2025)     3/17/2025 -  Chemotherapy    tarlatamab (Imdelltra) 10 mg IVPB, 10 mg, 2 of 10 cycles  Administration: 10 mg (3/24/2025), 10 mg (3/31/2025), 10 mg (4/14/2025)  tarlatamab (Imdelltra) 1 mg IVPB, 1 mg, 1 of 1 cycle  Administration: 1 mg (3/17/2025)        Pertinent Medical History   02/09/25:   Discussion  extended stage SCLC/Initial treatment      Most patients with eSCLC have disease relapse; thus this is incurable.  However the cornerstone of treatment remains platinum based.  Response rates are as high as 61% with CR of 10%.  Responses to chemotherapy are frequent and rapid with median time to best response in approximately 4.6 weeks.  Unfortunately, these are no durable responses and the median time to progression (TTP) is 4 months with OS 8.6 months.  Despite predictable relapse randomized studies have not shown a survival benefit for prolonged administration of chemotherapy and thus optimal dosing is 4-6 cycles.  There has been a study comparing Cisplatin to Carboplatin/noninferiority demonstrating that there is no difference with PFS, OS CARTER and thus can be used interchangeably although Cisplatin remains preferred.     The first study to demonstrate any improvement in OS in the first line treatment of eSCLC in several decades was Impower 133 a randomized Phase III trial of Carboplatin/Etoposide with the PDL1 inhibitor atezolizumab.  This was a global trial with patients with eSCLC getting 4 cycles of Carbo/Etoposide with or without concurrent atezolizumab/placebo with  maintenance afterward.  The addition of atezolizumab led to significant improvement in OS 12.3 vs 10.3 months HR 0.7 as well as PFS .  OS was independent of PDL1 expression.  The CASPIAN study demonstrated that durvalumab with first suzi therapy was similar in terms of response with OS at 13 months vs 10.3 months.  Either agent can be used in this setting.  KEYNOTE 604 with Pembrolizumab was negative for OS benefit which may be a design/patient flaw as generally these agents are similar in terms of efficacy.          Discussion Large cell neuroendocrine        The clinical presentation of large cell neuroendocrine or ?C??? appears similar to the other high-grade neuroendocrine pulmonary tumor, small cell lung ?????r (?C?C), with notable exceptions: primary LCNECs tend to be located peripherally rather than centrally, and presentation of LCNECs with early-stage (I to II) disease is more common than for S??C (approximately 25 versus less than 5 percent). Thus, patients with ??N?C more commonly undergo resection.       For patients with stage IV disease, we suggest using a standard S?LC regimen (etoposide plus a either carboplatin or cisplatin) for four to six cycles, independent of retinoblastoma gene 1 (RB1) status.     However, prognosis for this tumor type is poor regardless of choice of ?h?m?th?r?py, and other options also are reasonable.     The approach of [facility] is based on integrative profiling that clearly identifies ?CN?? to be a neuroendocrine tumor most similar to ?CL?     Data from a prospective phase II trial of 29 patients treated with etoposide/cisplatin along with review of published retrospective experience with ?h?m?ther?py in stage IV ??N?? demonstrated that major efficacy endpoints were similar to SCL?, with the exception that overall response rate is lower (34 percent) than the approximate 60 percent rate observed in S?L?     Thus, these are treated as small cell     Discussion progression/second  line      Sensitive vs > 6 months      Single-agent ?h?m?th?r?py is standard second-line treatment after ?l?ti?um-based ?h?m?th?rap? and immunotherapy. Lurbinectedin represents our preferred initial approach, although tarlatamab or camptothecins are acceptable alternatives.      For patients that are not eligible for, cannot tolerate, or progress on tarlatamab, other ?h?m?ther?p? agents are available        Data below represent efficacy of ?h?m?thera?? after progression on initial combination ?h?m?ther?py. These trials were conducted prior to introduction of immunotherapy into the management of SC?C.     Lurbinectedin is an alkylating agent that has received accelerated approval by the [place] Food and Drug Administration (FDA) for patients with metastatic ?CLC with disease progression on or after platin?m-based ?h?m?th?r??? [3]. It is given at a dose of 3.2 mg/m2 intravenously (IV) every three weeks.        In an open-label study of 105 patients with SCL? and no brain m?t??ta?es who progressed on or after ?l?tinum-based ?h?m?th?rap?, 8 percent of whom had prior immunotherapy in addition to plati?um-based ?h?m?th?r?py, the overall response rate (CARTER) with lurbinectedin according to independent review committee was 33 percent The median duration of response was 5.1 months, and 25 percent of responding patients experienced a duration of response exceeding six months. Among patients with resistant relapse (?h?m?th?r?py-free interval <90 days), the CARTER was 22 percent with a progression-free survival (PFS) of 2.6 months and an overall survival (OS) of 5 months. For patients with sensitive relapse, the CARTER was 45 percent with a PFS of 4.5 months and an OS of 11.9 months      All patients in this study received a prespecified antiemetic regimen consisting of a corticosteroid and serotonin antagonist. Serious adverse reactions occurred in 10 percent of patients, of which neutropenia and febrile neutropenia were the most common  (5 percent for each).        Tarlatamab is a reasonable alternative to lurbinectedin as a second-line treatment option, extrapolating from data in the third-line setting. These data are discussed below.      Topotecan has been shown to increase survival compared with best supportive care (BSC) and results in greater symptom management relative to a multiagent regimen [5,6]. It is approved by the FDA for relapses that occur after 45 days from the completion of ?h?m?ther?p?. ????t???? also has activity against brain m?t?st???s. The primary toxicities of t???te??? are hematologic, with most patients experiencing grade 3 or 4 neutropenia, anemia, or thrombocytopenia.     In a trial in which 211 patients with relapse >=60 days after completion of first-line therapy (usually pl?tinum plus etoposide) were randomly assigned to daily IV topotecan or cyclophosphamide, doxorubicin, and vincristine (CAV), disease outcomes were similar between the groups, but cancer symptoms were improved with t???t?can  For t???te??? versus CAV, the ORRs were 24 versus 18 percent, the median time to progression was 13 versus 12 weeks, and the median survival was 25 versus 24.7 weeks, differences that were not statistically significant. Control of several symptoms including dyspnea, anorexia, hoarseness, and fatigue was improved with t???t??a?. Severe neutropenia was less common with t???te?an (38 versus 51 percent), but grade 3 or 4 thrombocytopenia and anemia occurred more frequently (9.8 versus 1.4 percent and 17.7 versus 7.2 percent, respectively). The improvement in symptom control led to its FDA approval.\         Oral and IV formulations of topotecan have been extensively evaluated for the second-line treatment of relapsed ?CLC and are found to be equally effective     In a phase III noninferiority trial, 304 patients with chemosensitive relapse (>90 days) of ???C were randomly assigned to oral (2.3 mg/m2 daily for five days) or IV (1.5  mg/m2 daily for five days) topotecan every three weeks. Response rates were similar (18 versus 22 percent), as were median and one-year survival rates (33 versus 35 weeks and 33 versus 29 percent, respectively) [9]. The toxicity profiles of the two regimens were similar as well.        Topotecan daily times five every three weeks rather than a once-weekly schedule, given better efficacy in cross-trial comparisons. In the  phase II trial, 192 patients with previously treated ??L? were randomly assigned to weekly t???te?an with or without aflibercept after progression on a ?l?ti??m-based regimen. Only two partial responses (1 percent) were observed in the entire study population (both in patients who also received ?flib?r?e?t), and the median OS was approximately five months. Response rates of the daily times five every three weeks schedule were higher in other studies, on the order of 20 percent  These data are discussed above.         As an alternative to topotecan, three small clinical trials have evaluated irinotecan as second-line therapy, with objective response rates ranging from 16 to 47 percent . As an example, in one study, 44 patients (17 with sensitive relapse and 27 with resistant or refractory disease) were treated with iri??t?c?n 125 mg/m2 weekly times four with a two-week break. The overall objective response rate was 16 percent. The response rate in patients with resistant or refractory disease was 4 percent. The response rate was 35 percent in the patients with sensitive relapse, with a median survival of 6.8 months. Primary toxicities of iri??te??n included diarrhea and neutropenia. Results for those with sensitive relapse are discussed below.         Tarlatamab, is a bispecific T-cell engager immunotherapy that directs the patient's T cells to cancer cells expressing delta-like ligand 3 (overexpressed in approximately 90 percent of SCLCs). It is approved by the [place] Food and  Drug Administration  "(FDA) at a dose of 10 mg intravenously every two weeks (after an initial \"step-up\" dosing schedule) . The approval is based on response rates and is contingent upon results of a confirmatory trial.     Tarlatamab has shown promising activity in a phase II trial among patients with disease that had relapsed after, or was refractory to, one platin?m-based treatment regimen and at least one other line of therapy. At a dose of 10 mg intravenously every two weeks, the response rate was 40 percent, median progression-free survival was 4.9 months, and overall survival was 14.3 months  With longer follow-up (median 12.1 months), the response rate was 35 percent and median overall survival was 20 months.     The most common adverse events in these patients were cytokine-release syndrome (CRS; in 51 percent), decreased appetite (29 percent), and pyrexia (35 percent). Grade 3 CRS occurred in 1 percent.     In a pooled safety analysis reported in the FDA label         ?CRS occurred in 55 percent, mostly occurring during treatment cycle 1. The median time to onset of any grade CRS from last exposure to tarlatamab was 13.5 hours. CRS can manifest as pyrexia, hypotension, fatigue, tachycardia, headache, hypoxia, nausea, and vomiting. Potentially life-threatening complications of CRS include cardiac dysfunction, acute respiratory distress syndrome, neurologic toxicity, kidney and/or hepatic failure, and disseminated intravascular coagulation. Management of CRS is discussed elsewhere        ?Neurologic toxicity occurred in 47 percent, including 10 percent with grade 3 toxicity. The most frequent neurologic toxicities included headache (14 percent), peripheral neuropathy (7 percent), dizziness (7 percent), and insomnia (6 percent). Immune effector cell-associated neurotoxicity syndrome (ICANS), which can present as confusion, lethargy, or disorientation (among other symptoms) occurred in 9 percent. It most frequently occurred " following cycle 2 day 1 and had a median time of onset from first dose of 29.5 days. Further discussion of clinical features and management of ICANS is found elsewhere           Review of Systems   Constitutional:  Positive for fatigue.   HENT: Negative.     Eyes: Negative.    Respiratory: Negative.     Cardiovascular: Negative.    Gastrointestinal: Negative.    Endocrine: Negative.    Genitourinary: Negative.    Musculoskeletal: Negative.    Neurological:  Positive for headaches.   Hematological: Negative.    Psychiatric/Behavioral: Negative.               03/19/25:     04/11/25:     04/17/25:     04/26/25: ***     Review of Systems  {Select to insert medical history sections (Optional):12744}      Objective {?Quick Links Trend Vitals * Enter New Vitals * Results Review * Timeline (Adult) * Labs * Imaging * Cardiology * Procedures * Lung Cancer Screening * Surgical eConsent :20771}  There were no vitals taken for this visit.    Pain Screening:     ECOG   ***  Physical Exam    Labs: I have reviewed the following labs:  Lab Results   Component Value Date/Time    WBC 13.26 (H) 04/24/2025 12:11 PM    RBC 3.76 (L) 04/24/2025 12:11 PM    Hemoglobin 12.0 04/24/2025 12:11 PM    Hematocrit 35.8 (L) 04/24/2025 12:11 PM    MCV 95 04/24/2025 12:11 PM    MCH 31.9 04/24/2025 12:11 PM    RDW 14.6 04/24/2025 12:11 PM    Platelets 232 04/24/2025 12:11 PM    Segmented % 91 (H) 04/24/2025 12:11 PM    Lymphocytes % 3 (L) 04/24/2025 12:11 PM    Monocytes % 5 04/24/2025 12:11 PM    Eosinophils Relative 0 04/24/2025 12:11 PM    Basophils Relative 0 04/24/2025 12:11 PM    Immature Grans % 1 04/24/2025 12:11 PM    Absolute Neutrophils 12.01 (H) 04/24/2025 12:11 PM     Lab Results   Component Value Date/Time    Potassium 4.5 04/24/2025 12:11 PM    Chloride 101 04/24/2025 12:11 PM    CO2 27 04/24/2025 12:11 PM    BUN 29 (H) 04/24/2025 12:11 PM    Creatinine 0.59 (L) 04/24/2025 12:11 PM    Glucose, Fasting 106 (H) 03/28/2025 10:22 AM     Calcium 8.3 (L) 04/24/2025 12:11 PM    AST 21 04/24/2025 12:11 PM    ALT 37 04/24/2025 12:11 PM    Alkaline Phosphatase 37 04/24/2025 12:11 PM    Total Protein 6.5 04/24/2025 12:11 PM    Albumin 3.7 04/24/2025 12:11 PM    Total Bilirubin 0.50 04/24/2025 12:11 PM    eGFR 110 04/24/2025 12:11 PM   { AMB ONCOLOGY LABS (Optional):00770}    {Radiology Results Review (Optional):98841}    {Administrative / Billing Section (Optional):27528}

## 2025-04-28 ENCOUNTER — PATIENT OUTREACH (OUTPATIENT)
Dept: CASE MANAGEMENT | Facility: HOSPITAL | Age: 60
End: 2025-04-28

## 2025-04-28 NOTE — PROGRESS NOTES
"LSW made outreach to the pt to offer support and to assess any needs. Pt was open and receptive of this call.  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  The pt was planning to move to his sister's home to be on the first floor and went as far as requesting the hospital bed to be delivered there.  He states that he has not moved to his sister's as he \"no longer needs the additional help.\"  Pt states that if his needs should change, he will go \"but I think this chemo is making me stronger.\" It remains unclear if the pt makes these statements for his wife, who is sitting aside him or if he is in denial of his prognosis.  Pt states that he has no issues with the steps and has been able to make breakfast in the morning.  He spoke of his daughter's volley ball team that is undefeated and how joyful she is with playing the sport.  Pt does not report any other needs at this time.   Emotional support provided and LSW will plan to follow.   "

## 2025-04-29 ENCOUNTER — OFFICE VISIT (OUTPATIENT)
Age: 60
End: 2025-04-29
Payer: COMMERCIAL

## 2025-04-29 ENCOUNTER — TELEPHONE (OUTPATIENT)
Age: 60
End: 2025-04-29

## 2025-04-29 ENCOUNTER — HOSPITAL ENCOUNTER (OUTPATIENT)
Dept: INFUSION CENTER | Facility: HOSPITAL | Age: 60
Discharge: HOME | End: 2025-04-29
Attending: INTERNAL MEDICINE
Payer: COMMERCIAL

## 2025-04-29 VITALS
HEART RATE: 80 BPM | OXYGEN SATURATION: 95 % | RESPIRATION RATE: 18 BRPM | TEMPERATURE: 97.5 F | SYSTOLIC BLOOD PRESSURE: 112 MMHG | WEIGHT: 200.18 LBS | HEIGHT: 70 IN | BODY MASS INDEX: 28.66 KG/M2 | DIASTOLIC BLOOD PRESSURE: 72 MMHG

## 2025-04-29 DIAGNOSIS — C34.90 LUNG CANCER METASTATIC TO BRAIN (HCC): ICD-10-CM

## 2025-04-29 DIAGNOSIS — C34.11 SMALL CELL CARCINOMA OF UPPER LOBE OF RIGHT LUNG (HCC): Primary | ICD-10-CM

## 2025-04-29 DIAGNOSIS — C34.00 SMALL CELL CARCINOMA OF HILUM OF LUNG, UNSPECIFIED LATERALITY (HCC): Primary | ICD-10-CM

## 2025-04-29 DIAGNOSIS — R13.10 DYSPHAGIA, UNSPECIFIED TYPE: ICD-10-CM

## 2025-04-29 DIAGNOSIS — C79.31 LUNG CANCER METASTATIC TO BRAIN (HCC): ICD-10-CM

## 2025-04-29 PROCEDURE — 99215 OFFICE O/P EST HI 40 MIN: CPT | Performed by: INTERNAL MEDICINE

## 2025-04-29 PROCEDURE — 96365 THER/PROPH/DIAG IV INF INIT: CPT

## 2025-04-29 RX ORDER — SODIUM CHLORIDE 9 MG/ML
20 INJECTION, SOLUTION INTRAVENOUS ONCE
Status: COMPLETED | OUTPATIENT
Start: 2025-04-29 | End: 2025-04-29

## 2025-04-29 RX ADMIN — SODIUM CHLORIDE 20 ML/HR: 0.9 INJECTION, SOLUTION INTRAVENOUS at 07:17

## 2025-04-29 RX ADMIN — TARLATAMAB-DLLE 10 MG: KIT at 07:56

## 2025-04-29 NOTE — PROGRESS NOTES
..Nino Silva  tolerated treatment well with no complications.      Nino Silva is aware of future appt on 5/12 at 7:30.     AVS printed and given to Nino Silva:  No (Declined by Nino Silva)

## 2025-04-29 NOTE — PROGRESS NOTES
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 (original dose 80 mg/m2, Cycle 2, Reason: Anticipated Tolerance)  Administration: 164 mg (4/8/2024), 164 mg (4/9/2024), 164 mg (4/10/2024), 205 mg (4/29/2024), 205 mg (4/30/2024), 205 mg (5/1/2024), 200 mg (5/20/2024), 200 mg (5/21/2024), 200 mg (5/22/2024), 200 mg (6/17/2024), 200 mg (6/18/2024), 200 mg (6/19/2024)  CARBOplatin (PARAPLATIN) IVPB (GOG AUC DOSING), 750 mg (574.7 % of original dose 130.5 mg), Intravenous, Once, 4 of 4 cycles  Dose modification: 130.5 mg (original dose 130.5 mg, Cycle 1, Reason: Anticipated Tolerance),   (original dose 130.5 mg, Cycle 1, Reason: Other (Must fill in a comment))  Administration: 750 mg (4/8/2024), 701.5 mg (4/29/2024), 664 mg (5/20/2024), 633 mg (6/17/2024)  durvalumab (IMFINZI) IVPB, 1,500 mg, Intravenous, Once, 8 of 10 cycles  Administration: 1,500 mg (4/8/2024), 1,500 mg (4/29/2024), 1,500 mg (5/20/2024), 1,500 mg (6/17/2024), 1,500 mg (7/10/2024), 1,500 mg (8/13/2024), 1,500 mg (9/9/2024), 1,500 mg (10/7/2024)     11/19/2024 - 1/21/2025 Chemotherapy    alteplase (CATHFLO), 2 mg, Intracatheter, Every 1 Minute as needed, 4 of 6 cycles  Lurbinectedin (ZEPZELCA) IVPB, 3.2 mg/m2 = 6.4 mg, Intravenous, Once, 4 of 6 cycles  Administration: 6.4  mg (11/19/2024), 6.4 mg (12/10/2024), 6.4 mg (12/31/2024), 6.4 mg (1/21/2025)     3/17/2025 -  Chemotherapy    tarlatamab (Imdelltra) 10 mg IVPB, 10 mg, 2 of 10 cycles  Administration: 10 mg (3/24/2025), 10 mg (3/31/2025), 10 mg (4/14/2025)  tarlatamab (Imdelltra) 1 mg IVPB, 1 mg, 1 of 1 cycle  Administration: 1 mg (3/17/2025)        Pertinent Medical History   04/01/25: See above    04/29/25: See above     Review of Systems  ROS was performed and is negative except as indicated in HPI.    Current Outpatient Medications on File Prior to Visit   Medication Sig Dispense Refill    ascorbic acid (VITAMIN C) 250 mg tablet Take 500 mg by mouth daily      Cholecalciferol (Vitamin D) 50 MCG (2000 UT) tablet Take 2,000 Units by mouth daily      dexamethasone (DECADRON) 4 mg tablet Take 1 tablet (4 mg total) by mouth 3 (three) times a day 90 tablet 0    pantoprazole (PROTONIX) 40 mg tablet Take 1 tablet (40 mg total) by mouth 2 (two) times a day before meals 60 tablet 0    polyethylene glycol (MIRALAX) 17 g packet Take 17 g by mouth daily for 20 days 340 g 0    senna (SENOKOT) 8.6 MG tablet Take 1 tablet by mouth daily as needed for constipation Takes after chemo prn       Current Facility-Administered Medications on File Prior to Visit   Medication Dose Route Frequency Provider Last Rate Last Admin    alteplase (CATHFLO) injection 2 mg  2 mg Intracatheter Q1MIN PRN oCrtney Morrell MD        [COMPLETED] sodium chloride 0.9 % infusion  20 mL/hr Intravenous Once Cortney Morrell MD 20 mL/hr at 04/29/25 0717 20 mL/hr at 04/29/25 0717    [COMPLETED] Tarlatamab-dlle (Imdelltra) 10 mg in 233 mL 0.9% Sodium Chloride IVPB  10 mg Intravenous Once Cortney Morrell MD   Stopped at 04/29/25 0855        Objective   There were no vitals taken for this visit.    Pain Screening:       ECOG ECOG Performance Status: 2 - Ambulatory and capable of all selfcare but unable to carry out any work activities.  Up and about more than 50% of waking hours      Physical Exam  General: WDWN,well-appearing, no acute distress, can ambulate with some assistance  HEENT: PERRLA, moist mucosa, atraumatic  Respiratory: CTAB w/o wheezes, rales, or rhonchi, no increased work of breathing  Cardiovascular: RRR w/o murmurs, 2+ radial and pedal pulses, no b/l LE edema  Abdomen: soft, non-tender, non-distended, no hepatomegaly or splenomegaly  /Rectal: deferred  Musculoskeletal: moves all four extremities with 5/5 but decreased strength, normal ROM  Integumentary: warm, dry, no rash or bruising  Neurological: no gross or focal deficits identified, normal sensation  Psychiatric: pleasant and cooperative with normal mood, affect, and cognition     Labs: I have reviewed the following labs:  Lab Results   Component Value Date/Time    WBC 13.26 (H) 04/24/2025 12:11 PM    RBC 3.76 (L) 04/24/2025 12:11 PM    Hemoglobin 12.0 04/24/2025 12:11 PM    Hematocrit 35.8 (L) 04/24/2025 12:11 PM    MCV 95 04/24/2025 12:11 PM    MCH 31.9 04/24/2025 12:11 PM    RDW 14.6 04/24/2025 12:11 PM    Platelets 232 04/24/2025 12:11 PM    Segmented % 91 (H) 04/24/2025 12:11 PM    Lymphocytes % 3 (L) 04/24/2025 12:11 PM    Monocytes % 5 04/24/2025 12:11 PM    Eosinophils Relative 0 04/24/2025 12:11 PM    Basophils Relative 0 04/24/2025 12:11 PM    Immature Grans % 1 04/24/2025 12:11 PM    Absolute Neutrophils 12.01 (H) 04/24/2025 12:11 PM     Lab Results   Component Value Date/Time    Potassium 4.5 04/24/2025 12:11 PM    Chloride 101 04/24/2025 12:11 PM    CO2 27 04/24/2025 12:11 PM    BUN 29 (H) 04/24/2025 12:11 PM    Creatinine 0.59 (L) 04/24/2025 12:11 PM    Glucose, Fasting 106 (H) 03/28/2025 10:22 AM    Calcium 8.3 (L) 04/24/2025 12:11 PM    AST 21 04/24/2025 12:11 PM    ALT 37 04/24/2025 12:11 PM    Alkaline Phosphatase 37 04/24/2025 12:11 PM    Total Protein 6.5 04/24/2025 12:11 PM    Albumin 3.7 04/24/2025 12:11 PM    Total Bilirubin 0.50 04/24/2025 12:11 PM    eGFR 110 04/24/2025 12:11 PM     Lab  Results   Component Value Date/Time    WBC 13.26 (H) 04/24/2025 12:11 PM    RBC 3.76 (L) 04/24/2025 12:11 PM    Hemoglobin 12.0 04/24/2025 12:11 PM    Hematocrit 35.8 (L) 04/24/2025 12:11 PM    MCV 95 04/24/2025 12:11 PM    MCH 31.9 04/24/2025 12:11 PM    RDW 14.6 04/24/2025 12:11 PM    Platelets 232 04/24/2025 12:11 PM    Segmented % 91 (H) 04/24/2025 12:11 PM    Lymphocytes % 3 (L) 04/24/2025 12:11 PM    Monocytes % 5 04/24/2025 12:11 PM    Eosinophils Relative 0 04/24/2025 12:11 PM    Basophils Relative 0 04/24/2025 12:11 PM    Immature Grans % 1 04/24/2025 12:11 PM    Absolute Neutrophils 12.01 (H) 04/24/2025 12:11 PM      Lab Results   Component Value Date/Time    Sodium 135 04/24/2025 12:11 PM    Potassium 4.5 04/24/2025 12:11 PM    Chloride 101 04/24/2025 12:11 PM    CO2 27 04/24/2025 12:11 PM    ANION GAP 7 04/24/2025 12:11 PM    BUN 29 (H) 04/24/2025 12:11 PM    Creatinine 0.59 (L) 04/24/2025 12:11 PM    Glucose 108 04/24/2025 12:11 PM    Glucose, Fasting 106 (H) 03/28/2025 10:22 AM    Calcium 8.3 (L) 04/24/2025 12:11 PM    AST 21 04/24/2025 12:11 PM    ALT 37 04/24/2025 12:11 PM    Alkaline Phosphatase 37 04/24/2025 12:11 PM    Total Protein 6.5 04/24/2025 12:11 PM    Albumin 3.7 04/24/2025 12:11 PM    Total Bilirubin 0.50 04/24/2025 12:11 PM    eGFR 110 04/24/2025 12:11 PM      Radiology Results Review: I have reviewed the following images/report studies in PACS: XR chest 1 view portable  Result Date: 4/24/2025  Persistent right perihilar lung mass. Persistent bilateral lung disease. No superimposed acute appearing opacities. Please see subsequent CT report. Workstation performed: [id number]     CT chest with contrast  Result Date: 4/24/2025  No evidence for superior vena cava syndrome. 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 Right perihilar upper lobe mass and several mediastinal and hilar lymph nodes have decreased in size from prior PET/CT dated 3/15/2024. However, there is  been increase in size of left infrahilar lymph node and interval development of right adrenal nodule. Constellation of findings are concerning for mixed response. Recommend continued attention on follow-up imaging per oncologic protocol. Workstation performed: [id number]     CT soft tissue neck with contrast  Result Date: 4/24/2025  1. No neck mass or adenopathy seen. 2. Normal enhancement of the internal jugular veins and visualized upper SVC. Please see chest CT for evaluation of the remainder of the SVC. 3. Partially imaged lung mass. Please see dedicated chest CT for further details. 4. Brain metastases are noted as seen on recent MRI. Workstation performed: [id number]      Administrative Statements   I have spent a total time of 65 minutes in caring for this patient on the day of the visit/encounter including Diagnostic results, Prognosis, Risks and benefits of tx options, Instructions for management, Patient and family education, Importance of tx compliance, Risk factor reductions, Impressions, Counseling / Coordination of care, Documenting in the medical record, Reviewing/placing orders in the medical record (including tests, medications, and/or procedures), Obtaining or reviewing history  , and Communicating with other healthcare professionals .    Additional recommendations to follow per attending, Dr. Morrell.    Mandeep Talley DO, PGY4  Hematology/Oncology Fellow

## 2025-04-29 NOTE — PLAN OF CARE
Problem: Knowledge Deficit  Goal: Patient/family/caregiver demonstrates understanding of disease process, treatment plan, medications, and discharge instructions  Description: Complete learning assessment and assess knowledge base.Interventions:- Provide teaching at level of understanding- Provide teaching via preferred learning methods  4/29/2025 0707 by Roopa Martin RN  Outcome: Progressing

## 2025-04-29 NOTE — ASSESSMENT & PLAN NOTE
Nino Silva is a 59 y.o. 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     Treatment/Radiation:     Patient received urgent whole brain irradiation to a dose of 3000 cGy in 10 fractions between March 6 and March 19, 2024.       Oncology     First line:  Carboplatin + Etoposide + Durvalumab  C1-C4: 4/8/2024 through 6/19/2024    Maintenance Durvalumab every 4 weeks after scans/depending on outcome      C1 3/20/2024  C2 4/29/2024  C3 5/20/2024  C4 6/10/2024       Durvalumab only maintenance     C1 7/10/2024  C2 8/10/2024  C3 9/10/2024  C4 10/7/2024     PD on first line      Second Line Lurbinectedin      C1 11/19/2024  C2 12/10/2024   C3 12/31/2024  C4 1/21/2025      PD on second line     Third Line     Tarlatamab; load for C1; then Days 1/15 every 28 days until PD     C1D1 3/17/2025 (inpatient, tolerated well)  C1D8 3/24/2025 (inpatient, tolerated well)  C1D15 4/1/2025 (outpatient, tolerated well), proceeding with outpatient treatment     C2D1 4/14/2025  C2D15 4/29/2025, planned         2/12/2025     Now s/p 4 cycles lurbinectedin  with clear PD     PET 2/7/2025     IMPRESSION:  1.  The right upper lobe and right perihilar masses have both mildly increased in size.  2.  Multiple FDG avid mediastinal lymph nodes persist. New FDG avid right inferior hilar activity. Suspected developing right sided subcarinal adenopathy  3.  Interval improvement of previously seen FDG avid left para-aortic lymph node  4.  Interval development of several FDG avid osseous metastases  5.  FDG avid left cerebellar hemisphere lesion. Please refer to prior MRI        MRI brain 2/5/2025     New left supratentorial reference lesions:     7 mm left  centrum semiovale white matter lesion on series 10 image 217 with surrounding edema.     3 mm juxtacortical anterior left frontal lesion seen on series 10 image 199.     4 mm lateral left parietal cortical lesion seen on series 10 image 174     4 mm left frontal opercular lesion seen on series 10 image 174     7 mm x 6 mm ring-enhancing lateral left temporal lesion with surrounding vasogenic edema on series 10 image 125.     5 mm left parafalcine occipital lesion seen on series 10 image 150.     New right supratentorial reference lesions:     3 mm juxtacortical anterior right frontal lesion seen on series 10 image 226     8 mm parafalcine right parietal lesion seen on series 10 image 2021 with surrounding edema.     3 mm periventricular right parietal white matter lesion seen on series 10 image 170     3 mm right parietal periventricular white matter lesion seen on series 10 image 185     4 mm right parafalcine occipital lesion seen on series 10 image 150.     Multiple additional small lesions are noted involving the right basal ganglia, right thalamus, involving the body of the corpus callosum centered to the right of midline, and involving the juxtacortical right frontal lobe.     New infratentorial reference lesions:     Dominant 1.2 cm x 1.1 cm predominately solidly enhancing lesion lateral left cerebellar hemisphere on series 10 image 76.     9 mm x 7 mm enhancing lesion left parietal lobe posterior to the vermis seen on series 10 image 75.     6 mm right cerebellar enhancing lesion posteriorly seen on series 10 image 94.     4 mm right lateral cerebellar lesion seen on series 10 image 94.     7 mm right ventrolateral pontine enhancing lesion on series 10 image 112 with surrounding edema.     7 mm left posterior lateral pontine enhancing lesion seen on series 10 image 112.     Additional smaller enhancing lesions noted involving the central and posterior mitchel, as well as the left cerebellar hemisphere.      Previous lesions:     Previous partially cystic, treated metastatic lesions with peripheral enhancement appear grossly unchanged involving the bilateral cerebral hemispheres, and the posterior right cerebellar hemisphere.        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      Is treated with decadron 10 mg IV q 6 or solumedrol 1 mg/kg q 12 depending on grade     Premed with decadron     Other symptoms besides CRS or ICANS may be hypersensitivity reactions, fevers, myalgias, LFT elevations     Patient would prefer to start after 2 week vacation with family in early March; thus plan after 15th March     No symptoms except occasional balance issues, headaches     As has significant brain lesions supratentorial as well as infratentorial, concern with potential leptomeningeal disease; if headaches worsen, persist, may consider LP/paraneoplastic issues     May consider gamma knife if any lesions become symptomatic neurologically     Has some vasogenic edema; will try trial of low dose steroids to see if headaches/balance improve at 5 mg daily prednisone     Has new FDG uptake T11 and 5 and is having mid back pain-no neurological issues but will get MRI T/L spine     If 3rd line therapy with tarlatamab is ineffective or insurance does not pay, then will attempt Topotecan         MRI T/L spine   3/15/2025    Osseous metastasis in right T4 transverse process and L1 vertebral body. No pathologic compression fracture, as clinically questioned.     Partially imaged known right upper lobe malignancy, mediastinal and hilar beata metastasis.     Mild degenerative changes of thoracic spine, as  detailed above. No significant canal stenosis or foraminal narrowing.     Osseous metastasis in L1 and L2 vertebral bodies. No pathologic compression fracture, as clinically questioned.     Multilevel degenerative changes of lumbar spine with varying degrees of canal stenosis (mild L2-L3 and L4-L5) and foraminal narrowing (mild bilateral L4-L5), as detailed above.     Please see same day MRI thoracic spine with and without contrast for further evaluation.     No pain at these sites/no evidence cord compression     Issue is his lack balance/instability walking     Will repeat MRI brain and C spine     Last MRI brain 2/5/2025 with multiple, new small lesions     However did not have symptoms is s/p WBRT and Rad Onc appropriately wanted to wait to treat discrete lesions if symptomatic     Concern with lack of balance which predates tarlatamab as occurred on cruises he was on     Consider again zometa for bone mets        3/21/2025     No issues with C1D1 tarlatamab     However as above issues with balance-T/L spine MRI with no evidence of cord compression.  Concern with possible leptomeningeal.. Neuro exam though with 5/5 strength.  UE not affected.  Occurred on cruise so pre treatment so not from neurotoxicity due to tarlatamab.    Follow for now; consider RT if any changes     Add zometa for bone mets     Admission 3/17-3/19/2025-C1D1 Tarlatamab      Continued progression of disease on multiple chemotherapeutic agents, now admitted to start 3rd line therapy with tarlatamab  Recent PET 2/7 with increasing RUL and R perihilar masses, interval development of osseous metastases  Recently underwent MRI T/L spine for new FDG uptake T11 and T5 given mid back pain without neurologic deficits, read pending  Status post cycle.  Tolerated well  Discussed with hematology oncology and they want to monitor for 1 more day  Will be a readmission next week for C1D8 as per protocol here at [facility]     C1D15 can be as OP      4/11/2025     Recent admission for headache, blurry vision, paresthesias, ambulatory dysfunction Symptoms concerning for leptomeningeal involvement versus progression of brain mets     Admission 4/4-4/9/2025     LP negative     MRI brain with increased lesions-    - The largest right cerebral hemisphere lesion is seen in the parasagittal right parietal lobe measuring 1.5 x 1.1 cm, previously 0.8 x 0.5 cm (series 9 image 222).     - The largest left cerebral hemisphere lesions are seen in the left centrum semiovale measuring 0.9 x 0.7 cm, previously 0.6 x 0.6 cm (series 9 image 233), and left temporal lobe measuring 0.8 x 0.9 cm, previously 0.5 x 0.6 cm (series 9 image 134)     - The largest left cerebellar hemisphere lesion measures 2.1 x 2 cm, previously 1.1 x 1.2 cm (series 9 image 83).     - The largest right cerebellar hemisphere lesion measures 0.7 x 0.9 cm, previously 0.4 x 0.4 cm (series 9 image 97).     - The largest right brainstem lesion at the junction of anterior right midbrain and mitchel measures 1.7 x 1.4 cm, previously 0.6 x 0.6 cm (series 9 image 115).     - The largest left brainstem lesion at the junction of posterior left midbrain and mitchel measures 1.1 x 1 cm, previously 0.6 x 0.6 cm (series 9 image 117)     There is worsening of perilesional edema in supratentorial and infratentorial brain as well as brainstem, with the worsening most pronounced in the brainstem and right cerebellum. Confluent white matter FLAIR hyperintensity is most likely a combination   of worsened perilesional edema and posttreatment changes.      As most of his symptoms involve the cerebellum, Rad Onc was consulted not to repeat WBRT which cannot be done but for pallliative SRS to select enlarging lesions in both the right and left cerebellum     However since on decadron, his symptoms have all improved     C and L spine MRI with the following    Stable osseous metastasis within the L1 and L2 vertebral bodies with no  extraosseous extension of disease or new lesions.     Mild noncompressive lumbar degenerative change is stable.      Subtle foci of nodular enhancement along the dorsal surface of the cord at C4 and ventral surface of the cord at C6 could represent fortuitous vascular enhancement, although leptomeningeal metastatic disease is not excluded. Recommend further   clinical assessment, consider correlation with CSF analysis.  2.  Degenerative changes as discussed. No significant canal stenosis at any level. Multilevel foraminal stenosis as outlined above.     Decadron currently 4 mg three times daily-will continue for 7 days and then go down to 4 mg bid     Can treat with tarlatamab and decadron     Does not appear to have neurotoxicity related to drug but to progression of disease particularly in cerebellum     Will continue with C2 tarlatamb despite the above as he would like to continue with treatment     While in house, someone had discussion about 6 months or less assuming he stops treatment or has no response to current treatment.  As he is young and would like to try 1-2 more cycles, on steroids, will attempt to treat.  May need dose reduction if headaches, other neurological issues persist     He is aware but is not ready for supportive care/hospice at this point     Is having trouble getting into his house; has 18 steps to climb so planning to move     Was seen by speech pathology had swallow study-see section under dysphagia for rec     Is taking in less water and is dehydrated today-will get IVF     4/18/2025     Did well with tarlatamab 4/14/2025     Decadron 4 mg tid-will decrease to 4 mg bid as confusion, speech, walking all better     Home PT working with patient, feels stronger     Will call if not tolerating steroid taper     Rad Onc will not be giving SRS/ will continue on systemic therapy alone     Labs 4/11/2025 with Na 136 K 4.3 Cr 0.57 ALT?AST 71/20 TB 0.52 AP 45 WBC 10.8 Hgb 13.1 MCV 93 plts 280 ANC  8910          Summary/Recommendations    By RECIST criteria for the patient's right perihilar upper lobe mass (5.4 x 3.6 cm to 5.3 x 3.1 cm), mediastinal lymph node, right hilar lymph nodes, and left infrahilar lymph node show stable disease.  However, the interval development of a new right adrenal nodule would technically represent disease progression, but the patient has only received two cycles of treatment and it is too early to assess response radiographically.  His clinical response has shown significant improvement in multiple areas.     Continue tarlatamab (outpatient) on days 1 and 15 of a 28-day cycle with C2D2 4/28/2025.  Planning for four cycles at this time with repeat imaging after cycle 4.    Taper dexamethasone from 4 mg TID to 4 mg BID     Tumor board recommendation was to avoid radiation given cerebellar and brainstem involvement, and to continue tarlatamab for intracranial progression     Continue to follow with SLP PRN     IVF as needed     Follow up 2 weeks

## 2025-04-29 NOTE — TELEPHONE ENCOUNTER
Dr. Morrell saw patient today. Requesting treatments moving forward be scheduled on Tuesdays so wife does not have to transport multiple days. Dr. Morrell will see him for follow ups at infusion.

## 2025-04-30 ENCOUNTER — TELEPHONE (OUTPATIENT)
Age: 60
End: 2025-04-30

## 2025-05-02 ENCOUNTER — PATIENT OUTREACH (OUTPATIENT)
Dept: HEMATOLOGY ONCOLOGY | Facility: CLINIC | Age: 60
End: 2025-05-02

## 2025-05-02 NOTE — PROGRESS NOTES
I reached out and spoke with Nino to follow up and to review for any new changes in barriers to care and offer supportive services as needed.       Reviewed for any new barriers as noted below.    Are you having any side effects from your treatment?Yes, describe: Feels weak after therapy    Are you eating and drinking normally? yes    Have you been experiencing any uncontrolled pain related to your cancer diagnosis? No    Do you have a good support system? Yes     Are you interested in any support groups? no    How are you doing with transportation to your appointments? My wife    Do you have any questions or concerns regarding your treatment plan? No    Any new financial concerns for your household or medical bills? No    Do you know when your upcoming appointments are? yes  Future Appointments   Date Time Provider Department Center   5/13/2025  7:30 AM [facility]   5/13/2025 10:20 AM Cortney Morrell MD [facility]EM ON[facility]   5/23/2025  9:00 AM Harmeet Webster MD [facility]   5/27/2025  7:30 AM [facility]   6/10/2025  7:30 AM [facility]   6/24/2025  7:30 AM [facility]   7/8/2025  7:30 AM [facility]   7/21/2025  8:00 AM [facility]   7/22/2025  7:30 AM [facility]      Nino states everything is going well. Just got done with PT for the day. He felt a little weaker this week. He has infusion next week, he asked if he could a treat in for the nurses, he is very thankful for each and everyone of them.    Based on individual needs I will follow up with them in 2-3 weeks. I have provided my direct contact information and welcome them to contact me if their needs as discussed above change. He was appreciative for the call.

## 2025-05-06 RX ORDER — SODIUM CHLORIDE 9 MG/ML
20 INJECTION, SOLUTION INTRAVENOUS ONCE
OUTPATIENT
Start: 2025-05-27

## 2025-05-06 RX ORDER — SODIUM CHLORIDE 9 MG/ML
20 INJECTION, SOLUTION INTRAVENOUS ONCE
Status: CANCELLED | OUTPATIENT
Start: 2025-05-13

## 2025-05-07 DIAGNOSIS — C79.31 METASTASIS TO BRAIN (HCC): ICD-10-CM

## 2025-05-07 RX ORDER — PANTOPRAZOLE SODIUM 40 MG/1
40 TABLET, DELAYED RELEASE ORAL
Qty: 60 TABLET | Refills: 0 | Status: SHIPPED | OUTPATIENT
Start: 2025-05-07 | End: 2025-06-06

## 2025-05-08 ENCOUNTER — APPOINTMENT (OUTPATIENT)
Dept: LAB | Facility: HOSPITAL | Age: 60
End: 2025-05-08
Payer: COMMERCIAL

## 2025-05-08 DIAGNOSIS — C34.00 SMALL CELL CARCINOMA OF HILUM OF LUNG, UNSPECIFIED LATERALITY (HCC): Primary | ICD-10-CM

## 2025-05-08 LAB
ALBUMIN SERPL BCG-MCNC: 3.6 G/DL (ref 3.5–5)
ALP SERPL-CCNC: 42 U/L (ref 34–104)
ALT SERPL W P-5'-P-CCNC: 43 U/L (ref 7–52)
ANION GAP SERPL CALCULATED.3IONS-SCNC: 9 MMOL/L (ref 4–13)
ANISOCYTOSIS BLD QL SMEAR: PRESENT
AST SERPL W P-5'-P-CCNC: 20 U/L (ref 13–39)
BASOPHILS # BLD MANUAL: 0 THOUSAND/UL (ref 0–0.1)
BASOPHILS NFR MAR MANUAL: 0 % (ref 0–1)
BILIRUB SERPL-MCNC: 0.54 MG/DL (ref 0.2–1)
BUN SERPL-MCNC: 24 MG/DL (ref 5–25)
CALCIUM SERPL-MCNC: 8.5 MG/DL (ref 8.4–10.2)
CHLORIDE SERPL-SCNC: 97 MMOL/L (ref 96–108)
CO2 SERPL-SCNC: 28 MMOL/L (ref 21–32)
CREAT SERPL-MCNC: 0.51 MG/DL (ref 0.6–1.3)
EOSINOPHIL # BLD MANUAL: 0.13 THOUSAND/UL (ref 0–0.4)
EOSINOPHIL NFR BLD MANUAL: 1 % (ref 0–6)
ERYTHROCYTE [DISTWIDTH] IN BLOOD BY AUTOMATED COUNT: 15.6 % (ref 11.6–15.1)
GFR SERPL CREATININE-BSD FRML MDRD: 116 ML/MIN/1.73SQ M
GLUCOSE P FAST SERPL-MCNC: 97 MG/DL (ref 65–99)
HCT VFR BLD AUTO: 36.2 % (ref 36.5–49.3)
HGB BLD-MCNC: 12.2 G/DL (ref 12–17)
LYMPHOCYTES # BLD AUTO: 0.9 THOUSAND/UL (ref 0.6–4.47)
LYMPHOCYTES # BLD AUTO: 6 % (ref 14–44)
MACROCYTES BLD QL AUTO: PRESENT
MCH RBC QN AUTO: 32.4 PG (ref 26.8–34.3)
MCHC RBC AUTO-ENTMCNC: 33.7 G/DL (ref 31.4–37.4)
MCV RBC AUTO: 96 FL (ref 82–98)
MONOCYTES # BLD AUTO: 1.16 THOUSAND/UL (ref 0–1.22)
MONOCYTES NFR BLD: 9 % (ref 4–12)
NEUTROPHILS # BLD MANUAL: 10.68 THOUSAND/UL (ref 1.85–7.62)
NEUTS BAND NFR BLD MANUAL: 1 % (ref 0–8)
NEUTS SEG NFR BLD AUTO: 82 % (ref 43–75)
NRBC BLD AUTO-RTO: 1 /100 WBC (ref 0–2)
PLATELET # BLD AUTO: 281 THOUSANDS/UL (ref 149–390)
PLATELET BLD QL SMEAR: ADEQUATE
PMV BLD AUTO: 9.5 FL (ref 8.9–12.7)
POTASSIUM SERPL-SCNC: 4.3 MMOL/L (ref 3.5–5.3)
PROT SERPL-MCNC: 6.6 G/DL (ref 6.4–8.4)
RBC # BLD AUTO: 3.77 MILLION/UL (ref 3.88–5.62)
RBC MORPH BLD: PRESENT
SODIUM SERPL-SCNC: 134 MMOL/L (ref 135–147)
VARIANT LYMPHS # BLD AUTO: 1 %
WBC # BLD AUTO: 12.87 THOUSAND/UL (ref 4.31–10.16)

## 2025-05-08 PROCEDURE — 85027 COMPLETE CBC AUTOMATED: CPT

## 2025-05-08 PROCEDURE — 36415 COLL VENOUS BLD VENIPUNCTURE: CPT

## 2025-05-08 PROCEDURE — 85007 BL SMEAR W/DIFF WBC COUNT: CPT

## 2025-05-08 PROCEDURE — 80053 COMPREHEN METABOLIC PANEL: CPT

## 2025-05-09 DIAGNOSIS — C79.31 METASTASIS TO BRAIN (HCC): ICD-10-CM

## 2025-05-09 RX ORDER — PANTOPRAZOLE SODIUM 40 MG/1
40 TABLET, DELAYED RELEASE ORAL
Qty: 60 TABLET | Refills: 0 | OUTPATIENT
Start: 2025-05-09 | End: 2025-06-08

## 2025-05-11 NOTE — PROGRESS NOTES
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 mg (12/31/2024), 6.4 mg (1/21/2025)     3/17/2025 -  Chemotherapy    tarlatamab (Imdelltra) 10 mg IVPB, 10 mg, 2 of 10 cycles  Administration: 10 mg (3/24/2025), 10 mg (3/31/2025), 10 mg (4/14/2025), 10 mg (4/29/2025)  tarlatamab (Imdelltra) 1 mg IVPB, 1 mg, 1 of 1 cycle  Administration: 1 mg (3/17/2025)        Pertinent Medical History   02/09/25:   Discussion  extended stage SCLC/Initial treatment      Most patients with eSCLC have disease relapse; thus this is incurable.  However the cornerstone of treatment remains platinum based.  Response rates are as high as 61% with CR of 10%.  Responses to chemotherapy are frequent and rapid with median time to best response in approximately 4.6 weeks.  Unfortunately, these are no durable responses and the median time to progression (TTP) is 4 months with OS 8.6 months.  Despite predictable relapse randomized studies have not shown a survival benefit for prolonged administration of chemotherapy and thus optimal dosing is 4-6 cycles.  There has been a study comparing Cisplatin to Carboplatin/noninferiority demonstrating that there is no difference with PFS, OS CARTER and thus can be used interchangeably although Cisplatin remains preferred.     The first study to demonstrate any improvement in OS in the first line treatment of eSCLC in several  decades was Impower 133 a randomized Phase III trial of Carboplatin/Etoposide with the PDL1 inhibitor atezolizumab.  This was a global trial with patients with eSCLC getting 4 cycles of Carbo/Etoposide with or without concurrent atezolizumab/placebo with maintenance afterward.  The addition of atezolizumab led to significant improvement in OS 12.3 vs 10.3 months HR 0.7 as well as PFS .  OS was independent of PDL1 expression.  The CASPIAN study demonstrated that durvalumab with first suzi therapy was similar in terms of response with OS at 13 months vs 10.3 months.  Either agent can be used in this setting.  KEYNOTE 604 with Pembrolizumab was negative for OS benefit which may be a design/patient flaw as generally these agents are similar in terms of efficacy.          Discussion Large cell neuroendocrine        The clinical presentation of large cell neuroendocrine or ?C??? appears similar to the other high-grade neuroendocrine pulmonary tumor, small cell lung ?????r (?C?C), with notable exceptions: primary LCNECs tend to be located peripherally rather than centrally, and presentation of LCNECs with early-stage (I to II) disease is more common than for S??C (approximately 25 versus less than 5 percent). Thus, patients with ??N?C more commonly undergo resection.       For patients with stage IV disease, we suggest using a standard S?LC regimen (etoposide plus a either carboplatin or cisplatin) for four to six cycles, independent of retinoblastoma gene 1 (RB1) status.     However, prognosis for this tumor type is poor regardless of choice of ?h?m?th?r?py, and other options also are reasonable.     The approach of [facility] is based on integrative profiling that clearly identifies ?CN?? to be a neuroendocrine tumor most similar to ?CL?     Data from a prospective phase II trial of 29 patients treated with etoposide/cisplatin along with review of published retrospective experience with ?h?m?ther?py in stage IV ??N??  demonstrated that major efficacy endpoints were similar to SCL?, with the exception that overall response rate is lower (34 percent) than the approximate 60 percent rate observed in S?L?     Thus, these are treated as small cell     Discussion progression/second line      Sensitive vs > 6 months      Single-agent ?h?m?th?r?py is standard second-line treatment after ?l?ti?um-based ?h?m?th?rap? and immunotherapy. Lurbinectedin represents our preferred initial approach, although tarlatamab or camptothecins are acceptable alternatives.      For patients that are not eligible for, cannot tolerate, or progress on tarlatamab, other ?h?m?ther?p? agents are available        Data below represent efficacy of ?h?m?thera?? after progression on initial combination ?h?m?ther?py. These trials were conducted prior to introduction of immunotherapy into the management of SC?C.     Lurbinectedin is an alkylating agent that has received accelerated approval by the [place] Food and Drug Administration (FDA) for patients with metastatic ?CLC with disease progression on or after platin?m-based ?h?m?th?r??? [3]. It is given at a dose of 3.2 mg/m2 intravenously (IV) every three weeks.        In an open-label study of 105 patients with SCL? and no brain m?t??ta?es who progressed on or after ?l?tinum-based ?h?m?th?rap?, 8 percent of whom had prior immunotherapy in addition to plati?um-based ?h?m?th?r?py, the overall response rate (CARTER) with lurbinectedin according to independent review committee was 33 percent The median duration of response was 5.1 months, and 25 percent of responding patients experienced a duration of response exceeding six months. Among patients with resistant relapse (?h?m?th?r?py-free interval <90 days), the CARTER was 22 percent with a progression-free survival (PFS) of 2.6 months and an overall survival (OS) of 5 months. For patients with sensitive relapse, the CARTER was 45 percent with a PFS of 4.5 months and an OS of 11.9  months      All patients in this study received a prespecified antiemetic regimen consisting of a corticosteroid and serotonin antagonist. Serious adverse reactions occurred in 10 percent of patients, of which neutropenia and febrile neutropenia were the most common (5 percent for each).        Tarlatamab is a reasonable alternative to lurbinectedin as a second-line treatment option, extrapolating from data in the third-line setting. These data are discussed below.      Topotecan has been shown to increase survival compared with best supportive care (BSC) and results in greater symptom management relative to a multiagent regimen [5,6]. It is approved by the FDA for relapses that occur after 45 days from the completion of ?h?m?ther?p?. ????t???? also has activity against brain m?t?st???s. The primary toxicities of t???te??? are hematologic, with most patients experiencing grade 3 or 4 neutropenia, anemia, or thrombocytopenia.     In a trial in which 211 patients with relapse >=60 days after completion of first-line therapy (usually pl?tinum plus etoposide) were randomly assigned to daily IV topotecan or cyclophosphamide, doxorubicin, and vincristine (CAV), disease outcomes were similar between the groups, but cancer symptoms were improved with t???t?can  For t???te??? versus CAV, the ORRs were 24 versus 18 percent, the median time to progression was 13 versus 12 weeks, and the median survival was 25 versus 24.7 weeks, differences that were not statistically significant. Control of several symptoms including dyspnea, anorexia, hoarseness, and fatigue was improved with t???t??a?. Severe neutropenia was less common with t???te?an (38 versus 51 percent), but grade 3 or 4 thrombocytopenia and anemia occurred more frequently (9.8 versus 1.4 percent and 17.7 versus 7.2 percent, respectively). The improvement in symptom control led to its FDA approval.\         Oral and IV formulations of topotecan have been extensively  evaluated for the second-line treatment of relapsed ?CLC and are found to be equally effective     In a phase III noninferiority trial, 304 patients with chemosensitive relapse (>90 days) of ???C were randomly assigned to oral (2.3 mg/m2 daily for five days) or IV (1.5 mg/m2 daily for five days) topotecan every three weeks. Response rates were similar (18 versus 22 percent), as were median and one-year survival rates (33 versus 35 weeks and 33 versus 29 percent, respectively) [9]. The toxicity profiles of the two regimens were similar as well.        Topotecan daily times five every three weeks rather than a once-weekly schedule, given better efficacy in cross-trial comparisons. In the  phase II trial, 192 patients with previously treated ??L? were randomly assigned to weekly t???te?an with or without aflibercept after progression on a ?l?ti??m-based regimen. Only two partial responses (1 percent) were observed in the entire study population (both in patients who also received ?flib?r?e?t), and the median OS was approximately five months. Response rates of the daily times five every three weeks schedule were higher in other studies, on the order of 20 percent  These data are discussed above.         As an alternative to topotecan, three small clinical trials have evaluated irinotecan as second-line therapy, with objective response rates ranging from 16 to 47 percent . As an example, in one study, 44 patients (17 with sensitive relapse and 27 with resistant or refractory disease) were treated with iri??t?c?n 125 mg/m2 weekly times four with a two-week break. The overall objective response rate was 16 percent. The response rate in patients with resistant or refractory disease was 4 percent. The response rate was 35 percent in the patients with sensitive relapse, with a median survival of 6.8 months. Primary toxicities of iri??te??n included diarrhea and neutropenia. Results for those with sensitive relapse are  "discussed below.         Tarlatamab, is a bispecific T-cell engager immunotherapy that directs the patient's T cells to cancer cells expressing delta-like ligand 3 (overexpressed in approximately 90 percent of SCLCs). It is approved by the [place] Food and  Drug Administration (FDA) at a dose of 10 mg intravenously every two weeks (after an initial \"step-up\" dosing schedule) . The approval is based on response rates and is contingent upon results of a confirmatory trial.     Tarlatamab has shown promising activity in a phase II trial among patients with disease that had relapsed after, or was refractory to, one platin?m-based treatment regimen and at least one other line of therapy. At a dose of 10 mg intravenously every two weeks, the response rate was 40 percent, median progression-free survival was 4.9 months, and overall survival was 14.3 months  With longer follow-up (median 12.1 months), the response rate was 35 percent and median overall survival was 20 months.     The most common adverse events in these patients were cytokine-release syndrome (CRS; in 51 percent), decreased appetite (29 percent), and pyrexia (35 percent). Grade 3 CRS occurred in 1 percent.     In a pooled safety analysis reported in the FDA label         ?CRS occurred in 55 percent, mostly occurring during treatment cycle 1. The median time to onset of any grade CRS from last exposure to tarlatamab was 13.5 hours. CRS can manifest as pyrexia, hypotension, fatigue, tachycardia, headache, hypoxia, nausea, and vomiting. Potentially life-threatening complications of CRS include cardiac dysfunction, acute respiratory distress syndrome, neurologic toxicity, kidney and/or hepatic failure, and disseminated intravascular coagulation. Management of CRS is discussed elsewhere        ?Neurologic toxicity occurred in 47 percent, including 10 percent with grade 3 toxicity. The most frequent neurologic toxicities included headache (14 percent), peripheral " neuropathy (7 percent), dizziness (7 percent), and insomnia (6 percent). Immune effector cell-associated neurotoxicity syndrome (ICANS), which can present as confusion, lethargy, or disorientation (among other symptoms) occurred in 9 percent. It most frequently occurred following cycle 2 day 1 and had a median time of onset from first dose of 29.5 days. Further discussion of clinical features and management of ICANS is found elsewhere           Review of Systems   Constitutional:  Positive for fatigue.   HENT: Negative.     Eyes: Negative.    Respiratory: Negative.     Cardiovascular: Negative.    Gastrointestinal: Negative.    Endocrine: Negative.    Genitourinary: Negative.    Musculoskeletal: Negative.    Neurological:  Positive for headaches.   Hematological: Negative.    Psychiatric/Behavioral: Negative.               03/19/25:     04/11/25:     04/17/25:     05/11/25: ***     Review of Systems  {Select to insert medical history sections (Optional):59331}      Objective {?Quick Links Trend Vitals * Enter New Vitals * Results Review * Timeline (Adult) * Labs * Imaging * Cardiology * Procedures * Lung Cancer Screening * Surgical eConsent :87157}  There were no vitals taken for this visit.    Pain Screening:     ECOG   ***  Physical Exam    Labs: I have reviewed the following labs:  Lab Results   Component Value Date/Time    WBC 12.87 (H) 05/08/2025 09:29 AM    RBC 3.77 (L) 05/08/2025 09:29 AM    Hemoglobin 12.2 05/08/2025 09:29 AM    Hematocrit 36.2 (L) 05/08/2025 09:29 AM    MCV 96 05/08/2025 09:29 AM    MCH 32.4 05/08/2025 09:29 AM    RDW 15.6 (H) 05/08/2025 09:29 AM    Platelets 281 05/08/2025 09:29 AM    Segmented % 91 (H) 04/24/2025 12:11 PM    Lymphocytes % 6 (L) 05/08/2025 09:29 AM    Lymphocytes % 3 (L) 04/24/2025 12:11 PM    Monocytes % 9 05/08/2025 09:29 AM    Monocytes % 5 04/24/2025 12:11 PM    Eosinophils % 1 05/08/2025 09:29 AM    Eosinophils Relative 0 04/24/2025 12:11 PM    Basophils % 0  05/08/2025 09:29 AM    Basophils Relative 0 04/24/2025 12:11 PM    Immature Grans % 1 04/24/2025 12:11 PM    Absolute Neutrophils 12.01 (H) 04/24/2025 12:11 PM     Lab Results   Component Value Date/Time    Potassium 4.3 05/08/2025 09:29 AM    Chloride 97 05/08/2025 09:29 AM    CO2 28 05/08/2025 09:29 AM    BUN 24 05/08/2025 09:29 AM    Creatinine 0.51 (L) 05/08/2025 09:29 AM    Glucose, Fasting 97 05/08/2025 09:29 AM    Calcium 8.5 05/08/2025 09:29 AM    AST 20 05/08/2025 09:29 AM    ALT 43 05/08/2025 09:29 AM    Alkaline Phosphatase 42 05/08/2025 09:29 AM    Total Protein 6.6 05/08/2025 09:29 AM    Albumin 3.6 05/08/2025 09:29 AM    Total Bilirubin 0.54 05/08/2025 09:29 AM    eGFR 116 05/08/2025 09:29 AM   {SL AMB ONCOLOGY LABS (Optional):31550}    {Radiology Results Review (Optional):28086}    {Administrative / Billing Section (Optional):33997}

## 2025-05-12 ENCOUNTER — HOSPITAL ENCOUNTER (OUTPATIENT)
Dept: INFUSION CENTER | Facility: HOSPITAL | Age: 60
End: 2025-05-12
Attending: INTERNAL MEDICINE

## 2025-05-12 RX ORDER — POTASSIUM CHLORIDE 14.9 MG/ML
20 INJECTION INTRAVENOUS ONCE
Status: CANCELLED
Start: 2025-05-13

## 2025-05-12 NOTE — ASSESSMENT & PLAN NOTE
Nino Silva is a 59 y.o. 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     Treatment/Radiation:     Patient received urgent whole brain irradiation to a dose of 3000 cGy in 10 fractions between March 6 and March 19, 2024.       Oncology     First line:  Carboplatin + Etoposide + Durvalumab  C1-C4: 4/8/2024 through 6/19/2024    Maintenance Durvalumab every 4 weeks after scans/depending on outcome      C1 3/20/2024  C2 4/29/2024  C3 5/20/2024  C4 6/10/2024       Durvalumab only maintenance     C1 7/10/2024  C2 8/10/2024  C3 9/10/2024  C4 10/7/2024     PD on first line      Second Line Lurbinectedin      C1 11/19/2024  C2 12/10/2024   C3 12/31/2024  C4 1/21/2025      PD on second line     Third Line     Tarlatamab; load for C1; then Days 1/15 every 28 days until PD     C1D1 3/17/2025 (inpatient, tolerated well)  C1D8 3/24/2025 (inpatient, tolerated well)  C1D15 4/1/2025 (outpatient, tolerated well), outpatient treatment     C2D1 4/14/2025  C2D15 4/29/2025    C3D1 5/13/2025, planned         2/12/2025     Now s/p 4 cycles lurbinectedin  with clear PD     PET 2/7/2025     IMPRESSION:  1.  The right upper lobe and right perihilar masses have both mildly increased in size.  2.  Multiple FDG avid mediastinal lymph nodes persist. New FDG avid right inferior hilar activity. Suspected developing right sided subcarinal adenopathy  3.  Interval improvement of previously seen FDG avid left para-aortic lymph node  4.  Interval development of several FDG avid osseous metastases  5.  FDG avid left cerebellar hemisphere lesion. Please refer to prior MRI        MRI brain 2/5/2025     New left supratentorial reference lesions:     7 mm left  centrum semiovale white matter lesion on series 10 image 217 with surrounding edema.     3 mm juxtacortical anterior left frontal lesion seen on series 10 image 199.     4 mm lateral left parietal cortical lesion seen on series 10 image 174     4 mm left frontal opercular lesion seen on series 10 image 174     7 mm x 6 mm ring-enhancing lateral left temporal lesion with surrounding vasogenic edema on series 10 image 125.     5 mm left parafalcine occipital lesion seen on series 10 image 150.     New right supratentorial reference lesions:     3 mm juxtacortical anterior right frontal lesion seen on series 10 image 226     8 mm parafalcine right parietal lesion seen on series 10 image 2021 with surrounding edema.     3 mm periventricular right parietal white matter lesion seen on series 10 image 170     3 mm right parietal periventricular white matter lesion seen on series 10 image 185     4 mm right parafalcine occipital lesion seen on series 10 image 150.     Multiple additional small lesions are noted involving the right basal ganglia, right thalamus, involving the body of the corpus callosum centered to the right of midline, and involving the juxtacortical right frontal lobe.     New infratentorial reference lesions:     Dominant 1.2 cm x 1.1 cm predominately solidly enhancing lesion lateral left cerebellar hemisphere on series 10 image 76.     9 mm x 7 mm enhancing lesion left parietal lobe posterior to the vermis seen on series 10 image 75.     6 mm right cerebellar enhancing lesion posteriorly seen on series 10 image 94.     4 mm right lateral cerebellar lesion seen on series 10 image 94.     7 mm right ventrolateral pontine enhancing lesion on series 10 image 112 with surrounding edema.     7 mm left posterior lateral pontine enhancing lesion seen on series 10 image 112.     Additional smaller enhancing lesions noted involving the central and posterior mitchel, as well as the left cerebellar hemisphere.      Previous lesions:     Previous partially cystic, treated metastatic lesions with peripheral enhancement appear grossly unchanged involving the bilateral cerebral hemispheres, and the posterior right cerebellar hemisphere.        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      Is treated with decadron 10 mg IV q 6 or solumedrol 1 mg/kg q 12 depending on grade     Premed with decadron     Other symptoms besides CRS or ICANS may be hypersensitivity reactions, fevers, myalgias, LFT elevations     Patient would prefer to start after 2 week vacation with family in early March; thus plan after 15th March     No symptoms except occasional balance issues, headaches     As has significant brain lesions supratentorial as well as infratentorial, concern with potential leptomeningeal disease; if headaches worsen, persist, may consider LP/paraneoplastic issues     May consider gamma knife if any lesions become symptomatic neurologically     Has some vasogenic edema; will try trial of low dose steroids to see if headaches/balance improve at 5 mg daily prednisone     Has new FDG uptake T11 and 5 and is having mid back pain-no neurological issues but will get MRI T/L spine     If 3rd line therapy with tarlatamab is ineffective or insurance does not pay, then will attempt Topotecan         MRI T/L spine   3/15/2025    Osseous metastasis in right T4 transverse process and L1 vertebral body. No pathologic compression fracture, as clinically questioned.     Partially imaged known right upper lobe malignancy, mediastinal and hilar beata metastasis.     Mild degenerative changes of thoracic spine, as  detailed above. No significant canal stenosis or foraminal narrowing.     Osseous metastasis in L1 and L2 vertebral bodies. No pathologic compression fracture, as clinically questioned.     Multilevel degenerative changes of lumbar spine with varying degrees of canal stenosis (mild L2-L3 and L4-L5) and foraminal narrowing (mild bilateral L4-L5), as detailed above.     Please see same day MRI thoracic spine with and without contrast for further evaluation.     No pain at these sites/no evidence cord compression     Issue is his lack balance/instability walking     Will repeat MRI brain and C spine     Last MRI brain 2/5/2025 with multiple, new small lesions     However did not have symptoms is s/p WBRT and Rad Onc appropriately wanted to wait to treat discrete lesions if symptomatic     Concern with lack of balance which predates tarlatamab as occurred on cruises he was on     Consider again zometa for bone mets        3/21/2025     No issues with C1D1 tarlatamab     However as above issues with balance-T/L spine MRI with no evidence of cord compression.  Concern with possible leptomeningeal.. Neuro exam though with 5/5 strength.  UE not affected.  Occurred on cruise so pre treatment so not from neurotoxicity due to tarlatamab.    Follow for now; consider RT if any changes     Add zometa for bone mets     Admission 3/17-3/19/2025-C1D1 Tarlatamab      Continued progression of disease on multiple chemotherapeutic agents, now admitted to start 3rd line therapy with tarlatamab  Recent PET 2/7 with increasing RUL and R perihilar masses, interval development of osseous metastases  Recently underwent MRI T/L spine for new FDG uptake T11 and T5 given mid back pain without neurologic deficits, read pending  Status post cycle.  Tolerated well  Discussed with hematology oncology and they want to monitor for 1 more day  Will be a readmission next week for C1D8 as per protocol here at [facility]     C1D15 can be as OP      4/11/2025     Recent admission for headache, blurry vision, paresthesias, ambulatory dysfunction Symptoms concerning for leptomeningeal involvement versus progression of brain mets     Admission 4/4-4/9/2025     LP negative     MRI brain with increased lesions-    - The largest right cerebral hemisphere lesion is seen in the parasagittal right parietal lobe measuring 1.5 x 1.1 cm, previously 0.8 x 0.5 cm (series 9 image 222).     - The largest left cerebral hemisphere lesions are seen in the left centrum semiovale measuring 0.9 x 0.7 cm, previously 0.6 x 0.6 cm (series 9 image 233), and left temporal lobe measuring 0.8 x 0.9 cm, previously 0.5 x 0.6 cm (series 9 image 134)     - The largest left cerebellar hemisphere lesion measures 2.1 x 2 cm, previously 1.1 x 1.2 cm (series 9 image 83).     - The largest right cerebellar hemisphere lesion measures 0.7 x 0.9 cm, previously 0.4 x 0.4 cm (series 9 image 97).     - The largest right brainstem lesion at the junction of anterior right midbrain and mitchel measures 1.7 x 1.4 cm, previously 0.6 x 0.6 cm (series 9 image 115).     - The largest left brainstem lesion at the junction of posterior left midbrain and mitchel measures 1.1 x 1 cm, previously 0.6 x 0.6 cm (series 9 image 117)     There is worsening of perilesional edema in supratentorial and infratentorial brain as well as brainstem, with the worsening most pronounced in the brainstem and right cerebellum. Confluent white matter FLAIR hyperintensity is most likely a combination   of worsened perilesional edema and posttreatment changes.      As most of his symptoms involve the cerebellum, Rad Onc was consulted not to repeat WBRT which cannot be done but for pallliative SRS to select enlarging lesions in both the right and left cerebellum     However since on decadron, his symptoms have all improved     C and L spine MRI with the following    Stable osseous metastasis within the L1 and L2 vertebral bodies with no  extraosseous extension of disease or new lesions.     Mild noncompressive lumbar degenerative change is stable.      Subtle foci of nodular enhancement along the dorsal surface of the cord at C4 and ventral surface of the cord at C6 could represent fortuitous vascular enhancement, although leptomeningeal metastatic disease is not excluded. Recommend further   clinical assessment, consider correlation with CSF analysis.  2.  Degenerative changes as discussed. No significant canal stenosis at any level. Multilevel foraminal stenosis as outlined above.     Decadron currently 4 mg three times daily-will continue for 7 days and then go down to 4 mg bid     Can treat with tarlatamab and decadron     Does not appear to have neurotoxicity related to drug but to progression of disease particularly in cerebellum     Will continue with C2 tarlatamb despite the above as he would like to continue with treatment     While in house, someone had discussion about 6 months or less assuming he stops treatment or has no response to current treatment.  As he is young and would like to try 1-2 more cycles, on steroids, will attempt to treat.  May need dose reduction if headaches, other neurological issues persist     He is aware but is not ready for supportive care/hospice at this point     Is having trouble getting into his house; has 18 steps to climb so planning to move     Was seen by speech pathology had swallow study-see section under dysphagia for rec     Is taking in less water and is dehydrated today-will get IVF     4/18/2025     Did well with tarlatamab 4/14/2025     Decadron 4 mg tid-will decrease to 4 mg bid as confusion, speech, walking all better     Home PT working with patient, feels stronger     Will call if not tolerating steroid taper     Rad Onc will not be giving SRS/ will continue on systemic therapy alone     Labs 4/11/2025 with Na 136 K 4.3 Cr 0.57 ALT?AST 71/20 TB 0.52 AP 45 WBC 10.8 Hgb 13.1 MCV 93 plts 280 ANC  8910       4/29/2025    By RECIST criteria for the patient's right perihilar upper lobe mass (5.4 x 3.6 cm to 5.3 x 3.1 cm), mediastinal lymph node, right hilar lymph nodes, and left infrahilar lymph node show stable disease.  However, the interval development of a new right adrenal nodule would technically represent disease progression, but the patient has only received two cycles of treatment and it is too early to assess response radiographically.  His clinical response has shown significant improvement in multiple areas.     Continue tarlatamab (outpatient) on days 1 and 15 of a 28-day cycle with C2D2 4/28/2025.  Planning for four cycles at this time with repeat imaging after cycle 4.     Taper dexamethasone from 4 mg TID to 4 mg BID     Tumor board recommendation was to avoid radiation given cerebellar and brainstem involvement, and to continue tarlatamab for intracranial progression     Continue to follow with SLP PRN     IVF as needed     Follow up 2 weeks      Summary/Recommendations    Continue outpatient every 14 days tarlatamab s/p C2D15 4/29/2025 with C3D1 planned for 5/13/2025.  Overall, planning for four cycles followed by repeat imaging after cycle 4.    Decrease dexamethasone from 4 mg BID to 2 mg BID     Start Zyrtec and Mucinex    Encouraged follow up with pulmonology as scheduled next week (suspect congestion from seasonal allergies but he is at risk for developing ILD from tarlatamab.    IVF as needed     Follow up 2 weeks

## 2025-05-12 NOTE — PROGRESS NOTES
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 López MD on 4/18/2024  Histopathologic type: Small cell carcinoma, NOS  Stage prefix: Initial diagnosis  Histologic grade (G): G3  Histologic grading system: 4 grade system       3/6/2024 - 3/19/2024 Radiation      Plan ID Energy Fractions Dose per Fraction (cGy) Dose Correction (cGy) Total Dose Delivered (cGy) Elapsed Days   Whole Brain 6X 10 / 10 300 0 3,000 13        4/8/2024 - 10/7/2024 Chemotherapy    alteplase (CATHFLO), 2 mg, Intracatheter, Every 1 Minute as needed, 8 of 10 cycles  palonosetron (ALOXI), 0.25 mg, Intravenous, Once, 3 of 3 cycles  Administration: 0.25 mg (4/29/2024), 0.25 mg (5/20/2024), 0.25 mg (6/17/2024)  fosaprepitant (EMEND) IVPB, 150 mg, Intravenous, Once, 4 of 4 cycles  Administration: 150 mg (4/8/2024), 150 mg (4/29/2024), 150 mg (5/20/2024), 150 mg (6/17/2024)  etoposide (TOPOSAR), 80 mg/m2 = 164 mg, Intravenous, Once, 4 of 4 cycles  Dose modification: 100 mg/m2 (original dose 80 mg/m2, Cycle 1, Reason: Anticipated Tolerance), 80 mg/m2 (original dose 80 mg/m2, Cycle 1, Reason: Anticipated Tolerance), 100 mg/m2 (original dose 80 mg/m2, Cycle 2, Reason: Anticipated Tolerance)  Administration: 164 mg (4/8/2024), 164 mg (4/9/2024), 164 mg  (4/10/2024), 205 mg (4/29/2024), 205 mg (4/30/2024), 205 mg (5/1/2024), 200 mg (5/20/2024), 200 mg (5/21/2024), 200 mg (5/22/2024), 200 mg (6/17/2024), 200 mg (6/18/2024), 200 mg (6/19/2024)  CARBOplatin (PARAPLATIN) IVPB ([facility] [place] DOSING), 750 mg (574.7 % of original dose 130.5 mg), Intravenous, Once, 4 of 4 cycles  Dose modification: 130.5 mg (original dose 130.5 mg, Cycle 1, Reason: Anticipated Tolerance),   (original dose 130.5 mg, Cycle 1, Reason: Other (Must fill in a comment))  Administration: 750 mg (4/8/2024), 701.5 mg (4/29/2024), 664 mg (5/20/2024), 633 mg (6/17/2024)  durvalumab (IMFINZI) IVPB, 1,500 mg, Intravenous, Once, 8 of 10 cycles  Administration: 1,500 mg (4/8/2024), 1,500 mg (4/29/2024), 1,500 mg (5/20/2024), 1,500 mg (6/17/2024), 1,500 mg (7/10/2024), 1,500 mg (8/13/2024), 1,500 mg (9/9/2024), 1,500 mg (10/7/2024)     11/19/2024 - 1/21/2025 Chemotherapy    alteplase (CATHFLO), 2 mg, Intracatheter, Every 1 Minute as needed, 4 of 6 cycles  Lurbinectedin (ZEPZELCA) IVPB, 3.2 mg/m2 = 6.4 mg, Intravenous, Once, 4 of 6 cycles  Administration: 6.4 mg (11/19/2024), 6.4 mg (12/10/2024), 6.4 mg (12/31/2024), 6.4 mg (1/21/2025)     3/17/2025 -  Chemotherapy    tarlatamab (Imdelltra) 10 mg IVPB, 10 mg, 3 of 10 cycles  Administration: 10 mg (3/24/2025), 10 mg (3/31/2025), 10 mg (4/14/2025), 10 mg (4/29/2025)  tarlatamab (Imdelltra) 1 mg IVPB, 1 mg, 1 of 1 cycle  Administration: 1 mg (3/17/2025)        Pertinent Medical History      Review of Systems  ROS was performed and is negative except as indicated in HPI.    Current Outpatient Medications on File Prior to Visit   Medication Sig Dispense Refill    ascorbic acid (VITAMIN C) 250 mg tablet Take 500 mg by mouth daily      pantoprazole (PROTONIX) 40 mg tablet Take 1 tablet (40 mg total) by mouth 2 (two) times a day before meals 60 tablet 0    senna (SENOKOT) 8.6 MG tablet Take 1 tablet by mouth daily as needed for constipation Takes after chemo prn       Cholecalciferol (Vitamin D) 50 MCG (2000 UT) tablet Take 2,000 Units by mouth daily      polyethylene glycol (MIRALAX) 17 g packet Take 17 g by mouth daily for 20 days 340 g 0     No current facility-administered medications on file prior to visit.        Objective   There were no vitals taken for this visit.    Pain Screening:       ECOG ECOG Performance Status: 2 - Ambulatory and capable of all selfcare but unable to carry out any work activities.  Up and about more than 50% of waking hours     Physical Exam  General: WDWN,well-appearing, no acute distress, can ambulate with some assistance  HEENT: PERRLA, moist mucosa, atraumatic  Respiratory: initially ROGELIO end expiratory wheeze, subsequently RUL end expiratory wheeze, no increased work of breathing  Cardiovascular: RRR w/o murmurs, 2+ radial and pedal pulses, no b/l LE edema  Abdomen: soft, non-tender, non-distended, no hepatomegaly or splenomegaly  /Rectal: deferred  Musculoskeletal: moves all four extremities with 5/5 but decreased strength, normal ROM  Integumentary: warm, dry, no rash or bruising  Neurological: no gross or focal deficits identified, normal sensation  Psychiatric: pleasant and cooperative with normal mood, affect, and cognition    Labs: I have reviewed the following labs:  Lab Results   Component Value Date/Time    WBC 12.87 (H) 05/08/2025 09:29 AM    RBC 3.77 (L) 05/08/2025 09:29 AM    Hemoglobin 12.2 05/08/2025 09:29 AM    Hematocrit 36.2 (L) 05/08/2025 09:29 AM    MCV 96 05/08/2025 09:29 AM    MCH 32.4 05/08/2025 09:29 AM    RDW 15.6 (H) 05/08/2025 09:29 AM    Platelets 281 05/08/2025 09:29 AM    Segmented % 91 (H) 04/24/2025 12:11 PM    Lymphocytes % 6 (L) 05/08/2025 09:29 AM    Lymphocytes % 3 (L) 04/24/2025 12:11 PM    Monocytes % 9 05/08/2025 09:29 AM    Monocytes % 5 04/24/2025 12:11 PM    Eosinophils % 1 05/08/2025 09:29 AM    Eosinophils Relative 0 04/24/2025 12:11 PM    Basophils % 0 05/08/2025 09:29 AM    Basophils Relative 0  04/24/2025 12:11 PM    Immature Grans % 1 04/24/2025 12:11 PM    Absolute Neutrophils 12.01 (H) 04/24/2025 12:11 PM     Lab Results   Component Value Date/Time    Potassium 4.3 05/08/2025 09:29 AM    Chloride 97 05/08/2025 09:29 AM    CO2 28 05/08/2025 09:29 AM    BUN 24 05/08/2025 09:29 AM    Creatinine 0.51 (L) 05/08/2025 09:29 AM    Glucose, Fasting 97 05/08/2025 09:29 AM    Calcium 8.5 05/08/2025 09:29 AM    AST 20 05/08/2025 09:29 AM    ALT 43 05/08/2025 09:29 AM    Alkaline Phosphatase 42 05/08/2025 09:29 AM    Total Protein 6.6 05/08/2025 09:29 AM    Albumin 3.6 05/08/2025 09:29 AM    Total Bilirubin 0.54 05/08/2025 09:29 AM    eGFR 116 05/08/2025 09:29 AM     Lab Results   Component Value Date/Time    WBC 12.87 (H) 05/08/2025 09:29 AM    RBC 3.77 (L) 05/08/2025 09:29 AM    Hemoglobin 12.2 05/08/2025 09:29 AM    Hematocrit 36.2 (L) 05/08/2025 09:29 AM    MCV 96 05/08/2025 09:29 AM    MCH 32.4 05/08/2025 09:29 AM    RDW 15.6 (H) 05/08/2025 09:29 AM    Platelets 281 05/08/2025 09:29 AM    Segmented % 91 (H) 04/24/2025 12:11 PM    Bands % 1 05/08/2025 09:29 AM    Lymphocytes % 6 (L) 05/08/2025 09:29 AM    Lymphocytes % 3 (L) 04/24/2025 12:11 PM    Monocytes % 9 05/08/2025 09:29 AM    Monocytes % 5 04/24/2025 12:11 PM    Eosinophils % 1 05/08/2025 09:29 AM    Eosinophils Relative 0 04/24/2025 12:11 PM    Basophils % 0 05/08/2025 09:29 AM    Basophils Relative 0 04/24/2025 12:11 PM    Immature Grans % 1 04/24/2025 12:11 PM    Absolute Neutrophils 12.01 (H) 04/24/2025 12:11 PM      Lab Results   Component Value Date/Time    Sodium 134 (L) 05/08/2025 09:29 AM    Potassium 4.3 05/08/2025 09:29 AM    Chloride 97 05/08/2025 09:29 AM    CO2 28 05/08/2025 09:29 AM    ANION GAP 9 05/08/2025 09:29 AM    BUN 24 05/08/2025 09:29 AM    Creatinine 0.51 (L) 05/08/2025 09:29 AM    Glucose 108 04/24/2025 12:11 PM    Glucose, Fasting 97 05/08/2025 09:29 AM    Calcium 8.5 05/08/2025 09:29 AM    AST 20 05/08/2025 09:29 AM    ALT 43  05/08/2025 09:29 AM    Alkaline Phosphatase 42 05/08/2025 09:29 AM    Total Protein 6.6 05/08/2025 09:29 AM    Albumin 3.6 05/08/2025 09:29 AM    Total Bilirubin 0.54 05/08/2025 09:29 AM    eGFR 116 05/08/2025 09:29 AM      Radiology Results Review : No pertinent imaging studies reviewed.    Administrative Statements   I have spent a total time of 35 minutes in caring for this patient on the day of the visit/encounter including Diagnostic results, Prognosis, Risks and benefits of tx options, Instructions for management, Patient and family education, Importance of tx compliance, Risk factor reductions, Impressions, Counseling / Coordination of care, Documenting in the medical record, Reviewing/placing orders in the medical record (including tests, medications, and/or procedures), Obtaining or reviewing history  , and Communicating with other healthcare professionals .    Additional recommendations to follow per attending, Dr. Morrell.    Mandeep Talley DO, PGY4  Hematology/Oncology Fellow

## 2025-05-13 ENCOUNTER — OFFICE VISIT (OUTPATIENT)
Age: 60
End: 2025-05-13
Payer: COMMERCIAL

## 2025-05-13 ENCOUNTER — HOSPITAL ENCOUNTER (OUTPATIENT)
Dept: INFUSION CENTER | Facility: HOSPITAL | Age: 60
Discharge: HOME | End: 2025-05-13
Attending: INTERNAL MEDICINE
Payer: COMMERCIAL

## 2025-05-13 VITALS
BODY MASS INDEX: 28.72 KG/M2 | RESPIRATION RATE: 18 BRPM | DIASTOLIC BLOOD PRESSURE: 71 MMHG | HEART RATE: 58 BPM | OXYGEN SATURATION: 94 % | SYSTOLIC BLOOD PRESSURE: 110 MMHG | TEMPERATURE: 97.4 F | HEIGHT: 70 IN

## 2025-05-13 DIAGNOSIS — C79.31 LUNG CANCER METASTATIC TO BRAIN (HCC): ICD-10-CM

## 2025-05-13 DIAGNOSIS — C34.90 LUNG CANCER METASTATIC TO BRAIN (HCC): ICD-10-CM

## 2025-05-13 DIAGNOSIS — R13.10 DYSPHAGIA, UNSPECIFIED TYPE: ICD-10-CM

## 2025-05-13 DIAGNOSIS — C34.00 SMALL CELL CARCINOMA OF HILUM OF LUNG, UNSPECIFIED LATERALITY (HCC): Primary | ICD-10-CM

## 2025-05-13 DIAGNOSIS — C34.11 SMALL CELL CARCINOMA OF UPPER LOBE OF RIGHT LUNG (HCC): Primary | ICD-10-CM

## 2025-05-13 LAB

## 2025-05-13 PROCEDURE — 99215 OFFICE O/P EST HI 40 MIN: CPT | Performed by: INTERNAL MEDICINE

## 2025-05-13 PROCEDURE — 96413 CHEMO IV INFUSION 1 HR: CPT

## 2025-05-13 PROCEDURE — 81001 URINALYSIS AUTO W/SCOPE: CPT | Performed by: INTERNAL MEDICINE

## 2025-05-13 RX ORDER — SODIUM CHLORIDE 9 MG/ML
20 INJECTION, SOLUTION INTRAVENOUS ONCE
Status: COMPLETED | OUTPATIENT
Start: 2025-05-13 | End: 2025-05-13

## 2025-05-13 RX ADMIN — TARLATAMAB-DLLE 10 MG: KIT at 08:44

## 2025-05-13 RX ADMIN — SODIUM CHLORIDE 20 ML/HR: 0.9 INJECTION, SOLUTION INTRAVENOUS at 07:56

## 2025-05-13 NOTE — PROGRESS NOTES
Nino Silva  tolerated treatment well with no complications.      Nino Silva is aware of future appt on 5/27 at 0730.     AVS printed and given to Nino Silva:    No (Declined by Nino Silva)

## 2025-05-13 NOTE — PATIENT INSTRUCTIONS
Office follow up in two weeks    Decrease dexamethasone from 4 BID to 2 BID    Start Zyrtec and Mucinex    Pulmonology follow up as scheduled

## 2025-05-16 ENCOUNTER — PATIENT OUTREACH (OUTPATIENT)
Dept: CASE MANAGEMENT | Facility: HOSPITAL | Age: 60
End: 2025-05-16

## 2025-05-20 ENCOUNTER — TELEPHONE (OUTPATIENT)
Age: 60
End: 2025-05-20

## 2025-05-20 NOTE — PROGRESS NOTES
"LSW made outreach to the pt to update him on the status of his rental assistance.  Pt was appreciative of this help. He shared that while he is keeping his apartment, he does plan to move in with his sister as the steps have been too difficult for him to manage.  Pt has not been able to get outdoors unless he has an appointment as he can't get up and down unless he \"absolutely needs to do so.\"  His sister's apartment that she has prepared for him in on one floor and he will be able  to get outdoors on warm days.  Pt maintains a positive attitude and outlook as he spoke about his family and the weather.  Significant support provided and LSW will plan to follow.   "

## 2025-05-20 NOTE — TELEPHONE ENCOUNTER
Patient called and requested appointment 5/23 at 9:00 am with Dr Darin camacho virtual. Patient requested a call back with update if that is ok. Please advise.

## 2025-05-21 NOTE — TELEPHONE ENCOUNTER
Called Pt and informed him Appt was changed to Virtual at his request. Nino expressed understanding.

## 2025-05-22 ENCOUNTER — APPOINTMENT (OUTPATIENT)
Dept: LAB | Facility: HOSPITAL | Age: 60
End: 2025-05-22
Payer: COMMERCIAL

## 2025-05-23 ENCOUNTER — TELEMEDICINE (OUTPATIENT)
Age: 60
End: 2025-05-23

## 2025-05-23 VITALS — HEIGHT: 70 IN | BODY MASS INDEX: 28.63 KG/M2 | HEART RATE: 67 BPM | OXYGEN SATURATION: 92 % | WEIGHT: 200 LBS

## 2025-05-23 DIAGNOSIS — C34.90 LUNG CANCER METASTATIC TO BRAIN (HCC): ICD-10-CM

## 2025-05-23 DIAGNOSIS — J84.9 ILD (INTERSTITIAL LUNG DISEASE) (HCC): ICD-10-CM

## 2025-05-23 DIAGNOSIS — C34.90 SMALL CELL CARCINOMA OF LUNG, UNSPECIFIED LATERALITY, UNSPECIFIED PART OF LUNG (HCC): ICD-10-CM

## 2025-05-23 DIAGNOSIS — C79.31 LUNG CANCER METASTATIC TO BRAIN (HCC): ICD-10-CM

## 2025-05-23 DIAGNOSIS — J44.9 CHRONIC OBSTRUCTIVE PULMONARY DISEASE, UNSPECIFIED COPD TYPE (HCC): Primary | ICD-10-CM

## 2025-05-23 NOTE — ASSESSMENT & PLAN NOTE
With oncology for treatment of small cell lung cancer.  He remains relatively asymptomatic from a pulmonary perspective per him.

## 2025-05-23 NOTE — ASSESSMENT & PLAN NOTE
Patient remains relatively asymptomatic from a pulmonary perspective.  I had again recommended using Anoro but patient states that he does not need it as he is relatively asymptomatic.  Hold off any inhalers at this time.  If patient feels symptomatic with increased shortness of breath or increased cough or sputum production, patient will reach out to us for further evaluation.

## 2025-05-23 NOTE — PROGRESS NOTES
"Virtual Regular VisitName: Nino Silva      : 1965      MRN: [id number]  Encounter Provider: Harmeet Webster MD  Encounter Date: 2025   Encounter department: [facility]  :  Assessment & Plan  Chronic obstructive pulmonary disease, unspecified COPD type (HCC)  Patient remains relatively asymptomatic from a pulmonary perspective.  I had again recommended using Anoro but patient states that he does not need it as he is relatively asymptomatic.  Hold off any inhalers at this time.  If patient feels symptomatic with increased shortness of breath or increased cough or sputum production, patient will reach out to us for further evaluation.         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  He remains relatively asymptomatic from a pulmonary perspective per him.         ILD (interstitial lung disease) (HCC)  Based on patient's current treatment course for small cell lung cancer, I do not recommend treatment or workup for interstitial lung disease concerning for idiopathic pulmonary fibrosis.  If small cell lung cancer goes into remission, I recommend evaluation of this.             History of Present Illness     Patient denies significant pulmonary symptoms.  No shortness of breath.  Intermittent cough that he complains \"originates from his throat\".  No fevers or chills.  No dizziness or lightheadedness.  No recent pulmonary illness.  He continues to get treatment for small cell lung cancer.  He states that he believes that is going well.      Review of Systems   Constitutional:  Positive for fatigue. Negative for activity change, appetite change and chills.   HENT:  Positive for congestion.    Respiratory:  Negative for cough, chest tightness and shortness of breath.    Cardiovascular:  Negative for chest pain.   Gastrointestinal:  " "Negative for abdominal pain.   Musculoskeletal:  Negative for arthralgias.   Neurological:  Negative for dizziness, weakness and light-headedness.   Psychiatric/Behavioral:  Negative for confusion.        Objective   Pulse 67   Ht 5' 10\" (1.778 m)   Wt 90.7 kg (200 lb)   SpO2 92%   BMI 28.70 kg/m²     Physical Exam  Constitutional:       General: He is not in acute distress.     Appearance: He is not ill-appearing or toxic-appearing.   HENT:      Head: Normocephalic and atraumatic.      Nose: No rhinorrhea.     Eyes:      General: No scleral icterus.    Pulmonary:      Effort: No respiratory distress.     Skin:     Coloration: Skin is not jaundiced.     Neurological:      General: No focal deficit present.      Mental Status: He is oriented to person, place, and time.       I personally viewed and interpreted the following imaging studies:  Chest 4/24/2025 shows basilar predominant fibrotic changes with thickened interstitium and mild honeycombing.  There is diffuse but apical predominant emphysema with paraseptal emphysema particularly in the bases.    Administrative Statements   Encounter provider Harmeet Webster MD    The Patient is located at Home and in the following state in which I hold an active license [place].    The patient was identified by name and date of birth. Nino Silva was informed that this is a telemedicine visit and that the visit is being conducted through the Epic Embedded platform. He agrees to proceed..  My office door was closed. No one else was in the room.  He acknowledged consent and understanding of privacy and security of the video platform. The patient has agreed to participate and understands they can discontinue the visit at any time.    I have spent a total time of 26 minutes in caring for this patient on the day of the visit/encounter including Prognosis, Risks and benefits of tx options, Impressions, and Counseling / Coordination of care, not including the time spent for " establishing the audio/video connection.

## 2025-05-23 NOTE — ASSESSMENT & PLAN NOTE
Based on patient's current treatment course for small cell lung cancer, I do not recommend treatment or workup for interstitial lung disease concerning for idiopathic pulmonary fibrosis.  If small cell lung cancer goes into remission, I recommend evaluation of this.

## 2025-05-26 NOTE — PROGRESS NOTES
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 mg/m2, Cycle 2, Reason: Anticipated Tolerance)  Administration: 164 mg (4/8/2024), 164 mg (4/9/2024), 164 mg (4/10/2024), 205 mg (4/29/2024), 205 mg (4/30/2024), 205 mg (5/1/2024), 200 mg (5/20/2024), 200 mg (5/21/2024), 200 mg (5/22/2024), 200 mg (6/17/2024), 200 mg (6/18/2024), 200 mg (6/19/2024)  CARBOplatin (PARAPLATIN) IVPB (GOG AUC DOSING), 750 mg (574.7 % of original dose 130.5 mg), Intravenous, Once, 4 of 4 cycles  Dose modification: 130.5 mg (original dose 130.5 mg, Cycle 1, Reason: Anticipated Tolerance),   (original dose 130.5 mg, Cycle 1, Reason: Other (Must fill in a comment))  Administration: 750 mg (4/8/2024), 701.5 mg (4/29/2024), 664 mg (5/20/2024), 633 mg (6/17/2024)  durvalumab (IMFINZI) IVPB, 1,500 mg, Intravenous, Once, 8 of 10 cycles  Administration: 1,500 mg (4/8/2024), 1,500 mg (4/29/2024), 1,500 mg (5/20/2024), 1,500 mg (6/17/2024), 1,500 mg (7/10/2024), 1,500 mg (8/13/2024), 1,500 mg (9/9/2024), 1,500 mg (10/7/2024)     11/19/2024 - 1/21/2025 Chemotherapy    alteplase (CATHFLO), 2 mg, Intracatheter, Every 1 Minute as needed, 4 of 6 cycles  Lurbinectedin (ZEPZELCA) IVPB, 3.2 mg/m2 = 6.4 mg, Intravenous, Once, 4 of 6 cycles  Administration: 6.4 mg  (11/19/2024), 6.4 mg (12/10/2024), 6.4 mg (12/31/2024), 6.4 mg (1/21/2025)     3/17/2025 -  Chemotherapy    tarlatamab (Imdelltra) 10 mg IVPB, 10 mg, 3 of 10 cycles  Administration: 10 mg (3/24/2025), 10 mg (3/31/2025), 10 mg (4/14/2025), 10 mg (4/29/2025), 10 mg (5/13/2025)  tarlatamab (Imdelltra) 1 mg IVPB, 1 mg, 1 of 1 cycle  Administration: 1 mg (3/17/2025)        Pertinent Medical History   02/09/25:   Discussion  extended stage SCLC/Initial treatment      Most patients with eSCLC have disease relapse; thus this is incurable.  However the cornerstone of treatment remains platinum based.  Response rates are as high as 61% with CR of 10%.  Responses to chemotherapy are frequent and rapid with median time to best response in approximately 4.6 weeks.  Unfortunately, these are no durable responses and the median time to progression (TTP) is 4 months with OS 8.6 months.  Despite predictable relapse randomized studies have not shown a survival benefit for prolonged administration of chemotherapy and thus optimal dosing is 4-6 cycles.  There has been a study comparing Cisplatin to Carboplatin/noninferiority demonstrating that there is no difference with PFS, OS CARTER and thus can be used interchangeably although Cisplatin remains preferred.     The first study to demonstrate any improvement in OS in the first line treatment of eSCLC in several decades was Impower 133 a randomized Phase III trial of Carboplatin/Etoposide with the PDL1 inhibitor atezolizumab.  This was a global trial with patients with eSCLC getting 4 cycles of Carbo/Etoposide with or without concurrent atezolizumab/placebo with maintenance afterward.  The addition of atezolizumab led to significant improvement in OS 12.3 vs 10.3 months HR 0.7 as well as PFS .  OS was independent of PDL1 expression.  The CASPIAN study demonstrated that durvalumab with first suzi therapy was similar in terms of response with OS at 13 months vs 10.3 months.  Either agent can  be used in this setting.  KEYNOTE 604 with Pembrolizumab was negative for OS benefit which may be a design/patient flaw as generally these agents are similar in terms of efficacy.          Discussion Large cell neuroendocrine        The clinical presentation of large cell neuroendocrine or ?C??? appears similar to the other high-grade neuroendocrine pulmonary tumor, small cell lung ?????r (?C?C), with notable exceptions: primary LCNECs tend to be located peripherally rather than centrally, and presentation of LCNECs with early-stage (I to II) disease is more common than for S??C (approximately 25 versus less than 5 percent). Thus, patients with ??N?C more commonly undergo resection.       For patients with stage IV disease, we suggest using a standard S?LC regimen (etoposide plus a either carboplatin or cisplatin) for four to six cycles, independent of retinoblastoma gene 1 (RB1) status.     However, prognosis for this tumor type is poor regardless of choice of ?h?m?th?r?py, and other options also are reasonable.     The approach of Denise is based on integrative profiling that clearly identifies ?CN?? to be a neuroendocrine tumor most similar to ?CL?     Data from a prospective phase II trial of 29 patients treated with etoposide/cisplatin along with review of published retrospective experience with ?h?m?ther?py in stage IV ??N?? demonstrated that major efficacy endpoints were similar to SCL?, with the exception that overall response rate is lower (34 percent) than the approximate 60 percent rate observed in S?L?     Thus, these are treated as small cell     Discussion progression/second line      Sensitive vs > 6 months      Single-agent ?h?m?th?r?py is standard second-line treatment after ?l?ti?um-based ?h?m?th?rap? and immunotherapy. Lurbinectedin represents our preferred initial approach, although tarlatamab or camptothecins are acceptable alternatives.      For patients that are not eligible for, cannot  tolerate, or progress on tarlatamab, other ?h?m?ther?p? agents are available        Data below represent efficacy of ?h?m?thera?? after progression on initial combination ?h?m?ther?py. These trials were conducted prior to introduction of immunotherapy into the management of SC?C.     Lurbinectedin is an alkylating agent that has received accelerated approval by the US Food and Drug Administration (FDA) for patients with metastatic ?CLC with disease progression on or after platin?m-based ?h?m?th?r??? [3]. It is given at a dose of 3.2 mg/m2 intravenously (IV) every three weeks.        In an open-label study of 105 patients with SCL? and no brain m?t??ta?es who progressed on or after ?l?tinum-based ?h?m?th?rap?, 8 percent of whom had prior immunotherapy in addition to plati?um-based ?h?m?th?r?py, the overall response rate (CARTER) with lurbinectedin according to independent review committee was 33 percent The median duration of response was 5.1 months, and 25 percent of responding patients experienced a duration of response exceeding six months. Among patients with resistant relapse (?h?m?th?r?py-free interval <90 days), the CARTER was 22 percent with a progression-free survival (PFS) of 2.6 months and an overall survival (OS) of 5 months. For patients with sensitive relapse, the CARTER was 45 percent with a PFS of 4.5 months and an OS of 11.9 months      All patients in this study received a prespecified antiemetic regimen consisting of a corticosteroid and serotonin antagonist. Serious adverse reactions occurred in 10 percent of patients, of which neutropenia and febrile neutropenia were the most common (5 percent for each).        Tarlatamab is a reasonable alternative to lurbinectedin as a second-line treatment option, extrapolating from data in the third-line setting. These data are discussed below.      Topotecan has been shown to increase survival compared with best supportive care (BSC) and results in greater symptom  management relative to a multiagent regimen [5,6]. It is approved by the FDA for relapses that occur after 45 days from the completion of ?h?m?ther?p?. ????t???? also has activity against brain m?t?st???s. The primary toxicities of t???te??? are hematologic, with most patients experiencing grade 3 or 4 neutropenia, anemia, or thrombocytopenia.     In a trial in which 211 patients with relapse >=60 days after completion of first-line therapy (usually pl?tinum plus etoposide) were randomly assigned to daily IV topotecan or cyclophosphamide, doxorubicin, and vincristine (CAV), disease outcomes were similar between the groups, but cancer symptoms were improved with t???t?can  For t???te??? versus CAV, the ORRs were 24 versus 18 percent, the median time to progression was 13 versus 12 weeks, and the median survival was 25 versus 24.7 weeks, differences that were not statistically significant. Control of several symptoms including dyspnea, anorexia, hoarseness, and fatigue was improved with t???t??a?. Severe neutropenia was less common with t???te?an (38 versus 51 percent), but grade 3 or 4 thrombocytopenia and anemia occurred more frequently (9.8 versus 1.4 percent and 17.7 versus 7.2 percent, respectively). The improvement in symptom control led to its FDA approval.\         Oral and IV formulations of topotecan have been extensively evaluated for the second-line treatment of relapsed ?CLC and are found to be equally effective     In a phase III noninferiority trial, 304 patients with chemosensitive relapse (>90 days) of ???C were randomly assigned to oral (2.3 mg/m2 daily for five days) or IV (1.5 mg/m2 daily for five days) topotecan every three weeks. Response rates were similar (18 versus 22 percent), as were median and one-year survival rates (33 versus 35 weeks and 33 versus 29 percent, respectively) [9]. The toxicity profiles of the two regimens were similar as well.        Topotecan daily times five every three  "weeks rather than a once-weekly schedule, given better efficacy in cross-trial comparisons. In the  phase II trial, 192 patients with previously treated ??L? were randomly assigned to weekly t???te?an with or without aflibercept after progression on a ?l?ti??m-based regimen. Only two partial responses (1 percent) were observed in the entire study population (both in patients who also received ?flib?r?e?t), and the median OS was approximately five months. Response rates of the daily times five every three weeks schedule were higher in other studies, on the order of 20 percent  These data are discussed above.         As an alternative to topotecan, three small clinical trials have evaluated irinotecan as second-line therapy, with objective response rates ranging from 16 to 47 percent . As an example, in one study, 44 patients (17 with sensitive relapse and 27 with resistant or refractory disease) were treated with iri??t?c?n 125 mg/m2 weekly times four with a two-week break. The overall objective response rate was 16 percent. The response rate in patients with resistant or refractory disease was 4 percent. The response rate was 35 percent in the patients with sensitive relapse, with a median survival of 6.8 months. Primary toxicities of iri??te??n included diarrhea and neutropenia. Results for those with sensitive relapse are discussed below.         Tarlatamab, is a bispecific T-cell engager immunotherapy that directs the patient's T cells to cancer cells expressing delta-like ligand 3 (overexpressed in approximately 90 percent of SCLCs). It is approved by the [place] Food and  Drug Administration (FDA) at a dose of 10 mg intravenously every two weeks (after an initial \"step-up\" dosing schedule) . The approval is based on response rates and is contingent upon results of a confirmatory trial.     Tarlatamab has shown promising activity in a phase II trial among patients with disease that had relapsed after, or was " refractory to, one platin?m-based treatment regimen and at least one other line of therapy. At a dose of 10 mg intravenously every two weeks, the response rate was 40 percent, median progression-free survival was 4.9 months, and overall survival was 14.3 months  With longer follow-up (median 12.1 months), the response rate was 35 percent and median overall survival was 20 months.     The most common adverse events in these patients were cytokine-release syndrome (CRS; in 51 percent), decreased appetite (29 percent), and pyrexia (35 percent). Grade 3 CRS occurred in 1 percent.     In a pooled safety analysis reported in the FDA label         ?CRS occurred in 55 percent, mostly occurring during treatment cycle 1. The median time to onset of any grade CRS from last exposure to tarlatamab was 13.5 hours. CRS can manifest as pyrexia, hypotension, fatigue, tachycardia, headache, hypoxia, nausea, and vomiting. Potentially life-threatening complications of CRS include cardiac dysfunction, acute respiratory distress syndrome, neurologic toxicity, kidney and/or hepatic failure, and disseminated intravascular coagulation. Management of CRS is discussed elsewhere        ?Neurologic toxicity occurred in 47 percent, including 10 percent with grade 3 toxicity. The most frequent neurologic toxicities included headache (14 percent), peripheral neuropathy (7 percent), dizziness (7 percent), and insomnia (6 percent). Immune effector cell-associated neurotoxicity syndrome (ICANS), which can present as confusion, lethargy, or disorientation (among other symptoms) occurred in 9 percent. It most frequently occurred following cycle 2 day 1 and had a median time of onset from first dose of 29.5 days. Further discussion of clinical features and management of ICANS is found elsewhere           Review of Systems   Constitutional:  Positive for fatigue.   HENT: Negative.     Eyes: Negative.    Respiratory: Negative.     Cardiovascular:  Negative.    Gastrointestinal: Negative.    Endocrine: Negative.    Genitourinary: Negative.    Musculoskeletal: Negative.    Neurological:  Positive for headaches.   Hematological: Negative.    Psychiatric/Behavioral: Negative.               03/19/25:     04/11/25:     04/17/25:     05/26/25:      Review of Systems   Constitutional:  Positive for appetite change, fatigue and unexpected weight change.   HENT: Negative.     Respiratory:  Positive for shortness of breath and wheezing.    Cardiovascular: Negative.    Genitourinary: Negative.    Musculoskeletal:  Positive for arthralgias and myalgias.   Skin: Negative.    Neurological:  Positive for tremors, weakness and headaches.           Objective   There were no vitals taken for this visit.    Pain Screening:     ECOG   3-4  Physical Exam telemedicine no exam     Labs: I have reviewed the following labs:  Lab Results   Component Value Date/Time    WBC 17.39 (H) 05/22/2025 03:40 PM    RBC 3.87 (L) 05/22/2025 03:40 PM    Hemoglobin 12.5 05/22/2025 03:40 PM    Hematocrit 37.6 05/22/2025 03:40 PM    MCV 97 05/22/2025 03:40 PM    MCH 32.3 05/22/2025 03:40 PM    RDW 15.6 (H) 05/22/2025 03:40 PM    Platelets 282 05/22/2025 03:40 PM    Segmented % 91 (H) 04/24/2025 12:11 PM    Lymphocytes % 6 (L) 05/22/2025 03:40 PM    Lymphocytes % 3 (L) 04/24/2025 12:11 PM    Monocytes % 2 (L) 05/22/2025 03:40 PM    Monocytes % 5 04/24/2025 12:11 PM    Eosinophils % 0 05/22/2025 03:40 PM    Eosinophils Relative 0 04/24/2025 12:11 PM    Basophils % 0 05/22/2025 03:40 PM    Basophils Relative 0 04/24/2025 12:11 PM    Immature Grans % 1 04/24/2025 12:11 PM    Absolute Neutrophils 12.01 (H) 04/24/2025 12:11 PM     Lab Results   Component Value Date/Time    Potassium 4.7 05/22/2025 03:40 PM    Chloride 100 05/22/2025 03:40 PM    CO2 28 05/22/2025 03:40 PM    BUN 33 (H) 05/22/2025 03:40 PM    Creatinine 0.84 05/22/2025 03:40 PM    Glucose, Fasting 97 05/08/2025 09:29 AM    Calcium 8.5  05/22/2025 03:40 PM    AST 29 05/22/2025 03:40 PM    ALT 58 (H) 05/22/2025 03:40 PM    Alkaline Phosphatase 51 05/22/2025 03:40 PM    Total Protein 6.9 05/22/2025 03:40 PM    Albumin 3.8 05/22/2025 03:40 PM    Total Bilirubin 0.40 05/22/2025 03:40 PM    eGFR 95 05/22/2025 03:40 PM           Administrative Statements   Encounter provider Cortney Morrell MD    The Patient is located at Home and in the following state in which I hold an active license [place].    The patient was identified by name and date of birth. Nino Silva was informed that this is a telemedicine visit and that the visit is being conducted through the Epic Embedded platform. He agrees to proceed..  My office door was closed. No one else was in the room.  He acknowledged consent and understanding of privacy and security of the video platform. The patient has agreed to participate and understands they can discontinue the visit at any time.    I have spent a total time of 40 minutes in caring for this patient on the day of the visit/encounter including Impressions, Counseling / Coordination of care, Documenting in the medical record, Reviewing/placing orders in the medical record (including tests, medications, and/or procedures), and Obtaining or reviewing history  , not including the time spent for establishing the audio/video connection.

## 2025-05-27 ENCOUNTER — TELEPHONE (OUTPATIENT)
Age: 60
End: 2025-05-27

## 2025-05-27 ENCOUNTER — HOSPITAL ENCOUNTER (OUTPATIENT)
Dept: INFUSION CENTER | Facility: HOSPITAL | Age: 60
Discharge: HOME | End: 2025-05-27
Attending: INTERNAL MEDICINE
Payer: COMMERCIAL

## 2025-05-27 VITALS
RESPIRATION RATE: 16 BRPM | HEIGHT: 70 IN | TEMPERATURE: 97.2 F | HEART RATE: 83 BPM | SYSTOLIC BLOOD PRESSURE: 116 MMHG | DIASTOLIC BLOOD PRESSURE: 69 MMHG | OXYGEN SATURATION: 94 % | BODY MASS INDEX: 28.7 KG/M2

## 2025-05-27 DIAGNOSIS — C34.00 SMALL CELL CARCINOMA OF HILUM OF LUNG, UNSPECIFIED LATERALITY (HCC): Primary | ICD-10-CM

## 2025-05-27 PROCEDURE — 96413 CHEMO IV INFUSION 1 HR: CPT

## 2025-05-27 RX ORDER — SODIUM CHLORIDE 9 MG/ML
20 INJECTION, SOLUTION INTRAVENOUS ONCE
Status: COMPLETED | OUTPATIENT
Start: 2025-05-27 | End: 2025-05-27

## 2025-05-27 RX ADMIN — SODIUM CHLORIDE 20 ML/HR: 0.9 INJECTION, SOLUTION INTRAVENOUS at 08:24

## 2025-05-27 RX ADMIN — TARLATAMAB-DLLE 10 MG: KIT at 08:27

## 2025-05-27 NOTE — PROGRESS NOTES
Nino Silva  tolerated treatment well with no complications.  Pt. Observed for 3 hours post infusion.    Nino Silva is aware of future appt on 06/10/2025 at 07:30 AM.     AVS printed and given to Nino Silva:  No (Declined by Nino Silva)

## 2025-05-27 NOTE — PLAN OF CARE
Problem: Potential for Falls  Goal: Patient will remain free of falls  Description: INTERVENTIONS:  - Educate patient/family on patient safety including physical limitations  - Instruct patient to call for assistance with activity   - Consider consulting OT/PT to assist with strengthening/mobility based on AM PAC & JH-HLM score  - Consult OT/PT to assist with strengthening/mobility   - Keep Call bell within reach  - Keep bed low and locked with side rails adjusted as appropriate  - Keep care items and personal belongings within reach  - Initiate and maintain comfort rounds  - Make Fall Risk Sign visible to staff  - Offer Toileting every x Hours, in advance of need  - Initiate/Maintain xalarm  - Obtain necessary fall risk management equipment: x  - Apply yellow socks and bracelet for high fall risk patients  - Consider moving patient to room near nurses station  Outcome: Progressing     Problem: Knowledge Deficit  Goal: Patient/family/caregiver demonstrates understanding of disease process, treatment plan, medications, and discharge instructions  Description: Complete learning assessment and assess knowledge base.  Interventions:  - Provide teaching at level of understanding  - Provide teaching via preferred learning methods  Outcome: Progressing

## 2025-05-28 ENCOUNTER — TELEPHONE (OUTPATIENT)
Age: 60
End: 2025-05-28

## 2025-05-28 ENCOUNTER — TELEMEDICINE (OUTPATIENT)
Age: 60
End: 2025-05-28
Payer: COMMERCIAL

## 2025-05-28 DIAGNOSIS — C34.11 SMALL CELL CARCINOMA OF UPPER LOBE OF RIGHT LUNG (HCC): Primary | ICD-10-CM

## 2025-05-28 DIAGNOSIS — C79.31 METASTASIS TO BRAIN (HCC): ICD-10-CM

## 2025-05-28 PROCEDURE — 99215 OFFICE O/P EST HI 40 MIN: CPT | Performed by: INTERNAL MEDICINE

## 2025-05-28 NOTE — TELEPHONE ENCOUNTER
Called and screened patient for MRI.  Advised patient office would call back with MRI and PET date and time.

## 2025-05-28 NOTE — TELEPHONE ENCOUNTER
Patient saw Dr. Morrell today. Plan to hold treatment until after scans. Please cancel 6/10 appointment. Will update after scans

## 2025-05-28 NOTE — TELEPHONE ENCOUNTER
Called patient back provided MRI and PET dates and times and scheduled 2 wk fu with patient.  Mailed paperwork to patient

## 2025-05-30 ENCOUNTER — PATIENT OUTREACH (OUTPATIENT)
Dept: HEMATOLOGY ONCOLOGY | Facility: CLINIC | Age: 60
End: 2025-05-30

## 2025-06-02 ENCOUNTER — NURSE TRIAGE (OUTPATIENT)
Age: 60
End: 2025-06-02

## 2025-06-02 ENCOUNTER — APPOINTMENT (EMERGENCY)
Dept: RADIOLOGY | Facility: HOSPITAL | Age: 60
End: 2025-06-02
Payer: COMMERCIAL

## 2025-06-02 ENCOUNTER — APPOINTMENT (EMERGENCY)
Dept: CT IMAGING | Facility: HOSPITAL | Age: 60
End: 2025-06-02
Payer: COMMERCIAL

## 2025-06-02 ENCOUNTER — HOSPITAL ENCOUNTER (EMERGENCY)
Facility: HOSPITAL | Age: 60
Discharge: HOME | End: 2025-06-02
Attending: EMERGENCY MEDICINE | Admitting: EMERGENCY MEDICINE
Payer: COMMERCIAL

## 2025-06-02 VITALS
BODY MASS INDEX: 27.26 KG/M2 | DIASTOLIC BLOOD PRESSURE: 91 MMHG | WEIGHT: 190 LBS | SYSTOLIC BLOOD PRESSURE: 132 MMHG | TEMPERATURE: 98.1 F | OXYGEN SATURATION: 95 % | RESPIRATION RATE: 16 BRPM | HEART RATE: 71 BPM

## 2025-06-02 DIAGNOSIS — C79.31 METASTATIC CANCER TO BRAIN (HCC): Primary | ICD-10-CM

## 2025-06-02 LAB
2HR DELTA HS TROPONIN: 2 NG/L
ALBUMIN SERPL BCG-MCNC: 3.8 G/DL (ref 3.5–5)
ALP SERPL-CCNC: 47 U/L (ref 34–104)
ALT SERPL W P-5'-P-CCNC: 44 U/L (ref 7–52)
ANION GAP SERPL CALCULATED.3IONS-SCNC: 12 MMOL/L (ref 4–13)
ANISOCYTOSIS BLD QL SMEAR: PRESENT
AST SERPL W P-5'-P-CCNC: 27 U/L (ref 13–39)
BASOPHILS # BLD MANUAL: 0 THOUSAND/UL (ref 0–0.1)
BASOPHILS NFR MAR MANUAL: 0 % (ref 0–1)
BILIRUB SERPL-MCNC: 0.38 MG/DL (ref 0.2–1)
BNP SERPL-MCNC: 48 PG/ML (ref 0–100)
BUN SERPL-MCNC: 32 MG/DL (ref 5–25)
CALCIUM SERPL-MCNC: 8.5 MG/DL (ref 8.4–10.2)
CARDIAC TROPONIN I PNL SERPL HS: 18 NG/L (ref ?–50)
CARDIAC TROPONIN I PNL SERPL HS: 20 NG/L (ref ?–50)
CHLORIDE SERPL-SCNC: 102 MMOL/L (ref 96–108)
CO2 SERPL-SCNC: 23 MMOL/L (ref 21–32)
CREAT SERPL-MCNC: 0.83 MG/DL (ref 0.6–1.3)
EOSINOPHIL # BLD MANUAL: 0 THOUSAND/UL (ref 0–0.4)
EOSINOPHIL NFR BLD MANUAL: 0 % (ref 0–6)
ERYTHROCYTE [DISTWIDTH] IN BLOOD BY AUTOMATED COUNT: 16 % (ref 11.6–15.1)
GFR SERPL CREATININE-BSD FRML MDRD: 95 ML/MIN/1.73SQ M
GLUCOSE SERPL-MCNC: 122 MG/DL (ref 65–140)
HCT VFR BLD AUTO: 36.1 % (ref 36.5–49.3)
HGB BLD-MCNC: 11.9 G/DL (ref 12–17)
LYMPHOCYTES # BLD AUTO: 0.73 THOUSAND/UL (ref 0.6–4.47)
LYMPHOCYTES # BLD AUTO: 6 % (ref 14–44)
MCH RBC QN AUTO: 32.4 PG (ref 26.8–34.3)
MCHC RBC AUTO-ENTMCNC: 33 G/DL (ref 31.4–37.4)
MCV RBC AUTO: 98 FL (ref 82–98)
MONOCYTES # BLD AUTO: 0.61 THOUSAND/UL (ref 0–1.22)
MONOCYTES NFR BLD: 5 % (ref 4–12)
MYELOCYTE ABSOLUTE CT: 0.12 THOUSAND/UL (ref 0–0.1)
MYELOCYTES NFR BLD MANUAL: 1 % (ref 0–1)
NEUTROPHILS # BLD MANUAL: 10.75 THOUSAND/UL (ref 1.85–7.62)
NEUTS SEG NFR BLD AUTO: 88 % (ref 43–75)
PLATELET # BLD AUTO: 285 THOUSANDS/UL (ref 149–390)
PLATELET BLD QL SMEAR: ADEQUATE
PMV BLD AUTO: 9.5 FL (ref 8.9–12.7)
POIKILOCYTOSIS BLD QL SMEAR: PRESENT
POTASSIUM SERPL-SCNC: 4 MMOL/L (ref 3.5–5.3)
PROT SERPL-MCNC: 6.9 G/DL (ref 6.4–8.4)
RBC # BLD AUTO: 3.67 MILLION/UL (ref 3.88–5.62)
RBC MORPH BLD: PRESENT
SODIUM SERPL-SCNC: 137 MMOL/L (ref 135–147)
WBC # BLD AUTO: 12.22 THOUSAND/UL (ref 4.31–10.16)

## 2025-06-02 PROCEDURE — 85007 BL SMEAR W/DIFF WBC COUNT: CPT | Performed by: EMERGENCY MEDICINE

## 2025-06-02 PROCEDURE — 99285 EMERGENCY DEPT VISIT HI MDM: CPT | Performed by: EMERGENCY MEDICINE

## 2025-06-02 PROCEDURE — 96374 THER/PROPH/DIAG INJ IV PUSH: CPT

## 2025-06-02 PROCEDURE — 36415 COLL VENOUS BLD VENIPUNCTURE: CPT | Performed by: EMERGENCY MEDICINE

## 2025-06-02 PROCEDURE — 99285 EMERGENCY DEPT VISIT HI MDM: CPT

## 2025-06-02 PROCEDURE — 71045 X-RAY EXAM CHEST 1 VIEW: CPT

## 2025-06-02 PROCEDURE — 84484 ASSAY OF TROPONIN QUANT: CPT | Performed by: EMERGENCY MEDICINE

## 2025-06-02 PROCEDURE — 80053 COMPREHEN METABOLIC PANEL: CPT | Performed by: EMERGENCY MEDICINE

## 2025-06-02 PROCEDURE — 70498 CT ANGIOGRAPHY NECK: CPT

## 2025-06-02 PROCEDURE — 70496 CT ANGIOGRAPHY HEAD: CPT

## 2025-06-02 PROCEDURE — 96375 TX/PRO/DX INJ NEW DRUG ADDON: CPT

## 2025-06-02 PROCEDURE — 83880 ASSAY OF NATRIURETIC PEPTIDE: CPT | Performed by: EMERGENCY MEDICINE

## 2025-06-02 PROCEDURE — 93005 ELECTROCARDIOGRAM TRACING: CPT

## 2025-06-02 PROCEDURE — 85027 COMPLETE CBC AUTOMATED: CPT | Performed by: EMERGENCY MEDICINE

## 2025-06-02 RX ORDER — PANTOPRAZOLE SODIUM 40 MG/10ML
40 INJECTION, POWDER, LYOPHILIZED, FOR SOLUTION INTRAVENOUS ONCE
Status: COMPLETED | OUTPATIENT
Start: 2025-06-02 | End: 2025-06-02

## 2025-06-02 RX ORDER — DEXAMETHASONE SODIUM PHOSPHATE 4 MG/ML
4 INJECTION, SOLUTION INTRA-ARTICULAR; INTRALESIONAL; INTRAMUSCULAR; INTRAVENOUS; SOFT TISSUE ONCE
Status: COMPLETED | OUTPATIENT
Start: 2025-06-02 | End: 2025-06-02

## 2025-06-02 RX ADMIN — IOHEXOL 85 ML: 350 INJECTION, SOLUTION INTRAVENOUS at 16:25

## 2025-06-02 RX ADMIN — PANTOPRAZOLE SODIUM 40 MG: 40 INJECTION, POWDER, FOR SOLUTION INTRAVENOUS at 16:01

## 2025-06-02 RX ADMIN — DEXAMETHASONE SODIUM PHOSPHATE 4 MG: 4 INJECTION, SOLUTION INTRAMUSCULAR; INTRAVENOUS at 16:01

## 2025-06-02 NOTE — TELEPHONE ENCOUNTER
"              REASON FOR CONVERSATION: PAIN IN BACK    SYMPTOMS: Severe back pain with ambulation.     OTHER HEALTH INFORMATION: metastatic lung cancer.     PROTOCOL DISPOSITION: Go to ED Now    CARE ADVICE PROVIDED: Encouraged that pt go to the ED    PRACTICE FOLLOW-UP: Has an appt in June with Dr. Morrell.       Reason for Disposition   SEVERE back pain of sudden onset and age > 60 years    Answer Assessment - Initial Assessment Questions  1. ONSET: \"When did the pain begin?\" (e.g., minutes, hours, days)      5/30/25  2. LOCATION: \"Where does it hurt?\" (upper, mid or lower back)      LOWER BACK.   3. SEVERITY: \"How bad is the pain?\"  (e.g., Scale 1-10; mild, moderate, or severe)      7/8 OUT OF 10  4. PATTERN: \"Is the pain constant?\" (e.g., yes, no; constant, intermittent)       With movement.   5. RADIATION: \"Does the pain shoot into your legs or somewhere else?\"      Mrs. Silva does not know.   6. CAUSE:  \"What do you think is causing the back pain?\"       Lung mets.   7. BACK OVERUSE:  \"Any recent lifting of heavy objects, strenuous work or exercise?\"      no  8. MEDICINES: \"What have you taken so far for the pain?\" (e.g., nothing, acetaminophen, NSAIDS)      Taking steroids.   9. NEUROLOGIC SYMPTOMS: \"Do you have any weakness, numbness, or problems with bowel/bladder control?\"      Decreased urination,  Urinates every hour.   10. OTHER SYMPTOMS: \"Do you have any other symptoms?\" (e.g., fever, abdomen pain, burning with urination, blood in urine)        Dark urine.    Protocols used: Back Pain-Adult-[place]    "

## 2025-06-02 NOTE — DISCHARGE INSTRUCTIONS
As discussed, please continue your steroids as well as reaching out to your hematology/oncology team regarding any needs for adjustments to your medications.  Your CAT scan today showed that you have persistent metastatic brain lesions which have some hemorrhage causing swelling of your brain.  We did review her case with the on-call neurosurgical team, there are no surgical interventions needed at this time.  Please review the options for possible radiation treatment with your oncologist.  If you have concerns for any reason please return to the emergency room for repeat care.

## 2025-06-02 NOTE — TELEPHONE ENCOUNTER
"                      Reason for Disposition   SEVERE back pain of sudden onset and age > 60 years    Answer Assessment - Initial Assessment Questions  1. ONSET: \"When did the pain begin?\" (e.g., minutes, hours, days)      5/30/25  2. LOCATION: \"Where does it hurt?\" (upper, mid or lower back)      LOWER BACK.   3. SEVERITY: \"How bad is the pain?\"  (e.g., Scale 1-10; mild, moderate, or severe)      7/8 OUT OF 10  4. PATTERN: \"Is the pain constant?\" (e.g., yes, no; constant, intermittent)       With movement.   5. RADIATION: \"Does the pain shoot into your legs or somewhere else?\"      Mrs. Silva does not know.   6. CAUSE:  \"What do you think is causing the back pain?\"       Lung mets.   7. BACK OVERUSE:  \"Any recent lifting of heavy objects, strenuous work or exercise?\"      no  8. MEDICINES: \"What have you taken so far for the pain?\" (e.g., nothing, acetaminophen, NSAIDS)      Taking steroids.   9. NEUROLOGIC SYMPTOMS: \"Do you have any weakness, numbness, or problems with bowel/bladder control?\"      Decreased urination,  Urinates every hour.   10. OTHER SYMPTOMS: \"Do you have any other symptoms?\" (e.g., fever, abdomen pain, burning with urination, blood in urine)        Dark urine.    Protocols used: Back Pain-Adult-[place]     "

## 2025-06-02 NOTE — TELEPHONE ENCOUNTER
1st attempt-Left message for patient's spouse to call back. [employer] number provided [phone].

## 2025-06-02 NOTE — TELEPHONE ENCOUNTER
----- Message from Chary CAZARES sent at 6/2/2025 12:21 PM EDT -----  Pt's wife states he is having trouble sitting up and keeping his head up and he has back pain

## 2025-06-03 NOTE — ED PROVIDER NOTES
Time reflects when diagnosis was documented in both MDM as applicable and the Disposition within this note       Time User Action Codes Description Comment    6/2/2025  6:01 PM Sut Taveras Add [C79.31] Metastatic cancer to brain (HCC)           ED Disposition       ED Disposition   Discharge    Condition   Stable    Date/Time   Mon Jun 2, 2025  6:01 PM    Comment   Nino Silva discharge to home/self care.                   Assessment & Plan       Medical Decision Making     Reviewed past medical records: Yes extensive review of the patient's cancer history and previous treatments admissions.     History Provided by: Patient, family     Differential considered: Intracranial hemorrhage, vascular occlusion, worsening metastatic disease     Consideration of tests: Given patient's increasing weakness and fatigue  History of metastatic brain cancer.  Obtain CT angiogram to rule out vascular occlusion.  This was okay, however admitted having increasing size of hemorrhagic tumors in his brain.  Likely source of his symptoms.  Reviewed with neurosurgery, there continues to be no surgical intervention given the amount of burden in the brain.  Patient states he is currently only receiving chemotherapy as he was told that radiation therapy would be too detrimental to his health to further proceed.  He was offered admission for supportive care but both he and his wife decided they would prefer to go home and continue steroid use while following up with hematology and oncology.  They are aware they can return anytime for further care.    The gentleman has  a very unfortunate diagnosis.  I did have a lengthy discussion with the bedside about his goals of care.  He continues to want to do anything that may improve his quality of life.       I have reviewed the patient's visit and any testing done in the emergency department.  They have verbalized their understanding of any testing done today and have no further questions  or concerns regarding their care in the emergency room.  They will follow up with their primary care physician as well as with any specialist in their discharge instructions.  Strict return precautions were discussed.    Amount and/or Complexity of Data Reviewed  Labs: ordered.  Radiology: ordered.    Risk  Prescription drug management.        ED Course as of 06/03/25 0917   Mon Jun 02, 2025   1539 Procedure Note: EKG  Date/Time: 06/02/25 3:39 PM   Performed by: Fletcher Taveras  Authorized by: Fletcher Taveras  ECG interpreted by me, the ED Provider: yes   The EKG demonstrates:  Rate 79  Rhythm sinus  QTc 428  No ST elevations/depressions           Medications   pantoprazole (PROTONIX) injection 40 mg (40 mg Intravenous Given 6/2/25 1601)   dexamethasone (DECADRON) injection 4 mg (4 mg Intravenous Given 6/2/25 1601)   iohexol (OMNIPAQUE) 350 MG/ML injection (MULTI-DOSE) 85 mL (85 mL Intravenous Given 6/2/25 1625)       ED Risk Strat Scores                    No data recorded        SBIRT 20yo+      Flowsheet Row Most Recent Value   Initial Alcohol Screen: US AUDIT-C     1. How often do you have a drink containing alcohol? 0 Filed at: 06/02/2025 1530   2. How many drinks containing alcohol do you have on a typical day you are drinking?  0 Filed at: 06/02/2025 1530   3a. Male UNDER 65: How often do you have five or more drinks on one occasion? 0 Filed at: 06/02/2025 1530   3b. FEMALE Any Age, or MALE 65+: How often do you have 4 or more drinks on one occassion? 0 Filed at: 06/02/2025 1530   Audit-C Score 0 Filed at: 06/02/2025 1530   NEFTALI: How many times in the past year have you...    Used an illegal drug or used a prescription medication for non-medical reasons? Never Filed at: 06/02/2025 1530                            History of Present Illness       Chief Complaint   Patient presents with    Weakness - Generalized     Pt to ED for weakness that started Friday and getting worse. States this happened in march also and  was told he had tumors on his neck. States his neck feels weak today and having trouble moving it       Past Medical History[1]   Past Surgical History[2]   Family History[3]   Social History[4]   E-Cigarette/Vaping    E-Cigarette Use Never User       E-Cigarette/Vaping Substances      I have reviewed and agree with the history as documented.     59-year-old male who has known metastatic small cell cancer.  Diffuse in head and neck.  Presenting for transient weakness in inability to hold his head up without slurred.  No difficulty with breathing.  Patient has been switched to a thickened diet given his difficulties with swallowing past.  Coughs no fevers no vomiting or diarrhea.  States vision gets a little fuzzy but this is baseline for him.          Review of Systems   Constitutional: Negative.  Negative for chills and fever.   HENT: Negative.  Negative for rhinorrhea, sore throat, trouble swallowing and voice change.    Eyes: Negative.  Negative for pain and visual disturbance.   Respiratory: Negative.  Negative for cough, shortness of breath and wheezing.    Cardiovascular: Negative.  Negative for chest pain and palpitations.   Gastrointestinal:  Negative for abdominal pain, diarrhea, nausea and vomiting.   Genitourinary: Negative.  Negative for dysuria and frequency.   Musculoskeletal: Negative.  Negative for neck pain and neck stiffness.   Skin: Negative.  Negative for rash.   Neurological:  Positive for weakness. Negative for dizziness, facial asymmetry, speech difficulty, light-headedness and numbness.           Objective       ED Triage Vitals [06/02/25 1500]   Temperature Pulse Blood Pressure Respirations SpO2 Patient Position - Orthostatic VS   98.1 °F (36.7 °C) 84 126/81 18 94 % Sitting      Temp src Heart Rate Source BP Location FiO2 (%) Pain Score    -- Monitor Left arm -- --      Vitals      Date and Time Temp Pulse SpO2 Resp BP Pain Score FACES Pain Rating User   06/02/25 1815 -- 71 95 % 16 132/91 --  -- EW   06/02/25 1800 -- 71 95 % 16 138/93 -- -- EW   06/02/25 1600 -- 77 94 % 16 107/68 -- -- EW 06/02/25 1500 98.1 °F (36.7 °C) 84 94 % 18 126/81 -- -- ML            Physical Exam  Vitals and nursing note reviewed.   Constitutional:       General: He is not in acute distress.     Appearance: He is well-developed.   HENT:      Head: Normocephalic and atraumatic.     Eyes:      Conjunctiva/sclera: Conjunctivae normal.      Pupils: Pupils are equal, round, and reactive to light.     Neck:      Trachea: No tracheal deviation.     Cardiovascular:      Rate and Rhythm: Normal rate and regular rhythm.   Pulmonary:      Effort: Pulmonary effort is normal. No respiratory distress.      Breath sounds: Normal breath sounds. No wheezing or rales.   Abdominal:      General: Bowel sounds are normal. There is no distension.      Palpations: Abdomen is soft.      Tenderness: There is no abdominal tenderness. There is no guarding or rebound.     Musculoskeletal:         General: No tenderness or deformity. Normal range of motion.      Cervical back: Normal range of motion and neck supple.     Skin:     General: Skin is warm and dry.      Capillary Refill: Capillary refill takes less than 2 seconds.      Findings: No rash.     Neurological:      Mental Status: He is alert and oriented to person, place, and time.      Cranial Nerves: No facial asymmetry.      Sensory: No sensory deficit.      Motor: Weakness present.      Comments: Some weakness when trying to move head however he is able to resist gravity and keep his head in a supported position while sitting upright.   Psychiatric:         Behavior: Behavior normal.         Results Reviewed       Procedure Component Value Units Date/Time    HS Troponin I 2hr [[id number]]  (Normal) Collected: 06/02/25 1752    Lab Status: Final result Specimen: Blood from Arm, Right Updated: 06/02/25 1819     hs TnI 2hr 20 ng/L      Delta 2hr hsTnI 2 ng/L     B-Type Natriuretic Peptide(BNP)  [[id number]]  (Normal) Collected: 06/02/25 1518    Lab Status: Final result Specimen: Blood from Arm, Right Updated: 06/02/25 1743     BNP 48 pg/mL     RBC Morphology Reflex Test [[id number]] Collected: 06/02/25 1518    Lab Status: Final result Specimen: Blood from Arm, Right Updated: 06/02/25 1602    CBC and differential [[id number]]  (Abnormal) Collected: 06/02/25 1518    Lab Status: Final result Specimen: Blood from Arm, Right Updated: 06/02/25 1553     WBC 12.22 Thousand/uL      RBC 3.67 Million/uL      Hemoglobin 11.9 g/dL      Hematocrit 36.1 %      MCV 98 fL      MCH 32.4 pg      MCHC 33.0 g/dL      RDW 16.0 %      MPV 9.5 fL      Platelets 285 Thousands/uL     Narrative:      This is an appended report.  These results have been appended to a previously verified report.    Manual Differential(PHLEBS Do Not Order) [[id number]]  (Abnormal) Collected: 06/02/25 1518    Lab Status: Final result Specimen: Blood from Arm, Right Updated: 06/02/25 1553     Segmented % 88 %      Lymphocytes % 6 %      Monocytes % 5 %      Eosinophils % 0 %      Basophils % 0 %      Myelocytes % 1 %      Absolute Neutrophils 10.75 Thousand/uL      Absolute Lymphocytes 0.73 Thousand/uL      Absolute Monocytes 0.61 Thousand/uL      Absolute Eosinophils 0.00 Thousand/uL      Absolute Basophils 0.00 Thousand/uL      Absolute Myelocytes 0.12 Thousand/uL      Total Counted --     RBC Morphology Present     Platelet Estimate Adequate     Anisocytosis Present     Poikilocytes Present    HS Troponin 0hr (reflex protocol) [[id number]]  (Normal) Collected: 06/02/25 1518    Lab Status: Final result Specimen: Blood from Arm, Right Updated: 06/02/25 1546     hs TnI 0hr 18 ng/L     Comprehensive metabolic panel [[id number]]  (Abnormal) Collected: 06/02/25 1518    Lab Status: Final result Specimen: Blood from Arm, Right Updated: 06/02/25 1543     Sodium 137 mmol/L      Potassium 4.0 mmol/L      Chloride 102 mmol/L      CO2 23 mmol/L      ANION GAP 12 mmol/L       BUN 32 mg/dL      Creatinine 0.83 mg/dL      Glucose 122 mg/dL      Calcium 8.5 mg/dL      AST 27 U/L      ALT 44 U/L      Alkaline Phosphatase 47 U/L      Total Protein 6.9 g/dL      Albumin 3.8 g/dL      Total Bilirubin 0.38 mg/dL      eGFR 95 ml/min/1.73sq m     Narrative:      [employer] guidelines for Chronic Kidney Disease (CKD):     Stage 1 with normal or high GFR (GFR > 90 mL/min/1.73 square meters)    Stage 2 Mild CKD (GFR = 60-89 mL/min/1.73 square meters)    Stage 3A Moderate CKD (GFR = 45-59 mL/min/1.73 square meters)    Stage 3B Moderate CKD (GFR = 30-44 mL/min/1.73 square meters)    Stage 4 Severe CKD (GFR = 15-29 mL/min/1.73 square meters)    Stage 5 End Stage CKD (GFR <15 mL/min/1.73 square meters)  Note: GFR calculation is accurate only with a steady state creatinine            CTA head and neck with and without contrast   Final Interpretation by Bright Bingham MD (06/02 1707)   CT brain: Numerous hemorrhagic intracranial metastases with surrounding vasogenic edema which are better delineated on the recent prior MRI brain examination. The lesions appear increased in size compared to the recent prior examinations.      CTA head: Negative for large vessel intracranial occlusion or hemodynamically significant stenosis.      CTA neck: No extracranial carotid stenosis. The cervical vertebral arteries are patent.                        Workstation performed: [id number]         XR chest 1 view portable   ED Interpretation by Stu Taveras DO (06/02 1608)   Non-cavitary mass seen in right lung.      Final Interpretation by Leon Simon MD (06/02 1543)      Mass in the right midlung is slightly larger. This correlates to the patient's known neoplasm.      No pneumothorax or focal infiltrate..            Workstation performed: [id number]             Procedures    ED Medication and Procedure Management   Prior to Admission Medications   Prescriptions Last Dose  Informant Patient Reported? Taking?   Cholecalciferol (Vitamin D) 50 MCG (2000 UT) tablet   Yes No   Sig: Take 2,000 Units by mouth in the morning.   ascorbic acid (VITAMIN C) 250 mg tablet   Yes No   Sig: Take 500 mg by mouth in the morning.   pantoprazole (PROTONIX) 40 mg tablet   No No   Sig: Take 1 tablet (40 mg total) by mouth 2 (two) times a day before meals   polyethylene glycol (MIRALAX) 17 g packet   No No   Sig: Take 17 g by mouth daily for 20 days   senna (SENOKOT) 8.6 MG tablet   Yes No   Sig: Take 1 tablet by mouth daily as needed for constipation Takes after chemo prn      Facility-Administered Medications: None     Discharge Medication List as of 6/2/2025  6:03 PM        CONTINUE these medications which have NOT CHANGED    Details   ascorbic acid (VITAMIN C) 250 mg tablet Take 500 mg by mouth in the morning., Historical Med      Cholecalciferol (Vitamin D) 50 MCG (2000 UT) tablet Take 2,000 Units by mouth in the morning., Historical Med      pantoprazole (PROTONIX) 40 mg tablet Take 1 tablet (40 mg total) by mouth 2 (two) times a day before meals, Starting Wed 5/7/2025, Until Fri 6/6/2025, Normal      polyethylene glycol (MIRALAX) 17 g packet Take 17 g by mouth daily for 20 days, Starting Thu 4/10/2025, Until Wed 5/28/2025, Normal      senna (SENOKOT) 8.6 MG tablet Take 1 tablet by mouth daily as needed for constipation Takes after chemo prn, Historical Med           No discharge procedures on file.  ED SEPSIS DOCUMENTATION   Time reflects when diagnosis was documented in both MDM as applicable and the Disposition within this note       Time User Action Codes Description Comment    6/2/2025  6:01 PM Stu Taveras Add [C79.31] Metastatic cancer to brain (HCC)                    [1]   Past Medical History:  Diagnosis Date    Brain cancer (HCC)     Lung cancer (HCC)    [2]   Past Surgical History:  Procedure Laterality Date    FL LUMBAR PUNCTURE DIAGNOSTIC  4/7/2025    HERNIA REPAIR      IR BIOPSY  LUNG  03/05/2024    ORCHIECTOMY Left    [3]   Family History  Problem Relation Name Age of Onset    Breast cancer additional onset Mother      No Known Problems Father      Stroke Brother     [4]   Social History  Tobacco Use    Smoking status: Never    Smokeless tobacco: Never   Vaping Use    Vaping status: Never Used   Substance Use Topics    Alcohol use: Not Currently    Drug use: Not Currently        Stu Taveras DO  06/03/25 0961

## 2025-06-04 LAB
ATRIAL RATE: 79 BPM
P AXIS: 56 DEGREES
PR INTERVAL: 152 MS
QRS AXIS: 44 DEGREES
QRSD INTERVAL: 82 MS
QT INTERVAL: 374 MS
QTC INTERVAL: 428 MS
T WAVE AXIS: -49 DEGREES
VENTRICULAR RATE: 79 BPM

## 2025-06-04 PROCEDURE — 93010 ELECTROCARDIOGRAM REPORT: CPT | Performed by: INTERNAL MEDICINE

## 2025-06-05 ENCOUNTER — HOSPITAL ENCOUNTER (OUTPATIENT)
Dept: MRI IMAGING | Facility: HOSPITAL | Age: 60
End: 2025-06-05
Attending: INTERNAL MEDICINE
Payer: COMMERCIAL

## 2025-06-05 DIAGNOSIS — C34.11 SMALL CELL CARCINOMA OF UPPER LOBE OF RIGHT LUNG (HCC): ICD-10-CM

## 2025-06-05 PROCEDURE — A9585 GADOBUTROL INJECTION: HCPCS | Performed by: INTERNAL MEDICINE

## 2025-06-05 PROCEDURE — 70553 MRI BRAIN STEM W/O & W/DYE: CPT

## 2025-06-05 RX ORDER — GADOBUTROL 604.72 MG/ML
8 INJECTION INTRAVENOUS
Status: COMPLETED | OUTPATIENT
Start: 2025-06-05 | End: 2025-06-05

## 2025-06-05 RX ADMIN — GADOBUTROL 8 ML: 604.72 INJECTION INTRAVENOUS at 12:14

## 2025-06-06 ENCOUNTER — TELEPHONE (OUTPATIENT)
Age: 60
End: 2025-06-06

## 2025-06-06 ENCOUNTER — HOSPITAL ENCOUNTER (OUTPATIENT)
Dept: NUCLEAR MEDICINE | Facility: HOSPITAL | Age: 60
End: 2025-06-06
Attending: INTERNAL MEDICINE
Payer: COMMERCIAL

## 2025-06-06 DIAGNOSIS — C34.11 SMALL CELL CARCINOMA OF UPPER LOBE OF RIGHT LUNG (HCC): ICD-10-CM

## 2025-06-06 DIAGNOSIS — C79.31 METASTASIS TO BRAIN (HCC): ICD-10-CM

## 2025-06-06 LAB — GLUCOSE SERPL-MCNC: 101 MG/DL (ref 65–140)

## 2025-06-06 PROCEDURE — 82948 REAGENT STRIP/BLOOD GLUCOSE: CPT

## 2025-06-06 PROCEDURE — A9552 F18 FDG: HCPCS

## 2025-06-06 PROCEDURE — 78815 PET IMAGE W/CT SKULL-THIGH: CPT

## 2025-06-06 RX ORDER — PANTOPRAZOLE SODIUM 40 MG/1
40 TABLET, DELAYED RELEASE ORAL
Qty: 60 TABLET | Refills: 0 | Status: SHIPPED | OUTPATIENT
Start: 2025-06-06

## 2025-06-06 NOTE — TELEPHONE ENCOUNTER
Veronika from the [facility] called to report Immediate Findings on the MRI Brain from yesterday. Requests Dr. Morrell review the report in [facility].

## 2025-06-08 NOTE — PROGRESS NOTES
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 mass (5.4 x 3.6 cm to 5.3 x 3.1 cm) and mediastinal lymph node, stable right hilar lymph nodes (19 x 24 mm to 19 x 24 mm), increase in size of left infrahilar lymph node (14 x 14 mm to 17 x 18 mm), and interval development of new right adrenal nodule (2.5 x 1.9 cm) compared to PET/CT 3/15/2024.  Bilateral lower extremity ultrasound was negative for DVT.        Interval History: 5/13/2025     Patient presents for 2-week follow-up last seen 4/29/2025.  He is increasingly fatigued since C2D15 of tarlatamab on 4/29/2025 compared to prior infusions.  His fatigue after this infusion has been persistent and significant to the level that stairs feels like a challenge.  He has not fallen.  C3D1 is scheduled for 5/13/2025.  Most recent labs (5/8/2025) show WBC 12.9, Hb 12.2, , SCR 0.51, and all other aspects of CMP roughly WNL.  There is no new imaging.    6/11//2025    CT PET 6/6/2025    VISUALIZED BRAIN:  Multiple hypermetabolic brain lesions are seen in the left cerebellar with SUV of 12, demonstrates central photopenia  There is a hypermetabolic lesion within the right anterior mitchel with SUV of 14, new from the previous study  Hypermetabolic lesion additional multiple hypermetabolic lesions in the brain parenchyma      HEAD/NECK:  There is a physiologic distribution of FDG. No FDG avid cervical adenopathy is seen.  CT images: Unremarkable.     CHEST:  Hypermetabolic mass right perihilar region and upper lung, larger now measures 5 x 4.6 cm, previously measuring 4.1 x 2.8 cm, demonstrates maximal SUV of 13, previously the maximal uptake was about 18 there is a new satellite area of hypermetabolism,   image 104 which demonstrates maximal SUV of 7.4  The dominant lesion extends into the right hilar region which demonstrates SUV of 13  There is a new hypermetabolic lesion left perihilar region with maximal SUV of 10, measuring 4 cm  Hypermetabolic lymph nodes which were noted on the previous study in the upper right paratracheal region, lower right paratracheal region have become non-hypermetabolic and not measurable.     CT images: Reticulation seen in the lungs with subpleural sparing. There is some architectural distortion, related to interstitial lung disease, unchanged  Residual right paratracheal lymph node, measuring about 6 mm. Previous measurement 1.6 cm  Severe coronary artery calcification seen  ABDOMEN:  There is enlargement of the bilateral adrenal glands with the right adrenal gland measuring 2.7 cm and left adrenal gland measuring 2.5 cm. These are hypermetabolic the right adrenal gland demonstrates maximal uptake of 22 and the left demonstrating   maximal uptake of the 19  There are new hypermetabolic mesenteric lymph nodes with largest lymph node measuring 3.4 cm x 3 cm with maximal SUV of about 17, seen in image 172  Focal area of uptake seen in the bowel in image 196 with SUV of 20 with no associated soft tissue correlate, image 196  CT images: Left renal cyst  No significant retroperitoneal lymph node enlargement seen     PELVIS:  No FDG avid soft tissue lesions are seen.  CT images: Unremarkable.     OSSEOUS STRUCTURES:  The previously noted focus of uptake in the right supra-acetabular region demonstrates  increasing hypermetabolism with SUV of 13, previously the uptake was about 5.2  There are some foci of increased uptake in the right iliac bone with SUV of 13, image 202, new.  Hypermetabolic uptake seen in the L1 body with SUV of 9.8 with some sclerosis, image 154, new  Hypermetabolic uptake seen within the right transverse process of the T3 vertebral with maximal SUV of 8.2  CT images: Increasing sclerosis in the right acetabulum     IMPRESSION:  Progression of disease with development of multiple new hypermetabolic brain lesions, new left perihilar lesion, new bilateral adrenal lesions, new mesenteric lymphadenopathy and progression of bony metastatic disease     The previously noted dominant lesion in the right upper lung and perihilar region is larger though demonstrating decreasing FDG avidity     Focal area of uptake seen in the bowel in image 196 without associated soft tissue correlate, attention at follow-up suggested     Resolution of the previously noted right lower paratracheal lymph node             MRI brain 6/5/2025     Multiple scattered enhancing hemorrhagic and nonhemorrhagic metastatic lesions in bilateral cerebral hemispheres, bilateral basal ganglia, brainstem, and bilateral cerebellum (approximately 25-30 lesions) with associated diffuse scattered perilesional   vasogenic edema have increased in size since MRI brain 4/4/2025. Many of these lesions also have peripheral restricted diffusion like related to increase cellularity. 3 reference lesions (measured in long axis):  - 1.7 cm right paramedian parietal lesion (1001:151), increased size.  - 2.0 cm right ventral upper mitchel hemorrhagic lesion (1001:72), increased size.  - 3.1 cm left cerebellar lesion (1001:42), increased size.     No midline shift. No acute intracranial hemorrhage. No acute infarction.     Extensive diffuse confluent white matter changes in bilateral cerebral hemispheres, may be treatment-related changes and/or severe chronic  microangiopathy.     VENTRICLES: Mild ventriculomegaly, unchanged.     SELLA AND PITUITARY GLAND:  Normal.     ORBITS:  Normal.     PARANASAL SINUSES:  Normal.     VASCULATURE:  Evaluation of the major intracranial vasculature demonstrates appropriate flow voids.     CALVARIUM AND SKULL BASE:  Normal.     EXTRACRANIAL SOFT TISSUES:  Normal.     IMPRESSION:     Multiple scattered enhancing hemorrhagic and nonhemorrhagic metastatic lesions in bilateral cerebral hemispheres, bilateral basal ganglia, brainstem, and bilateral cerebellum (approximately 25-30 lesions) with associated diffuse scattered perilesional   vasogenic edema have increased in size since MRI brain 4/4/2025.     Oncology History   Cancer Staging   Small cell lung cancer (HCC)  Staging form: Lung, AJCC 8th Edition  - Clinical stage from 3/5/2024: Stage IV (cT2, cN3, cM1) - Signed by Lizbeth López MD on 4/18/2024  Histopathologic type: Small cell carcinoma, NOS  Stage prefix: Initial diagnosis  Histologic grade (G): G3  Histologic grading system: 4 grade system  Oncology History   Small cell lung cancer (HCC)   3/5/2024 Initial Diagnosis    Small cell lung cancer (HCC)     3/5/2024 Biopsy    Final Diagnosis  A. Lung, Right Upper Lobe, biopsy:  - Poorly differentiated carcinoma with neuroendocrine features, favor large cell neuroendocrine carcinoma.  - See note.     3/5/2024 -  Cancer Staged    Staging form: Lung, AJCC 8th Edition  - Clinical stage from 3/5/2024: Stage IV (cT2, cN3, cM1) - Signed by Lizbeth López MD on 4/18/2024  Histopathologic type: Small cell carcinoma, NOS  Stage prefix: Initial diagnosis  Histologic grade (G): G3  Histologic grading system: 4 grade system       3/6/2024 - 3/19/2024 Radiation      Plan ID Energy Fractions Dose per Fraction (cGy) Dose Correction (cGy) Total Dose Delivered (cGy) Elapsed Days   Whole Brain 6X 10 / 10 300 0 3,000 13        4/8/2024 - 10/7/2024 Chemotherapy    alteplase (CATHFLO), 2 mg,  Intracatheter, Every 1 Minute as needed, 8 of 10 cycles  palonosetron (ALOXI), 0.25 mg, Intravenous, Once, 3 of 3 cycles  Administration: 0.25 mg (4/29/2024), 0.25 mg (5/20/2024), 0.25 mg (6/17/2024)  fosaprepitant (EMEND) IVPB, 150 mg, Intravenous, Once, 4 of 4 cycles  Administration: 150 mg (4/8/2024), 150 mg (4/29/2024), 150 mg (5/20/2024), 150 mg (6/17/2024)  etoposide (TOPOSAR), 80 mg/m2 = 164 mg, Intravenous, Once, 4 of 4 cycles  Dose modification: 100 mg/m2 (original dose 80 mg/m2, Cycle 1, Reason: Anticipated Tolerance), 80 mg/m2 (original dose 80 mg/m2, Cycle 1, Reason: Anticipated Tolerance), 100 mg/m2 (original dose 80 mg/m2, Cycle 2, Reason: Anticipated Tolerance)  Administration: 164 mg (4/8/2024), 164 mg (4/9/2024), 164 mg (4/10/2024), 205 mg (4/29/2024), 205 mg (4/30/2024), 205 mg (5/1/2024), 200 mg (5/20/2024), 200 mg (5/21/2024), 200 mg (5/22/2024), 200 mg (6/17/2024), 200 mg (6/18/2024), 200 mg (6/19/2024)  CARBOplatin (PARAPLATIN) IVPB (GO AUC DOSING), 750 mg (574.7 % of original dose 130.5 mg), Intravenous, Once, 4 of 4 cycles  Dose modification: 130.5 mg (original dose 130.5 mg, Cycle 1, Reason: Anticipated Tolerance),   (original dose 130.5 mg, Cycle 1, Reason: Other (Must fill in a comment))  Administration: 750 mg (4/8/2024), 701.5 mg (4/29/2024), 664 mg (5/20/2024), 633 mg (6/17/2024)  durvalumab (IMFINZI) IVPB, 1,500 mg, Intravenous, Once, 8 of 10 cycles  Administration: 1,500 mg (4/8/2024), 1,500 mg (4/29/2024), 1,500 mg (5/20/2024), 1,500 mg (6/17/2024), 1,500 mg (7/10/2024), 1,500 mg (8/13/2024), 1,500 mg (9/9/2024), 1,500 mg (10/7/2024)     11/19/2024 - 1/21/2025 Chemotherapy    alteplase (CATHFLO), 2 mg, Intracatheter, Every 1 Minute as needed, 4 of 6 cycles  Lurbinectedin (ZEPZELCA) IVPB, 3.2 mg/m2 = 6.4 mg, Intravenous, Once, 4 of 6 cycles  Administration: 6.4 mg (11/19/2024), 6.4 mg (12/10/2024), 6.4 mg (12/31/2024), 6.4 mg (1/21/2025)     3/17/2025 -  Chemotherapy    tarlatamab  (Imdelltra) 10 mg IVPB, 10 mg, 3 of 10 cycles  Administration: 10 mg (3/24/2025), 10 mg (3/31/2025), 10 mg (4/14/2025), 10 mg (4/29/2025), 10 mg (5/13/2025), 10 mg (5/27/2025)  tarlatamab (Imdelltra) 1 mg IVPB, 1 mg, 1 of 1 cycle  Administration: 1 mg (3/17/2025)        Pertinent Medical History   02/09/25:   Discussion  extended stage SCLC/Initial treatment      Most patients with eSCLC have disease relapse; thus this is incurable.  However the cornerstone of treatment remains platinum based.  Response rates are as high as 61% with CR of 10%.  Responses to chemotherapy are frequent and rapid with median time to best response in approximately 4.6 weeks.  Unfortunately, these are no durable responses and the median time to progression (TTP) is 4 months with OS 8.6 months.  Despite predictable relapse randomized studies have not shown a survival benefit for prolonged administration of chemotherapy and thus optimal dosing is 4-6 cycles.  There has been a study comparing Cisplatin to Carboplatin/noninferiority demonstrating that there is no difference with PFS, OS CARTER and thus can be used interchangeably although Cisplatin remains preferred.     The first study to demonstrate any improvement in OS in the first line treatment of eSCLC in several decades was Impower 133 a randomized Phase III trial of Carboplatin/Etoposide with the PDL1 inhibitor atezolizumab.  This was a global trial with patients with eSCLC getting 4 cycles of Carbo/Etoposide with or without concurrent atezolizumab/placebo with maintenance afterward.  The addition of atezolizumab led to significant improvement in OS 12.3 vs 10.3 months HR 0.7 as well as PFS .  OS was independent of PDL1 expression.  The CASPIAN study demonstrated that durvalumab with first suzi therapy was similar in terms of response with OS at 13 months vs 10.3 months.  Either agent can be used in this setting.  KEYNOTE 604 with Pembrolizumab was negative for OS benefit which may be a  design/patient flaw as generally these agents are similar in terms of efficacy.          Discussion Large cell neuroendocrine        The clinical presentation of large cell neuroendocrine or ?C??? appears similar to the other high-grade neuroendocrine pulmonary tumor, small cell lung ?????r (?C?C), with notable exceptions: primary LCNECs tend to be located peripherally rather than centrally, and presentation of LCNECs with early-stage (I to II) disease is more common than for S??C (approximately 25 versus less than 5 percent). Thus, patients with ??N?C more commonly undergo resection.       For patients with stage IV disease, we suggest using a standard S?LC regimen (etoposide plus a either carboplatin or cisplatin) for four to six cycles, independent of retinoblastoma gene 1 (RB1) status.     However, prognosis for this tumor type is poor regardless of choice of ?h?m?th?r?py, and other options also are reasonable.     The approach of K[facility] is based on integrative profiling that clearly identifies ?CN?? to be a neuroendocrine tumor most similar to ?CL?     Data from a prospective phase II trial of 29 patients treated with etoposide/cisplatin along with review of published retrospective experience with ?h?m?ther?py in stage IV ??N?? demonstrated that major efficacy endpoints were similar to SCL?, with the exception that overall response rate is lower (34 percent) than the approximate 60 percent rate observed in S?L?     Thus, these are treated as small cell     Discussion progression/second line      Sensitive vs > 6 months      Single-agent ?h?m?th?r?py is standard second-line treatment after ?l?ti?um-based ?h?m?th?rap? and immunotherapy. Lurbinectedin represents our preferred initial approach, although tarlatamab or camptothecins are acceptable alternatives.      For patients that are not eligible for, cannot tolerate, or progress on tarlatamab, other ?h?m?ther?p? agents are available        Data below represent  efficacy of ?h?m?thera?? after progression on initial combination ?h?m?ther?py. These trials were conducted prior to introduction of immunotherapy into the management of SC?C.     Lurbinectedin is an alkylating agent that has received accelerated approval by the [place] Food and Drug Administration (FDA) for patients with metastatic ?CLC with disease progression on or after platin?m-based ?h?m?th?r??? [3]. It is given at a dose of 3.2 mg/m2 intravenously (IV) every three weeks.        In an open-label study of 105 patients with SCL? and no brain m?t??ta?es who progressed on or after ?l?tinum-based ?h?m?th?rap?, 8 percent of whom had prior immunotherapy in addition to plati?um-based ?h?m?th?r?py, the overall response rate (CARTER) with lurbinectedin according to independent review committee was 33 percent The median duration of response was 5.1 months, and 25 percent of responding patients experienced a duration of response exceeding six months. Among patients with resistant relapse (?h?m?th?r?py-free interval <90 days), the CARTER was 22 percent with a progression-free survival (PFS) of 2.6 months and an overall survival (OS) of 5 months. For patients with sensitive relapse, the CARTER was 45 percent with a PFS of 4.5 months and an OS of 11.9 months      All patients in this study received a prespecified antiemetic regimen consisting of a corticosteroid and serotonin antagonist. Serious adverse reactions occurred in 10 percent of patients, of which neutropenia and febrile neutropenia were the most common (5 percent for each).        Tarlatamab is a reasonable alternative to lurbinectedin as a second-line treatment option, extrapolating from data in the third-line setting. These data are discussed below.      Topotecan has been shown to increase survival compared with best supportive care (BSC) and results in greater symptom management relative to a multiagent regimen [5,6]. It is approved by the FDA for relapses that occur after  45 days from the completion of ?h?m?ther?p?. ????t???? also has activity against brain m?t?st???s. The primary toxicities of t???te??? are hematologic, with most patients experiencing grade 3 or 4 neutropenia, anemia, or thrombocytopenia.     In a trial in which 211 patients with relapse >=60 days after completion of first-line therapy (usually pl?tinum plus etoposide) were randomly assigned to daily IV topotecan or cyclophosphamide, doxorubicin, and vincristine (CAV), disease outcomes were similar between the groups, but cancer symptoms were improved with t???t?can  For t???te??? versus CAV, the ORRs were 24 versus 18 percent, the median time to progression was 13 versus 12 weeks, and the median survival was 25 versus 24.7 weeks, differences that were not statistically significant. Control of several symptoms including dyspnea, anorexia, hoarseness, and fatigue was improved with t???t??a?. Severe neutropenia was less common with t???te?an (38 versus 51 percent), but grade 3 or 4 thrombocytopenia and anemia occurred more frequently (9.8 versus 1.4 percent and 17.7 versus 7.2 percent, respectively). The improvement in symptom control led to its FDA approval.\         Oral and IV formulations of topotecan have been extensively evaluated for the second-line treatment of relapsed ?CLC and are found to be equally effective     In a phase III noninferiority trial, 304 patients with chemosensitive relapse (>90 days) of ???C were randomly assigned to oral (2.3 mg/m2 daily for five days) or IV (1.5 mg/m2 daily for five days) topotecan every three weeks. Response rates were similar (18 versus 22 percent), as were median and one-year survival rates (33 versus 35 weeks and 33 versus 29 percent, respectively) [9]. The toxicity profiles of the two regimens were similar as well.        Topotecan daily times five every three weeks rather than a once-weekly schedule, given better efficacy in cross-trial comparisons. In the   "phase II trial, 192 patients with previously treated ??L? were randomly assigned to weekly t???te?an with or without aflibercept after progression on a ?l?ti??m-based regimen. Only two partial responses (1 percent) were observed in the entire study population (both in patients who also received ?flib?r?e?t), and the median OS was approximately five months. Response rates of the daily times five every three weeks schedule were higher in other studies, on the order of 20 percent  These data are discussed above.         As an alternative to topotecan, three small clinical trials have evaluated irinotecan as second-line therapy, with objective response rates ranging from 16 to 47 percent . As an example, in one study, 44 patients (17 with sensitive relapse and 27 with resistant or refractory disease) were treated with iri??t?c?n 125 mg/m2 weekly times four with a two-week break. The overall objective response rate was 16 percent. The response rate in patients with resistant or refractory disease was 4 percent. The response rate was 35 percent in the patients with sensitive relapse, with a median survival of 6.8 months. Primary toxicities of iri??te??n included diarrhea and neutropenia. Results for those with sensitive relapse are discussed below.         Tarlatamab, is a bispecific T-cell engager immunotherapy that directs the patient's T cells to cancer cells expressing delta-like ligand 3 (overexpressed in approximately 90 percent of SCLCs). It is approved by the [place] Food and  Drug Administration (FDA) at a dose of 10 mg intravenously every two weeks (after an initial \"step-up\" dosing schedule) . The approval is based on response rates and is contingent upon results of a confirmatory trial.     Tarlatamab has shown promising activity in a phase II trial among patients with disease that had relapsed after, or was refractory to, one platin?m-based treatment regimen and at least one other line of therapy. At a dose of 10 " mg intravenously every two weeks, the response rate was 40 percent, median progression-free survival was 4.9 months, and overall survival was 14.3 months  With longer follow-up (median 12.1 months), the response rate was 35 percent and median overall survival was 20 months.     The most common adverse events in these patients were cytokine-release syndrome (CRS; in 51 percent), decreased appetite (29 percent), and pyrexia (35 percent). Grade 3 CRS occurred in 1 percent.     In a pooled safety analysis reported in the FDA label         ?CRS occurred in 55 percent, mostly occurring during treatment cycle 1. The median time to onset of any grade CRS from last exposure to tarlatamab was 13.5 hours. CRS can manifest as pyrexia, hypotension, fatigue, tachycardia, headache, hypoxia, nausea, and vomiting. Potentially life-threatening complications of CRS include cardiac dysfunction, acute respiratory distress syndrome, neurologic toxicity, kidney and/or hepatic failure, and disseminated intravascular coagulation. Management of CRS is discussed elsewhere        ?Neurologic toxicity occurred in 47 percent, including 10 percent with grade 3 toxicity. The most frequent neurologic toxicities included headache (14 percent), peripheral neuropathy (7 percent), dizziness (7 percent), and insomnia (6 percent). Immune effector cell-associated neurotoxicity syndrome (ICANS), which can present as confusion, lethargy, or disorientation (among other symptoms) occurred in 9 percent. It most frequently occurred following cycle 2 day 1 and had a median time of onset from first dose of 29.5 days. Further discussion of clinical features and management of ICANS is found elsewhere           Review of Systems   Constitutional:  Positive for fatigue.   HENT: Negative.     Eyes: Negative.    Respiratory: Negative.     Cardiovascular: Negative.    Gastrointestinal: Negative.    Endocrine: Negative.    Genitourinary: Negative.    Musculoskeletal:  Negative.    Neurological:  Positive for headaches.   Hematological: Negative.    Psychiatric/Behavioral: Negative.               03/19/25:     04/11/25:     04/17/25:     05/26/25:     06/08/25:      Review of Systems   Constitutional:  Positive for appetite change, fatigue and unexpected weight change.   HENT: Negative.     Respiratory:  Positive for shortness of breath.    Gastrointestinal: Negative.    Musculoskeletal: Negative.    Neurological:  Positive for dizziness, weakness and light-headedness.   Psychiatric/Behavioral: Negative.       {Select to insert medical history sections (Optional):63257}      Objective {?Quick Links Trend Vitals * Enter New Vitals * Results Review * Timeline (Adult) * Labs * Imaging * Cardiology * Procedures * Lung Cancer Screening * Surgical eConsent :37074}  There were no vitals taken for this visit.    Pain Screening:     ECOG   4  Physical Exam  telemedicine no exam     Labs: I have reviewed the following labs:  Lab Results   Component Value Date/Time    WBC 12.22 (H) 06/02/2025 03:18 PM    RBC 3.67 (L) 06/02/2025 03:18 PM    Hemoglobin 11.9 (L) 06/02/2025 03:18 PM    Hematocrit 36.1 (L) 06/02/2025 03:18 PM    MCV 98 06/02/2025 03:18 PM    MCH 32.4 06/02/2025 03:18 PM    RDW 16.0 (H) 06/02/2025 03:18 PM    Platelets 285 06/02/2025 03:18 PM    Segmented % 91 (H) 04/24/2025 12:11 PM    Lymphocytes % 6 (L) 06/02/2025 03:18 PM    Lymphocytes % 3 (L) 04/24/2025 12:11 PM    Monocytes % 5 06/02/2025 03:18 PM    Monocytes % 5 04/24/2025 12:11 PM    Eosinophils % 0 06/02/2025 03:18 PM    Eosinophils Relative 0 04/24/2025 12:11 PM    Basophils % 0 06/02/2025 03:18 PM    Basophils Relative 0 04/24/2025 12:11 PM    Immature Grans % 1 04/24/2025 12:11 PM    Absolute Neutrophils 12.01 (H) 04/24/2025 12:11 PM     Lab Results   Component Value Date/Time    Potassium 4.0 06/02/2025 03:18 PM    Chloride 102 06/02/2025 03:18 PM    CO2 23 06/02/2025 03:18 PM    BUN 32 (H) 06/02/2025 03:18 PM     Creatinine 0.83 06/02/2025 03:18 PM    Glucose, Fasting 97 05/08/2025 09:29 AM    Calcium 8.5 06/02/2025 03:18 PM    AST 27 06/02/2025 03:18 PM    ALT 44 06/02/2025 03:18 PM    Alkaline Phosphatase 47 06/02/2025 03:18 PM    Total Protein 6.9 06/02/2025 03:18 PM    Albumin 3.8 06/02/2025 03:18 PM    Total Bilirubin 0.38 06/02/2025 03:18 PM    eGFR 95 06/02/2025 03:18 PM   {SL AMB ONCOLOGY LABS (Optional):86167}    {Radiology Results Review (Optional):26112}    Administrative Statements   Encounter provider Cortney Morrell MD    The Patient is located at Home and in the following state in which I hold an active license [place].    The patient was identified by name and date of birth. Nino Pierresusie was informed that this is a telemedicine visit and that the visit is being conducted through the Epic Embedded platform. He agrees to proceed..  My office door was closed. No one else was in the room.  He acknowledged consent and understanding of privacy and security of the video platform. The patient has agreed to participate and understands they can discontinue the visit at any time.    I have spent a total time of 40 minutes in caring for this patient on the day of the visit/encounter including Prognosis, Documenting in the medical record, Reviewing/placing orders in the medical record (including tests, medications, and/or procedures), and Obtaining or reviewing history  , not including the time spent for establishing the audio/video connection.

## 2025-06-09 ENCOUNTER — NURSE TRIAGE (OUTPATIENT)
Age: 60
End: 2025-06-09

## 2025-06-09 DIAGNOSIS — C79.31 METASTASIS TO BRAIN (HCC): ICD-10-CM

## 2025-06-09 DIAGNOSIS — C34.11 SMALL CELL CARCINOMA OF UPPER LOBE OF RIGHT LUNG (HCC): Primary | ICD-10-CM

## 2025-06-09 NOTE — TELEPHONE ENCOUNTER
Received the following message from patient...    Good morning,     Nino did not sleep at all last night because of stabbing pain on his feet and he also had pain on the right side of his head. The pain on his head went away when we put blanket on it but his feet still the same and getting worse. We thought the pain is from being cold, but we are putting heated foot warmers now and still the same.  Is there any medication for the pain to give to him or we will just wait for his appointment on Wednesday?     And also, he can't lift his body anymore and is getting weaker and weaker so if it is possible he would like to ask if he can do the virtual appt instead of in person on Wednesday?     Please let me know,   Thank you.    Attempted to contact patient.  Left voicemail message with call back number.

## 2025-06-09 NOTE — TELEPHONE ENCOUNTER
Called and spoke to Nino. Asked if his pain in his feet was like a burning, pins and needles pain. He said it was but has resolved now. Offered to prescribe him gabapentin but he said he has tried it before and it did not help. Since his pain has currently resolved, Nino states he does not need anything at the moment. I also offered to refer him to palliative again to help with pain and symptom management. Nino is agreeable. I will place the order and also switch his appointment with Dr. Morrell on Wednesday to a virtual. Nino voiced understanding.

## 2025-06-09 NOTE — TELEPHONE ENCOUNTER
"Leslie calling in to reports patient feet pain is resolved but is unable to stand and walk on his own.  She reports patient has this pain at night time only.     REASON FOR CONVERSATION: Foot Pain    SYMPTOMS: bilateral feet pain at night for past 3 days.     OTHER HEALTH INFORMATION: onset 3 days ago with 8/10 pain in bilateral feet with swelling and color red at night only.  Jerod reports no pain now and feet look ok now no redness or swelling at present but patient is unable to stand on his own. Patient is asking for something for the pain. Patient has been elevating feet while laying down, patient gets some relief with a warm pad.     Patient reports has an appointment with Dr. Morrell for 6/11/25 and they are requesting a virtual appointment due to the pain.     PROTOCOL DISPOSITION: Discuss with Provider and Call Back Patient (overriding See Today in Office)    CARE ADVICE PROVIDED: await call back from office     PRACTICE FOLLOW-UP: Call back from office with recommendations.       Reason for Disposition   SEVERE pain (e.g., excruciating, unable to do any normal activities)    Answer Assessment - Initial Assessment Questions  1. ONSET: \"When did the pain start?\"       For past 3 nights.     2. LOCATION: \"Where is the pain located?\"       Bilateral feet.     3. PAIN: \"How bad is the pain?\"    (Scale 1-10; or mild, moderate, severe)      8/10 at night only and reports feet swelling     4. WORK OR EXERCISE: \"Has there been any recent work or exercise that involved this part of the body?\"       No    5. CAUSE: \"What do you think is causing the foot pain?\"      Unsure    6. OTHER SYMPTOMS: \"Do you have any other symptoms?\" (e.g., leg pain, rash, fever, numbness)    Protocols used: Foot Pain-Adult-[place]    "

## 2025-06-10 ENCOUNTER — TELEPHONE (OUTPATIENT)
Age: 60
End: 2025-06-10

## 2025-06-10 NOTE — TELEPHONE ENCOUNTER
Pt was recently seen inpatient by Loren Velazquez 4/7/25. Pt requested to schedule an appt to follow up and then asked to have this appt be done virtually since he was already evaluated in the hospital. Please advise if this is possible to do a virtual visit and call patient and wife back.

## 2025-06-11 ENCOUNTER — TELEMEDICINE (OUTPATIENT)
Age: 60
End: 2025-06-11
Payer: COMMERCIAL

## 2025-06-11 DIAGNOSIS — C79.31 METASTASIS TO BRAIN (HCC): ICD-10-CM

## 2025-06-11 DIAGNOSIS — C34.11 SMALL CELL CARCINOMA OF UPPER LOBE OF RIGHT LUNG (HCC): Primary | ICD-10-CM

## 2025-06-11 PROCEDURE — 99215 OFFICE O/P EST HI 40 MIN: CPT | Performed by: INTERNAL MEDICINE

## 2025-06-11 NOTE — PROGRESS NOTES
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 hilar lymph nodes (19 x 24 mm to 19 x 24 mm), increase in size of left infrahilar lymph node (14 x 14 mm to 17 x 18 mm), and interval development of new right adrenal nodule (2.5 x 1.9 cm) compared to PET/CT 3/15/2024.  Bilateral lower extremity ultrasound was negative for DVT.        Interval History: 5/13/2025     Patient presents for 2-week follow-up last seen 4/29/2025.  He is increasingly fatigued since C2D15 of tarlatamab on 4/29/2025 compared to prior infusions.  His fatigue after this infusion has been persistent and significant to the level that stairs feels like a challenge.  He has not fallen.  C3D1 is scheduled for 5/13/2025.  Most recent labs (5/8/2025) show WBC 12.9, Hb 12.2, , SCR 0.51, and all other aspects of CMP roughly WNL.  There is no new imaging.     6/11//2025     CT PET 6/6/2025    VISUALIZED BRAIN:  Multiple hypermetabolic brain lesions are seen in the left cerebellar with SUV of 12, demonstrates central photopenia  There is a hypermetabolic lesion within the right anterior mitchel with SUV of 14, new from the previous study  Hypermetabolic lesion additional multiple hypermetabolic lesions in the brain parenchyma     HEAD/NECK:  There is a physiologic distribution of FDG. No FDG avid  cervical adenopathy is seen.  CT images: Unremarkable.     CHEST:  Hypermetabolic mass right perihilar region and upper lung, larger now measures 5 x 4.6 cm, previously measuring 4.1 x 2.8 cm, demonstrates maximal SUV of 13, previously the maximal uptake was about 18 there is a new satellite area of hypermetabolism,   image 104 which demonstrates maximal SUV of 7.4  The dominant lesion extends into the right hilar region which demonstrates SUV of 13  There is a new hypermetabolic lesion left perihilar region with maximal SUV of 10, measuring 4 cm  Hypermetabolic lymph nodes which were noted on the previous study in the upper right paratracheal region, lower right paratracheal region have become non-hypermetabolic and not measurable.     CT images: Reticulation seen in the lungs with subpleural sparing. There is some architectural distortion, related to interstitial lung disease, unchanged  Residual right paratracheal lymph node, measuring about 6 mm. Previous measurement 1.6 cm  Severe coronary artery calcification seen  ABDOMEN:  There is enlargement of the bilateral adrenal glands with the right adrenal gland measuring 2.7 cm and left adrenal gland measuring 2.5 cm. These are hypermetabolic the right adrenal gland demonstrates maximal uptake of 22 and the left demonstrating   maximal uptake of the 19  There are new hypermetabolic mesenteric lymph nodes with largest lymph node measuring 3.4 cm x 3 cm with maximal SUV of about 17, seen in image 172  Focal area of uptake seen in the bowel in image 196 with SUV of 20 with no associated soft tissue correlate, image 196  CT images: Left renal cyst  No significant retroperitoneal lymph node enlargement seen     PELVIS:  No FDG avid soft tissue lesions are seen.  CT images: Unremarkable.     OSSEOUS STRUCTURES:  The previously noted focus of uptake in the right supra-acetabular region demonstrates increasing hypermetabolism with SUV of 13, previously the uptake was about  5.2  There are some foci of increased uptake in the right iliac bone with SUV of 13, image 202, new.  Hypermetabolic uptake seen in the L1 body with SUV of 9.8 with some sclerosis, image 154, new  Hypermetabolic uptake seen within the right transverse process of the T3 vertebral with maximal SUV of 8.2  CT images: Increasing sclerosis in the right acetabulum     IMPRESSION:  Progression of disease with development of multiple new hypermetabolic brain lesions, new left perihilar lesion, new bilateral adrenal lesions, new mesenteric lymphadenopathy and progression of bony metastatic disease     The previously noted dominant lesion in the right upper lung and perihilar region is larger though demonstrating decreasing FDG avidity     Focal area of uptake seen in the bowel in image 196 without associated soft tissue correlate, attention at follow-up suggested     Resolution of the previously noted right lower paratracheal lymph node              MRI brain 6/5/2025     Multiple scattered enhancing hemorrhagic and nonhemorrhagic metastatic lesions in bilateral cerebral hemispheres, bilateral basal ganglia, brainstem, and bilateral cerebellum (approximately 25-30 lesions) with associated diffuse scattered perilesional   vasogenic edema have increased in size since MRI brain 4/4/2025. Many of these lesions also have peripheral restricted diffusion like related to increase cellularity. 3 reference lesions (measured in long axis):  - 1.7 cm right paramedian parietal lesion (1001:151), increased size.  - 2.0 cm right ventral upper mitchel hemorrhagic lesion (1001:72), increased size.  - 3.1 cm left cerebellar lesion (1001:42), increased size.     No midline shift. No acute intracranial hemorrhage. No acute infarction.     Extensive diffuse confluent white matter changes in bilateral cerebral hemispheres, may be treatment-related changes and/or severe chronic microangiopathy.     VENTRICLES: Mild ventriculomegaly, unchanged.      SELLA AND PITUITARY GLAND:  Normal.     ORBITS:  Normal.     PARANASAL SINUSES:  Normal.     VASCULATURE:  Evaluation of the major intracranial vasculature demonstrates appropriate flow voids.     CALVARIUM AND SKULL BASE:  Normal.     EXTRACRANIAL SOFT TISSUES:  Normal.     IMPRESSION:     Multiple scattered enhancing hemorrhagic and nonhemorrhagic metastatic lesions in bilateral cerebral hemispheres, bilateral basal ganglia, brainstem, and bilateral cerebellum (approximately 25-30 lesions) with associated diffuse scattered perilesional   vasogenic edema have increased in size since MRI brain 4/4/2025.     Oncology History  Cancer Staging   Small cell lung cancer (HCC)  Staging form: Lung, AJCC 8th Edition  - Clinical stage from 3/5/2024: Stage IV (cT2, cN3, cM1) - Signed by Lizbeth López MD on 4/18/2024  Histopathologic type: Small cell carcinoma, NOS  Stage prefix: Initial diagnosis  Histologic grade (G): G3  Histologic grading system: 4 grade system      Oncology History   Small cell lung cancer (HCC)   3/5/2024 Initial Diagnosis     Small cell lung cancer (HCC)      3/5/2024 Biopsy     Final Diagnosis  A. Lung, Right Upper Lobe, biopsy:  - Poorly differentiated carcinoma with neuroendocrine features, favor large cell neuroendocrine carcinoma.  - See note.      3/5/2024 -  Cancer Staged     Staging form: Lung, AJCC 8th Edition  - Clinical stage from 3/5/2024: Stage IV (cT2, cN3, cM1) - Signed by Lizbeth López MD on 4/18/2024  Histopathologic type: Small cell carcinoma, NOS  Stage prefix: Initial diagnosis  Histologic grade (G): G3  Histologic grading system: 4 grade system         3/6/2024 - 3/19/2024 Radiation        Plan ID Energy Fractions Dose per Fraction (cGy) Dose Correction (cGy) Total Dose Delivered (cGy) Elapsed Days   Whole Brain 6X 10 / 10 300 0 3,000 13          4/8/2024 - 10/7/2024 Chemotherapy     alteplase (CATHFLO), 2 mg, Intracatheter, Every 1 Minute as needed, 8 of 10  cycles  palonosetron (ALOXI), 0.25 mg, Intravenous, Once, 3 of 3 cycles  Administration: 0.25 mg (4/29/2024), 0.25 mg (5/20/2024), 0.25 mg (6/17/2024)  fosaprepitant (EMEND) IVPB, 150 mg, Intravenous, Once, 4 of 4 cycles  Administration: 150 mg (4/8/2024), 150 mg (4/29/2024), 150 mg (5/20/2024), 150 mg (6/17/2024)  etoposide (TOPOSAR), 80 mg/m2 = 164 mg, Intravenous, Once, 4 of 4 cycles  Dose modification: 100 mg/m2 (original dose 80 mg/m2, Cycle 1, Reason: Anticipated Tolerance), 80 mg/m2 (original dose 80 mg/m2, Cycle 1, Reason: Anticipated Tolerance), 100 mg/m2 (original dose 80 mg/m2, Cycle 2, Reason: Anticipated Tolerance)  Administration: 164 mg (4/8/2024), 164 mg (4/9/2024), 164 mg (4/10/2024), 205 mg (4/29/2024), 205 mg (4/30/2024), 205 mg (5/1/2024), 200 mg (5/20/2024), 200 mg (5/21/2024), 200 mg (5/22/2024), 200 mg (6/17/2024), 200 mg (6/18/2024), 200 mg (6/19/2024)  CARBOplatin (PARAPLATIN) IVPB (GO AUC DOSING), 750 mg (574.7 % of original dose 130.5 mg), Intravenous, Once, 4 of 4 cycles  Dose modification: 130.5 mg (original dose 130.5 mg, Cycle 1, Reason: Anticipated Tolerance),   (original dose 130.5 mg, Cycle 1, Reason: Other (Must fill in a comment))  Administration: 750 mg (4/8/2024), 701.5 mg (4/29/2024), 664 mg (5/20/2024), 633 mg (6/17/2024)  durvalumab (IMFINZI) IVPB, 1,500 mg, Intravenous, Once, 8 of 10 cycles  Administration: 1,500 mg (4/8/2024), 1,500 mg (4/29/2024), 1,500 mg (5/20/2024), 1,500 mg (6/17/2024), 1,500 mg (7/10/2024), 1,500 mg (8/13/2024), 1,500 mg (9/9/2024), 1,500 mg (10/7/2024)      11/19/2024 - 1/21/2025 Chemotherapy     alteplase (CATHFLO), 2 mg, Intracatheter, Every 1 Minute as needed, 4 of 6 cycles  Lurbinectedin (ZEPZELCA) IVPB, 3.2 mg/m2 = 6.4 mg, Intravenous, Once, 4 of 6 cycles  Administration: 6.4 mg (11/19/2024), 6.4 mg (12/10/2024), 6.4 mg (12/31/2024), 6.4 mg (1/21/2025)      3/17/2025 -  Chemotherapy     tarlatamab (Imdelltra) 10 mg IVPB, 10 mg, 3 of 10  cycles  Administration: 10 mg (3/24/2025), 10 mg (3/31/2025), 10 mg (4/14/2025), 10 mg (4/29/2025), 10 mg (5/13/2025), 10 mg (5/27/2025)  tarlatamab (Imdelltra) 1 mg IVPB, 1 mg, 1 of 1 cycle  Administration: 1 mg (3/17/2025)        Oncology History         Pertinent Medical History  02/09/25:   Discussion  extended stage SCLC/Initial treatment      Most patients with eSCLC have disease relapse; thus this is incurable.  However the cornerstone of treatment remains platinum based.  Response rates are as high as 61% with CR of 10%.  Responses to chemotherapy are frequent and rapid with median time to best response in approximately 4.6 weeks.  Unfortunately, these are no durable responses and the median time to progression (TTP) is 4 months with OS 8.6 months.  Despite predictable relapse randomized studies have not shown a survival benefit for prolonged administration of chemotherapy and thus optimal dosing is 4-6 cycles.  There has been a study comparing Cisplatin to Carboplatin/noninferiority demonstrating that there is no difference with PFS, OS CARTER and thus can be used interchangeably although Cisplatin remains preferred.     The first study to demonstrate any improvement in OS in the first line treatment of eSCLC in several decades was Impower 133 a randomized Phase III trial of Carboplatin/Etoposide with the PDL1 inhibitor atezolizumab.  This was a global trial with patients with eSCLC getting 4 cycles of Carbo/Etoposide with or without concurrent atezolizumab/placebo with maintenance afterward.  The addition of atezolizumab led to significant improvement in OS 12.3 vs 10.3 months HR 0.7 as well as PFS .  OS was independent of PDL1 expression.  The CASPIAN study demonstrated that durvalumab with first suzi therapy was similar in terms of response with OS at 13 months vs 10.3 months.  Either agent can be used in this setting.  KEYNOTE 604 with Pembrolizumab was negative for OS benefit which may be a design/patient  flaw as generally these agents are similar in terms of efficacy.          Discussion Large cell neuroendocrine        The clinical presentation of large cell neuroendocrine or ?C??? appears similar to the other high-grade neuroendocrine pulmonary tumor, small cell lung ?????r (?C?C), with notable exceptions: primary LCNECs tend to be located peripherally rather than centrally, and presentation of LCNECs with early-stage (I to II) disease is more common than for S??C (approximately 25 versus less than 5 percent). Thus, patients with ??N?C more commonly undergo resection.       For patients with stage IV disease, we suggest using a standard S?LC regimen (etoposide plus a either carboplatin or cisplatin) for four to six cycles, independent of retinoblastoma gene 1 (RB1) status.     However, prognosis for this tumor type is poor regardless of choice of ?h?m?th?r?py, and other options also are reasonable.     The approach of K[facility] is based on integrative profiling that clearly identifies ?CN?? to be a neuroendocrine tumor most similar to ?CL?     Data from a prospective phase II trial of 29 patients treated with etoposide/cisplatin along with review of published retrospective experience with ?h?m?ther?py in stage IV ??N?? demonstrated that major efficacy endpoints were similar to SCL?, with the exception that overall response rate is lower (34 percent) than the approximate 60 percent rate observed in S?L?     Thus, these are treated as small cell     Discussion progression/second line      Sensitive vs > 6 months      Single-agent ?h?m?th?r?py is standard second-line treatment after ?l?ti?um-based ?h?m?th?rap? and immunotherapy. Lurbinectedin represents our preferred initial approach, although tarlatamab or camptothecins are acceptable alternatives.      For patients that are not eligible for, cannot tolerate, or progress on tarlatamab, other ?h?m?ther?p? agents are available        Data below represent efficacy of  ?h?m?thera?? after progression on initial combination ?h?m?ther?py. These trials were conducted prior to introduction of immunotherapy into the management of SC?C.     Lurbinectedin is an alkylating agent that has received accelerated approval by the [place] Food and Drug Administration (FDA) for patients with metastatic ?CLC with disease progression on or after platin?m-based ?h?m?th?r??? [3]. It is given at a dose of 3.2 mg/m2 intravenously (IV) every three weeks.        In an open-label study of 105 patients with SCL? and no brain m?t??ta?es who progressed on or after ?l?tinum-based ?h?m?th?rap?, 8 percent of whom had prior immunotherapy in addition to plati?um-based ?h?m?th?r?py, the overall response rate (CARTER) with lurbinectedin according to independent review committee was 33 percent The median duration of response was 5.1 months, and 25 percent of responding patients experienced a duration of response exceeding six months. Among patients with resistant relapse (?h?m?th?r?py-free interval <90 days), the CARTER was 22 percent with a progression-free survival (PFS) of 2.6 months and an overall survival (OS) of 5 months. For patients with sensitive relapse, the CARTER was 45 percent with a PFS of 4.5 months and an OS of 11.9 months      All patients in this study received a prespecified antiemetic regimen consisting of a corticosteroid and serotonin antagonist. Serious adverse reactions occurred in 10 percent of patients, of which neutropenia and febrile neutropenia were the most common (5 percent for each).        Tarlatamab is a reasonable alternative to lurbinectedin as a second-line treatment option, extrapolating from data in the third-line setting. These data are discussed below.      Topotecan has been shown to increase survival compared with best supportive care (BSC) and results in greater symptom management relative to a multiagent regimen [5,6]. It is approved by the FDA for relapses that occur after 45 days from  the completion of ?h?m?ther?p?. ????t???? also has activity against brain m?t?st???s. The primary toxicities of t???te??? are hematologic, with most patients experiencing grade 3 or 4 neutropenia, anemia, or thrombocytopenia.     In a trial in which 211 patients with relapse >=60 days after completion of first-line therapy (usually pl?tinum plus etoposide) were randomly assigned to daily IV topotecan or cyclophosphamide, doxorubicin, and vincristine (CAV), disease outcomes were similar between the groups, but cancer symptoms were improved with t???t?can  For t???te??? versus CAV, the ORRs were 24 versus 18 percent, the median time to progression was 13 versus 12 weeks, and the median survival was 25 versus 24.7 weeks, differences that were not statistically significant. Control of several symptoms including dyspnea, anorexia, hoarseness, and fatigue was improved with t???t??a?. Severe neutropenia was less common with t???te?an (38 versus 51 percent), but grade 3 or 4 thrombocytopenia and anemia occurred more frequently (9.8 versus 1.4 percent and 17.7 versus 7.2 percent, respectively). The improvement in symptom control led to its FDA approval.\         Oral and IV formulations of topotecan have been extensively evaluated for the second-line treatment of relapsed ?CLC and are found to be equally effective     In a phase III noninferiority trial, 304 patients with chemosensitive relapse (>90 days) of ???C were randomly assigned to oral (2.3 mg/m2 daily for five days) or IV (1.5 mg/m2 daily for five days) topotecan every three weeks. Response rates were similar (18 versus 22 percent), as were median and one-year survival rates (33 versus 35 weeks and 33 versus 29 percent, respectively) [9]. The toxicity profiles of the two regimens were similar as well.        Topotecan daily times five every three weeks rather than a once-weekly schedule, given better efficacy in cross-trial comparisons. In the  phase II trial,  "192 patients with previously treated ??L? were randomly assigned to weekly t???te?an with or without aflibercept after progression on a ?l?ti??m-based regimen. Only two partial responses (1 percent) were observed in the entire study population (both in patients who also received ?flib?r?e?t), and the median OS was approximately five months. Response rates of the daily times five every three weeks schedule were higher in other studies, on the order of 20 percent  These data are discussed above.         As an alternative to topotecan, three small clinical trials have evaluated irinotecan as second-line therapy, with objective response rates ranging from 16 to 47 percent . As an example, in one study, 44 patients (17 with sensitive relapse and 27 with resistant or refractory disease) were treated with iri??t?c?n 125 mg/m2 weekly times four with a two-week break. The overall objective response rate was 16 percent. The response rate in patients with resistant or refractory disease was 4 percent. The response rate was 35 percent in the patients with sensitive relapse, with a median survival of 6.8 months. Primary toxicities of iri??te??n included diarrhea and neutropenia. Results for those with sensitive relapse are discussed below.         Tarlatamab, is a bispecific T-cell engager immunotherapy that directs the patient's T cells to cancer cells expressing delta-like ligand 3 (overexpressed in approximately 90 percent of SCLCs). It is approved by the [place] Food and  Drug Administration (FDA) at a dose of 10 mg intravenously every two weeks (after an initial \"step-up\" dosing schedule) . The approval is based on response rates and is contingent upon results of a confirmatory trial.     Tarlatamab has shown promising activity in a phase II trial among patients with disease that had relapsed after, or was refractory to, one platin?m-based treatment regimen and at least one other line of therapy. At a dose of 10 mg intravenously " every two weeks, the response rate was 40 percent, median progression-free survival was 4.9 months, and overall survival was 14.3 months  With longer follow-up (median 12.1 months), the response rate was 35 percent and median overall survival was 20 months.     The most common adverse events in these patients were cytokine-release syndrome (CRS; in 51 percent), decreased appetite (29 percent), and pyrexia (35 percent). Grade 3 CRS occurred in 1 percent.     In a pooled safety analysis reported in the FDA label         ?CRS occurred in 55 percent, mostly occurring during treatment cycle 1. The median time to onset of any grade CRS from last exposure to tarlatamab was 13.5 hours. CRS can manifest as pyrexia, hypotension, fatigue, tachycardia, headache, hypoxia, nausea, and vomiting. Potentially life-threatening complications of CRS include cardiac dysfunction, acute respiratory distress syndrome, neurologic toxicity, kidney and/or hepatic failure, and disseminated intravascular coagulation. Management of CRS is discussed elsewhere        ?Neurologic toxicity occurred in 47 percent, including 10 percent with grade 3 toxicity. The most frequent neurologic toxicities included headache (14 percent), peripheral neuropathy (7 percent), dizziness (7 percent), and insomnia (6 percent). Immune effector cell-associated neurotoxicity syndrome (ICANS), which can present as confusion, lethargy, or disorientation (among other symptoms) occurred in 9 percent. It most frequently occurred following cycle 2 day 1 and had a median time of onset from first dose of 29.5 days. Further discussion of clinical features and management of ICANS is found elsewhere             Review of Systems   Constitutional:  Positive for fatigue.   HENT: Negative.     Eyes: Negative.    Respiratory: Negative.     Cardiovascular: Negative.    Gastrointestinal: Negative.    Endocrine: Negative.    Genitourinary: Negative.    Musculoskeletal: Negative.     Neurological:  Positive for headaches.   Hematological: Negative.    Psychiatric/Behavioral: Negative.                  03/19/25:      04/11/25:      04/17/25:      05/26/25:      06/08/25:        Review of Systems   Constitutional:  Positive for appetite change, fatigue and unexpected weight change.   HENT: Negative.     Respiratory:  Positive for shortness of breath.    Gastrointestinal: Negative.    Musculoskeletal: Negative.    Neurological:  Positive for dizziness, weakness and light-headedness.   Psychiatric/Behavioral: Negative.              Objective    There were no vitals taken for this visit.     Pain Screening:     ECOG   4  Physical Exam  telemedicine no exam      Labs: I have reviewed the following labs:        Lab Results   Component Value Date/Time     WBC 12.22 (H) 06/02/2025 03:18 PM     RBC 3.67 (L) 06/02/2025 03:18 PM     Hemoglobin 11.9 (L) 06/02/2025 03:18 PM     Hematocrit 36.1 (L) 06/02/2025 03:18 PM     MCV 98 06/02/2025 03:18 PM     MCH 32.4 06/02/2025 03:18 PM     RDW 16.0 (H) 06/02/2025 03:18 PM     Platelets 285 06/02/2025 03:18 PM     Segmented % 91 (H) 04/24/2025 12:11 PM     Lymphocytes % 6 (L) 06/02/2025 03:18 PM     Lymphocytes % 3 (L) 04/24/2025 12:11 PM     Monocytes % 5 06/02/2025 03:18 PM     Monocytes % 5 04/24/2025 12:11 PM     Eosinophils % 0 06/02/2025 03:18 PM     Eosinophils Relative 0 04/24/2025 12:11 PM     Basophils % 0 06/02/2025 03:18 PM     Basophils Relative 0 04/24/2025 12:11 PM     Immature Grans % 1 04/24/2025 12:11 PM     Absolute Neutrophils 12.01 (H) 04/24/2025 12:11 PM            Lab Results   Component Value Date/Time     Potassium 4.0 06/02/2025 03:18 PM     Chloride 102 06/02/2025 03:18 PM     CO2 23 06/02/2025 03:18 PM     BUN 32 (H) 06/02/2025 03:18 PM     Creatinine 0.83 06/02/2025 03:18 PM     Glucose, Fasting 97 05/08/2025 09:29 AM     Calcium 8.5 06/02/2025 03:18 PM     AST 27 06/02/2025 03:18 PM     ALT 44 06/02/2025 03:18 PM     Alkaline  Phosphatase 47 06/02/2025 03:18 PM     Total Protein 6.9 06/02/2025 03:18 PM     Albumin 3.8 06/02/2025 03:18 PM     Total Bilirubin 0.38 06/02/2025 03:18 PM     eGFR 95 06/02/2025 03:18 PM        Administrative Statements   Encounter provider Cortney Morrell MD    The Patient is located at Home and in the following state in which I hold an active license [place].    The patient was identified by name and date of birth. Nino DEBORA Silva was informed that this is a telemedicine visit and that the visit is being conducted through the Epic Embedded platform. He agrees to proceed..  My office door was closed. No one else was in the room.  He acknowledged consent and understanding of privacy and security of the video platform. The patient has agreed to participate and understands they can discontinue the visit at any time.    I have spent a total time of 40 minutes in caring for this patient on the day of the visit/encounter including Diagnostic results, Prognosis, Impressions, Counseling / Coordination of care, Documenting in the medical record, Reviewing/placing orders in the medical record (including tests, medications, and/or procedures), and Obtaining or reviewing history  , not including the time spent for establishing the audio/video connection.

## 2025-06-12 ENCOUNTER — TELEPHONE (OUTPATIENT)
Age: 60
End: 2025-06-12

## 2025-06-12 DIAGNOSIS — C79.31 LUNG CANCER METASTATIC TO BRAIN (HCC): Primary | ICD-10-CM

## 2025-06-12 DIAGNOSIS — C34.90 LUNG CANCER METASTATIC TO BRAIN (HCC): Primary | ICD-10-CM

## 2025-06-12 RX ORDER — DEXAMETHASONE 4 MG/1
4 TABLET ORAL 2 TIMES DAILY WITH MEALS
Qty: 60 TABLET | Refills: 0 | Status: SHIPPED | OUTPATIENT
Start: 2025-06-12

## 2025-06-12 NOTE — TELEPHONE ENCOUNTER
Requesting Decadron sent to:    [facility]  [address] [place] [address] [phone]    Patient's wife states it was discussed at visit yesterday dosage would be increased.

## 2025-06-13 ENCOUNTER — TELEPHONE (OUTPATIENT)
Dept: HEMATOLOGY ONCOLOGY | Facility: CLINIC | Age: 60
End: 2025-06-13

## 2025-06-16 ENCOUNTER — PATIENT OUTREACH (OUTPATIENT)
Dept: CASE MANAGEMENT | Facility: HOSPITAL | Age: 60
End: 2025-06-16

## 2025-06-16 DIAGNOSIS — C34.90 LUNG CANCER METASTATIC TO BRAIN (HCC): Primary | ICD-10-CM

## 2025-06-16 DIAGNOSIS — C79.31 LUNG CANCER METASTATIC TO BRAIN (HCC): Primary | ICD-10-CM

## 2025-06-16 NOTE — PROGRESS NOTES
"PN reached out to LSW to inquire about in home services for the pt.  As per the PN, the pt has stopped treatment and the spouse has been providing care.      LSW made outreach to the spouse, Alvina, to discuss.  Alvina states that the pt is weak, no longer able to bear weight and has been bed bound.  Pt has been hallucinating and she has been the primary caregiver.  LSW asked Alvina if she was open to taking about hospice care and she states that she thought this had been ordered.  As per chart, it appeared to have been discussed, however an order was never placed.  MONICAW reached out to FRAN Decker, while on the call and Brianne states the plan was for the pt to see Palliative first and then transition to hospice care.  MONICAW explained that the pt is not able to even be seen until 7/28 in Palliative and he is having significant changes, as per the pt's wife.  Brianne was placing the order this day and MONICAW relayed this information to the pt's wife.  Alvina asked how soon hospice could start and MONICAW explained that she should expect a call either today ort tomorrow.  Alvina proceeded to explain that she needed to return to work and was hopeful to do this when hospice was involved.  MONICAW explained how hospice worked and how they were in the house once a day for about an hour.  Alvina states that if someone was able to assist with personal care, his siblings are present daily and will be able to help.  The pt's siblings are retired and they have moved into the pt's sisters home.  GLENN also explained that there are caregivers that can be hired for private duty however, she was not interested in any list at this time.  LSW spent time listening to Alvina as she spoke of the pt;s decline.  She did talk about end-of life and was appropriate with her anticipatory grief.  LSW inquired about their daughter and she states that she \"has her moments but they are surrounded by family.\"  LSW explained the emotional support that hospice can provide as well.  " Significant support provided.

## 2025-06-16 NOTE — PROGRESS NOTES
MONICAW received message from FRAN Decker that pt's insurance is not accepted by [facility].  Pt was referred to [facility]. MONICAW spoke with Toshia Sims @ [facility].  Services to start this evening.  MONICAW sent all documentation.

## 2025-06-19 ENCOUNTER — TELEPHONE (OUTPATIENT)
Dept: PALLIATIVE MEDICINE | Facility: CLINIC | Age: 60
End: 2025-06-19

## 2025-06-19 ENCOUNTER — PATIENT MESSAGE (OUTPATIENT)
Age: 60
End: 2025-06-19

## 2025-06-19 DIAGNOSIS — J44.9 CHRONIC OBSTRUCTIVE PULMONARY DISEASE, UNSPECIFIED COPD TYPE (HCC): Primary | ICD-10-CM

## 2025-06-19 DIAGNOSIS — J84.9 ILD (INTERSTITIAL LUNG DISEASE) (HCC): ICD-10-CM

## 2025-06-19 LAB
DME PARACHUTE DELIVERY DATE REQUESTED: NORMAL
DME PARACHUTE ITEM DESCRIPTION: NORMAL
DME PARACHUTE ORDER STATUS: NORMAL
DME PARACHUTE SUPPLIER NAME: NORMAL
DME PARACHUTE SUPPLIER PHONE: NORMAL

## 2025-06-19 NOTE — TELEPHONE ENCOUNTER
Dr Rivero will not be available at his scheduled appt 7/17, left message to reschedule this appt with Dr Rivero.

## 2025-06-20 ENCOUNTER — TELEPHONE (OUTPATIENT)
Age: 60
End: 2025-06-20

## 2025-06-20 NOTE — TELEPHONE ENCOUNTER
Please cancel all treatment appointments. Patient will be transitioning to palliative and then hospice.

## 2025-06-24 ENCOUNTER — HOSPITAL ENCOUNTER (OUTPATIENT)
Dept: INFUSION CENTER | Facility: HOSPITAL | Age: 60
End: 2025-06-24
Attending: INTERNAL MEDICINE

## 2025-06-25 ENCOUNTER — PATIENT MESSAGE (OUTPATIENT)
Age: 60
End: 2025-06-25

## 2025-06-25 DIAGNOSIS — J44.9 CHRONIC OBSTRUCTIVE PULMONARY DISEASE, UNSPECIFIED COPD TYPE (HCC): Primary | ICD-10-CM
